# Patient Record
Sex: FEMALE | Race: WHITE | NOT HISPANIC OR LATINO | Employment: FULL TIME | URBAN - NONMETROPOLITAN AREA
[De-identification: names, ages, dates, MRNs, and addresses within clinical notes are randomized per-mention and may not be internally consistent; named-entity substitution may affect disease eponyms.]

---

## 2017-07-14 ENCOUNTER — RADIANT APPOINTMENT (OUTPATIENT)
Dept: GENERAL RADIOLOGY | Facility: CLINIC | Age: 52
End: 2017-07-14
Attending: FAMILY MEDICINE
Payer: COMMERCIAL

## 2017-07-14 DIAGNOSIS — M79.631 PAIN OF RIGHT FOREARM: Primary | ICD-10-CM

## 2017-07-14 DIAGNOSIS — M79.631 PAIN OF RIGHT FOREARM: ICD-10-CM

## 2017-07-14 PROCEDURE — 73090 X-RAY EXAM OF FOREARM: CPT | Mod: RT

## 2018-04-03 DIAGNOSIS — Z20.1 EXPOSURE TO TB: ICD-10-CM

## 2018-04-03 DIAGNOSIS — Z20.1 EXPOSURE TO TB: Primary | ICD-10-CM

## 2018-04-03 PROCEDURE — 36415 COLL VENOUS BLD VENIPUNCTURE: CPT | Performed by: FAMILY MEDICINE

## 2018-04-03 PROCEDURE — 86480 TB TEST CELL IMMUN MEASURE: CPT | Performed by: FAMILY MEDICINE

## 2018-04-05 LAB
M TB TUBERC IFN-G BLD QL: NEGATIVE
M TB TUBERC IFN-G/MITOGEN IGNF BLD: 0.02 IU/ML

## 2018-09-19 ENCOUNTER — RADIANT APPOINTMENT (OUTPATIENT)
Dept: MAMMOGRAPHY | Facility: CLINIC | Age: 53
End: 2018-09-19
Payer: COMMERCIAL

## 2018-09-19 DIAGNOSIS — Z12.31 VISIT FOR SCREENING MAMMOGRAM: ICD-10-CM

## 2018-09-19 DIAGNOSIS — Z12.11 SPECIAL SCREENING FOR MALIGNANT NEOPLASMS, COLON: Primary | ICD-10-CM

## 2018-09-19 PROCEDURE — 77067 SCR MAMMO BI INCL CAD: CPT | Mod: TC

## 2018-09-20 NOTE — PROGRESS NOTES
[facility] OB/GYN     CC: Annual Exam    HPI: Lucina Santamaria is a 52 year old  female here for annual exam. 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 Bleeding lasted for 2 days. Described as medium flow, dark brown. Denies any STI hx. Has had only 1x partner for 17 years (her ), but has not had intercourse in >1 yr. S/P tubal ligation. Denies any vaginal dryness, although reports intermittent issues with yeast infections and BV - which she treats with OTC medications. No issues currently. Hx of PE on OCPs.   2) Skin tag: had a skin tag near her anus that bothersome, itchy and gets caught on her underwear.    PAP smear history: >5 yrs ago, denies any abnormal PAP smears   Last mammogram: Mammogram was normal, 2018  Colon Cancer: Recent nl fit testing.     Review Of Systems  A 10 pt ROS was completed and found to be negative unless mentioned in the HPI.     Past Medical History:   Diagnosis Date     Gestational diabetes      Pulmonary emboli (H)      Patient Active Problem List   Diagnosis     CARDIOVASCULAR SCREENING; LDL GOAL LESS THAN 130     Past Surgical History:   Procedure Laterality Date     APPENDECTOMY       BREAST BIOPSY, RT/LT      right breast - benign      SECTION       GYN SURGERY      tubal ligation     ORTHOPEDIC SURGERY      radius, ulnar - open fracture     Family History   Problem Relation Age of Onset     Diabetes Mother      Cancer Father      Arthritis Maternal Grandmother      rheumatoid arthritis     Diabetes Paternal Grandmother      Depression Brother      bipolar     Cancer       breast   Denies any family history of endometrial, ovarian or cervical cancer.     Social History     Social History     Marital status: Single     Spouse name: N/A     Number of  "children: N/A     Years of education: N/A     Occupational History     Not on file.     Social History Main Topics     Smoking status: Never Smoker     Smokeless tobacco: Never Used     Alcohol use Yes      Comment: rare     Drug use: No     Sexual activity: Yes     Partners: Male     Birth control/ protection: Surgical     Other Topics Concern     Not on file     Social History Narrative   Partnered for 17 years. 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     No known allergies  Current Outpatient Prescriptions   Medication Sig Dispense Refill     ASPIRIN CAPS 81 MG OR one capsule by mouth daily 100 3     Calcium Carbonate-Vit D-Min (CALCIUM 1200 PO) Take  by mouth.       Cholecalciferol (VITAMIN D) 2000 UNIT tablet Take 1 tablet by mouth daily.       Multiple Vitamin (MULTIVITAMIN) per tablet Take 1 tablet by mouth daily.       Objective:    Exam:  /75 (BP Location: Left arm, Patient Position: Chair, Cuff Size: Adult Large)  Pulse 73  Temp 98.1  F (36.7  C) (Tympanic)  Resp 16  Ht 5' 6.5\" (1.689 m)  Wt 316 lb 3.2 oz (143.4 kg)  BMI 50.27 kg/m2  Constitutional: Healthy appearing morbidly obese female, no acute distress  Cardiovascular: warm and well-perfused  Respiratory: breathing comfortably on room air   Gastrointestinal: Abdomen soft, no masses, organomegaly  Pelvic Exam - : External genitalia normal well-estrogenized, healthy tissue.  No obvious excoriations, lesions, or rashes. Bartholins, urethra, skeins normal.  Normal pink vaginal mucosa. Cervix without lesions. Uterus sounded to 10cm in length. Bimanual exam limited by obesity, but uterus anteverted with no appreciable adnexal enlargement.   Skin: 5mm skin tag, darkly pigmented, inferior to anus at 5 o'clock   Psychiatric: mentation appears normal and affect normal/bright    UPT: negative    ASSESSMENT: 52 year old  female here for annual routine pap and checkup.     PLAN:   -Cervical cancer screening: history " of abnormal PAP smear so plan for co-testing q5 yrs. Collected now.  -Breast cancer screening: no increased risk of breast cancer, plan for annual mammogram - completed 9/19  -Colon cancer screening: recent FIT testing nl   -Annual exam labs drawn: TSH, HgbA1c, lipid panel - return when fasting.   -Hypertension: reviewed BP with patient and she plans to f/u with primary care NP at her own clinic   -Smoking: pt is a non-smoker  -Weight: Body mass index is 50.27 kg/(m^2)., pt has plans to complete bariatric surgery and has completed the training to do so, waiting until children are out of HS.   -Immunizations: plan for flu shot at her own clinic  -Postmenopausal bleeding: UPT neg. Given BMI Body mass index is 50.27 kg/(m^2). pt is a high risk for endometrial cancer. Endometrial biopsy obtained today. Consider pelvic US to assess for fibroid or polyp once GYN malignancy or hyperplasia is ruled out. S/p biopsy - see procedure note  -Skin tag: s/p removal - see procedure note    Yumi Kay MD  Obstetrics and Gynecology  [facility]

## 2018-09-21 DIAGNOSIS — Z12.11 SPECIAL SCREENING FOR MALIGNANT NEOPLASMS, COLON: ICD-10-CM

## 2018-09-21 PROCEDURE — 82274 ASSAY TEST FOR BLOOD FECAL: CPT | Performed by: FAMILY MEDICINE

## 2018-09-22 LAB — HEMOCCULT STL QL IA: NEGATIVE

## 2018-09-24 ENCOUNTER — OFFICE VISIT (OUTPATIENT)
Dept: OBGYN | Facility: CLINIC | Age: 53
End: 2018-09-24
Payer: COMMERCIAL

## 2018-09-24 VITALS
BODY MASS INDEX: 45.99 KG/M2 | TEMPERATURE: 98.1 F | HEART RATE: 73 BPM | DIASTOLIC BLOOD PRESSURE: 75 MMHG | RESPIRATION RATE: 16 BRPM | HEIGHT: 67 IN | SYSTOLIC BLOOD PRESSURE: 141 MMHG | WEIGHT: 293 LBS

## 2018-09-24 DIAGNOSIS — Z01.411 ENCOUNTER FOR GYNECOLOGICAL EXAMINATION WITH ABNORMAL FINDING: ICD-10-CM

## 2018-09-24 DIAGNOSIS — E66.01 MORBID OBESITY (H): ICD-10-CM

## 2018-09-24 DIAGNOSIS — N95.0 POST-MENOPAUSAL BLEEDING: Primary | ICD-10-CM

## 2018-09-24 DIAGNOSIS — K64.4 SKIN TAG OF ANUS: ICD-10-CM

## 2018-09-24 LAB — HCG UR QL: NEGATIVE

## 2018-09-24 PROCEDURE — G0145 SCR C/V CYTO,THINLAYER,RESCR: HCPCS | Performed by: OBSTETRICS & GYNECOLOGY

## 2018-09-24 PROCEDURE — 88305 TISSUE EXAM BY PATHOLOGIST: CPT | Performed by: OBSTETRICS & GYNECOLOGY

## 2018-09-24 PROCEDURE — 99386 PREV VISIT NEW AGE 40-64: CPT | Mod: 25 | Performed by: OBSTETRICS & GYNECOLOGY

## 2018-09-24 PROCEDURE — 11200 RMVL SKIN TAGS UP TO&INC 15: CPT | Performed by: OBSTETRICS & GYNECOLOGY

## 2018-09-24 PROCEDURE — 58100 BIOPSY OF UTERUS LINING: CPT | Performed by: OBSTETRICS & GYNECOLOGY

## 2018-09-24 PROCEDURE — 99214 OFFICE O/P EST MOD 30 MIN: CPT | Mod: 25 | Performed by: OBSTETRICS & GYNECOLOGY

## 2018-09-24 PROCEDURE — 88304 TISSUE EXAM BY PATHOLOGIST: CPT | Performed by: OBSTETRICS & GYNECOLOGY

## 2018-09-24 PROCEDURE — 81025 URINE PREGNANCY TEST: CPT | Performed by: OBSTETRICS & GYNECOLOGY

## 2018-09-24 PROCEDURE — 87624 HPV HI-RISK TYP POOLED RSLT: CPT | Performed by: OBSTETRICS & GYNECOLOGY

## 2018-09-24 NOTE — LETTER
October 2, 2018    Lucina Santamaria  [address]  [place] [address]    Dear Lucina,  We are happy to inform you that your PAP smear result from 9/24/18 is normal.  We are now able to do a follow up test on PAP smears. The DNA test is for HPV (Human Papilloma Virus). Cervical cancer is closely linked with certain types of HPV. Your results showed no evidence of high risk HPV.  Therefore we recommend you return in 3 years for your next pap smear.  You will still need to return to the clinic every year for an annual exam and other preventive tests.  If you have additional questions regarding this result, please call our registered nurse, Tanesha at [phone].  Sincerely,    Yumi Kay MD/leslie

## 2018-09-24 NOTE — PATIENT INSTRUCTIONS
Please go to the lab and have your blood drawn when you are fasting.   We will contact you with the results of your biopsies.   It was a pleasure to meet you!  Sincerely,   Dr. Kay       Preventive Health Recommendations  Female Ages 50 - 64    Yearly exam: See your health care provider every year in order to  o Review health changes.   o Discuss preventive care.    o Review your medicines if your doctor has prescribed any.      Get a Pap test every three years (unless you have an abnormal result and your provider advises testing more often).    If you get Pap tests with HPV test, you only need to test every 5 years, unless you have an abnormal result.     You do not need a Pap test if your uterus was removed (hysterectomy) and you have not had cancer.    You should be tested each year for STDs (sexually transmitted diseases) if you're at risk.     Have a mammogram every 1 to 2 years.    Have a colonoscopy at age 50, or have a yearly FIT test (stool test). These exams screen for colon cancer.      Have a cholesterol test every 5 years, or more often if advised.    Have a diabetes test (fasting glucose) every three years. If you are at risk for diabetes, you should have this test more often.     If you are at risk for osteoporosis (brittle bone disease), think about having a bone density scan (DEXA).    Shots: Get a flu shot each year. Get a tetanus shot every 10 years.    Nutrition:     Eat at least 5 servings of fruits and vegetables each day.    Eat whole-grain bread, whole-wheat pasta and brown rice instead of white grains and rice.    Get adequate Calcium and Vitamin D.     Lifestyle    Exercise at least 150 minutes a week (30 minutes a day, 5 days a week). This will help you control your weight and prevent disease.    Limit alcohol to one drink per day.    No smoking.     Wear sunscreen to prevent skin cancer.     See your dentist every six months for an exam and cleaning.    See your eye doctor every 1 to  2 years.

## 2018-09-24 NOTE — NURSING NOTE
"Initial /75 (BP Location: Left arm, Patient Position: Chair, Cuff Size: Adult Large)  Pulse 73  Temp 98.1  F (36.7  C) (Tympanic)  Resp 16  Ht 5' 6.5\" (1.689 m)  Wt 316 lb 3.2 oz (143.4 kg)  BMI 50.27 kg/m2 Estimated body mass index is 50.27 kg/(m^2) as calculated from the following:    Height as of this encounter: 5' 6.5\" (1.689 m).    Weight as of this encounter: 316 lb 3.2 oz (143.4 kg). .      "

## 2018-09-24 NOTE — MR AVS SNAPSHOT
After Visit Summary   9/24/2018    Lucina Santamaria    MRN: [id number]           Patient Information     Date Of Birth          1965        Visit Information        Provider Department      9/24/2018 10:30 AM Yumi Kay MD [facility]        Today's Diagnoses     Post-menopausal bleeding    -  1    Encounter for gynecological examination with abnormal finding        Morbid obesity (H)        Skin tag of anus          Care Instructions    Please go to the lab and have your blood drawn when you are fasting.   We will contact you with the results of your biopsies.   It was a pleasure to meet you!  Sincerely,   Dr. Kay       Preventive Health Recommendations  Female Ages 50 - 64    Yearly exam: See your health care provider every year in order to  o Review health changes.   o Discuss preventive care.    o Review your medicines if your doctor has prescribed any.      Get a Pap test every three years (unless you have an abnormal result and your provider advises testing more often).    If you get Pap tests with HPV test, you only need to test every 5 years, unless you have an abnormal result.     You do not need a Pap test if your uterus was removed (hysterectomy) and you have not had cancer.    You should be tested each year for STDs (sexually transmitted diseases) if you're at risk.     Have a mammogram every 1 to 2 years.    Have a colonoscopy at age 50, or have a yearly FIT test (stool test). These exams screen for colon cancer.      Have a cholesterol test every 5 years, or more often if advised.    Have a diabetes test (fasting glucose) every three years. If you are at risk for diabetes, you should have this test more often.     If you are at risk for osteoporosis (brittle bone disease), think about having a bone density scan (DEXA).    Shots: Get a flu shot each year. Get a tetanus shot every 10 years.    Nutrition:     Eat at least 5 servings of fruits and  "vegetables each day.    Eat whole-grain bread, whole-wheat pasta and brown rice instead of white grains and rice.    Get adequate Calcium and Vitamin D.     Lifestyle    Exercise at least 150 minutes a week (30 minutes a day, 5 days a week). This will help you control your weight and prevent disease.    Limit alcohol to one drink per day.    No smoking.     Wear sunscreen to prevent skin cancer.     See your dentist every six months for an exam and cleaning.    See your eye doctor every 1 to 2 years.            Follow-ups after your visit        Future tests that were ordered for you today     Open Future Orders        Priority Expected Expires Ordered    **TSH with free T4 reflex FUTURE 1yr Routine 8/25/2019 9/24/2019 9/24/2018    Lipid panel reflex to direct LDL Fasting Routine 8/25/2019 9/24/2019 9/24/2018    **A1C FUTURE 1yr Routine 8/25/2019 9/24/2019 9/24/2018            Who to contact     If you have questions or need follow up information about today's clinic visit or your schedule please contact [facility] directly at [phone].  Normal or non-critical lab and imaging results will be communicated to you by MyChart, letter or phone within 4 business days after the clinic has received the results. If you do not hear from us within 7 days, please contact the clinic through MyChart or phone. If you have a critical or abnormal lab result, we will notify you by phone as soon as possible.  Submit refill requests through [employer] or call your pharmacy and they will forward the refill request to us. Please allow 3 business days for your refill to be completed.          Additional Information About Your Visit        Care EveryWhere ID     This is your Care EveryWhere ID. This could be used by other organizations to access your [facility] medical records  [id number]        Your Vitals Were     Pulse Temperature Respirations Height BMI (Body Mass Index)       73 98.1  F (36.7  C) (Tympanic) 16 5' 6.5\" " (1.689 m) 50.27 kg/m2        Blood Pressure from Last 3 Encounters:   09/24/18 141/75   05/31/12 116/72   07/01/04 140/70    Weight from Last 3 Encounters:   09/24/18 316 lb 3.2 oz (143.4 kg)              We Performed the Following     BIOPSY SKIN/SUBQ/MUC MEM, SINGLE LESION     ENDOMETRIAL BIOPSY W/O CERVICAL DILATION     HCG Qual, Urine - [facility],  [place], [place]  ([id number])     HPV High Risk Types DNA Cervical     Pap imaged thin layer screen with HPV - recommended age 30 - 65 years (select HPV order below)     Surgical pathology exam          Today's Medication Changes          These changes are accurate as of 9/24/18 11:04 AM.  If you have any questions, ask your nurse or doctor.               Stop taking these medicines if you haven't already. Please contact your care team if you have questions.     HYDROcodone-acetaminophen 5-325 MG per tablet   Commonly known as:  NORCO   Stopped by:  Yumi Kay MD           predniSONE 20 MG tablet   Commonly known as:  DELTASONE   Stopped by:  Yumi Kay MD                    Primary Care Provider Office Phone # Fax #    Jessie LAWRENCE MD [place] [phone]       [address] [address]        Equal Access to Services     CATHY MAYORGA AH: Hadii aad ku hadasho Soomaali, waaxda luqadaha, qaybta kaalmada adeegyada, dayna cast. So [facility] [phone].    ATENCIÓN: Si habla español, tiene a israel disposición servicios gratuitos de asistencia lingüística. Llame al [phone].    We comply with applicable federal civil rights laws and [place] laws. We do not discriminate on the basis of race, color, national origin, age, disability, sex, sexual orientation, or gender identity.            Thank you!     Thank you for choosing [facility]  for your care. Our goal is always to provide you with excellent care. Hearing back from our patients is one way we can continue to improve our  services. Please take a few minutes to complete the written survey that you may receive in the mail after your visit with us. Thank you!             Your Updated Medication List - Protect others around you: Learn how to safely use, store and throw away your medicines at www.disposemymeds.org.          This list is accurate as of 9/24/18 11:04 AM.  Always use your most recent med list.                   Brand Name Dispense Instructions for use Diagnosis    ASPIRIN CAPS 81 MG OR     100    one capsule by mouth daily        CALCIUM 1200 PO      Take  by mouth.        multivitamin per tablet      Take 1 tablet by mouth daily.        vitamin D 2000 units tablet      Take 1 tablet by mouth daily.

## 2018-09-26 LAB
COPATH REPORT: NORMAL
PAP: NORMAL

## 2018-09-28 LAB
FINAL DIAGNOSIS: NORMAL
HPV HR 12 DNA CVX QL NAA+PROBE: NEGATIVE
HPV16 DNA SPEC QL NAA+PROBE: NEGATIVE
HPV18 DNA SPEC QL NAA+PROBE: NEGATIVE
SPECIMEN DESCRIPTION: NORMAL
SPECIMEN SOURCE CVX/VAG CYTO: NORMAL

## 2018-09-29 LAB — COPATH REPORT: NORMAL

## 2018-10-01 NOTE — PROGRESS NOTES
Please call Lucina and let her know that the biopsy of her skin tag was seborrheic keratosis, which is benign. Her endometrial biopsy was also benign and her PAP smear was normal, HPV negative. Please ask her to return to clinic if she has any repeat postmenopausal bleeding. Also please have her stop by the lab when she is fasting, in order to have the other blood work drawn that we talked about.   Thanks! Yumi

## 2018-10-01 NOTE — PROGRESS NOTES
Call to pt to notify of below.  Unable to reach.  Left message for pt to call back     Kacy Choudhary   Ob/Gyn Clinic  RN

## 2019-08-07 NOTE — PROGRESS NOTES
Endometrial Biopsy/Skin tag removal Procedure Note    Lucina Santamaria  1965  [id number]    The patient was counseled on the risks (including including risk of infection, bleeding, perforation), benefits, and alternatives of the procedure. Verbal and written consent were obtained. A UPT was negative prior to the procedure.    Technique: The patient was placed in the dorsal lithotomy position.  A speculum was placed in the vagina and the cervix visualized. The cervix was cleaned with betadine swabs x3 and the endometrial rocket was passed through the cervix. Adequate tissue returned and was sent for permanent surgical pathology. Speculum was removed.   Attention was then turned to the skin tag. 2cc of lidocaine plain were injected at the base. The skin tag was grasped with a pickups and excised with a scalpel. The skin tag was placed in formalin and sent for permanent pathology. Hemostasis was obtained with silver nitrate sticks. Pt tolerated both procedures well and EBL 1 ml.     A/P: s/p procedures detailed above. Bleeding and pelvic rest precautions detailed.     Will call to discuss results with patient.     Yumi Kay MD  OB/GYN   9/21/2018   Orthopedics

## 2019-12-31 ENCOUNTER — HOSPITAL ENCOUNTER (OUTPATIENT)
Facility: CLINIC | Age: 54
Discharge: HOME | End: 2019-12-31
Attending: EMERGENCY MEDICINE | Admitting: SURGERY
Payer: COMMERCIAL

## 2019-12-31 ENCOUNTER — APPOINTMENT (OUTPATIENT)
Dept: ULTRASOUND IMAGING | Facility: CLINIC | Age: 54
End: 2019-12-31
Attending: FAMILY MEDICINE
Payer: COMMERCIAL

## 2019-12-31 ENCOUNTER — OFFICE VISIT (OUTPATIENT)
Dept: SURGERY | Facility: CLINIC | Age: 54
End: 2019-12-31
Payer: COMMERCIAL

## 2019-12-31 ENCOUNTER — HOSPITAL ENCOUNTER (EMERGENCY)
Facility: CLINIC | Age: 54
Discharge: HOME | End: 2019-12-31
Attending: FAMILY MEDICINE | Admitting: FAMILY MEDICINE
Payer: COMMERCIAL

## 2019-12-31 ENCOUNTER — ANESTHESIA EVENT (OUTPATIENT)
Dept: SURGERY | Facility: CLINIC | Age: 54
End: 2019-12-31
Payer: COMMERCIAL

## 2019-12-31 ENCOUNTER — ANESTHESIA (OUTPATIENT)
Dept: SURGERY | Facility: CLINIC | Age: 54
End: 2019-12-31
Payer: COMMERCIAL

## 2019-12-31 VITALS
SYSTOLIC BLOOD PRESSURE: 137 MMHG | RESPIRATION RATE: 25 BRPM | TEMPERATURE: 97.4 F | HEART RATE: 72 BPM | DIASTOLIC BLOOD PRESSURE: 82 MMHG | OXYGEN SATURATION: 94 %

## 2019-12-31 VITALS
DIASTOLIC BLOOD PRESSURE: 78 MMHG | HEIGHT: 68 IN | TEMPERATURE: 97.8 F | SYSTOLIC BLOOD PRESSURE: 172 MMHG | OXYGEN SATURATION: 99 % | BODY MASS INDEX: 42.44 KG/M2 | WEIGHT: 280 LBS | RESPIRATION RATE: 18 BRPM

## 2019-12-31 VITALS
HEIGHT: 68 IN | TEMPERATURE: 97.5 F | DIASTOLIC BLOOD PRESSURE: 84 MMHG | SYSTOLIC BLOOD PRESSURE: 140 MMHG | WEIGHT: 293 LBS | BODY MASS INDEX: 44.41 KG/M2

## 2019-12-31 DIAGNOSIS — K80.50 BILIARY COLIC: Primary | ICD-10-CM

## 2019-12-31 DIAGNOSIS — Z90.49 S/P LAPAROSCOPIC CHOLECYSTECTOMY: Primary | ICD-10-CM

## 2019-12-31 DIAGNOSIS — K80.50 BILIARY COLIC: ICD-10-CM

## 2019-12-31 DIAGNOSIS — E66.01 MORBID OBESITY (H): ICD-10-CM

## 2019-12-31 DIAGNOSIS — K80.20 CALCULUS OF GALLBLADDER WITHOUT CHOLECYSTITIS WITHOUT OBSTRUCTION: ICD-10-CM

## 2019-12-31 DIAGNOSIS — K80.10 CHRONIC CHOLECYSTITIS WITH CALCULUS: ICD-10-CM

## 2019-12-31 LAB
ALBUMIN SERPL-MCNC: 3.6 G/DL (ref 3.4–5)
ALBUMIN UR-MCNC: NEGATIVE MG/DL
ALP SERPL-CCNC: 76 U/L (ref 40–150)
ALT SERPL W P-5'-P-CCNC: 29 U/L (ref 0–50)
ANION GAP SERPL CALCULATED.3IONS-SCNC: 4 MMOL/L (ref 3–14)
APPEARANCE UR: CLEAR
AST SERPL W P-5'-P-CCNC: 15 U/L (ref 0–45)
BACTERIA #/AREA URNS HPF: ABNORMAL /HPF
BASOPHILS # BLD AUTO: 0 10E9/L (ref 0–0.2)
BASOPHILS NFR BLD AUTO: 0.5 %
BILIRUB SERPL-MCNC: 0.2 MG/DL (ref 0.2–1.3)
BILIRUB UR QL STRIP: NEGATIVE
BUN SERPL-MCNC: 18 MG/DL (ref 7–30)
CALCIUM SERPL-MCNC: 8.6 MG/DL (ref 8.5–10.1)
CHLORIDE SERPL-SCNC: 109 MMOL/L (ref 94–109)
CO2 SERPL-SCNC: 27 MMOL/L (ref 20–32)
COLOR UR AUTO: YELLOW
CREAT SERPL-MCNC: 0.76 MG/DL (ref 0.52–1.04)
DIFFERENTIAL METHOD BLD: ABNORMAL
EOSINOPHIL NFR BLD AUTO: 5 %
ERYTHROCYTE [DISTWIDTH] IN BLOOD BY AUTOMATED COUNT: 11.9 % (ref 10–15)
GFR SERPL CREATININE-BSD FRML MDRD: 89 ML/MIN/{1.73_M2}
GLUCOSE SERPL-MCNC: 120 MG/DL (ref 70–99)
GLUCOSE UR STRIP-MCNC: NEGATIVE MG/DL
HCT VFR BLD AUTO: 39.6 % (ref 35–47)
HGB BLD-MCNC: 13.1 G/DL (ref 11.7–15.7)
HGB UR QL STRIP: ABNORMAL
IMM GRANULOCYTES # BLD: 0 10E9/L (ref 0–0.4)
IMM GRANULOCYTES NFR BLD: 0.2 %
KETONES UR STRIP-MCNC: NEGATIVE MG/DL
LEUKOCYTE ESTERASE UR QL STRIP: NEGATIVE
LIPASE SERPL-CCNC: 49 U/L (ref 73–393)
LYMPHOCYTES # BLD AUTO: 1.8 10E9/L (ref 0.8–5.3)
LYMPHOCYTES NFR BLD AUTO: 22.1 %
MCH RBC QN AUTO: 33.2 PG (ref 26.5–33)
MCHC RBC AUTO-ENTMCNC: 33.1 G/DL (ref 31.5–36.5)
MCV RBC AUTO: 101 FL (ref 78–100)
MONOCYTES # BLD AUTO: 0.4 10E9/L (ref 0–1.3)
MONOCYTES NFR BLD AUTO: 5.4 %
MUCOUS THREADS #/AREA URNS LPF: PRESENT /LPF
NEUTROPHILS # BLD AUTO: 5.4 10E9/L (ref 1.6–8.3)
NEUTROPHILS NFR BLD AUTO: 66.8 %
NITRATE UR QL: NEGATIVE
NRBC # BLD AUTO: 0 10*3/UL
NRBC BLD AUTO-RTO: 0 /100
PH UR STRIP: 6 PH (ref 5–7)
PLATELET # BLD AUTO: 179 10E9/L (ref 150–450)
POTASSIUM SERPL-SCNC: 4.3 MMOL/L (ref 3.4–5.3)
PROT SERPL-MCNC: 7.1 G/DL (ref 6.8–8.8)
RBC # BLD AUTO: 3.94 10E12/L (ref 3.8–5.2)
RBC #/AREA URNS AUTO: 1 /HPF (ref 0–2)
SODIUM SERPL-SCNC: 140 MMOL/L (ref 133–144)
SOURCE: ABNORMAL
SP GR UR STRIP: 1.01 (ref 1–1.03)
SQUAMOUS #/AREA URNS AUTO: 1 /HPF (ref 0–1)
UROBILINOGEN UR STRIP-MCNC: 0 MG/DL (ref 0–2)
WBC # BLD AUTO: 8.2 10E9/L (ref 4–11)
WBC #/AREA URNS AUTO: 1 /HPF (ref 0–5)

## 2019-12-31 PROCEDURE — 25800030 ZZH RX IP 258 OP 636: Performed by: FAMILY MEDICINE

## 2019-12-31 PROCEDURE — 93010 ELECTROCARDIOGRAM REPORT: CPT | Mod: Z6 | Performed by: FAMILY MEDICINE

## 2019-12-31 PROCEDURE — 80053 COMPREHEN METABOLIC PANEL: CPT | Performed by: FAMILY MEDICINE

## 2019-12-31 PROCEDURE — 96372 THER/PROPH/DIAG INJ SC/IM: CPT | Performed by: EMERGENCY MEDICINE

## 2019-12-31 PROCEDURE — 99285 EMERGENCY DEPT VISIT HI MDM: CPT | Mod: Z6 | Performed by: EMERGENCY MEDICINE

## 2019-12-31 PROCEDURE — 85025 COMPLETE CBC W/AUTO DIFF WBC: CPT | Performed by: FAMILY MEDICINE

## 2019-12-31 PROCEDURE — 25000128 H RX IP 250 OP 636: Performed by: NURSE ANESTHETIST, CERTIFIED REGISTERED

## 2019-12-31 PROCEDURE — 25000132 ZZH RX MED GY IP 250 OP 250 PS 637: Performed by: SURGERY

## 2019-12-31 PROCEDURE — 96375 TX/PRO/DX INJ NEW DRUG ADDON: CPT | Performed by: FAMILY MEDICINE

## 2019-12-31 PROCEDURE — 81001 URINALYSIS AUTO W/SCOPE: CPT | Performed by: FAMILY MEDICINE

## 2019-12-31 PROCEDURE — 71000014 ZZH RECOVERY PHASE 1 LEVEL 2 FIRST HR: Performed by: SURGERY

## 2019-12-31 PROCEDURE — 99285 EMERGENCY DEPT VISIT HI MDM: CPT | Mod: 25 | Performed by: FAMILY MEDICINE

## 2019-12-31 PROCEDURE — 83690 ASSAY OF LIPASE: CPT | Performed by: FAMILY MEDICINE

## 2019-12-31 PROCEDURE — 25000125 ZZHC RX 250: Performed by: SURGERY

## 2019-12-31 PROCEDURE — 25000128 H RX IP 250 OP 636: Performed by: SURGERY

## 2019-12-31 PROCEDURE — 37000008 ZZH ANESTHESIA TECHNICAL FEE, 1ST 30 MIN: Performed by: SURGERY

## 2019-12-31 PROCEDURE — 96361 HYDRATE IV INFUSION ADD-ON: CPT | Performed by: FAMILY MEDICINE

## 2019-12-31 PROCEDURE — 96374 THER/PROPH/DIAG INJ IV PUSH: CPT | Performed by: FAMILY MEDICINE

## 2019-12-31 PROCEDURE — 93005 ELECTROCARDIOGRAM TRACING: CPT | Performed by: FAMILY MEDICINE

## 2019-12-31 PROCEDURE — 37000009 ZZH ANESTHESIA TECHNICAL FEE, EACH ADDTL 15 MIN: Performed by: SURGERY

## 2019-12-31 PROCEDURE — 36000056 ZZH SURGERY LEVEL 3 1ST 30 MIN: Performed by: SURGERY

## 2019-12-31 PROCEDURE — 25800030 ZZH RX IP 258 OP 636: Performed by: NURSE ANESTHETIST, CERTIFIED REGISTERED

## 2019-12-31 PROCEDURE — 36000058 ZZH SURGERY LEVEL 3 EA 15 ADDTL MIN: Performed by: SURGERY

## 2019-12-31 PROCEDURE — 88304 TISSUE EXAM BY PATHOLOGIST: CPT | Performed by: SURGERY

## 2019-12-31 PROCEDURE — 27210794 ZZH OR GENERAL SUPPLY STERILE: Performed by: SURGERY

## 2019-12-31 PROCEDURE — 25800030 ZZH RX IP 258 OP 636: Performed by: SURGERY

## 2019-12-31 PROCEDURE — 25000564 ZZH DESFLURANE, EA 15 MIN: Performed by: SURGERY

## 2019-12-31 PROCEDURE — 25000128 H RX IP 250 OP 636: Performed by: FAMILY MEDICINE

## 2019-12-31 PROCEDURE — 76705 ECHO EXAM OF ABDOMEN: CPT

## 2019-12-31 PROCEDURE — 88304 TISSUE EXAM BY PATHOLOGIST: CPT | Mod: 26 | Performed by: SURGERY

## 2019-12-31 PROCEDURE — 99204 OFFICE O/P NEW MOD 45 MIN: CPT | Performed by: SURGERY

## 2019-12-31 PROCEDURE — 99285 EMERGENCY DEPT VISIT HI MDM: CPT | Mod: 25 | Performed by: EMERGENCY MEDICINE

## 2019-12-31 PROCEDURE — 25000125 ZZHC RX 250: Performed by: NURSE ANESTHETIST, CERTIFIED REGISTERED

## 2019-12-31 PROCEDURE — 71000027 ZZH RECOVERY PHASE 2 EACH 15 MINS: Performed by: SURGERY

## 2019-12-31 PROCEDURE — 96375 TX/PRO/DX INJ NEW DRUG ADDON: CPT | Mod: 59 | Performed by: EMERGENCY MEDICINE

## 2019-12-31 PROCEDURE — 47562 LAPAROSCOPIC CHOLECYSTECTOMY: CPT | Mod: 22 | Performed by: SURGERY

## 2019-12-31 PROCEDURE — 96374 THER/PROPH/DIAG INJ IV PUSH: CPT | Performed by: EMERGENCY MEDICINE

## 2019-12-31 PROCEDURE — 25000128 H RX IP 250 OP 636: Performed by: EMERGENCY MEDICINE

## 2019-12-31 RX ORDER — FENTANYL CITRATE 50 UG/ML
INJECTION, SOLUTION INTRAMUSCULAR; INTRAVENOUS PRN
Status: DISCONTINUED | OUTPATIENT
Start: 2019-12-31 | End: 2019-12-31

## 2019-12-31 RX ORDER — LIDOCAINE HYDROCHLORIDE 20 MG/ML
INJECTION, SOLUTION INFILTRATION; PERINEURAL PRN
Status: DISCONTINUED | OUTPATIENT
Start: 2019-12-31 | End: 2019-12-31

## 2019-12-31 RX ORDER — SODIUM CHLORIDE 9 MG/ML
1000 INJECTION, SOLUTION INTRAVENOUS CONTINUOUS
Status: DISCONTINUED | OUTPATIENT
Start: 2019-12-31 | End: 2019-12-31 | Stop reason: HOSPADM

## 2019-12-31 RX ORDER — DEXAMETHASONE SODIUM PHOSPHATE 10 MG/ML
INJECTION, SOLUTION INTRAMUSCULAR; INTRAVENOUS PRN
Status: DISCONTINUED | OUTPATIENT
Start: 2019-12-31 | End: 2019-12-31

## 2019-12-31 RX ORDER — ONDANSETRON 2 MG/ML
4 INJECTION INTRAMUSCULAR; INTRAVENOUS EVERY 30 MIN PRN
Status: DISCONTINUED | OUTPATIENT
Start: 2019-12-31 | End: 2019-12-31 | Stop reason: HOSPADM

## 2019-12-31 RX ORDER — FENTANYL CITRATE 50 UG/ML
25-50 INJECTION, SOLUTION INTRAMUSCULAR; INTRAVENOUS
Status: DISCONTINUED | OUTPATIENT
Start: 2019-12-31 | End: 2019-12-31 | Stop reason: HOSPADM

## 2019-12-31 RX ORDER — HYDRALAZINE HYDROCHLORIDE 20 MG/ML
2.5-5 INJECTION INTRAMUSCULAR; INTRAVENOUS EVERY 10 MIN PRN
Status: DISCONTINUED | OUTPATIENT
Start: 2019-12-31 | End: 2019-12-31 | Stop reason: HOSPADM

## 2019-12-31 RX ORDER — NALOXONE HYDROCHLORIDE 0.4 MG/ML
.1-.4 INJECTION, SOLUTION INTRAMUSCULAR; INTRAVENOUS; SUBCUTANEOUS
Status: DISCONTINUED | OUTPATIENT
Start: 2019-12-31 | End: 2019-12-31 | Stop reason: HOSPADM

## 2019-12-31 RX ORDER — HYDROMORPHONE HYDROCHLORIDE 1 MG/ML
.3-.5 INJECTION, SOLUTION INTRAMUSCULAR; INTRAVENOUS; SUBCUTANEOUS EVERY 10 MIN PRN
Status: DISCONTINUED | OUTPATIENT
Start: 2019-12-31 | End: 2019-12-31 | Stop reason: HOSPADM

## 2019-12-31 RX ORDER — FENTANYL CITRATE 50 UG/ML
50 INJECTION, SOLUTION INTRAMUSCULAR; INTRAVENOUS ONCE
Status: COMPLETED | OUTPATIENT
Start: 2019-12-31 | End: 2019-12-31

## 2019-12-31 RX ORDER — CEFAZOLIN SODIUM IN 0.9 % NACL 3 G/100 ML
3 INTRAVENOUS SOLUTION, PIGGYBACK (ML) INTRAVENOUS
Status: COMPLETED | OUTPATIENT
Start: 2019-12-31 | End: 2019-12-31

## 2019-12-31 RX ORDER — OXYCODONE AND ACETAMINOPHEN 5; 325 MG/1; MG/1
1 TABLET ORAL EVERY 6 HOURS PRN
Qty: 12 TABLET | Refills: 0 | Status: SHIPPED | OUTPATIENT
Start: 2019-12-31 | End: 2020-11-27

## 2019-12-31 RX ORDER — FENTANYL CITRATE 50 UG/ML
100 INJECTION, SOLUTION INTRAMUSCULAR; INTRAVENOUS ONCE
Status: COMPLETED | OUTPATIENT
Start: 2019-12-31 | End: 2019-12-31

## 2019-12-31 RX ORDER — SODIUM CHLORIDE, SODIUM LACTATE, POTASSIUM CHLORIDE, CALCIUM CHLORIDE 600; 310; 30; 20 MG/100ML; MG/100ML; MG/100ML; MG/100ML
INJECTION, SOLUTION INTRAVENOUS CONTINUOUS PRN
Status: DISCONTINUED | OUTPATIENT
Start: 2019-12-31 | End: 2019-12-31

## 2019-12-31 RX ORDER — DIMENHYDRINATE 50 MG/ML
25 INJECTION, SOLUTION INTRAMUSCULAR; INTRAVENOUS
Status: DISCONTINUED | OUTPATIENT
Start: 2019-12-31 | End: 2019-12-31 | Stop reason: HOSPADM

## 2019-12-31 RX ORDER — BUPIVACAINE HYDROCHLORIDE AND EPINEPHRINE 2.5; 5 MG/ML; UG/ML
INJECTION, SOLUTION INFILTRATION; PERINEURAL PRN
Status: DISCONTINUED | OUTPATIENT
Start: 2019-12-31 | End: 2019-12-31 | Stop reason: HOSPADM

## 2019-12-31 RX ORDER — MEPERIDINE HYDROCHLORIDE 25 MG/ML
12.5 INJECTION INTRAMUSCULAR; INTRAVENOUS; SUBCUTANEOUS
Status: DISCONTINUED | OUTPATIENT
Start: 2019-12-31 | End: 2019-12-31 | Stop reason: HOSPADM

## 2019-12-31 RX ORDER — HYDROMORPHONE HYDROCHLORIDE 1 MG/ML
INJECTION, SOLUTION INTRAMUSCULAR; INTRAVENOUS; SUBCUTANEOUS
Status: DISCONTINUED
Start: 2019-12-31 | End: 2019-12-31 | Stop reason: HOSPADM

## 2019-12-31 RX ORDER — KETOROLAC TROMETHAMINE 30 MG/ML
30 INJECTION, SOLUTION INTRAMUSCULAR; INTRAVENOUS ONCE
Status: COMPLETED | OUTPATIENT
Start: 2019-12-31 | End: 2019-12-31

## 2019-12-31 RX ORDER — SODIUM CHLORIDE, SODIUM LACTATE, POTASSIUM CHLORIDE, CALCIUM CHLORIDE 600; 310; 30; 20 MG/100ML; MG/100ML; MG/100ML; MG/100ML
INJECTION, SOLUTION INTRAVENOUS CONTINUOUS
Status: DISCONTINUED | OUTPATIENT
Start: 2019-12-31 | End: 2019-12-31 | Stop reason: HOSPADM

## 2019-12-31 RX ORDER — OXYCODONE HYDROCHLORIDE 5 MG/1
5-10 TABLET ORAL EVERY 4 HOURS PRN
Qty: 12 TABLET | Refills: 0 | Status: SHIPPED | OUTPATIENT
Start: 2019-12-31 | End: 2020-11-27

## 2019-12-31 RX ORDER — METOPROLOL TARTRATE 1 MG/ML
1-2 INJECTION, SOLUTION INTRAVENOUS EVERY 5 MIN PRN
Status: DISCONTINUED | OUTPATIENT
Start: 2019-12-31 | End: 2019-12-31 | Stop reason: HOSPADM

## 2019-12-31 RX ORDER — PROPOFOL 10 MG/ML
INJECTION, EMULSION INTRAVENOUS PRN
Status: DISCONTINUED | OUTPATIENT
Start: 2019-12-31 | End: 2019-12-31

## 2019-12-31 RX ORDER — HEPARIN SODIUM 5000 [USP'U]/.5ML
5000 INJECTION, SOLUTION INTRAVENOUS; SUBCUTANEOUS
Status: COMPLETED | OUTPATIENT
Start: 2019-12-31 | End: 2019-12-31

## 2019-12-31 RX ORDER — OXYCODONE HYDROCHLORIDE 5 MG/1
5 TABLET ORAL
Status: COMPLETED | OUTPATIENT
Start: 2019-12-31 | End: 2019-12-31

## 2019-12-31 RX ORDER — ONDANSETRON 4 MG/1
4 TABLET, ORALLY DISINTEGRATING ORAL EVERY 6 HOURS PRN
Qty: 12 TABLET | Refills: 0 | Status: SHIPPED | OUTPATIENT
Start: 2019-12-31 | End: 2020-01-05

## 2019-12-31 RX ORDER — ONDANSETRON 2 MG/ML
INJECTION INTRAMUSCULAR; INTRAVENOUS PRN
Status: DISCONTINUED | OUTPATIENT
Start: 2019-12-31 | End: 2019-12-31

## 2019-12-31 RX ORDER — CEFAZOLIN SODIUM 1 G/3ML
1 INJECTION, POWDER, FOR SOLUTION INTRAMUSCULAR; INTRAVENOUS SEE ADMIN INSTRUCTIONS
Status: DISCONTINUED | OUTPATIENT
Start: 2019-12-31 | End: 2019-12-31 | Stop reason: HOSPADM

## 2019-12-31 RX ORDER — KETOROLAC TROMETHAMINE 30 MG/ML
INJECTION, SOLUTION INTRAMUSCULAR; INTRAVENOUS PRN
Status: DISCONTINUED | OUTPATIENT
Start: 2019-12-31 | End: 2019-12-31

## 2019-12-31 RX ORDER — ONDANSETRON 4 MG/1
4 TABLET, ORALLY DISINTEGRATING ORAL EVERY 30 MIN PRN
Status: DISCONTINUED | OUTPATIENT
Start: 2019-12-31 | End: 2019-12-31 | Stop reason: HOSPADM

## 2019-12-31 RX ADMIN — HEPARIN SODIUM 5000 UNITS: 10000 INJECTION, SOLUTION INTRAVENOUS; SUBCUTANEOUS at 17:53

## 2019-12-31 RX ADMIN — FENTANYL CITRATE 50 MCG: 50 INJECTION, SOLUTION INTRAMUSCULAR; INTRAVENOUS at 18:10

## 2019-12-31 RX ADMIN — SODIUM CHLORIDE 1000 ML: 9 INJECTION, SOLUTION INTRAVENOUS at 05:22

## 2019-12-31 RX ADMIN — KETOROLAC TROMETHAMINE 30 MG: 30 INJECTION, SOLUTION INTRAMUSCULAR at 19:20

## 2019-12-31 RX ADMIN — FENTANYL CITRATE 50 MCG: 50 INJECTION, SOLUTION INTRAMUSCULAR; INTRAVENOUS at 18:07

## 2019-12-31 RX ADMIN — CEFAZOLIN SODIUM 3 G: 1 INJECTION, POWDER, FOR SOLUTION INTRAMUSCULAR; INTRAVENOUS at 17:50

## 2019-12-31 RX ADMIN — ROCURONIUM BROMIDE 50 MG: 10 INJECTION INTRAVENOUS at 18:10

## 2019-12-31 RX ADMIN — MIDAZOLAM 1 MG: 1 INJECTION INTRAMUSCULAR; INTRAVENOUS at 18:07

## 2019-12-31 RX ADMIN — DEXAMETHASONE SODIUM PHOSPHATE 10 MG: 10 INJECTION, SOLUTION INTRAMUSCULAR; INTRAVENOUS at 18:23

## 2019-12-31 RX ADMIN — ONDANSETRON 4 MG: 2 INJECTION INTRAMUSCULAR; INTRAVENOUS at 20:18

## 2019-12-31 RX ADMIN — ONDANSETRON 4 MG: 2 INJECTION INTRAMUSCULAR; INTRAVENOUS at 06:30

## 2019-12-31 RX ADMIN — HYDROMORPHONE HYDROCHLORIDE 0.5 MG: 1 INJECTION, SOLUTION INTRAMUSCULAR; INTRAVENOUS; SUBCUTANEOUS at 20:19

## 2019-12-31 RX ADMIN — ROCURONIUM BROMIDE 20 MG: 10 INJECTION INTRAVENOUS at 18:31

## 2019-12-31 RX ADMIN — LIDOCAINE HYDROCHLORIDE 80 MG: 20 INJECTION, SOLUTION INFILTRATION; PERINEURAL at 18:10

## 2019-12-31 RX ADMIN — SUGAMMADEX 200 MG: 100 INJECTION, SOLUTION INTRAVENOUS at 19:40

## 2019-12-31 RX ADMIN — SODIUM CHLORIDE, POTASSIUM CHLORIDE, SODIUM LACTATE AND CALCIUM CHLORIDE: 600; 310; 30; 20 INJECTION, SOLUTION INTRAVENOUS at 18:07

## 2019-12-31 RX ADMIN — HYDROMORPHONE HYDROCHLORIDE 0.5 MG: 1 INJECTION, SOLUTION INTRAMUSCULAR; INTRAVENOUS; SUBCUTANEOUS at 18:26

## 2019-12-31 RX ADMIN — PROPOFOL 200 MG: 10 INJECTION, EMULSION INTRAVENOUS at 18:10

## 2019-12-31 RX ADMIN — FENTANYL CITRATE 100 MCG: 50 INJECTION, SOLUTION INTRAMUSCULAR; INTRAVENOUS at 06:30

## 2019-12-31 RX ADMIN — KETOROLAC TROMETHAMINE 30 MG: 30 INJECTION, SOLUTION INTRAMUSCULAR at 05:23

## 2019-12-31 RX ADMIN — MIDAZOLAM 1 MG: 1 INJECTION INTRAMUSCULAR; INTRAVENOUS at 18:05

## 2019-12-31 RX ADMIN — ONDANSETRON 4 MG: 2 INJECTION INTRAMUSCULAR; INTRAVENOUS at 05:23

## 2019-12-31 RX ADMIN — FENTANYL CITRATE 50 MCG: 50 INJECTION, SOLUTION INTRAMUSCULAR; INTRAVENOUS at 17:47

## 2019-12-31 RX ADMIN — OXYCODONE HYDROCHLORIDE 5 MG: 5 TABLET ORAL at 20:46

## 2019-12-31 RX ADMIN — ONDANSETRON 4 MG: 2 INJECTION INTRAMUSCULAR; INTRAVENOUS at 18:19

## 2019-12-31 RX ADMIN — HYDROMORPHONE HYDROCHLORIDE 0.5 MG: 1 INJECTION, SOLUTION INTRAMUSCULAR; INTRAVENOUS; SUBCUTANEOUS at 20:02

## 2019-12-31 ASSESSMENT — MIFFLIN-ST. JEOR
SCORE: 1928.57
SCORE: 2118.63

## 2019-12-31 NOTE — ED TRIAGE NOTES
"Pt presents with abdominal pain that started about midnight tonight. Generalized throughout belly. Had pain like this \"when I had my babies\". Pt could not get comfortable at home. Had tylenol at around 12-1am. That did not help; tried to use the restroom and that didn't help either.   "

## 2019-12-31 NOTE — ED AVS SNAPSHOT
[facility] Emergency Department  [address] [place] [address]  Phone:  [phone]  Fax:  [phone]                                    Lucina Santamaria   MRN: [id number]    Department:  [facility] Emergency Department   Date of Visit:  12/31/2019           After Visit Summary Signature Page    I have received my discharge instructions, and my questions have been answered. I have discussed any challenges I see with this plan with the nurse or doctor.    ..........................................................................................................................................  Patient/Patient Representative Signature      ..........................................................................................................................................  Patient Representative Print Name and Relationship to Patient    ..................................................               ................................................  Date                                   Time    ..........................................................................................................................................  Reviewed by Signature/Title    ...................................................              ..............................................  Date                                               Time          22EPIC Rev 08/18

## 2019-12-31 NOTE — ANESTHESIA PREPROCEDURE EVALUATION
Anesthesia Pre-Procedure Evaluation    Patient: Lucina Santamaria   MRN: [id number] : 1965          Preoperative Diagnosis: Cholecystitis [K81.9]    Procedure(s):  CHOLECYSTECTOMY, LAPAROSCOPIC    Past Medical History:   Diagnosis Date     Gestational diabetes      Pulmonary emboli (H)      Past Surgical History:   Procedure Laterality Date     APPENDECTOMY       BREAST BIOPSY, RT/LT      right breast - benign      SECTION       GYN SURGERY      tubal ligation     ORTHOPEDIC SURGERY      radius, ulnar - open fracture       Anesthesia Evaluation     . Pt has had prior anesthetic. Type: General and Regional           ROS/MED HX    ENT/Pulmonary:  - neg pulmonary ROS     Neurologic:  - neg neurologic ROS     Cardiovascular:  - neg cardiovascular ROS   (+) ----. : . . . :. . Previous cardiac testing date:results:date: results:ECG reviewed date: results:SB date: results:          METS/Exercise Tolerance:     Hematologic:  - neg hematologic  ROS       Musculoskeletal:   (+) arthritis,  -       GI/Hepatic:  - neg GI/hepatic ROS       Renal/Genitourinary:     (+) Pt has no history of transplant,       Endo:     (+) Obesity, .      Psychiatric:  - neg psychiatric ROS       Infectious Disease:  - neg infectious disease ROS       Malignancy:      - no malignancy   Other:    (+) No chance of pregnancy C-spine cleared: N/A, no H/O Chronic Pain,                        Physical Exam  Normal systems: cardiovascular, pulmonary and dental    Airway   Mallampati: II  TM distance: >3 FB  Neck ROM: full    Dental     Cardiovascular   Rhythm and rate: regular and normal      Pulmonary    breath sounds clear to auscultation            Lab Results   Component Value Date    WBC 8.2 2019    HGB 13.1 2019    HCT 39.6 2019     2019    CRP 11.1 (H) 2016    SED 15 2016     2019    POTASSIUM 4.3 2019    CHLORIDE 109 2019    CO2  "27 12/31/2019    BUN 18 12/31/2019    CR 0.76 12/31/2019     (H) 12/31/2019    LAURA 8.6 12/31/2019    ALBUMIN 3.6 12/31/2019    PROTTOTAL 7.1 12/31/2019    ALT 29 12/31/2019    AST 15 12/31/2019    ALKPHOS 76 12/31/2019    BILITOTAL 0.2 12/31/2019    LIPASE 49 (L) 12/31/2019    TSH 3.10 06/09/2016    HCG Negative 09/24/2018       Preop Vitals  BP Readings from Last 3 Encounters:   12/31/19 (!) 184/88   12/31/19 (!) 140/84   12/31/19 (!) 172/78    Pulse Readings from Last 3 Encounters:   12/31/19 72   09/24/18 73   05/31/12 72      Resp Readings from Last 3 Encounters:   12/31/19 20   12/31/19 18   09/24/18 16    SpO2 Readings from Last 3 Encounters:   12/31/19 97%   12/31/19 99%      Temp Readings from Last 1 Encounters:   12/31/19 97.4  F (36.3  C) (Oral)    Ht Readings from Last 1 Encounters:   12/31/19 1.727 m (5' 8\")      Wt Readings from Last 1 Encounters:   12/31/19 146 kg (321 lb 14.4 oz)    Estimated body mass index is 48.94 kg/m  as calculated from the following:    Height as of an earlier encounter on 12/31/19: 1.727 m (5' 8\").    Weight as of an earlier encounter on 12/31/19: 146 kg (321 lb 14.4 oz).       Anesthesia Plan      History & Physical Review  History and physical reviewed and following examination; no interval change.    ASA Status:  3 emergent.    NPO Status:  > 8 hours    Plan for ETT and RSI with Intravenous and Propofol induction.   PONV prophylaxis:  Dexamethasone or Solumedrol and Ondansetron (or other 5HT-3)  Additional equipment: Videolaryngoscope      Postoperative Care  Postoperative pain management:  Oral pain medications and IV analgesics.      Consents  Anesthetic plan, risks, benefits and alternatives discussed with:  Patient..                 YARON Mclain CRNA  "

## 2019-12-31 NOTE — ED TRIAGE NOTES
Pt here with abdominal pain, was seen earlier and reports that Dr. Doshi told her to come back to the ER so that she can have her gallbladder removed, pt reports taking 2 oxycodone at home without relief of pain

## 2019-12-31 NOTE — PROGRESS NOTES
General Surgery Consultation    Lucina Santamaria MRN# [id number]   Age: 54 year old YOB: 1965     Reason for consult: Abdominal pain, right upper quadrant                        Assessment and Plan:    Lucina Santamaria is a 54 year old female who presented to the ED this morning (12/31) with epigastric pain and RUQ abdominal pain with symptoms, history, and physical exam that are consistent with biliary colic. Ultrasound done in the ED today showed cholelithiasis without evidence of cholecystitis. There was no intra or extrahepatic biliary dilatation. Hepatic duct measured at 0.6 cm.     After discussing options for delay of surgery with pain management vs surgery this evening, pt has elected for surgery today. Because of the holiday, the way to do this is to re-admit through the ED. I discussed this with the patient and she was still willing. Plan to admit and do a laparoscopic cholecystectomy.       ICD-10-CM    1. Biliary colic K80.50    2. Chronic cholecystitis with calculus K80.10    3. Morbid obesity (H) E66.01      I discussed the risks, benefits, and alternatives as below:     The patient was thoroughly counseled regarding their Biliary colic [K80.50].     The patient was informed that the proposed procedure or medical intervention involves removal of the gallbladder laparoscopically (with small incisions and camera) possibly open and does offer a very good likelihood of symptom relief.     The patient was made aware of the risks of the procedure, including but not limited to:    bleeding, conversion to open, bile leak, bile duct injury or other adjacent organ injury, retained gallstones, cardiac or pulmonary related complications and anesthesia complications. Also that difficulties may be encountered during recovery to include: wound or deep intraabdominal infection, wound dehiscence, incisional hernia, bile leak or retained stone requiring ERCP.     In the course of the evaluation we did  discuss other therapeutic options with the patient, including antibiotics and/or drainage. The risks and benefits of these options were also discussed which include but are not limited to: recurrent worsening episodes.     Also discussed were possible problems or difficulties the patient may encounter if treatment was not pursued at this time. These include: worsening episodes, cholangitis and/or pancreatitis.     The patient was informed that [facility], DO will be primarily responsible for the procedure. Assistance during the procedure and during hospitalization may also be provided by other physicians, nurses and technicians.     The patient was also informed that if exposure to the patient s blood or body fluids occurs during the procedure, HIV testing of the patient will occur unless they refuse at this time. Risk of blood transfusion is minimal.     The patient will be provided additional education resources by the support staff. If there are ever any questions regarding their diagnosis or the procedure, the patient is encouraged to ask.     All of the patient s or their legal representative s questions have been answered to their satisfaction and they have indicated a clear understanding of this discussion.   Lucina expressed understanding of risks, benefits and alternatives and wished to proceed.     All findings, test results, and diagnosis were discussed with the patient. Lucina  participated in the decision making process and agreed with the plan of care. Questions were sought and answered.             Chief Complaint:     RUQ pain radiating to back, nausea.            History of Present Illness:   This patient is a 54 year old  female  who presents with symptomatic cholelithiasis.     Yesterday Lucina had one episode of nonbloody, non-greasy loose stools after eating some ricardo pasta. She then woke up at midnight on 12/31 with some abdominal pain, starting in the umbilical region. The  pain then radiated up to right upper quadrant and now radiates through to scapula. Pain was at first intermittent but is now constant. Only had relief with fentanyl. She has a history of a reducible umbilical hernia. Is now nauseated, had chills. No vomiting. No symptoms of heart burn. Distant history of appendectomy (age 12) without complication. PSH also included - tubal ligation and C section.  No CVA tenderness. Had a hx of an uncomplicated UTI a few months ago that was cleared with antibiotics.     No fevers; no chills; not jaundice; no pruritis.      Has been urinating fine, had a small bowel movement today around midnight with no blood or mucous.            Past Medical History:    has a past medical history of Gestational diabetes () and Pulmonary emboli (H) ().          Past Surgical History:     Past Surgical History:   Procedure Laterality Date     APPENDECTOMY       BREAST BIOPSY, RT/LT      right breast - benign      SECTION       GYN SURGERY      tubal ligation     ORTHOPEDIC SURGERY      radius, ulnar - open fracture           Medications:   [COMPLETED] 0.9% sodium chloride BOLUS  [COMPLETED] fentaNYL (PF) (SUBLIMAZE) injection 100 mcg  [COMPLETED] ketorolac (TORADOL) injection 30 mg    Calcium Carbonate-Vit D-Min (CALCIUM 1200 PO), Take  by mouth.  Cholecalciferol (VITAMIN D) 2000 UNIT tablet, Take 1 tablet by mouth daily.  Multiple Vitamin (MULTIVITAMIN) per tablet, Take 1 tablet by mouth daily.  ondansetron (ZOFRAN ODT) 4 MG ODT tab, Take 1 tablet (4 mg) by mouth every 6 hours as needed for nausea  oxyCODONE (ROXICODONE) 5 MG tablet, Take 1-2 tablets (5-10 mg) by mouth every 4 hours as needed for pain  ASPIRIN CAPS 81 MG OR, one capsule by mouth daily (Patient not taking: Reported on 2019)          Allergies:      Allergies   Allergen Reactions     No Known Allergies             Social History:   Lucina CALERO Rosalio  reports that she has never smoked. She has  "never used smokeless tobacco. She reports current alcohol use. She reports that she does not use drugs.          Family History:   The patient has a personal history of  PE. She has no other family history of blood clots, bleeding, or problems with anaesthesia.            Review of Systems:     Constitutional: Denies fever. Had some chills yesterday but no longer.   Eyes: Denies change in visual acuity   HENT: Denies nasal congestion or sore throat   Respiratory: Denies cough or shortness of breath   Cardiovascular: Denies chest pain or edema   GI: Positive for abdominal pain and nausea. Denies vomiting, bloody stools or diarrhea   : Denies dysuria   Musculoskeletal: Denies back pain or joint pain   Integument: Denies rash   Neurologic: Denies headache, focal weakness or sensory changes   Endocrine: Denies polyuria or polydipsia   Lymphatic: Denies swollen glands          Physical Exam:     Vitals: BP (!) 140/84   Temp 97.5  F (36.4  C) (Temporal)   Ht 1.727 m (5' 8\")   Wt 146 kg (321 lb 14.4 oz)   BMI 48.94 kg/m    BMI= Body mass index is 48.94 kg/m .  Constitutional: Awake, alert, in moderate distress.  Eyes:  No scleral icterus.  Conjunctiva are without injection.  ENMT: Mucous membranes moist  Respiratory: No acute respiratory distress  Abdomen: Non-distended, tender with peritoneal signs. Positive murillo's sign. umbilical hernia   Musculoskeletal: No spinal or CVA tenderness. Full range of motion in the upper and lower extremities.    Skin: No skin rashes or lesions to inspection.  No petechia.  Bruise on right arm from IV site in ED earlier today.   Psychiatric: The patient is alert and oriented times 3.  The patient's affect is not blunted and mood is appropriate.          Data:   Vital Signs:  BP (!) 140/84   Temp 97.5  F (36.4  C) (Temporal)   Ht 1.727 m (5' 8\")   Wt 146 kg (321 lb 14.4 oz)   BMI 48.94 kg/m       WBC -   WBC   Date Value Ref Range Status   12/31/2019 8.2 4.0 - 11.0 10e9/L Final "   ], HgB -   Hemoglobin   Date Value Ref Range Status   12/31/2019 13.1 11.7 - 15.7 g/dL Final   ]   Liver Function Studies -   Recent Labs   Lab Test 12/31/19  0525   PROTTOTAL 7.1   ALBUMIN 3.6   BILITOTAL 0.2   ALKPHOS 76   AST 15   ALT 29     Recent Results (from the past 744 hour(s))   US Abdomen Limited (RUQ)    Narrative    US ABDOMEN LIMITED  12/31/2019 6:18 AM      HISTORY: RUQ abd pain, radiating to back, suspect GB disease     COMPARISON: None.    FINDINGS: The liver is normal in size and texture without focal mass.  There is no intra or extrahepatic biliary dilatation. The common  hepatic duct measures 0.6 cm. Several stones in the gallbladder. The  gallbladder otherwise appears normal.  The pancreas head and body  appear normal. The tail is obscured by bowel gas.  The right kidney  measures 11.5 cm and is normal in appearance. The proximal abdominal  aorta and IVC appear normal.       Impression    IMPRESSION:  Cholelithiasis. There is no biliary dilatation or evidence of  cholecystitis.    BOONE SANDOVAL MD               This document serves as a record of the services and decisions personally performed and made by Dr. Doshi.  It was created on her behalf by Julia Carey, a trained medical student and scribe.  The creation of this record is based on the provider's personal observations and the statements of the patient.     Julia Carey, MPH, MS3  December 31, 2019      [facility] Doshi, DO

## 2019-12-31 NOTE — DISCHARGE INSTRUCTIONS
See Dr. Doshi in clinic at 12:30 today.  She may be able to take your gallbladder out on Thursday.  Low-fat diet until then.  You may use the Zofran ODT as needed for nausea on the oxycodone as needed for pain.  Please return to the ED if you develop a fever over 100.4, persistent vomiting, pain not controlled by oral medications or any concerns.  It was a pleasure visiting with both of you this morning.  I am glad you are feeling better and hope you continue to do well.  Good luck with your surgery and recovery.  Happy New Year!    Thank you for choosing [facility]. We appreciate the opportunity to meet your urgent medical needs. Please let us know if we could have done anything to make your stay more satisfying.    After discharge, please closely monitor for any new or worsening symptoms. Return to the Emergency Department if you develop any acute worsening signs or symptoms.    If you received an opiate pain medication or sedative during your visit, please do not drive for at least 8 hours.     If you had lab work, cultures or imaging studies done during your stay, the final results may still be pending. We will call you if your plan of care needs to change. However, if you are not improving as expected, please follow up with your primary care provider or clinic.     Start any prescription medications that were prescribed to you and take them as directed.     Please see additional handouts that may be pertinent to your condition.

## 2019-12-31 NOTE — LETTER
12/31/2019         RE: Lucina Santamaria  [address]  [place] [address]        Dear Colleague,    Thank you for referring your patient, Lucina Santamaria, to the [facility]. Please see a copy of my visit note below.    Lucina to follow up with Primary Care provider regarding elevated blood pressure.    140/84    An RAMIREZ CMA      General Surgery Consultation    Lucina Santamaria MRN# [id number]   Age: 54 year old YOB: 1965     Reason for consult: Abdominal pain, right upper quadrant                        Assessment and Plan:    Lucina Santamaria is a 54 year old female who presented to the ED this morning (12/31) with epigastric pain and RUQ abdominal pain with symptoms, history, and physical exam that are consistent with biliary colic. Ultrasound done in the ED today showed cholelithiasis without evidence of cholecystitis. There was no intra or extrahepatic biliary dilatation. Hepatic duct measured at 0.6 cm.     After discussing options for delay of surgery with pain management vs surgery this evening, pt has elected for surgery today. Because of the holiday, the way to do this is to re-admit through the ED. I discussed this with the patient and she was still willing. Plan to admit and do a laparoscopic cholecystectomy.       ICD-10-CM    1. Biliary colic K80.50    2. Chronic cholecystitis with calculus K80.10    3. Morbid obesity (H) E66.01      I discussed the risks, benefits, and alternatives as below:     The patient was thoroughly counseled regarding their Biliary colic [K80.50].     The patient was informed that the proposed procedure or medical intervention involves removal of the gallbladder laparoscopically (with small incisions and camera) possibly open and does offer a very good likelihood of symptom relief.     The patient was made aware of the risks of the procedure, including but not limited to:    bleeding, conversion to open, bile leak, bile duct injury or  other adjacent organ injury, retained gallstones, cardiac or pulmonary related complications and anesthesia complications. Also that difficulties may be encountered during recovery to include: wound or deep intraabdominal infection, wound dehiscence, incisional hernia, bile leak or retained stone requiring ERCP.     In the course of the evaluation we did discuss other therapeutic options with the patient, including antibiotics and/or drainage. The risks and benefits of these options were also discussed which include but are not limited to: recurrent worsening episodes.     Also discussed were possible problems or difficulties the patient may encounter if treatment was not pursued at this time. These include: worsening episodes, cholangitis and/or pancreatitis.     The [facility] was [facility] for the procedure. Assistance during the procedure and during hospitalization may also be provided by other physicians, nurses and technicians.     The patient was also informed that if exposure to the patient s blood or body fluids occurs during the procedure, HIV testing of the patient will occur unless they refuse at this time. Risk of blood transfusion is minimal.     The patient will be provided additional education resources by the support staff. If there are ever any questions regarding their diagnosis or the procedure, the patient is encouraged to ask.     All of the patient s or their legal representative s questions have been answered to their satisfaction and they have indicated a clear understanding of this discussion.   Lucina expressed understanding of risks, benefits and alternatives and wished to proceed.     All findings, test results, and diagnosis were discussed with the patient. Lucina  participated in the decision making process and agreed with the plan of care. Questions were sought and answered.             Chief Complaint:     RUQ pain radiating to  back, nausea.            History of Present Illness:   This patient is a 54 year old  female  who presents with symptomatic cholelithiasis.     Yesterday Lucina had one episode of nonbloody, non-greasy loose stools after eating some ricardo pasta. She then woke up at midnight on  with some abdominal pain, starting in the umbilical region. The pain then radiated up to right upper quadrant and now radiates through to scapula. Pain was at first intermittent but is now constant. Only had relief with fentanyl. She has a history of a reducible umbilical hernia. Is now nauseated, had chills. No vomiting. No symptoms of heart burn. Distant history of appendectomy (age 12) without complication. PSH also included - tubal ligation and C section.  No CVA tenderness. Had a hx of an uncomplicated UTI a few months ago that was cleared with antibiotics.     No fevers; no chills; not jaundice; no pruritis.      Has been urinating fine, had a small bowel movement today around midnight with no blood or mucous.            Past Medical History:    has a past medical history of Gestational diabetes () and Pulmonary emboli (H) ().          Past Surgical History:     Past Surgical History:   Procedure Laterality Date     APPENDECTOMY       BREAST BIOPSY, RT/LT      right breast - benign      SECTION       GYN SURGERY      tubal ligation     ORTHOPEDIC SURGERY      radius, ulnar - open fracture           Medications:   [COMPLETED] 0.9% sodium chloride BOLUS  [COMPLETED] fentaNYL (PF) (SUBLIMAZE) injection 100 mcg  [COMPLETED] ketorolac (TORADOL) injection 30 mg    Calcium Carbonate-Vit D-Min (CALCIUM 1200 PO), Take  by mouth.  Cholecalciferol (VITAMIN D) 2000 UNIT tablet, Take 1 tablet by mouth daily.  Multiple Vitamin (MULTIVITAMIN) per tablet, Take 1 tablet by mouth daily.  ondansetron (ZOFRAN ODT) 4 MG ODT tab, Take 1 tablet (4 mg) by mouth every 6 hours as needed for nausea  oxyCODONE  "(ROXICODONE) 5 MG tablet, Take 1-2 tablets (5-10 mg) by mouth every 4 hours as needed for pain  ASPIRIN CAPS 81 MG OR, one capsule by mouth daily (Patient not taking: Reported on 12/31/2019)          Allergies:      Allergies   Allergen Reactions     No Known Allergies             Social History:   Lucina Santamaria  reports that she has never smoked. She has never used smokeless tobacco. She reports current alcohol use. She reports that she does not use drugs.          Family History:   The patient has a personal history of  PE. She has no other family history of blood clots, bleeding, or problems with anaesthesia.            Review of Systems:     Constitutional: Denies fever. Had some chills yesterday but no longer.   Eyes: Denies change in visual acuity   HENT: Denies nasal congestion or sore throat   Respiratory: Denies cough or shortness of breath   Cardiovascular: Denies chest pain or edema   GI: Positive for abdominal pain and nausea. Denies vomiting, bloody stools or diarrhea   : Denies dysuria   Musculoskeletal: Denies back pain or joint pain   Integument: Denies rash   Neurologic: Denies headache, focal weakness or sensory changes   Endocrine: Denies polyuria or polydipsia   Lymphatic: Denies swollen glands          Physical Exam:     Vitals: BP (!) 140/84   Temp 97.5  F (36.4  C) (Temporal)   Ht 1.727 m (5' 8\")   Wt 146 kg (321 lb 14.4 oz)   BMI 48.94 kg/m     BMI= Body mass index is 48.94 kg/m .  Constitutional: Awake, alert, in moderate distress.  Eyes:  No scleral icterus.  Conjunctiva are without injection.  ENMT: Mucous membranes moist  Respiratory: No acute respiratory distress  Abdomen: Non-distended, tender with peritoneal signs. Positive murillo's sign. umbilical hernia   Musculoskeletal: No spinal or CVA tenderness. Full range of motion in the upper and lower extremities.    Skin: No skin rashes or lesions to inspection.  No petechia.  Bruise on right arm from IV site in ED earlier today. " "  Psychiatric: The patient is alert and oriented times 3.  The patient's affect is not blunted and mood is appropriate.          Data:   Vital Signs:  BP (!) 140/84   Temp 97.5  F (36.4  C) (Temporal)   Ht 1.727 m (5' 8\")   Wt 146 kg (321 lb 14.4 oz)   BMI 48.94 kg/m        WBC -   WBC   Date Value Ref Range Status   12/31/2019 8.2 4.0 - 11.0 10e9/L Final   ], HgB -   Hemoglobin   Date Value Ref Range Status   12/31/2019 13.1 11.7 - 15.7 g/dL Final   ]   Liver Function Studies -   Recent Labs   Lab Test 12/31/19  0525   PROTTOTAL 7.1   ALBUMIN 3.6   BILITOTAL 0.2   ALKPHOS 76   AST 15   ALT 29     Recent Results (from the past 744 hour(s))   US Abdomen Limited (RUQ)    Narrative    US ABDOMEN LIMITED  12/31/2019 6:18 AM      HISTORY: RUQ abd pain, radiating to back, suspect GB disease     COMPARISON: None.    FINDINGS: The liver is normal in size and texture without focal mass.  There is no intra or extrahepatic biliary dilatation. The common  hepatic duct measures 0.6 cm. Several stones in the gallbladder. The  gallbladder otherwise appears normal.  The pancreas head and body  appear normal. The tail is obscured by bowel gas.  The right kidney  measures 11.5 cm and is normal in appearance. The proximal abdominal  aorta and IVC appear normal.       Impression    IMPRESSION:  Cholelithiasis. There is no biliary dilatation or evidence of  cholecystitis.    BOONE SANDOVAL MD               This document serves as a record of the services and decisions personally performed and made by Dr. Doshi.  It was created on her behalf by Julia Carey, a trained medical student and scribe.  The creation of this record is based on the provider's personal observations and the statements of the patient.     Julia Carey, MPH, MS3  December 31, 2019      [facility] Glenda DO            Again, thank you for allowing me to participate in the care of your patient.        Sincerely,        BarronSuraj Doshi MD    "

## 2019-12-31 NOTE — ED PROVIDER NOTES
History     Chief Complaint   Patient presents with     Abdominal Pain     HPI  Lucina Santamaria is a 54 year old female who presents to the ED this morning with epigastric and right upper quadrant abdominal pain that started around 12 midnight.  It radiates through to her back.  She is slightly nauseous because of the pain.  Had chicken Sudhir about 8 PM and then some pretzels and a Snickers bar around 12 midnight shortly before the onset.  Still has her gallbladder.  Status post appendectomy, tubal ligation and .  No fevers chills or sweats.  No dysuria.  No shortness of breath or chest pressure.    Works at the [employer].    Allergies:  Allergies   Allergen Reactions     No Known Allergies        Problem List:    Patient Active Problem List    Diagnosis Date Noted     Morbid obesity (H) 2018     Priority: Medium     CARDIOVASCULAR SCREENING; LDL GOAL LESS THAN 130 2012     Priority: Medium        Past Medical History:    Past Medical History:   Diagnosis Date     Gestational diabetes      Pulmonary emboli (H)        Past Surgical History:    Past Surgical History:   Procedure Laterality Date     APPENDECTOMY       BREAST BIOPSY, RT/LT      right breast - benign      SECTION       GYN SURGERY      tubal ligation     ORTHOPEDIC SURGERY      radius, ulnar - open fracture       Family History:    Family History   Problem Relation Age of Onset     Cancer Father      Depression Brother         bipolar     Diabetes Mother      Arthritis Maternal Grandmother         rheumatoid arthritis     Diabetes Paternal Grandmother      Cancer Other         breast       Social History:  Marital Status:  Single [1]  Social History     Tobacco Use     Smoking status: Never Smoker     Smokeless tobacco: Never Used   Substance Use Topics     Alcohol use: Yes     Comment: rare     Drug use: No        Medications:    ondansetron (ZOFRAN ODT) 4 MG ODT  "tab  oxyCODONE (ROXICODONE) 5 MG tablet  ASPIRIN CAPS 81 MG OR  Calcium Carbonate-Vit D-Min (CALCIUM 1200 PO)  Cholecalciferol (VITAMIN D) 2000 UNIT tablet  Multiple Vitamin (MULTIVITAMIN) per tablet          Review of Systems   All other systems reviewed and are negative.      Physical Exam   BP: (!) 196/108  Heart Rate: 65  Temp: 97.8  F (36.6  C)  Resp: 18  Height: 172.7 cm (5' 8\")  Weight: 127 kg (280 lb)  SpO2: 99 %      Physical Exam  Constitutional:       General: She is in acute distress ( Moderate, cannot get comfortable.).      Appearance: She is well-developed. She is obese.   HENT:      Mouth/Throat:      Mouth: Mucous membranes are moist.      Pharynx: Oropharynx is clear.   Cardiovascular:      Rate and Rhythm: Normal rate and regular rhythm.   Pulmonary:      Effort: Pulmonary effort is normal.      Breath sounds: Normal breath sounds.   Abdominal:      Tenderness: There is abdominal tenderness in the right upper quadrant and epigastric area. There is no right CVA tenderness, guarding or rebound. Positive signs include Rich's sign.      Hernia: A hernia is present. Hernia is present in the umbilical area ( Slightly tender but reducible.  States it is always a little tender).   Musculoskeletal: Normal range of motion.   Skin:     General: Skin is warm and dry.   Neurological:      General: No focal deficit present.      Mental Status: She is alert.   Psychiatric:         Mood and Affect: Mood normal.         ED Course  (with Medical Decision Making)    54-year-old female with right upper quadrant epigastric pain starting around 12 midnight tonight.  It does radiate to her back and is suspicious for gallbladder etiology.  IV placed.  Labs drawn.  Toradol for pain.  Zofran for nausea.  1 L normal saline.  Right upper quadrant ultrasound ordered.    She did not realize any pain relief with the Toradol.  She was given fentanyl 100 mcg IV and had excellent relief.    WBC normal at 8.2.  LFTs and lipase " are normal.  UA was unremarkable.  Right upper quadrant ultrasound shows cholelithiasis without biliary ductal dilatation or gallbladder wall thickening.    0647:  I spoke with Dr. Zeng from general surgery.  He could see her in clinic later this week but since she lives a fair distance away, we could try to get her into see 1 of his partners later today.  I spoke with Dr. Doshi who would be happy to see her in the clinic at 1230 today.  Might be able to take her gallbladder out on Thursday, January 2, 2020.  Low-fat diet until then.  Oxycodone for pain.  Zofran for nausea.  Will do an EKG today in anticipation of surgery later this week.    EKG shows a sinus bradycardia at 53 bpm.  No acute ischemic changes.    She is cleared for surgery and anesthesia of choice.          Procedures           EKG:  Interpreted by Andrea Mcdaniel MD   Sinus bradycardia  Rate:  53 bpm  Normal axis.  No acute ST/T changes.  Normal EKG.     Critical Care time:  none               Results for orders placed or performed during the hospital encounter of 12/31/19 (from the past 24 hour(s))   CBC with platelets differential   Result Value Ref Range    WBC 8.2 4.0 - 11.0 10e9/L    RBC Count 3.94 3.8 - 5.2 10e12/L    Hemoglobin 13.1 11.7 - 15.7 g/dL    Hematocrit 39.6 35.0 - 47.0 %     (H) 78 - 100 fl    MCH 33.2 (H) 26.5 - 33.0 pg    MCHC 33.1 31.5 - 36.5 g/dL    RDW 11.9 10.0 - 15.0 %    Platelet Count 179 150 - 450 10e9/L    Diff Method Automated Method     % Neutrophils 66.8 %    % Lymphocytes 22.1 %    % Monocytes 5.4 %    % Eosinophils 5.0 %    % Basophils 0.5 %    % Immature Granulocytes 0.2 %    Nucleated RBCs 0 0 /100    Absolute Neutrophil 5.4 1.6 - 8.3 10e9/L    Absolute Lymphocytes 1.8 0.8 - 5.3 10e9/L    Absolute Monocytes 0.4 0.0 - 1.3 10e9/L    Absolute Basophils 0.0 0.0 - 0.2 10e9/L    Abs Immature Granulocytes 0.0 0 - 0.4 10e9/L    Absolute Nucleated RBC 0.0    Comprehensive metabolic panel   Result Value  Ref Range    Sodium 140 133 - 144 mmol/L    Potassium 4.3 3.4 - 5.3 mmol/L    Chloride 109 94 - 109 mmol/L    Carbon Dioxide 27 20 - 32 mmol/L    Anion Gap 4 3 - 14 mmol/L    Glucose 120 (H) 70 - 99 mg/dL    Urea Nitrogen 18 7 - 30 mg/dL    Creatinine 0.76 0.52 - 1.04 mg/dL    GFR Estimate 89 >60 mL/min/[1.73_m2]    GFR Estimate If Black >90 >60 mL/min/[1.73_m2]    Calcium 8.6 8.5 - 10.1 mg/dL    Bilirubin Total 0.2 0.2 - 1.3 mg/dL    Albumin 3.6 3.4 - 5.0 g/dL    Protein Total 7.1 6.8 - 8.8 g/dL    Alkaline Phosphatase 76 40 - 150 U/L    ALT 29 0 - 50 U/L    AST 15 0 - 45 U/L   Lipase   Result Value Ref Range    Lipase 49 (L) 73 - 393 U/L   UA with Microscopic   Result Value Ref Range    Color Urine Yellow     Appearance Urine Clear     Glucose Urine Negative NEG^Negative mg/dL    Bilirubin Urine Negative NEG^Negative    Ketones Urine Negative NEG^Negative mg/dL    Specific Gravity Urine 1.014 1.003 - 1.035    Blood Urine Small (A) NEG^Negative    pH Urine 6.0 5.0 - 7.0 pH    Protein Albumin Urine Negative NEG^Negative mg/dL    Urobilinogen mg/dL 0.0 0.0 - 2.0 mg/dL    Nitrite Urine Negative NEG^Negative    Leukocyte Esterase Urine Negative NEG^Negative    Source Midstream Urine     WBC Urine 1 0 - 5 /HPF    RBC Urine 1 0 - 2 /HPF    Bacteria Urine Few (A) NEG^Negative /HPF    Squamous Epithelial /HPF Urine 1 0 - 1 /HPF    Mucous Urine Present (A) NEG^Negative /LPF   US Abdomen Limited (RUQ)    Narrative    US ABDOMEN LIMITED  12/31/2019 6:18 AM      HISTORY: RUQ abd pain, radiating to back, suspect GB disease     COMPARISON: None.    FINDINGS: The liver is normal in size and texture without focal mass.  There is no intra or extrahepatic biliary dilatation. The common  hepatic duct measures 0.6 cm. Several stones in the gallbladder. The  gallbladder otherwise appears normal.  The pancreas head and body  appear normal. The tail is obscured by bowel gas.  The right kidney  measures 11.5 cm and is normal in  appearance. The proximal abdominal  aorta and IVC appear normal.       Impression    IMPRESSION:  Cholelithiasis. There is no biliary dilatation or evidence of  cholecystitis.    BOONE SANDOVAL MD       Medications   0.9% sodium chloride BOLUS (0 mLs Intravenous Stopped 12/31/19 0640)     Followed by   sodium chloride 0.9% infusion (has no administration in time range)   ondansetron (ZOFRAN) injection 4 mg (4 mg Intravenous Given 12/31/19 0630)   ketorolac (TORADOL) injection 30 mg (30 mg Intravenous Given 12/31/19 0523)   fentaNYL (PF) (SUBLIMAZE) injection 100 mcg (100 mcg Intravenous Given 12/31/19 0630)       Assessments & Plan     I have reviewed the nursing notes.    I have reviewed the findings, diagnosis, plan and need for follow up with the patient.       New Prescriptions    ONDANSETRON (ZOFRAN ODT) 4 MG ODT TAB    Take 1 tablet (4 mg) by mouth every 6 hours as needed for nausea    OXYCODONE (ROXICODONE) 5 MG TABLET    Take 1-2 tablets (5-10 mg) by mouth every 4 hours as needed for pain       Final diagnoses:   Biliary colic   Calculus of gallbladder without cholecystitis without obstruction       12/31/2019   [facility] EMERGENCY DEPARTMENT     Andrea Mcdaniel MD  12/31/19 0422

## 2019-12-31 NOTE — ED PROVIDER NOTES
History     Chief Complaint   Patient presents with     Abdominal Pain     HPI  Lucina Santamaria is a 54 year old female who presents to the emergency department at the request of Dr. Doshi for brief evaluation in preparation for a cholecystectomy.  She was seen earlier today for right upper quadrant pain felt to be related to her gallbladder.  She was nauseated due to the pain.  Last time she ate anything was a candy bar around 12 PM but she did have water to drink at 3:30 PM today.  She received Toradol, Zofran and normal saline on her initial presentation.  She ended up needing fentanyl, 100 mcg IV to get relief.  Her white blood cell count was normal.  LFTs and lipase were also normal.  Right upper quadrant ultrasound showed cholelithiasis without biliary ductal dilatation or gallbladder wall thickening.  He had cleared her for surgery.  She saw  in the office today and would not be able to do surgery until Thursday.  She spoke with the patient again who stated she can wait and therefore she was sent back to the emergency department to prepare for surgery.  The patient continues to have right upper quadrant pain.    Since midnight been issues  Episode 14 years ago after having second set of twins, has not had problems since  Sent  home with pain pills and nausea meds, took 2 oxycodone, helped some  Cannot get comfrotable  Has umbilical hernia  Goes up behind right breast into right shoulder  No vomiting   Was not nauseated before this  No fevers, chilled this afternoon  No previous issues with anesthesia  Nothing to eat since midnight last night       Allergies:  Allergies   Allergen Reactions     No Known Allergies        Problem List:    Patient Active Problem List    Diagnosis Date Noted     Morbid obesity (H) 09/24/2018     Priority: Medium     CARDIOVASCULAR SCREENING; LDL GOAL LESS THAN 130 05/31/2012     Priority: Medium        Past Medical History:    Past Medical History:   Diagnosis Date      Gestational diabetes 2003     Pulmonary emboli (H)        Past Surgical History:    Past Surgical History:   Procedure Laterality Date     APPENDECTOMY  1971     BREAST BIOPSY, RT/LT  2000    right breast - benign      SECTION  2005     GYN SURGERY  2005    tubal ligation     ORTHOPEDIC SURGERY  1991    radius, ulnar - open fracture       Family History:    Family History   Problem Relation Age of Onset     Cancer Father      Depression Brother         bipolar     Diabetes Mother      Arthritis Maternal Grandmother         rheumatoid arthritis     Diabetes Paternal Grandmother      Cancer Other         breast       Social History:  Marital Status:  Single [1]  Social History     Tobacco Use     Smoking status: Never Smoker     Smokeless tobacco: Never Used   Substance Use Topics     Alcohol use: Yes     Comment: rare     Drug use: No        Medications:    No current outpatient medications on file.        Review of Systems   All other systems reviewed and are negative.      Physical Exam   BP: (!) 184/88  Pulse: 72  Temp: 97.4  F (36.3  C)  Resp: 20  SpO2: 97 %      Physical Exam  Constitutional:       General: She is not in acute distress.     Appearance: She is well-developed. She is not diaphoretic.   HENT:      Head: Normocephalic and atraumatic.      Mouth/Throat:      Mouth: Mucous membranes are moist.      Pharynx: Oropharynx is clear.   Eyes:      General: No scleral icterus.  Neck:      Musculoskeletal: Normal range of motion and neck supple.   Cardiovascular:      Rate and Rhythm: Normal rate and regular rhythm.   Abdominal:      Tenderness: There is abdominal tenderness in the right upper quadrant.      Comments: Obese   Skin:     General: Skin is warm and dry.      Coloration: Skin is not pale.      Findings: No erythema or rash.   Neurological:      Mental Status: She is alert and oriented to person, place, and time.   Psychiatric:         Mood and Affect: Mood normal.         ED Course         Procedures                 Results for orders placed or performed during the hospital encounter of 12/31/19 (from the past 24 hour(s))   CBC with platelets differential   Result Value Ref Range    WBC 8.2 4.0 - 11.0 10e9/L    RBC Count 3.94 3.8 - 5.2 10e12/L    Hemoglobin 13.1 11.7 - 15.7 g/dL    Hematocrit 39.6 35.0 - 47.0 %     (H) 78 - 100 fl    MCH 33.2 (H) 26.5 - 33.0 pg    MCHC 33.1 31.5 - 36.5 g/dL    RDW 11.9 10.0 - 15.0 %    Platelet Count 179 150 - 450 10e9/L    Diff Method Automated Method     % Neutrophils 66.8 %    % Lymphocytes 22.1 %    % Monocytes 5.4 %    % Eosinophils 5.0 %    % Basophils 0.5 %    % Immature Granulocytes 0.2 %    Nucleated RBCs 0 0 /100    Absolute Neutrophil 5.4 1.6 - 8.3 10e9/L    Absolute Lymphocytes 1.8 0.8 - 5.3 10e9/L    Absolute Monocytes 0.4 0.0 - 1.3 10e9/L    Absolute Basophils 0.0 0.0 - 0.2 10e9/L    Abs Immature Granulocytes 0.0 0 - 0.4 10e9/L    Absolute Nucleated RBC 0.0    Comprehensive metabolic panel   Result Value Ref Range    Sodium 140 133 - 144 mmol/L    Potassium 4.3 3.4 - 5.3 mmol/L    Chloride 109 94 - 109 mmol/L    Carbon Dioxide 27 20 - 32 mmol/L    Anion Gap 4 3 - 14 mmol/L    Glucose 120 (H) 70 - 99 mg/dL    Urea Nitrogen 18 7 - 30 mg/dL    Creatinine 0.76 0.52 - 1.04 mg/dL    GFR Estimate 89 >60 mL/min/[1.73_m2]    GFR Estimate If Black >90 >60 mL/min/[1.73_m2]    Calcium 8.6 8.5 - 10.1 mg/dL    Bilirubin Total 0.2 0.2 - 1.3 mg/dL    Albumin 3.6 3.4 - 5.0 g/dL    Protein Total 7.1 6.8 - 8.8 g/dL    Alkaline Phosphatase 76 40 - 150 U/L    ALT 29 0 - 50 U/L    AST 15 0 - 45 U/L   Lipase   Result Value Ref Range    Lipase 49 (L) 73 - 393 U/L   UA with Microscopic   Result Value Ref Range    Color Urine Yellow     Appearance Urine Clear     Glucose Urine Negative NEG^Negative mg/dL    Bilirubin Urine Negative NEG^Negative    Ketones Urine Negative NEG^Negative mg/dL    Specific Gravity Urine 1.014 1.003 - 1.035    Blood Urine Small (A) NEG^Negative     pH Urine 6.0 5.0 - 7.0 pH    Protein Albumin Urine Negative NEG^Negative mg/dL    Urobilinogen mg/dL 0.0 0.0 - 2.0 mg/dL    Nitrite Urine Negative NEG^Negative    Leukocyte Esterase Urine Negative NEG^Negative    Source Midstream Urine     WBC Urine 1 0 - 5 /HPF    RBC Urine 1 0 - 2 /HPF    Bacteria Urine Few (A) NEG^Negative /HPF    Squamous Epithelial /HPF Urine 1 0 - 1 /HPF    Mucous Urine Present (A) NEG^Negative /LPF   US Abdomen Limited (RUQ)    Narrative    US ABDOMEN LIMITED  12/31/2019 6:18 AM      HISTORY: RUQ abd pain, radiating to back, suspect GB disease     COMPARISON: None.    FINDINGS: The liver is normal in size and texture without focal mass.  There is no intra or extrahepatic biliary dilatation. The common  hepatic duct measures 0.6 cm. Several stones in the gallbladder. The  gallbladder otherwise appears normal.  The pancreas head and body  appear normal. The tail is obscured by bowel gas.  The right kidney  measures 11.5 cm and is normal in appearance. The proximal abdominal  aorta and IVC appear normal.       Impression    IMPRESSION:  Cholelithiasis. There is no biliary dilatation or evidence of  cholecystitis.    BOONE SANDOVAL MD       Medications   ceFAZolin (ANCEF) 1 g vial to attach to  ml bag for ADULT or 50 ml bag for PEDS (has no administration in time range)   ceFAZolin (ANCEF) intermittent infusion 3 g (pre-mix) (3 g Intravenous Given 12/31/19 1750)   heparin ANTICOAGULANT injection 5,000 Units (5,000 Units Subcutaneous Given 12/31/19 1753)   fentaNYL (PF) (SUBLIMAZE) injection 50 mcg (50 mcg Intravenous Given 12/31/19 1747)       Assessments & Plan (with Medical Decision Making)  Right upper quadrant pain concerning for biliary colic.  Patient was already evaluated by surgery go to the operating room for laparoscopic cholecystectomy.  Labs and ultrasound were reviewed from earlier in the day.  The patient was given a dose of IV fentanyl.  She is stable for surgery and is  cleared for surgery.     I have reviewed the nursing notes.    I have reviewed the findings, diagnosis, plan and need for follow up with the patient.      Current Discharge Medication List          Final diagnoses:   Biliary colic       12/31/2019   [facility] EMERGENCY DEPARTMENT     Tristan Banegas MD  12/31/19 4643

## 2019-12-31 NOTE — H&P (VIEW-ONLY)
General Surgery Consultation    Lucina Santamaria MRN# [id number]   Age: 54 year old YOB: 1965     Reason for consult: Abdominal pain, right upper quadrant                        Assessment and Plan:    Lucina Santamaria is a 54 year old female who presented to the ED this morning (12/31) with epigastric pain and RUQ abdominal pain with symptoms, history, and physical exam that are consistent with biliary colic. Ultrasound done in the ED today showed cholelithiasis without evidence of cholecystitis. There was no intra or extrahepatic biliary dilatation. Hepatic duct measured at 0.6 cm.     After discussing options for delay of surgery with pain management vs surgery this evening, pt has elected for surgery today. Because of the holiday, the way to do this is to re-admit through the ED. I discussed this with the patient and she was still willing. Plan to admit and do a laparoscopic cholecystectomy.       ICD-10-CM    1. Biliary colic K80.50    2. Chronic cholecystitis with calculus K80.10    3. Morbid obesity (H) E66.01      I discussed the risks, benefits, and alternatives as below:     The patient was thoroughly counseled regarding their Biliary colic [K80.50].     The patient was informed that the proposed procedure or medical intervention involves removal of the gallbladder laparoscopically (with small incisions and camera) possibly open and does offer a very good likelihood of symptom relief.     The patient was made aware of the risks of the procedure, including but not limited to:    bleeding, conversion to open, bile leak, bile duct injury or other adjacent organ injury, retained gallstones, cardiac or pulmonary related complications and anesthesia complications. Also that difficulties may be encountered during recovery to include: wound or deep intraabdominal infection, wound dehiscence, incisional hernia, bile leak or retained stone requiring ERCP.     In the course of the evaluation we did  discuss other therapeutic options with the patient, including antibiotics and/or drainage. The risks and benefits of these options were also discussed which include but are not limited to: recurrent worsening episodes.     Also discussed were possible problems or difficulties the patient may encounter if treatment was not pursued at this time. These include: worsening episodes, cholangitis and/or pancreatitis.     The patient was informed that [facility], DO will be primarily responsible for the procedure. Assistance during the procedure and during hospitalization may also be provided by other physicians, nurses and technicians.     The patient was also informed that if exposure to the patient s blood or body fluids occurs during the procedure, HIV testing of the patient will occur unless they refuse at this time. Risk of blood transfusion is minimal.     The patient will be provided additional education resources by the support staff. If there are ever any questions regarding their diagnosis or the procedure, the patient is encouraged to ask.     All of the patient s or their legal representative s questions have been answered to their satisfaction and they have indicated a clear understanding of this discussion.   Lucina expressed understanding of risks, benefits and alternatives and wished to proceed.     All findings, test results, and diagnosis were discussed with the patient. Lucina  participated in the decision making process and agreed with the plan of care. Questions were sought and answered.             Chief Complaint:     RUQ pain radiating to back, nausea.            History of Present Illness:   This patient is a 54 year old  female  who presents with symptomatic cholelithiasis.     Yesterday Lucina had one episode of nonbloody, non-greasy loose stools after eating some ricardo pasta. She then woke up at midnight on 12/31 with some abdominal pain, starting in the umbilical region. The  pain then radiated up to right upper quadrant and now radiates through to scapula. Pain was at first intermittent but is now constant. Only had relief with fentanyl. She has a history of a reducible umbilical hernia. Is now nauseated, had chills. No vomiting. No symptoms of heart burn. Distant history of appendectomy (age 12) without complication. PSH also included - tubal ligation and C section.  No CVA tenderness. Had a hx of an uncomplicated UTI a few months ago that was cleared with antibiotics.     No fevers; no chills; not jaundice; no pruritis.      Has been urinating fine, had a small bowel movement today around midnight with no blood or mucous.            Past Medical History:    has a past medical history of Gestational diabetes () and Pulmonary emboli (H) ().          Past Surgical History:     Past Surgical History:   Procedure Laterality Date     APPENDECTOMY       BREAST BIOPSY, RT/LT      right breast - benign      SECTION       GYN SURGERY      tubal ligation     ORTHOPEDIC SURGERY      radius, ulnar - open fracture           Medications:   [COMPLETED] 0.9% sodium chloride BOLUS  [COMPLETED] fentaNYL (PF) (SUBLIMAZE) injection 100 mcg  [COMPLETED] ketorolac (TORADOL) injection 30 mg    Calcium Carbonate-Vit D-Min (CALCIUM 1200 PO), Take  by mouth.  Cholecalciferol (VITAMIN D) 2000 UNIT tablet, Take 1 tablet by mouth daily.  Multiple Vitamin (MULTIVITAMIN) per tablet, Take 1 tablet by mouth daily.  ondansetron (ZOFRAN ODT) 4 MG ODT tab, Take 1 tablet (4 mg) by mouth every 6 hours as needed for nausea  oxyCODONE (ROXICODONE) 5 MG tablet, Take 1-2 tablets (5-10 mg) by mouth every 4 hours as needed for pain  ASPIRIN CAPS 81 MG OR, one capsule by mouth daily (Patient not taking: Reported on 2019)          Allergies:      Allergies   Allergen Reactions     No Known Allergies             Social History:   Lucina CALERO Rosalio  reports that she has never smoked. She has  "never used smokeless tobacco. She reports current alcohol use. She reports that she does not use drugs.          Family History:   The patient has a personal history of  PE. She has no other family history of blood clots, bleeding, or problems with anaesthesia.            Review of Systems:     Constitutional: Denies fever. Had some chills yesterday but no longer.   Eyes: Denies change in visual acuity   HENT: Denies nasal congestion or sore throat   Respiratory: Denies cough or shortness of breath   Cardiovascular: Denies chest pain or edema   GI: Positive for abdominal pain and nausea. Denies vomiting, bloody stools or diarrhea   : Denies dysuria   Musculoskeletal: Denies back pain or joint pain   Integument: Denies rash   Neurologic: Denies headache, focal weakness or sensory changes   Endocrine: Denies polyuria or polydipsia   Lymphatic: Denies swollen glands          Physical Exam:     Vitals: BP (!) 140/84   Temp 97.5  F (36.4  C) (Temporal)   Ht 1.727 m (5' 8\")   Wt 146 kg (321 lb 14.4 oz)   BMI 48.94 kg/m    BMI= Body mass index is 48.94 kg/m .  Constitutional: Awake, alert, in moderate distress.  Eyes:  No scleral icterus.  Conjunctiva are without injection.  ENMT: Mucous membranes moist  Respiratory: No acute respiratory distress  Abdomen: Non-distended, tender with peritoneal signs. Positive murillo's sign. umbilical hernia   Musculoskeletal: No spinal or CVA tenderness. Full range of motion in the upper and lower extremities.    Skin: No skin rashes or lesions to inspection.  No petechia.  Bruise on right arm from IV site in ED earlier today.   Psychiatric: The patient is alert and oriented times 3.  The patient's affect is not blunted and mood is appropriate.          Data:   Vital Signs:  BP (!) 140/84   Temp 97.5  F (36.4  C) (Temporal)   Ht 1.727 m (5' 8\")   Wt 146 kg (321 lb 14.4 oz)   BMI 48.94 kg/m       WBC -   WBC   Date Value Ref Range Status   12/31/2019 8.2 4.0 - 11.0 10e9/L Final "   ], HgB -   Hemoglobin   Date Value Ref Range Status   12/31/2019 13.1 11.7 - 15.7 g/dL Final   ]   Liver Function Studies -   Recent Labs   Lab Test 12/31/19  0525   PROTTOTAL 7.1   ALBUMIN 3.6   BILITOTAL 0.2   ALKPHOS 76   AST 15   ALT 29     Recent Results (from the past 744 hour(s))   US Abdomen Limited (RUQ)    Narrative    US ABDOMEN LIMITED  12/31/2019 6:18 AM      HISTORY: RUQ abd pain, radiating to back, suspect GB disease     COMPARISON: None.    FINDINGS: The liver is normal in size and texture without focal mass.  There is no intra or extrahepatic biliary dilatation. The common  hepatic duct measures 0.6 cm. Several stones in the gallbladder. The  gallbladder otherwise appears normal.  The pancreas head and body  appear normal. The tail is obscured by bowel gas.  The right kidney  measures 11.5 cm and is normal in appearance. The proximal abdominal  aorta and IVC appear normal.       Impression    IMPRESSION:  Cholelithiasis. There is no biliary dilatation or evidence of  cholecystitis.    BOONE SANDOVAL MD               This document serves as a record of the services and decisions personally performed and made by Dr. Doshi.  It was created on her behalf by Julia Carey, a trained medical student and scribe.  The creation of this record is based on the provider's personal observations and the statements of the patient.     Julia Carey, MPH, MS3  December 31, 2019      [facility] Doshi, DO

## 2019-12-31 NOTE — INTERVAL H&P NOTE
The History and Physical has been reviewed, the patient has been examined and no changes have occurred in the patient's condition since the H & P was completed.       [facility]o, DO

## 2019-12-31 NOTE — PROGRESS NOTES
Lucina to follow up with Primary Care provider regarding elevated blood pressure.    140/84    An RAMIREZ CMA

## 2020-01-01 NOTE — ANESTHESIA POSTPROCEDURE EVALUATION
Patient: Lucina Santamaria    Procedure(s):  CHOLECYSTECTOMY, LAPAROSCOPIC    Diagnosis:Cholecystitis [K81.9]  Diagnosis Additional Information: No value filed.    Anesthesia Type:  General    Note:  Anesthesia Post Evaluation    Patient location during evaluation: Phase 2  Patient participation: Able to fully participate in evaluation  Level of consciousness: awake and alert  Pain management: adequate  Airway patency: patent  Cardiovascular status: blood pressure returned to baseline  Respiratory status: spontaneous ventilation and room air  Hydration status: acceptable  PONV: none     Anesthetic complications: None    Comments: Patient is resting without complaint at this time.  She was pleased with her anesthetic today. No anesthesia concerns.         Last vitals:  Vitals:    12/31/19 1619 12/31/19 1757 12/31/19 1758   BP: (!) 184/88 (!) 183/83    Pulse: 72 77    Resp: 20     Temp: 97.4  F (36.3  C)     SpO2: 97%  99%         Electronically Signed By: YARON Mclain CRNA  December 31, 2019  8:14 PM

## 2020-01-01 NOTE — ED NOTES
Anesthetist aware of elevated BP and Pt's clotting issues and when she had her last Fentanyl. To OR accompanied by OR surgery crew.

## 2020-01-01 NOTE — OP NOTE
OPERATIVE NOTE  [facility] SURGERY    DATE:  12/31/2019    SURGEON:  Lynn Doshi DO    ASSISTANT:  Demario Ricci CFA    PREOPERATIVE DIAGNOSIS:  Cholecystitis [K81.9]  Chronic calculus cholecystitis  Morbid obesity  Incarcerated umbilical hernia      POSTOPERATIVE DIAGNOSIS:  Acute on chronic calculus cholecystitis  Morbid obesity  Incarcerated umbilical hernia with omentum within    OPERATION:  Laparoscopic cholecystectomy - modifier 22 .    ANESTHESIA:  General endotracheal.    INDICATIONS FOR PROCEDURE:  The patient is a 54 year old year old female with refractory biliary colic with secondary chronic calculus cholecystitis; pt has incarcerated umbilical hernia; I do the lap rocael for now and will do a definite repair of the hernia at another date  And when pt undergo went loss .  Based on this, laparoscopic cholecystectomy was recommended.    FINDINGS:  Enlarge and distended gallbladder with pericholecystitic edema.  Pt also has a large tongue of omentum incarcerated within her umbilical hernia ~2cm was what I can see.  Case did take longer due to pt's body's habitus and enlarge and edematous gallbladder    PROCEDURE IN DETAIL: The patient was preoperatively identified. Consent was signed and placed on the chart. She/he was brought back to the operative suite where he was laid supine on the operating table. General endotracheal anesthesia was induced per anesthesia protocol. She/he was then prepped and draped in sterile fashion. We started by infiltrating 5 cc of local anesthetic in the right upper quadrant area and made small skin incision and accessing the abdominal cavity by using Optiview trocar and 5-mm trocar site visualizing all layers of the abdominal wall until we reached the peritoneal cavity. We then insufflated  with CO2 gas to create pneumoperitoneum. A second right subcostal port was inserted, also 5 mm, as well as subxiphoid at supraumbilical site. All were 5 mm ports except the subxiphoid.  All were inserted under direct visualization and all sites were preanesthetized with local anesthetic prior to the insertion of the trocar. I then used a 60cc syringe to aspirate ~60 ml of serosangenous fluid from the distended gallbladder inorder to grasp onto it.  We then grabbed the fundus of the gallbladder, retracted it anteriorly and cephalad. There were large number of adhesions to the greater omentum and the gallbladder - including omentum and portion of transverse colon These were bluntly taken down and with the bovie.  There was also edematous fat rind around the gallbladder that I dissected off bluntly and sharply.   After we had successfully taken down these adhesions, we then grabbed the neck of the gallbladder, retracted it laterally, and undertook dissection of the Calot's triangle.   This was difficult due to the large and distended gallbladder. We identified the duct and artery, skeletonized the structures, clipped them proximally and distally, and ligated them with EndoShears. At this point, we took the gallbladder off of the liver bed using Bovie electrosurgery. This was difficult due to the edema around th liver fossa making it difficult to identify the plan.  After the gallbladder had been completely liberated from the liver bed, we removed it from the abdomen using an Endopouch.  At this point, we then irrigated copiously in this area and then suctioned out all irrigant. Hemostasis was noted to be excellent at the conclusion of the case. We closed the 12mm trocar transfascially with a 0-vicryl on a UR6. All irrigant that could be visualized was removed from the abdomen. We then desufflated the abdomen, reduced pneumoperitoneum, and removed the trocars under direct visualization. We then undertook skin closure with interrupted Monocryl sutures in subcuticular fashion. The patient tolerated the procedure well and was brought to PACU in stable condition.    COMPLICATIONS: None.    ESTIMATED BLOOD  LOSS: 10cc    SPECIMENS: Gallbladder.    DISPOSITION: Stable to PACU with anticipated discharge home later today.    [facility], DO

## 2020-01-01 NOTE — ANESTHESIA CARE TRANSFER NOTE
Patient: Lucina Santamaria    Procedure(s):  CHOLECYSTECTOMY, LAPAROSCOPIC    Diagnosis: Cholecystitis [K81.9]  Diagnosis Additional Information: No value filed.    Anesthesia Type:   General     Note:  Airway :Face Mask  Patient transferred to:PACU  Handoff Report: Identifed the Patient, Identified the Reponsible Provider, Reviewed the pertinent medical history, Discussed the surgical course, Reviewed Intra-OP anesthesia mangement and issues during anesthesia, Set expectations for post-procedure period and Allowed opportunity for questions and acknowledgement of understanding      Vitals: (Last set prior to Anesthesia Care Transfer)    CRNA VITALS  12/31/2019 1915 - 12/31/2019 1955      12/31/2019             EKG:  Sinus rhythm                Electronically Signed By: YARON Mclain CRNA  December 31, 2019  7:55 PM

## 2020-01-01 NOTE — DISCHARGE INSTRUCTIONS
[facility]    Home Care Following Gallbladder Surgery    [facility]    Pain meds:     600mg ibuprofen every 6hrs prn     650mg of acetaminophen every 6hrs prn    You can alternate these meds so that you take one every 3 hrs.      Make sure you do not go over 4000mg of acetaminophen every 24hrs, especially if you are taking Norco or percocet 5/325mg.    Care of the Incision:    Remove gauze dressing (if present) after 48 hours.    Leave small strips in place (if present).  They will gradually come off.    If surgical glue was used on your incision, keep it dry for 24 hours.  Then you may shower but don t submerge under water for at least 2 weeks.  Gently pat your incision dry with a freshly laundered towel.    Do not touch your incision with bare hands or pick at scabs.    Leave your incision open to air.  Cover it only if draining, clothing rubs or irritates it.    Activity:    Gradually increase your activity.  Walk short distances several times each day and increase the distance as your strength allows.    To promote circulation, do not cross your legs while sitting.    No strenuous lifting or straining for 2-3 weeks.  Do not lift anything over 10-20 pounds until your doctor approves an increase.    Return to work will be determined by the type of work you do and should be discussed with your physician.    Do not drive or operate equipment while taking prescription pain medicines.  You may drive 1 week after surgery if you have stopped taking prescription pain medicines and can react quickly enough to make an emergency stop if necessary.    Diet:    Return to the diet you were on before surgery.    Drink plenty of water.    Avoid foods that cause constipation.      REMEMBER--most prescription pain pills cause constipation.  Walking, extra fluids, and increased fiber (fresh fruits and vegetables, etc.) are natural remedies for constipation.  You can also take mineral oil, 1-2 Tablespoons per  day.  If still constipated you may try a stool softener such as Colace or Miralax.    Call Your Physician if You Have:    Redness, increased swelling or cloudy drainage from your incision.    A temperature of more than 101 degrees F.    Worsening pain in your incision not relieved by your prescription pain pills and/or a short rest.    Jaundice (yellowing of skin or eyes)    Abdominal distention (stomach getting very large)    Swelling in your legs    Productive cough    Burning with urination    Any questions or concerns about your recovery, please call     Business hours [phone]    After hours [phone] Nurse Advice Line (24 hours a day)    Follow-up Care:    Make an appointment 2-3 week after your surgery.  Call [phone].    [facility] Same-Day Surgery   Adult Discharge Orders & Instructions     For 24 hours after surgery    1. Get plenty of rest.  A responsible adult must stay with you for at least 24 hours after you leave the hospital.   2. Do not drive or use heavy equipment.  If you have weakness or tingling, don't drive or use heavy equipment until this feeling goes away.  3. Do not drink alcohol.  4. Avoid strenuous or risky activities.  Ask for help when climbing stairs.   5. You may feel lightheaded.  If so, sit for a few minutes before standing.  Have someone help you get up.   6. You may have a slight fever. Call the doctor if your fever is over 100 F (37.7 C) (taken under the tongue) or lasts longer than 24 hours.  7. You may have a dry mouth, a sore throat, muscle aches or trouble sleeping.  These should go away after 24 hours.  8. Do not make important or legal decisions.  We don t expect you to have any problems from the surgery or treatment you had today. Just in case, here s what to do if you have pain, upset stomach (nausea), bleeding or infection:  Pain:  Take medicines your doctor has prescribed or over-the-counter medicine they have suggested. Resting and using ice packs  can help, too. For surgery on an arm or leg, raise it on a pillow to ease swelling. Call your doctor if these methods don t work.  Copyright Joni Cosme, Licensed under [employer]  Upset stomach (nausea):  Take anti-nausea medicine approved by your doctor. Drink clear liquids like apple juice, ginger ale, broth or 7-Up. Be sure to drink enough fluids. Rest can help, too. Move to normal foods when you re ready.   Bleeding:  In the first 24 hours, you may see a little blood on your dressing, about the size of a quarter. You don t need to worry about this much blood, but if the blood spot keeps getting bigger:    Put pressure on the wound if you can, AND    Call your doctor.  Copyright [employer], Licensed under [employer]  Fever/Infection: Please call your doctor if you have any of these signs:    Redness    Swelling    Wound feels warm    Pain gets worse    Bad-smelling fluid leaks from wound    Fever or chills  Call your doctor for any of the followin.  It has been over 8 to 10 hours since surgery and you are still not able to urinate (pass water).    Nurse advice line: [phone] (24 hour)

## 2020-01-04 LAB — COPATH REPORT: NORMAL

## 2020-01-13 ENCOUNTER — OFFICE VISIT (OUTPATIENT)
Dept: SURGERY | Facility: OTHER | Age: 55
End: 2020-01-13
Payer: COMMERCIAL

## 2020-01-13 VITALS
HEIGHT: 68 IN | SYSTOLIC BLOOD PRESSURE: 136 MMHG | WEIGHT: 293 LBS | DIASTOLIC BLOOD PRESSURE: 84 MMHG | TEMPERATURE: 97 F | BODY MASS INDEX: 44.41 KG/M2

## 2020-01-13 DIAGNOSIS — Z90.49 S/P LAPAROSCOPIC CHOLECYSTECTOMY: Primary | ICD-10-CM

## 2020-01-13 DIAGNOSIS — E66.01 MORBID OBESITY (H): ICD-10-CM

## 2020-01-13 PROCEDURE — 99024 POSTOP FOLLOW-UP VISIT: CPT | Performed by: SURGERY

## 2020-01-13 RX ORDER — IBUPROFEN 200 MG
200 TABLET ORAL EVERY 4 HOURS PRN
COMMUNITY
End: 2020-11-27

## 2020-01-13 ASSESSMENT — MIFFLIN-ST. JEOR: SCORE: 2086.2

## 2020-01-13 NOTE — NURSING NOTE
Lucina brought in paper work today from  [employer]. I will complete paperwork tomorrow in clinic, fax to [facility] management @ [phone]. I will also mail a copy to patients home address.      An RAMIREZ CMA

## 2020-01-13 NOTE — LETTER
"    1/13/2020         RE: Lucina Santamaria  [address]  [place] [address]        Dear Colleague,    Thank you for referring your patient, Lucina Santamaria, to the [facility]. Please see a copy of my visit note below.    [facility] FOLLOW-UP NOTE  GENERAL SURGERY    PCP: Nani Pierson         Assessment and Plan:   Assessment:   Lucina Santamaria is a 54 year old female who presented post operatively from laparoscopic cholecystectomy 12/31/2019 and is doing very well.       ICD-10-CM    1. S/P laparoscopic cholecystectomy Z90.49    2. Morbid obesity (H) E66.01        I reviewed the pathology report today with the patient and answered all questions.  FINAL DIAGNOSIS:   Gallbladder and contents:   - Cholelithiasis with acute and chronic cholecystitis.     Electronically signed out by:     DUSTIN Moreira M.D.     Plan:  -Pain controlled; Incisions CDI; will get work note and FMLA papers done soon.  Pt understands she will be on a lifting restriction of less than 15-20lbs for 6 weeks from DOS           Subjective:     Lucina Santamaria is a 54 year old female who presents post operatively from laparoscopic cholecystectomy 12/31/2019. Pain has been controled. Currently is not using pain medications. Eating a Regular and having regular bladder/bowel function. Overall doing very well.           Objective:       /84   Temp 97  F (36.1  C) (Temporal)   Ht 1.727 m (5' 8\")   Wt 142.8 kg (314 lb 12 oz)   BMI 47.86 kg/m      Constitutional: Awake, alert, in no acute distress.  Respiratory: Non-labored.   Cardiovascular: Regular rate and rhythm.   Abdomen: soft. Incision is Healing well.    BarronKristopherh Glenda, DO  [facility]                Again, thank you for allowing me to participate in the care of your patient.        Sincerely,        BarronSuraj Doshi MD    "

## 2020-01-13 NOTE — PROGRESS NOTES
"[facility] FOLLOW-UP NOTE  GENERAL SURGERY    PCP: Nani Pierson         Assessment and Plan:   Assessment:   Lucina Santamaria is a 54 year old female who presented post operatively from laparoscopic cholecystectomy 12/31/2019 and is doing very well.       ICD-10-CM    1. S/P laparoscopic cholecystectomy Z90.49    2. Morbid obesity (H) E66.01        I reviewed the pathology report today with the patient and answered all questions.  FINAL DIAGNOSIS:   Gallbladder and contents:   - Cholelithiasis with acute and chronic cholecystitis.     Electronically signed out by:     DUSTIN Moreira M.D.     Plan:  -Pain controlled; Incisions CDI; will get work note and FMLA papers done soon.  Pt understands she will be on a lifting restriction of less than 15-20lbs for 6 weeks from DOS           Subjective:     Lucina Santamaria is a 54 year old female who presents post operatively from laparoscopic cholecystectomy 12/31/2019. Pain has been controled. Currently is not using pain medications. Eating a Regular and having regular bladder/bowel function. Overall doing very well.           Objective:       /84   Temp 97  F (36.1  C) (Temporal)   Ht 1.727 m (5' 8\")   Wt 142.8 kg (314 lb 12 oz)   BMI 47.86 kg/m     Constitutional: Awake, alert, in no acute distress.  Respiratory: Non-labored.   Cardiovascular: Regular rate and rhythm.   Abdomen: soft. Incision is Healing well.    [facility], DO  [facility]              "

## 2020-01-14 ENCOUNTER — DOCUMENTATION ONLY (OUTPATIENT)
Dept: SURGERY | Facility: CLINIC | Age: 55
End: 2020-01-14

## 2020-01-14 NOTE — PROGRESS NOTES
Completed paperwork and sent to [facility] management [phone]. Fax'd work note Attn: Allie . Mailed copies to [facility] home address. I will place fax'd forms in white binder , copies sent to [facility].        Received forms from [facility] for disability benefits , will complete and fax to [facility] @ [phone].      An RAMIREZ CMA

## 2020-01-16 DIAGNOSIS — J20.9 ACUTE BRONCHITIS WITH SYMPTOMS > 10 DAYS: Primary | ICD-10-CM

## 2020-01-16 RX ORDER — AZITHROMYCIN 250 MG/1
TABLET, FILM COATED ORAL
Qty: 6 TABLET | Refills: 0 | Status: SHIPPED | OUTPATIENT
Start: 2020-01-16 | End: 2020-01-21

## 2020-02-16 ENCOUNTER — HEALTH MAINTENANCE LETTER (OUTPATIENT)
Age: 55
End: 2020-02-16

## 2020-04-23 ENCOUNTER — TELEPHONE (OUTPATIENT)
Dept: FAMILY MEDICINE | Facility: CLINIC | Age: 55
End: 2020-04-23

## 2020-04-23 DIAGNOSIS — Z13.6 CARDIOVASCULAR SCREENING; LDL GOAL LESS THAN 130: ICD-10-CM

## 2020-04-23 DIAGNOSIS — R73.9 ELEVATED BLOOD SUGAR: ICD-10-CM

## 2020-04-23 DIAGNOSIS — R00.2 PALPITATIONS: Primary | ICD-10-CM

## 2020-04-23 NOTE — TELEPHONE ENCOUNTER
Lucina thinks she may have had an episode of afib with RVR, lasted 45 mn. High irregular pulse.   Will get some labs to start, consider Zio patch EKG.

## 2020-11-22 ENCOUNTER — HEALTH MAINTENANCE LETTER (OUTPATIENT)
Age: 55
End: 2020-11-22

## 2020-11-25 DIAGNOSIS — J00 ACUTE RHINITIS: Primary | ICD-10-CM

## 2020-11-25 DIAGNOSIS — Z20.822 EXPOSURE TO 2019 NOVEL CORONAVIRUS: ICD-10-CM

## 2020-11-27 ENCOUNTER — RESULTS ONLY (OUTPATIENT)
Dept: LAB | Age: 55
End: 2020-11-27

## 2020-11-27 ENCOUNTER — TELEPHONE (OUTPATIENT)
Dept: FAMILY MEDICINE | Facility: CLINIC | Age: 55
End: 2020-11-27

## 2020-11-27 DIAGNOSIS — J00 ACUTE RHINITIS: ICD-10-CM

## 2020-11-27 DIAGNOSIS — Z20.822 EXPOSURE TO 2019 NOVEL CORONAVIRUS: ICD-10-CM

## 2020-11-27 DIAGNOSIS — J20.9 ACUTE BRONCHITIS, UNSPECIFIED ORGANISM: Primary | ICD-10-CM

## 2020-11-27 RX ORDER — PREDNISONE 20 MG/1
20 TABLET ORAL 2 TIMES DAILY
Qty: 10 TABLET | Refills: 0 | Status: SHIPPED | OUTPATIENT
Start: 2020-11-27 | End: 2020-12-02

## 2020-11-27 RX ORDER — AZITHROMYCIN 250 MG/1
TABLET, FILM COATED ORAL
Qty: 6 TABLET | Refills: 0 | Status: SHIPPED | OUTPATIENT
Start: 2020-11-27 | End: 2020-12-02

## 2020-11-27 NOTE — TELEPHONE ENCOUNTER
Has cough with wheezing. Has COVID test today, would like to have zpack and prednisone on hand for emergency use.   zpack for secondary pneumonia, will hold on to it

## 2020-11-29 LAB
SARS-COV-2 RNA SPEC QL NAA+PROBE: ABNORMAL
SPECIMEN SOURCE: ABNORMAL

## 2020-12-17 ENCOUNTER — TELEPHONE (OUTPATIENT)
Dept: FAMILY MEDICINE | Facility: CLINIC | Age: 55
End: 2020-12-17

## 2020-12-17 DIAGNOSIS — R30.0 DYSURIA: Primary | ICD-10-CM

## 2020-12-17 DIAGNOSIS — R30.0 DYSURIA: ICD-10-CM

## 2020-12-17 DIAGNOSIS — N30.00 ACUTE CYSTITIS WITHOUT HEMATURIA: ICD-10-CM

## 2020-12-17 LAB
ALBUMIN UR-MCNC: 100 MG/DL
APPEARANCE UR: ABNORMAL
BACTERIA #/AREA URNS HPF: ABNORMAL /HPF
BILIRUB UR QL STRIP: NEGATIVE
COLOR UR AUTO: YELLOW
GLUCOSE UR STRIP-MCNC: NEGATIVE MG/DL
HGB UR QL STRIP: ABNORMAL
KETONES UR STRIP-MCNC: ABNORMAL MG/DL
LEUKOCYTE ESTERASE UR QL STRIP: ABNORMAL
NITRATE UR QL: POSITIVE
NON-SQ EPI CELLS #/AREA URNS LPF: ABNORMAL /LPF
PH UR STRIP: 5.5 PH (ref 5–7)
RBC #/AREA URNS AUTO: ABNORMAL /HPF
SOURCE: ABNORMAL
SP GR UR STRIP: >1.03 (ref 1–1.03)
UROBILINOGEN UR STRIP-ACNC: 0.2 EU/DL (ref 0.2–1)
WBC #/AREA URNS AUTO: ABNORMAL /HPF

## 2020-12-17 PROCEDURE — 81001 URINALYSIS AUTO W/SCOPE: CPT | Performed by: FAMILY MEDICINE

## 2020-12-17 PROCEDURE — 87088 URINE BACTERIA CULTURE: CPT | Performed by: FAMILY MEDICINE

## 2020-12-17 PROCEDURE — 87086 URINE CULTURE/COLONY COUNT: CPT | Performed by: FAMILY MEDICINE

## 2020-12-17 PROCEDURE — 87186 SC STD MICRODIL/AGAR DIL: CPT | Performed by: FAMILY MEDICINE

## 2020-12-17 RX ORDER — SULFAMETHOXAZOLE/TRIMETHOPRIM 800-160 MG
1 TABLET ORAL 2 TIMES DAILY
Qty: 14 TABLET | Refills: 0 | Status: SHIPPED | OUTPATIENT
Start: 2020-12-17 | End: 2020-12-24

## 2020-12-17 RX ORDER — PHENAZOPYRIDINE HYDROCHLORIDE 200 MG/1
200 TABLET, FILM COATED ORAL 3 TIMES DAILY PRN
Qty: 6 TABLET | Refills: 0 | Status: SHIPPED | OUTPATIENT
Start: 2020-12-17 | End: 2020-12-19

## 2020-12-19 LAB
BACTERIA SPEC CULT: ABNORMAL
Lab: ABNORMAL
SPECIMEN SOURCE: ABNORMAL

## 2020-12-30 ENCOUNTER — HOSPITAL ENCOUNTER (OUTPATIENT)
Dept: MAMMOGRAPHY | Facility: CLINIC | Age: 55
Discharge: HOME | End: 2020-12-30
Attending: FAMILY MEDICINE | Admitting: FAMILY MEDICINE
Payer: COMMERCIAL

## 2020-12-30 DIAGNOSIS — Z12.31 VISIT FOR SCREENING MAMMOGRAM: ICD-10-CM

## 2020-12-30 PROCEDURE — 77067 SCR MAMMO BI INCL CAD: CPT

## 2021-04-04 ENCOUNTER — HEALTH MAINTENANCE LETTER (OUTPATIENT)
Age: 56
End: 2021-04-04

## 2021-07-20 ENCOUNTER — TELEPHONE (OUTPATIENT)
Dept: FAMILY MEDICINE | Facility: CLINIC | Age: 56
End: 2021-07-20

## 2021-07-20 DIAGNOSIS — R06.2 WHEEZING: Primary | ICD-10-CM

## 2021-07-20 RX ORDER — PREDNISONE 20 MG/1
TABLET ORAL
Qty: 20 TABLET | Refills: 0 | Status: SHIPPED | OUTPATIENT
Start: 2021-07-20 | End: 2021-09-13

## 2021-07-21 NOTE — TELEPHONE ENCOUNTER
2 days ago Lucina was burning boxes from attic with mouse droppings and mold and within 15 min felt wheezy and shortness of breath.   Has had more trouble with shortness of breath since COVID last year.   Has no fever and does not feel ill.   Continues to have wheezing   Lungs listened to and have expiratory wheezes all fields  Prednisone ordered.

## 2021-09-02 NOTE — PATIENT INSTRUCTIONS
1. Welcome to the Bariatric Program. Read the manual as you've been doing.  2. Aiming for 10-15lb reduction in weight preop if measurements show weight above 330 lbs. Goal of 315 lbs or less. To help, start trial of Half a tablet of phentermine after breakfast. Don't skip meals. Don't use if ill or using cold medications or other stimulants. Stop using if racing heart beats/chest pain/headaches/insomnia, increased anxiety or mood swings. Anticipate stopping phentermine with preop liquid diet before surgery.  3. Anticipate using lovenox for 2-4 weeks post op to reduce chance of another provoked PE.  4. Anticipate longer term H2 or PPI therapy to reduce stomach ulcer risks while on baby ASA.   5. Come in for measurements/exam this next 6 weeks and to assess how phentermine is going.  6. Attend support group (see link below).  7. Intake labs can be done anytime, I'll message about a week after they are drawn, once all are back.  8. No need for Actigall given your previous cholecystectomy.  9. Recommend starting 2-3 days of some strength work to minimize muscle loss during the rapid weight loss phase. Continue excellent walking habits.            Before being submitted for insurance approval, you will need the following:    -Clearance by the Psychologist  -Clearance by the dietitian  -Attend Support Group ([address] [place] [address] at [facility]) 2nd Tuesday of the month 6:30-8pm. Make sure to sign in.  -Routine Health Care Maintenance must be up to date (mammograms yearly after age 40, paps as recommended by your primary provider,  colonoscopy after age 50, earlier if high risk.  -If you are on estrogen, estrogen will be discontinued one month prior to surgery. It may be resumed one month after surgery unless otherwise advised to limit blood clotting risks.  -Pre Operative Lab work-ordered today.    -Structured weight loss visits IF mandated by your insurance carrier or bariatrician.  -Surgeon consult as we get nearer to  potential surgery or sooner if you have special considerations that may make your surgery more complex.  -If you have sleep apnea you will need to be using CPAP for at least one month before surgery to reduce your risk of breathing problems after surgery. Make sure to bring your CPAP or BiPAP to the hospital at the time of surgery.    -You will need to be tobacco free for 2 months before surgery and remain a non-smoker thereafter. If you are currently smoking or have recently quit, your urine will be evaluated for tobacco metabolites pre-operatively.  Smoking is a major risk factor for developing ulceration of your surgical sites and potential severe complications.    -If you are on insulin, you might be referred to an endocrinologist who will manage your insulin during the liquid diet and around the time of surgery. This endocrinologist does not replace your primary provider or your endocrinologist.   -You will need an exercise plan which includes MOVE, ie., walking and MUSCLE, ie.,calisthenics, bands, weight, machines, etc...Maintenance of long term weight loss and quality of life is much improved with regular exercise as the weight comes off.  ______________________________________________________________________    Remember that after your bariatric surgery, vitamin supplementation is a lifelong need.    You will take:    B-12 300-500mcg or higher sublingual (under the tongue) daily or by 1000mcg injection 1-2X/month  D3:  3000- 5000 IUs daily   Multivitamin containing 18mg of iron twice a day  Calcium citrate 1 or 2 daily    To keep your weight off and your vitamin levels up, follow-up is important.    Your labs will be monitored every 6 months for the first two years (every 3 months if you had a duodenal switch) and yearly thereafter.    To avoid ulcers in your stomach avoid tobacco forever, alcohol in excess, caffeine in excess and anti-inflammatories (NSAIDS)  (Aspirin, Ibuprofen, Naproxen and similar  medications). Tylenol is fine at usual doses on the box.    If you are told by your physician take Aspirin to protect your heart or for another reason, make sure to take omeprazole or similar medication (protonix, nexium, prevacid) to protect your stomach.    Remember that alcohol affects you differently after bariatric surgery. If you have even ONE drink DO NOT DRIVE due to rapid absorption and fast spiking of blood alcohol levels within minutes of consumption.     Slowly Increasing your exercise capacity such that 3-6 months after your surgery you're tolerating 150-250 minutes of exercise weekly will help optimize your metabolism and improve the chance of good weight loss maintenance.              LEAN PROTEIN SOURCES  Getting 20-30 grams of protein, 3 meals daily, is appropriate for most people, some need more but more than about 40 grams per meal is not useful.  General rule is drinking one ounce of water per gram of protein eaten over the course of the day:  70 grams of protein each day, drink 70 oz of water.  Protein Source Portion Calories Grams of Protein                           Nonfat, plain Greek yogurt    (10 grams sugar or less) 3/4 cup (6 oz)  12-17   Light Yogurt (10 grams sugar or less) 3/4 cup (6 oz)  6-8   Protein Shake 1 shake 110-180 15-30   Skim/1% Milk or lactose-free milk 1 cup ( 8 oz)  8   Plain or light, flavored soymilk 1 cup  7-8   Plain or light, hemp milk 1 cup 110 6   Fat Free or 1% Cottage Cheese 1/2 cup 90 15   Part skim ricotta cheese 1/2 cup 100 14   Part skim or reduced fat cheese slices 1 ounce 65-80 8     Mozzarella String Cheese 1 80 8   Canned tuna, chicken, crab or salmon  (canned in water)  1/2 cup 100 15-20   White fish (broiled, grilled, baked) 3 ounces 100 21   [place]/Tuna (broiled, grilled, baked) 3 ounces 150-180 21   Shrimp, Scallops, Lobster, Crab 3 ounces 100 21   Pork loin, Pork Tenderloin 3 ounces 150 21   Boneless, skinless chicken /turkey  breast                          (broiled, grilled, baked) 3 ounces 120 21   [place], [place], [place], and Venison 3 ounces 120 21   Lean cuts of red meat and pork (sirloin,   round, tenderloin, flank, ground 93%-96%) 3 ounces 170 21   Lean or Extra Lean Ground Turkey 1/2 cup 150 20   90-95% Lean [place] Burger 1 stephania 140-180 21   Low-fat casserole with lean meat 3/4 cup 200 17   Luncheon Meats                                                        (turkey, lean ham, roast beef, chicken) 3 ounces 100 21   Egg (boiled, poached, scrambled) 1 Egg 60 7   Egg Substitute 1/2 cup 70 10   Nuts (limit to 1 serving per day)  3 Tbsp. 150 7   Nut [place] (peanut, almond)  Limit to 1 serving or less daily 1 Tbsp. 90 4   Soy Burger (varies) 1  15   Garbanzo, Black, [place] Beans 1/2 cup 110 7   Refried Beans 1/2 cup 100 7   Kidney and Lima beans 1/2 cup 110 7   Tempeh 3 oz 175 18   Vegan crumbles 1/2 cup 100 14   Tofu 1/2 cup 110 14   Chili (beans and extra lean beef or turkey) 1 cup 200 23   Lentil Stew/Soup 1 cup 150 12   Black Bean Soup 1 cup 175 12         Example Meal Plan for a 8537-7319 Calorie Diet:    In order to fuel your weight loss properly and avoid hunger-induced overeating later in the day, for your height and weight, you will enjoy the most success by following the diet below or similar with adjustments based on your particular tastes and preferences.  Exercise may influence speed, amount of weight loss further.     I recommend getting into a meal routine and keeping it similar day to day in the beginning so you don t have to think too hard about what you re going to make/eat.  Keep snacks healthy, ideally containing protein and some vegetables.  Non-processed food is preferable to packaged items.  Eat at least a few crunchy green vegetables if having a snack, which should be 2-3 hours after your mealtimes(prepare these ahead of time for ease of use).  Drink 64 oz -80 oz of water daily for most, some of you will  need more and we'll discuss it at your visit if that is the case.  When changing our diet and reducing our intake,  we can often mistake thirst for hunger or just have some grazing habits we have to break ('bored/mindless eating') and a glass of water and reconsideration of our hunger is often all that is needed.  Having the urge is not the problem, but watching it pass by without acting on it is the goal.    If you re having hunger problems, add a protein drink/snack to your morning hours or afternoon snack with at least 20grams of protein and not too much sugar (under 10g).  A carton of higher protein/low sugar yogurt can work as well.  If the urge to snack is overwhelming and not satiated, try going for a 10 minute walk/exercise, come home and drink a glass of water and if still hungry, have a  calorie snack (handful of raw/sprouted nuts, veggies and string cheese, protein bar, etc).  Savor it.    It is better to have a large breakfast, a moderate lunch and a smaller dinner to fuel your day.  People lose 10-15% more weight during their weight loss season with this strategy. Optimizing your protein intake at each meal will further keep you more satisfied while eating less food overall.  Getting exercise in early has also been shown to offer the best results (before breakfast ideally but anytime is the right time to exercise if that is not an option for you).    To make sure you re getting adequate vitamins and minerals during weight loss, I recommend one complete multivitamin a day of your choice.  Consider a probiotic and taking some vitamin D 2000 IU daily.    Let supper be your last meal of the day and ideally try to have at least 12 hours between supper and breakfast the next day to tap into some beneficial overnight fasting dynamics.  Water in the evening is fine, unsweetened, non caffeinated herbal tea is helpful as well.  Consolidating your meals within a 8-12 hour period of your day will help tap  into these additional metabolic benefits and tends to keep your appetite up for breakfast, further helping to stay on track.  For most of my patients I don't recommend an intermittent fasting style diet (many find it hard to fit in their lifestyle) but an overnight fast is very doable for most patients and helps regulate our hunger drives a little better.  This makes it very important to nail good intake at all three meals to feel satisfied/energized and still lose weight.  If evening snacking desires are high, consider a glass of fiber supplement for some additional fullness (metamucil or similar). Most of us don't get the 25-30 grams per day of fiber that promotes good gut health/satiety.  Benefiber, metamucil, citrucel are reasonable/affordable options for most people.  Inulin, chicory, psyllium husk are reasonable options but start slow and low in the dose to avoid gas/bloating until your gut gets acclimated (ramping up to 5-10 grams per day of supplemental fiber after 3-4 weeks if needed).      Example Meal Plan:  Breakfast: 450-475 Calories  1 egg cooked on low in olive oil:   calories.  5oz Greek Yogurt (Fage plain classic: ~150 hawa)  Handful of Berries of your choice (about a calorie per berry or 20-40cal per handful)    cup(cooked) of  old fashioned oatmeal or 1/2 cup(cooked) steel cut oats. (150 hawa)  Sprinkle amount of brown sugar and a pat of butter. (40 hawa)  Glass of  Water  Black coffee or unsweetened Tea (0calories).      2-3 hours Later Snack: (195 calories).  Glass of water  One string Cheese (80 calories) or 4 oz creamed cottage cheese (115 calories) with  Crunchy Celery sticks (less than 10 calories per large stalk) 2 stalks. (20 calories)    of a  Large Banana or   of a Large Apple (60 calories):  eat second half at lunch or afternoon snack.     Lunch:300 -350 calories   Chicken Breast  (baked/broiled/roasted/grilled)  4-6 oz.  (125-180 hawa), BBQ sauce/hot sauce/mustard/seasoning is free.  Just use a reasonable amount. Or a can of tuna with 1 tablespoon mayonnaise.  Salad: lettuce, any other veggies (cucumbers, green peppers/celery you like and a small drizzle of dressing to just flavor.  Go as big on the veggies as you like,  as they are practically calorie free.   A whole, 8 inch cucumber is 45 calories, a whole green pepper is 23 calories, a stalk of celery is 9 calories.  [place] Dressing is 60 calories per tablespoon..so moderate your desired dressing or do a drizzle of olive oil and splash of balsamic vinegar on top,  Total calories unlikely to be over 150 even with dressing.  Glass of Water.    Option for lunch is meal replacement protein drink/smoothie.  Need at least 20 grams of protein and eat the rest of your apple/banana from the morning snack.      Afternoon Snack: 150-200 calories   Cheese Stick or cottage cheese again  and a fresh fruit OR  Granola Bar (protein Bar acceptable if under 200 calories OR  Homemade smoothies:  8oz skim milk,  a handful of berries (fresh or frozen and a serving of protein powder such as BiPro or Sheila sWhey for example.  If you don't like dairy, make with 8oz water, one small banana, handful of berries and the protein powder, add any veggies you want as well:  roughly 200 calories.   Glass of Water    Dinner: 325 calories  4oz of fresh, [place] salmon.  Broiled (salt/pepper/dill) for about 8-8.5 minutes (200calories) or  4oz filet mignon steak or sirloin steak  Salad or vegetable sautéed lightly in olive oil or   Broccoli 1.5  cups chopped and steamed  or micro-waved in a little water (75 calories)  Glass of Water,    Cup of herbal tea (unsweetened, caffeine free)      Herbs and seasonings are encouraged to flavor your foods/vegetables.  Make your food delicious.      Tips for Success:  1.  Prepare proteins ahead of time (broil chicken breasts in bulk so you can grab and go), steel cut oats/lentils can be stored in casserole dish/bowl in the fridge  "for quick scoop in the morning and rewarm in microwave, make use of crock pot recipes (watch salt content).  Making meals that cover 3-4 future meals is an easy way to stay on track.  2.  Drink a 8-12 oz glass of water every 2-3 hours when awake.  We often mistake hunger for thirst, especially when losing weight.  3. Remember your Reward and Motivation when things get hard.  4.  Weigh yourself every morning and record, you'll stay on track better and learn how our biorhythms, diet and elimination patterns show up on the scale. Don't worry about 1 or 2 day patterns, but when on track you'll notice good trend downward of weight over 3-4 day segments.  Plateaus tend to resolve after 4-8 days in most cases if you stay consistent with your plan.  These are natural and part of weight loss, even if you're perfect with your plan execution.  5. Call if problems/concerns.  [employer] is a great tool to stay in touch and provide weekly outside accountability. Check in with questions or if you want to brag.  6.  Find a handful of meals/foods that keep you on track and feeling good and get into a routine that is sustainable for you.  It's OK to have a routine that works for you.  7.  Consider taking a complete multivitamin just to make sure all micronutrients are adequate during weight loss.  8. If losing hair/brittle nails it usually means you are not taking enough protein.  Minimum goal is 60 grams daily of protein for smaller women, 80 grams a day for men. Consider taking Biotin as supplement or a \"Hair and Nail\" multivitamin.    Information about the Weight Loss Medication Phentermine    When combined with mindful eating and behavior changes, weight loss medications can be a nice additional tool to maximize your weight loss season.  There are no magic pills and without diet and behavior changes, weight loss will be minimal.  Think of this medication as a tool to make your diet and behavior changes easier and you'll enjoy a " "higher probability of success.  Remember not to skip meals, but use this medication to tolerate your reduced calories more easily.  If you are very hungry in the evenings, you are likely not eating enough in the first 10 hours of your day and need to focus on getting your protein requirements in at each of your 3 daily meals.      Phentermine is a stimulant medication related to the amphetamine class of medication but with a lower risk of dependence and addiction.  It is used for weight loss by suppressing the appetite region of the brain.  It also may speed up the metabolic rate and give a person more energy.  Like any medication there are potential side effects and the most common are:  Dry mouth occurs in almost everyone (hydrate well), fewer people experience Palpatations, fast heart rate, elevation of blood pressure, restlessness, insomnia, dizziness, change in mood, tremor, headache, changes in bowel movements,itchiness, changes in sex drive.  If you are or may have become pregnant, do not use phentermine as it increases the risk for birth defects/miscarriage.  Do not use if breastfeeding.    Some people can develop serious side effects which include:  Heart strain (\"ischemia\").  Tachycardia (fast heart rate or irregular heart rate).  Hypertension  Pulmonary Hypertension  Psychosis  Dependency and abuse has occurred in some.  If you've been on high dose (37.5mg) for long periods, phentermine should be tapered down over a few weeks before abruptly stopping as seizures have been reported rarely.    We do not recommend taking it in combination with the following medications due to potential drug interactions which can increase the risk of side effects and/or potential for seizures:    Absolutely contraindicated are:  Amphetamines or other stimulants like ADHD medication: (dextroamphetamine, amphetamine, diethylpropion, isocarboxazid, methamphetamine, lisdexamfetamine, benzphetamine,dexmethylphenidate, " methylphenidate, selegiline patch, sibutramine, tranylcypromine.    Avoid use with:   Dopamine, dobutamine, ephedra, ephedrine, epinephrine, isoproterenol, linezolid, norepinephrine, phenylephrine injection, venlafaxine (Effexor).    Monitor or modify dose with:  Acebutolol, atenolol, betaxolol, bisoprolol, carvedilol, droxidopa, esmolol, labetalol, magnesium citrate, metoprolol, nadolol, nebivolol, penbutolol, pindolol, propranolol, sotalol, timolol.    Caution with: armodafinil,betaxolol eye drops, brexpiprazole, bupropion, busulfan, caffeine, carteolol drops, enzalutaminde, ginseng, green tea, guarana, levobunolol drops, lindane cream, modafinil, afrin nasal spray (oxymetazoline), pamabrom, phenylephrine oral and nasal spray, pseudoephedrine (sudafed), rasgiline, sleegiline, bowel prep, tiagabine, timolol drops,  TRAMADOL due to increased risk of seizures.    The current cheapest place to fill your prescription is at [facility] [address] [facility] [place] [facility] or [facility] and is around $22for 90 tablets.  Occasionally, [employer] and [facility] have price matched, so call around and get the best price for you.  Other pharmacies may charge closer to$70- $100 for the same prescription. You don't have to be a member to use the pharmacy at [facility] currently.  An alternative some patients have tried is using a voucher system through [employer].  $25 paid on their website gets you a  voucher that can allows you to pick your meds up at [facility] without paying anything more.  [employer] may also offer discounted coupons and give prices around you.    Dosing:  We start with half a tablet for the first 2-3 weeks and if tolerating it without problems, you can take up to one full tablet daily in the morning after breakfast.  For those with evening hunger problems, sometimes half a tablet in the morning and half a tablet around 1 pm can be effective, however, risks of nighttime insomnia/restless increase with  "afternoon dosing so call me at the clinic if considering this regimen or having any issues.  You only have to use the amount effective for you, not to exceed one full tablet.  It can also be used situationaly and does not have to be taken every day. For more sensitive individuals,: get a pill cutter and cut the half tab into quarters and use a quarter of a tablet, about 9mg, for a couple weeks before increasing to half a tablet (18.75mg) if needed.  As always, if any questions give us a call at the [facility] telephone:  [phone].     Don't use Phentermine if sick/ill or using other stimulants/cold medications and it's OK to skip days that you don't feel the need for appetite suppressant assistance.  This medication works the day you take it and doesn't require \"building up\" in the system.  MEDICATIONS FOR WEIGHT LOSS  There are several medications available to assist us in weight loss.  By themselves, without compliance to a change in diet and increase in movement/activity these medications are disappointing in their results. However, combined with a closely monitored program of diet change and exercise they can be very effective in controlling appetite and boosting initial weight loss.  All weight loss medications need continual re-evaluation for efficacy as their side effects and health benefits fail to be worthwhile if a person is not continuing to lose weight or in maintaining their healthy weight.  Some weight loss medications are scheduled drugs, meaning there is at least a theoretical possibility for developing addiction to them but in practice this is rare.  We do anticipate coming off meds in the future after stabilization of weight loss is assurred.  Finally, a tolerance can develop and people s perceived efficacy of medication can diminish.  In communication with your physician, it may be appropriate to intermittently take a break from these medications and then restart again " (few weeks off then restart again) if a plateau is reached that cannot be broken through.  Each person can respond to a medication differently and to be a good option for you, it will need to be affordable, effective and well tolerated with minimal side effects.    In most cases, weight loss progress after one month and three months will be obtained and if a patient is not reaching the satisfactory progress towards weight loss, the medications may be discontinued.  The thought is that if a person is taking a weight loss medication and not receiving the potential health benefit of that drug, the side effects are not worthwhile and use should be discontinued.  On the flip side, there are many people on some weight loss medications for years because it continues to be an effective tool in their weight management and they are tolerating the medication without any long-term side effects.  Each person's response and purpose will be evaluated.      PHENTERMINE (Adipex): approved in 1959 for appetite suppression.  It has stimulant effects and cannot be used with Ritalin, Concerta, or other stimulants.  Although it is not highly addictive, it's chemically related to amphetamines which are addictive.  Occasional dependence can develop, but rarely. The most common side effects are dry mouth, increased energy and concentration, increased pulse, and constipation.  You should not take phentermine if you have glaucoma, hyperthyroidism, or uncontrolled/untreated hypertension or overly anxious. You should stop if dramatic mood swings, severe insomnia, palpations, chest pains, visual changes or if your Blood Pressure is consistently elevated or any time it's over 160/90.   It's ok to go off the med for a few weeks and restart if efficacy is wearing off.  $24-$30 for 90 tablets at [facility]. Females are required to have reliable birth control to reduce the risk birth defects/miscarriage.      TOPIRAMATE (Topamax): Anti-seizure  medication, also used to prevent migraines and sometimes for mood stabilization.  Side effects include paresthesia, glaucoma, altered concentration, attention difficulties, memory and speech problems, metabolic acidosis, depression, increase in body temperature and decrease sweating, risk of kidney stones.  Do not take Topamax while taking Depakote as this can cause high ammonia levels.  You must have reliable birth control as Topamax can cause birth defects.  If prolonged use has occurred it should be tapered off slowly to avoid withdrawal issues.  Insurance usually covers Topiramate.  At higher doses, there may be some confusion/forgetfulness associated with this so we try to limit dose to under 75mg twice daily to reduce this risk. Often covered by insurance as it's used for many reasons.  Topamax will cause carbonated beverages to taste bad. A recheck of your kidney/electrolytes may occur within a few months of starting.    QSYMIA (Phentermine + Topamax):  See above information about phentermine and Topamax.  Most common side effects are paresthesia, dizziness, distortion of taste, insomnia, constipation, and dry mouth.  See above descriptions for the two individual agents.Females are required to have reliable birth control to reduce the risk birth defects/miscarriage.  $150-$220 per month      Liraglutide (Victoza/Saxenda):   Part of the family of Glucagon Like Peptide Agonists, liraglutide directly suppresses appetite and is often used by diabetic patients due to decrease liver production of glucose, thereby improving glucose levels.  It also slows how quickly the stomach empties. May be hard to get covered for non diabetics and is an injectable medication delivered via a injector pen. It can be very costly without insurance coverage (over $500/month).  Small risk for pancreatitis and dose should be held if increased mid abdominal pain/burning. It is not to be used if previous Multiple Endocrine Neoplasia. In  "rodents, may increase risk of thyroid tumors and not indicated for anyone with hx of medullary thyroid cancer as a result.  If changes in voice/swallowing should be discontinued. Reliable birth control required in women.    Contrave (Bupropion/Naltrexone).    Synergistic combination of a mild appetite suppressing anti-depressant (Bupropion) whose effects are increased due to interaction with Naltrexone.  Naltrexone may have some effects on craving and is often used in addiction medicine to help previous opiate addicts be less prone to relapse as it blocks the action of opiates. Should be stopped if any need for opiate pain medication, surgery or planned procedures where you'll be given sedation/anesthesia. If prolonged use recommend stepping down bupropion over 2-3 weeks to limit any risk of withdrawal issues. Side effects may include dry mouth, increased heart rate, mild elevation in Blood pressure;  dizziness, ringing in the ears, anxiety (typically due to bupropion), nausea, constipation, and some get fatigued with naltrexone.  About $210 on [facility] for 120 tabs of \"Contrave\", the brand name without insurance coverage. Generic Bupropion 75mg: $25 for 120 tabs, Naltrexone: $55 for 90 tabs without insurance coverage on [facility]. Cannot be used if pregnant/trying to conceive or breast feeding.            Support and accountability is an important part of your weight loss journey and maintenance once you reach your health goals.  Because of this, we require that you attend at least one of our support groups before you meet with the surgeon so you get a feel for what they are like and hopefully establish a connection to others who are going through a similar process or have had surgery before so you can ask your questions.    We currently have two options for support group but they are in the virtual format only at this time.  Both groups are using Microsoft Teams for their platform and you can access it through the " web or an giorgio that can be downloaded to a smart phone if you have one.      The Pre- and Post-op Connections Group is on the second Tuesday of the month from 6:30-8pm and is hosted by Jason Dodd, PhD.  If you are interested in this group, you will need to email him at [email or web address] each month and then he will in turn send you the invitation to join.      We also have a Post-op focused Connections Group the 4th Wednesday of the month from 11am-12pm that is mostly geared toward post-operative patients who are past three months post-op.  This group is hosted by Paula Drew, KJ, CBN, CIC and you can email her to join the group at [email or web address] each month and she will send you an invite similar to Jason's group.  If you decide you would like to be a regular attender at this group, we can add you to an automatic invitation list of people.    You can see more information about available support groups that the [facility] offers to our patients as well by following the link:    [email or web address]      Please let us know if you have any questions.     Thank you,   [facility] Bariatric Team      Bariatric Task List    Fax:  Please fax all paperwork to: [phone] -     Status:  Is patient a candidate for bariatric surgery?:  patient is a candidate for bariatric surgery -     Cleared to schedule surgeon consult?:  not cleared to schedule surgeon consult -     Status:  surgery evaluation in process -     Surgeon: Any -        Insurance: Insurance:  [employer] -      Contact insurance to discuss coverage: Needed -       Other:  Please call Rabia Dunn to rubiuss insurance needs at [phone] -        Patient Info: Initial Weight:  333lb -     Date of Initial Weight/Height:  9/13/2021 -     Goal Weight (lbs):  320 -     Required Weight Loss:  13 -     Surgery Type:  gastric  "bypass -        Dietician Visits: Structured weight loss required by insurance?:  no structured weight loss required -     Clearance from dietician to see surgeon?:  Needed -     Dietician Notes:    -        Psychological Evaluation: Psych eval:  Needed -     Other:    -        Lab Work: Complete Blood Count:  Completed -     Comprehensive Metabolic Panel:  Completed -     Vitamin D:  Completed -     PTH:  Completed -     Hgb A1c:  Completed - 9/23/2021  5.9    Lipids: Completed -      TSH ([facility], [facility], [place] MA): Completed -       Ferritin: Completed -       Folate: Completed -     Thiamine: Completed -     Vitamin A: Completed -     Vitamin B12: Completed -     Zinc: Completed -        PCP: Establish care with PCP:  Completed -        Patient Education:  Information Session:  Completed -     Given \"Making your decision\" handout?:  Yes -     Given \"A Roadmap to you Weight Loss Surgery\" handout?: Yes -     Given support group information?:    -  Yes   Attended support group?:  Needed -     Support plan in place?:  Completed -        Additional Surgery Requirements: Review Coag plan:  Needed - high risk, hx of PE provoked as child on OCP/dental extraction. 2-3 weeks lovenox recommended then baby asa again.   Birth control plan:    - tubal ligation   Gallstone prevention plan (Actigall for 6 months postop): Completed - hx of rocael.   Other: Colonoscopy scheduled for 11/10/2021 -     Other:   -        Final Tasks:  Before surgery online class:  Needed - 3 weeks prior to surgery with RD and RN   After surgery online class:    - 1 week after with RD   Nurse visit per clinic:  Needed - for measurements, photos, height and weight.   History and Physical per clinic:   -  Needed -  Within 1 month of surgery, nurses will arrange closer to surgery   Final labs per clinic: Needed -  Within 1 month of surgery, nurses will arrange closer to surgery   Chest xray per clinic:   -  Needed -  Within 1 month of surgery, nurses will arrange " closer to surgery   Electrocardiogram (ECG) per clinic:   -  Needed -  Within 1 month of surgery, nurses will arrange closer to surgery   Other:   -        Notes:   -

## 2021-09-13 ENCOUNTER — VIRTUAL VISIT (OUTPATIENT)
Dept: SURGERY | Facility: CLINIC | Age: 56
End: 2021-09-13
Payer: COMMERCIAL

## 2021-09-13 VITALS — HEIGHT: 68 IN | BODY MASS INDEX: 44.41 KG/M2 | WEIGHT: 293 LBS

## 2021-09-13 DIAGNOSIS — R63.5 WEIGHT GAIN: ICD-10-CM

## 2021-09-13 DIAGNOSIS — E66.01 OBESITY, MORBID, BMI 50 OR HIGHER (H): Primary | ICD-10-CM

## 2021-09-13 DIAGNOSIS — Z86.711 HISTORY OF PULMONARY EMBOLISM: ICD-10-CM

## 2021-09-13 PROCEDURE — 99205 OFFICE O/P NEW HI 60 MIN: CPT | Mod: 95 | Performed by: EMERGENCY MEDICINE

## 2021-09-13 RX ORDER — MULTIVIT-MIN/IRON/FOLIC ACID/K 18-600-40
1000 CAPSULE ORAL 2 TIMES DAILY
Status: ON HOLD | COMMUNITY
End: 2022-07-26

## 2021-09-13 RX ORDER — PHENTERMINE HYDROCHLORIDE 37.5 MG/1
TABLET ORAL
Qty: 30 TABLET | Refills: 0 | Status: SHIPPED | OUTPATIENT
Start: 2021-09-13 | End: 2021-10-01

## 2021-09-13 ASSESSMENT — MIFFLIN-ST. JEOR: SCORE: 2163.98

## 2021-09-13 NOTE — PROGRESS NOTES
"Video visit      New Bariatric Surgery Consultation Note    2021    RE: Lucina Santamaria  MR#: [id number]  : 1965      Referring provider:   No flowsheet data found.    Chief Complaint/Reason for visit: evaluation for possible weight loss surgery    Dear Nani Pierson MD (General),    I had the pleasure of seeing your patient, Lucina Santamaria, to evaluate her obesity and consider her for possible weight loss surgery. As you know, Lucina Santamaria is 55 year old.  She has a height of 5' 8\", a weight of 333 lbs 0 oz, and calculated Body mass index is 50.63 kg/m ..  Today we discussed our Bariatric Surgery program to address their weight related health. She has viewed our online seminar prior to this visit and on discussion today, has decided to pursue the surgical program.  Given higher BMI, weight reported of 333 lbs she was interested in preoperative weight loss and after reviewing options, will start a couple months of phentermine to assist her dietary changes given poor progress this year with just self guided, higher protein dietary therapy.        Factors that could affect the success/risk of their bariatric surgery include:  Hx of PE: will recommend 2-4 weeks of lovenox post op.  She's an avid walker but joint pains have slowed her in the past.  Long commute to and from work can affect personal time/activity and exercise and reliance on convenience eating.  Baby ASA use will need some PPI or H2 blocker therapy long term to reduce ulcer risks.  Previous rocael/appy/tubal ligation and c/s could affect scar tissues/length of surgery.  Nauseated w/ morphine in the past so avoiding post op is recommended (didn't require pain meds after cholecystectomy).  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    is also getting bariatric surgery this Fall. .    Assessment & Plan   Problem List Items Addressed This Visit     None      Visit Diagnoses     " Obesity, morbid, BMI 50 or higher (H)    -  Primary    Relevant Medications    phentermine (ADIPEX-P) 37.5 MG tablet         1. Welcome to the Bariatric Program. Read the manual as you've been doing.  2. Aiming for 10-15lb reduction in weight preop if measurements show weight above 330 lbs. Goal of 315 lbs or less. To help, start trial of Half a tablet of phentermine after breakfast. Don't skip meals. Don't use if ill or using cold medications or other stimulants. Stop using if racing heart beats/chest pain/headaches/insomnia, increased anxiety or mood swings. Anticipate stopping phentermine with preop liquid diet before surgery.  3. Anticipate using lovenox for 2-4 weeks post op to reduce chance of another provoked PE.  4. Anticipate longer term H2 or PPI therapy to reduce stomach ulcer risks while on baby ASA.   5. Come in for measurements/exam this next 6 weeks and to assess how phentermine is going.  6. Attend support group (see link below).  7. Intake labs can be done anytime, I'll message about a week after they are drawn, once all are back.  8. No need for Actigall given your previous cholecystectomy.  9. Recommend starting 2-3 days of some strength work to minimize muscle loss during the rapid weight loss phase. Continue excellent walking habits.        HISTORY OF PRESENT ILLNESS:  Weight Loss History Reviewed with Patient 9/13/2021   How long have you been overweight? Since puberty   What is the most that you have ever weighed? 333   What is the most weight you have lost? 40   I have tried the following methods to lose weight Watching portions or calories, Exercise, Weight Watchers, Atkins type diet (low carb/high protein), OTC Medications, Prescription Medications, Physician directed program   I have tried the following weight loss medications? (Check all that apply) Xenical/Orlistat/Chevy, Phentermine/Adipex-p/Suprenza   Have you ever had weight loss surgery? No     Kids are getting older, two sets of  twins and feels now is the time to take care of her restrictions from her weight. Has tried multiple nonsurgical plans.  Worked w/ Dr. Ortiz in the past prior to twins.   COVID in November last year and gained about 50 lbs last year.  Can struggle w/ later meals some of the time.   Working up endurance  Former 5k regular user.  CO-MORBIDITIES OF OBESITY INCLUDE:     2021   I have the following health issues associated with obesity: Stress Incontinence, Lymphedema, Osteoarthritis (joint disease)     Lower extremity edema, wears compression socks from her venostasis.   Family hx of diabetes and wants to avoid it.    Note: long ago had PE while on OCPs and getting wisdom teeth out, takes baby asa. No factor V hx. MTHFR gene mutation hx.     Needs colonoscopy (hx of constipation).   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  PAST MEDICAL HISTORY:  Past Medical History:   Diagnosis Date     Arthritis     sacro iliac and hip, right ankle.     Chronic constipation     since menopause. fiber/hydration helps.     Complication of anesthesia     nauseated w/ morphine previously after arm surgery.     COVID-19     .     Gallbladder problem     s/p cholecystectomy.     Gestational diabetes      Head injury     childhood concussions     Pneumonia     covid     Pulmonary emboli (H)      Pulmonary embolism (H)     wisdome teeth out while on OCPs and MTHFR gene mutation history.     Pulmonary embolism (H)     age 23, OCPs/dental extraction provoked. reports some MTHFR gene issues, unsure if homo or heterozygous.       PAST SURGICAL HISTORY:  Past Surgical History:   Procedure Laterality Date     APPENDECTOMY       BREAST BIOPSY, RT/LT      right breast - benign      SECTION  2005     GYN SURGERY      tubal ligation     LAPAROSCOPIC CHOLECYSTECTOMY N/A 2019    Procedure: CHOLECYSTECTOMY, LAPAROSCOPIC;  Surgeon: Glenda, MD Lynn;  Location:  OR      ORTHOPEDIC SURGERY  1991    radius, ulnar - open fracture     TUBAL LIGATION       wisdom teeth         FAMILY HISTORY:   Family History   Problem Relation Age of Onset     Cancer Father      Depression Brother         bipolar     Diabetes Mother      Arthritis Maternal Grandmother         rheumatoid arthritis     Diabetes Paternal Grandmother      Cancer Other         breast       SOCIAL HISTORY:   Social History Questions Reviewed With Patient 9/13/2021   Which best describes your employment status (select all that apply) I work full-time   If you work, 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 Yes   Who do you have in your support network that can be available to help you for the first 2 weeks after surgery? , children   Who can you count on for support throughout your weight loss surgery journey? , children   Can you afford 3 meals a day?  Yes   Can you afford 50-60 dollars a month for vitamins? Yes       HABITS:     9/13/2021   How often do you drink alcohol? Monthly or less   If you do drink alcohol, how many drinks might you have in a day? (one drink = 5 oz. wine, 1 can/bottle of beer, 1 shot liquor) 1 or 2   Have you ever used any of the following nicotine products? No   Have you or are you currently using street drugs or prescription strength medication for which you do not have a prescription for? No   Do you have a history of chemical dependency (alcohol or drug abuse)? No     Currently taking narcotic/opioids No    PSYCHOLOGICAL HISTORY:   Psychological History Reviewed With Patient 9/13/2021   Have you ever attempted suicide? Never.   Have you had thoughts of suicide in the past year? No   Have you ever been hospitalized for mental illness or a suicide attempt? Never.   Do you have a history of chronic pain? No   Have you ever been diagnosed with fibromyalgia? No   Are you currently seeing a therapist or counselor?  No   Are  "you currently seeing a psychiatrist? No       ROS:     9/13/2021   Skin:  Skin fold rashes (groin or other folds), Leg swelling, Varicose veins, compression socks help.   HEENT: None of these   Musculoskeletal: Joint Pain, Back pain, Swelling of legs   Cardiovascular: None of the above   Pulmonary: Shortness of breath with activity   Gastrointestinal: Constipation   Genitourinary: Stress incontinence (losing urine when coughing, sneezing, etc.)   Hematological: Pulmonary embolism (PE)   Neurological: None of the above   Female only: Post-menopausal       EATING BEHAVIORS:     9/13/2021   Have you or anyone else thought that you had an eating disorder? No   Do you currently binge eat (eat a large amount of food in a short time)? No   Are you an emotional eater? Yes   Do you get up to eat after falling asleep? No       EXERCISE:     9/13/2021   How often do you exercise? 3 to 4 times per week   What is the duration of your exercise (in minutes)? 45 Minutes   What types of exercise do you do? walking, swimming   What keeps you from being more active?  Pain, I have recently been sick, Lack of Time       MEDICATIONS:  Current Outpatient Medications   Medication Sig Dispense Refill     Acetaminophen 325 MG CAPS Take 325-650 mg by mouth every 4 hours as needed       Ascorbic Acid (VITAMIN C) 500 MG CAPS Take 1,000 mg by mouth 2 times daily       ASPIRIN CAPS 81 MG OR one capsule by mouth daily 100 3     Cholecalciferol (VITAMIN D) 2000 UNIT tablet Take 1 tablet by mouth daily.       Multiple Vitamin (MULTIVITAMIN) per tablet Take 1 tablet by mouth daily.       phentermine (ADIPEX-P) 37.5 MG tablet Start half tablet daily after breakfast. 30 tablet 0       ALLERGIES:  Allergies   Allergen Reactions     No Known Allergies    wt hx 300 lbs prior to rocael, 260lbs         PHYSICAL EXAM:  Objective    Ht 1.727 m (5' 8\")   Wt (!) 151 kg (333 lb)   BMI 50.63 kg/m    Vitals - Patient Reported  Weight (Patient Reported): 151 kg " "(333 lb)  Height (Patient Reported): 172.7 cm (5' 8\")  BMI (Based on Pt Reported Ht/Wt): 50.63        Physical Exam   GENERAL: Healthy, alert and no distress  RESP: No audible wheeze, cough, or visible cyanosis.  No visible retractions or increased work of breathing.    SKIN: Visible skin clear. No significant rash, abnormal pigmentation or lesions.  NEURO: Cranial nerves grossly intact.  Mentation and speech appropriate for age.  PSYCH: Mentation appears normal, affect normal/bright, judgement and insight intact, normal speech and appearance well-groomed.      In summary, Lucina Santamaria has Class III obesity with a body mass index of Body mass index is 50.63 kg/m . kg/m2 and the comorbidities stated above. She completed an informational seminar and is a possible candidate for the laparoscopic gastric bypass.  She will have to complete the following pre-requisites:    Received weight loss goal of TBD once measurements done,  prior to surgery.  Achieve clearance from dietitian to see surgeon.  Have preoperative laboratory tests drawn.  Psychological Evaluation with MMPI and clearance for weight loss surgery.  Given provoked PE in the past, anticipate some lovenox use post op for 2-4 weeks.    Today in the office we discussed gastric bypass surgery.  Preoperative, perioperative, and postoperative processes, management, and follow up were addressed.  Risks and benefits were outlined including the risk of death, staple line leak (1-2%), PE, DVT, ulcer, N/V, stricture, hernia, wound infection, weight regain, and vitamin deficiencies. I emphasized exercise and activity along with appropriate food choice as the main foundation for weight loss with surgery providing surgical reinforcement of this. All questions were answered.  A goal sheet and support group handout were given to the patient.          Once the patient has completed the requirements in their task list and there are no further recommendations, the pt will " be allowed to see the surgeon of her choice for consultation on the laparoscopic gastric bypass surgery. Patient verbalizes understanding of the process to surgery and expectations for the postoperative period including the need for lifelong lifestyle changes, vitamin supplementation, and laboratory monitoring.    Sincerely,     Arnulfo Stanley MD

## 2021-09-13 NOTE — LETTER
"    2021         RE: Lucina Santamaria  [address]  [place] [address]        Dear Colleague,    Thank you for referring your patient, Lucina Santamaria, to the [facility]. Please see a copy of my visit note below.    Video visit      New Bariatric Surgery Consultation Note    2021    RE: Lucina Santamaria  MR#: [id number]  : 1965      Referring provider:   No flowsheet data found.    Chief Complaint/Reason for visit: evaluation for possible weight loss surgery    Dear Nani Pierson MD (General),    I had the pleasure of seeing your patient, Lucina Santamaria, to evaluate her obesity and consider her for possible weight loss surgery. As you know, Lucina Santamaria is 55 year old.  She has a height of 5' 8\", a weight of 333 lbs 0 oz, and calculated Body mass index is 50.63 kg/m ..  Today we discussed our Bariatric Surgery program to address their weight related health. She has viewed our online seminar prior to this visit and on discussion today, has decided to pursue the surgical program.  Given higher BMI, weight reported of 333 lbs she was interested in preoperative weight loss and after reviewing options, will start a couple months of phentermine to assist her dietary changes given poor progress this year with just self guided, higher protein dietary therapy.        Factors that could affect the success/risk of their bariatric surgery include:  Hx of PE: will recommend 2-4 weeks of lovenox post op.  She's an avid walker but joint pains have slowed her in the past.  Long commute to and from work can affect personal time/activity and exercise and reliance on convenience eating.  Baby ASA use will need some PPI or H2 blocker therapy long term to reduce ulcer risks.  Previous rocael/appy/tubal ligation and c/s could affect scar tissues/length of surgery.  Nauseated w/ morphine in the past so avoiding post op is recommended (didn't " require pain meds after cholecystectomy).  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    is also getting bariatric surgery this Fall. .    Assessment & Plan   Problem List Items Addressed This Visit     None      Visit Diagnoses     Obesity, morbid, BMI 50 or higher (H)    -  Primary    Relevant Medications    phentermine (ADIPEX-P) 37.5 MG tablet         1. Welcome to the Bariatric Program. Read the manual as you've been doing.  2. Aiming for 10-15lb reduction in weight preop if measurements show weight above 330 lbs. Goal of 315 lbs or less. To help, start trial of Half a tablet of phentermine after breakfast. Don't skip meals. Don't use if ill or using cold medications or other stimulants. Stop using if racing heart beats/chest pain/headaches/insomnia, increased anxiety or mood swings. Anticipate stopping phentermine with preop liquid diet before surgery.  3. Anticipate using lovenox for 2-4 weeks post op to reduce chance of another provoked PE.  4. Anticipate longer term H2 or PPI therapy to reduce stomach ulcer risks while on baby ASA.   5. Come in for measurements/exam this next 6 weeks and to assess how phentermine is going.  6. Attend support group (see link below).  7. Intake labs can be done anytime, I'll message about a week after they are drawn, once all are back.  8. No need for Actigall given your previous cholecystectomy.  9. Recommend starting 2-3 days of some strength work to minimize muscle loss during the rapid weight loss phase. Continue excellent walking habits.        HISTORY OF PRESENT ILLNESS:  Weight Loss History Reviewed with Patient 9/13/2021   How long have you been overweight? Since puberty   What is the most that you have ever weighed? 333   What is the most weight you have lost? 40   I have tried the following methods to lose weight Watching portions or calories, Exercise, Weight Watchers, Atkins type diet (low carb/high protein), OTC  Medications, Prescription Medications, Physician directed program   I have tried the following weight loss medications? (Check all that apply) Xenical/Orlistat/Chevy, Phentermine/Adipex-p/Suprenza   Have you ever had weight loss surgery? No     Kids are getting older, two sets of twins and feels now is the time to take care of her restrictions from her weight. Has tried multiple nonsurgical plans.  Worked w/ Dr. Ortiz in the past prior to twins.   COVID in November last year and gained about 50 lbs last year.  Can struggle w/ later meals some of the time.   Working up endurance  Former 5k regular user.  CO-MORBIDITIES OF OBESITY INCLUDE:     9/13/2021   I have the following health issues associated with obesity: Stress Incontinence, Lymphedema, Osteoarthritis (joint disease)     Lower extremity edema, wears compression socks from her venostasis.   Family hx of diabetes and wants to avoid it.    Note: long ago had PE while on OCPs and getting wisdom teeth out, takes baby asa. No factor V hx. MTHFR gene mutation hx.     Needs colonoscopy (hx of constipation).   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  PAST MEDICAL HISTORY:  Past Medical History:   Diagnosis Date     Arthritis     sacro iliac and hip, right ankle.     Chronic constipation     since menopause. fiber/hydration helps.     Complication of anesthesia     nauseated w/ morphine previously after arm surgery.     COVID-19     2020, November.     Gallbladder problem     s/p cholecystectomy.     Gestational diabetes 2003     Head injury     childhood concussions     Pneumonia     covid     Pulmonary emboli (H) 1989     Pulmonary embolism (H)     wisdome teeth out while on OCPs and MTHFR gene mutation history.     Pulmonary embolism (H)     age 23, OCPs/dental extraction provoked. reports some MTHFR gene issues, unsure if homo or heterozygous.       PAST SURGICAL HISTORY:  Past Surgical History:   Procedure Laterality Date      APPENDECTOMY  1971     BREAST BIOPSY, RT/LT      right breast - benign      SECTION  2005     GYN SURGERY  2005    tubal ligation     LAPAROSCOPIC CHOLECYSTECTOMY N/A 2019    Procedure: CHOLECYSTECTOMY, LAPAROSCOPIC;  Surgeon: Lynn Doshi MD;  Location: [facility] OR     ORTHOPEDIC SURGERY      radius, ulnar - open fracture     TUBAL LIGATION       wisdom teeth         FAMILY HISTORY:   Family History   Problem Relation Age of Onset     Cancer Father      Depression Brother         bipolar     Diabetes Mother      Arthritis Maternal Grandmother         rheumatoid arthritis     Diabetes Paternal Grandmother      Cancer Other         breast       SOCIAL HISTORY:   Social History Questions Reviewed With Patient 2021   Which best describes your employment status (select all that apply) I work full-time   If you work, 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 Yes   Who do you have in your support network that can be available to help you for the first 2 weeks after surgery? , children   Who can you count on for support throughout your weight loss surgery journey? , children   Can you afford 3 meals a day?  Yes   Can you afford 50-60 dollars a month for vitamins? Yes       HABITS:     2021   How often do you drink alcohol? Monthly or less   If you do drink alcohol, how many drinks might you have in a day? (one drink = 5 oz. wine, 1 can/bottle of beer, 1 shot liquor) 1 or 2   Have you ever used any of the following nicotine products? No   Have you or are you currently using street drugs or prescription strength medication for which you do not have a prescription for? No   Do you have a history of chemical dependency (alcohol or drug abuse)? No     Currently taking narcotic/opioids No    PSYCHOLOGICAL HISTORY:   Psychological History Reviewed With Patient 2021   Have you ever attempted suicide? Never.   Have you had  thoughts of suicide in the past year? No   Have you ever been hospitalized for mental illness or a suicide attempt? Never.   Do you have a history of chronic pain? No   Have you ever been diagnosed with fibromyalgia? No   Are you currently seeing a therapist or counselor?  No   Are you currently seeing a psychiatrist? No       ROS:     9/13/2021   Skin:  Skin fold rashes (groin or other folds), Leg swelling, Varicose veins, compression socks help.   HEENT: None of these   Musculoskeletal: Joint Pain, Back pain, Swelling of legs   Cardiovascular: None of the above   Pulmonary: Shortness of breath with activity   Gastrointestinal: Constipation   Genitourinary: Stress incontinence (losing urine when coughing, sneezing, etc.)   Hematological: Pulmonary embolism (PE)   Neurological: None of the above   Female only: Post-menopausal       EATING BEHAVIORS:     9/13/2021   Have you or anyone else thought that you had an eating disorder? No   Do you currently binge eat (eat a large amount of food in a short time)? No   Are you an emotional eater? Yes   Do you get up to eat after falling asleep? No       EXERCISE:     9/13/2021   How often do you exercise? 3 to 4 times per week   What is the duration of your exercise (in minutes)? 45 Minutes   What types of exercise do you do? walking, swimming   What keeps you from being more active?  Pain, I have recently been sick, Lack of Time       MEDICATIONS:  Current Outpatient Medications   Medication Sig Dispense Refill     Acetaminophen 325 MG CAPS Take 325-650 mg by mouth every 4 hours as needed       Ascorbic Acid (VITAMIN C) 500 MG CAPS Take 1,000 mg by mouth 2 times daily       ASPIRIN CAPS 81 MG OR one capsule by mouth daily 100 3     Cholecalciferol (VITAMIN D) 2000 UNIT tablet Take 1 tablet by mouth daily.       Multiple Vitamin (MULTIVITAMIN) per tablet Take 1 tablet by mouth daily.       phentermine (ADIPEX-P) 37.5 MG tablet Start half tablet daily after breakfast. 30  "tablet 0       ALLERGIES:  Allergies   Allergen Reactions     No Known Allergies    wt hx 300 lbs prior to rocael, 260lbs         PHYSICAL EXAM:  Objective    Ht 1.727 m (5' 8\")   Wt (!) 151 kg (333 lb)   BMI 50.63 kg/m    Vitals - Patient Reported  Weight (Patient Reported): 151 kg (333 lb)  Height (Patient Reported): 172.7 cm (5' 8\")  BMI (Based on Pt Reported Ht/Wt): 50.63        Physical Exam   GENERAL: Healthy, alert and no distress  RESP: No audible wheeze, cough, or visible cyanosis.  No visible retractions or increased work of breathing.    SKIN: Visible skin clear. No significant rash, abnormal pigmentation or lesions.  NEURO: Cranial nerves grossly intact.  Mentation and speech appropriate for age.  PSYCH: Mentation appears normal, affect normal/bright, judgement and insight intact, normal speech and appearance well-groomed.      In summary, Lucina Santamaria has Class III obesity with a body mass index of Body mass index is 50.63 kg/m . kg/m2 and the comorbidities stated above. She completed an informational seminar and is a possible candidate for the laparoscopic gastric bypass.  She will have to complete the following pre-requisites:    Received weight loss goal of TBD once measurements done,  prior to surgery.  Achieve clearance from dietitian to see surgeon.  Have preoperative laboratory tests drawn.  Psychological Evaluation with MMPI and clearance for weight loss surgery.  Given provoked PE in the past, anticipate some lovenox use post op for 2-4 weeks.    Today in the office we discussed gastric bypass surgery.  Preoperative, perioperative, and postoperative processes, management, and follow up were addressed.  Risks and benefits were outlined including the risk of death, staple line leak (1-2%), PE, DVT, ulcer, N/V, stricture, hernia, wound infection, weight regain, and vitamin deficiencies. I emphasized exercise and activity along with appropriate food choice as the main foundation for weight " loss with surgery providing surgical reinforcement of this. All questions were answered.  A goal sheet and support group handout were given to the patient.          Once the patient has completed the requirements in their task list and there are no further recommendations, the pt will be allowed to see the surgeon of her choice for consultation on the laparoscopic gastric bypass surgery. Patient verbalizes understanding of the process to surgery and expectations for the postoperative period including the need for lifelong lifestyle changes, vitamin supplementation, and laboratory monitoring.    Sincerely,     Arnulfo Stanley MD            Again, thank you for allowing me to participate in the care of your patient.        Sincerely,        Arnulfo Stanley MD

## 2021-09-13 NOTE — Clinical Note
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 Started some phentermine to help preop weight loss and needs to come in for exam/measurements within 6 weeks.

## 2021-09-19 ENCOUNTER — HEALTH MAINTENANCE LETTER (OUTPATIENT)
Age: 56
End: 2021-09-19

## 2021-09-23 ENCOUNTER — ANCILLARY PROCEDURE (OUTPATIENT)
Dept: GENERAL RADIOLOGY | Facility: CLINIC | Age: 56
End: 2021-09-23
Attending: FAMILY MEDICINE
Payer: COMMERCIAL

## 2021-09-23 ENCOUNTER — OFFICE VISIT (OUTPATIENT)
Dept: FAMILY MEDICINE | Facility: CLINIC | Age: 56
End: 2021-09-23
Payer: COMMERCIAL

## 2021-09-23 VITALS
SYSTOLIC BLOOD PRESSURE: 138 MMHG | HEART RATE: 102 BPM | RESPIRATION RATE: 20 BRPM | BODY MASS INDEX: 50.48 KG/M2 | TEMPERATURE: 97.9 F | WEIGHT: 293 LBS | DIASTOLIC BLOOD PRESSURE: 88 MMHG | OXYGEN SATURATION: 100 %

## 2021-09-23 DIAGNOSIS — R60.9 EDEMA, UNSPECIFIED TYPE: ICD-10-CM

## 2021-09-23 DIAGNOSIS — R06.02 SOB (SHORTNESS OF BREATH): ICD-10-CM

## 2021-09-23 DIAGNOSIS — R63.5 WEIGHT GAIN: ICD-10-CM

## 2021-09-23 DIAGNOSIS — Z12.4 SCREENING FOR CERVICAL CANCER: ICD-10-CM

## 2021-09-23 DIAGNOSIS — Z00.00 ROUTINE GENERAL MEDICAL EXAMINATION AT A HEALTH CARE FACILITY: Primary | ICD-10-CM

## 2021-09-23 DIAGNOSIS — Z13.6 CARDIOVASCULAR SCREENING; LDL GOAL LESS THAN 130: ICD-10-CM

## 2021-09-23 DIAGNOSIS — E66.01 OBESITY, MORBID, BMI 50 OR HIGHER (H): ICD-10-CM

## 2021-09-23 DIAGNOSIS — L98.9 SKIN LESION: ICD-10-CM

## 2021-09-23 DIAGNOSIS — Z12.11 SPECIAL SCREENING FOR MALIGNANT NEOPLASMS, COLON: ICD-10-CM

## 2021-09-23 DIAGNOSIS — R03.0 ELEVATED BLOOD PRESSURE READING WITHOUT DIAGNOSIS OF HYPERTENSION: ICD-10-CM

## 2021-09-23 LAB
ALBUMIN SERPL-MCNC: 3.4 G/DL (ref 3.4–5)
ALP SERPL-CCNC: 78 U/L (ref 40–150)
ALT SERPL W P-5'-P-CCNC: 32 U/L (ref 0–50)
ANION GAP SERPL CALCULATED.3IONS-SCNC: 5 MMOL/L (ref 3–14)
AST SERPL W P-5'-P-CCNC: 16 U/L (ref 0–45)
BILIRUB SERPL-MCNC: 0.3 MG/DL (ref 0.2–1.3)
BUN SERPL-MCNC: 16 MG/DL (ref 7–30)
CALCIUM SERPL-MCNC: 8.7 MG/DL (ref 8.5–10.1)
CHLORIDE BLD-SCNC: 108 MMOL/L (ref 94–109)
CHOLEST SERPL-MCNC: 182 MG/DL
CO2 SERPL-SCNC: 24 MMOL/L (ref 20–32)
CREAT SERPL-MCNC: 0.85 MG/DL (ref 0.52–1.04)
ERYTHROCYTE [DISTWIDTH] IN BLOOD BY AUTOMATED COUNT: 12.1 % (ref 10–15)
FASTING STATUS PATIENT QL REPORTED: YES
FERRITIN SERPL-MCNC: 119 NG/ML (ref 8–252)
FOLATE SERPL-MCNC: 14.2 NG/ML
GFR SERPL CREATININE-BSD FRML MDRD: 78 ML/MIN/1.73M2
GLUCOSE BLD-MCNC: 113 MG/DL (ref 70–99)
HBA1C MFR BLD: 5.9 % (ref 0–5.6)
HCT VFR BLD AUTO: 41.1 % (ref 35–47)
HDLC SERPL-MCNC: 53 MG/DL
HGB BLD-MCNC: 13.8 G/DL (ref 11.7–15.7)
IRON SATN MFR SERPL: 13 % (ref 15–46)
IRON SERPL-MCNC: 45 UG/DL (ref 35–180)
LDLC SERPL CALC-MCNC: 110 MG/DL
MCH RBC QN AUTO: 33.2 PG (ref 26.5–33)
MCHC RBC AUTO-ENTMCNC: 33.6 G/DL (ref 31.5–36.5)
MCV RBC AUTO: 99 FL (ref 78–100)
NONHDLC SERPL-MCNC: 129 MG/DL
PLATELET # BLD AUTO: 172 10E3/UL (ref 150–450)
POTASSIUM BLD-SCNC: 4.3 MMOL/L (ref 3.4–5.3)
PROLACTIN SERPL-MCNC: 9 UG/L (ref 3–27)
PROT SERPL-MCNC: 7.2 G/DL (ref 6.8–8.8)
PTH-INTACT SERPL-MCNC: 35 PG/ML (ref 18–80)
RBC # BLD AUTO: 4.16 10E6/UL (ref 3.8–5.2)
SODIUM SERPL-SCNC: 137 MMOL/L (ref 133–144)
T3FREE SERPL-MCNC: 3.8 PG/ML (ref 2.3–4.2)
T4 FREE SERPL-MCNC: 1.18 NG/DL (ref 0.76–1.46)
TIBC SERPL-MCNC: 356 UG/DL (ref 240–430)
TRIGL SERPL-MCNC: 93 MG/DL
TSH SERPL DL<=0.005 MIU/L-ACNC: 5.94 MU/L (ref 0.4–4)
VIT B12 SERPL-MCNC: 301 PG/ML (ref 193–986)
WBC # BLD AUTO: 7.2 10E3/UL (ref 4–11)

## 2021-09-23 PROCEDURE — 82306 VITAMIN D 25 HYDROXY: CPT | Performed by: FAMILY MEDICINE

## 2021-09-23 PROCEDURE — 82728 ASSAY OF FERRITIN: CPT | Performed by: FAMILY MEDICINE

## 2021-09-23 PROCEDURE — 83550 IRON BINDING TEST: CPT | Performed by: FAMILY MEDICINE

## 2021-09-23 PROCEDURE — 36415 COLL VENOUS BLD VENIPUNCTURE: CPT | Performed by: FAMILY MEDICINE

## 2021-09-23 PROCEDURE — 71046 X-RAY EXAM CHEST 2 VIEWS: CPT | Performed by: RADIOLOGY

## 2021-09-23 PROCEDURE — 84425 ASSAY OF VITAMIN B-1: CPT | Mod: 90 | Performed by: FAMILY MEDICINE

## 2021-09-23 PROCEDURE — 99396 PREV VISIT EST AGE 40-64: CPT | Performed by: FAMILY MEDICINE

## 2021-09-23 PROCEDURE — 87624 HPV HI-RISK TYP POOLED RSLT: CPT | Performed by: FAMILY MEDICINE

## 2021-09-23 PROCEDURE — 80053 COMPREHEN METABOLIC PANEL: CPT | Performed by: FAMILY MEDICINE

## 2021-09-23 PROCEDURE — 84590 ASSAY OF VITAMIN A: CPT | Mod: 90 | Performed by: FAMILY MEDICINE

## 2021-09-23 PROCEDURE — 84146 ASSAY OF PROLACTIN: CPT | Performed by: FAMILY MEDICINE

## 2021-09-23 PROCEDURE — 82607 VITAMIN B-12: CPT | Performed by: FAMILY MEDICINE

## 2021-09-23 PROCEDURE — 83970 ASSAY OF PARATHORMONE: CPT | Performed by: FAMILY MEDICINE

## 2021-09-23 PROCEDURE — 82746 ASSAY OF FOLIC ACID SERUM: CPT | Performed by: FAMILY MEDICINE

## 2021-09-23 PROCEDURE — 99000 SPECIMEN HANDLING OFFICE-LAB: CPT | Performed by: FAMILY MEDICINE

## 2021-09-23 PROCEDURE — 84481 FREE ASSAY (FT-3): CPT | Performed by: FAMILY MEDICINE

## 2021-09-23 PROCEDURE — 99214 OFFICE O/P EST MOD 30 MIN: CPT | Mod: 25 | Performed by: FAMILY MEDICINE

## 2021-09-23 PROCEDURE — 80061 LIPID PANEL: CPT | Performed by: FAMILY MEDICINE

## 2021-09-23 PROCEDURE — G0145 SCR C/V CYTO,THINLAYER,RESCR: HCPCS | Performed by: FAMILY MEDICINE

## 2021-09-23 PROCEDURE — 85027 COMPLETE CBC AUTOMATED: CPT | Performed by: FAMILY MEDICINE

## 2021-09-23 PROCEDURE — 84443 ASSAY THYROID STIM HORMONE: CPT | Performed by: FAMILY MEDICINE

## 2021-09-23 PROCEDURE — 84439 ASSAY OF FREE THYROXINE: CPT | Performed by: FAMILY MEDICINE

## 2021-09-23 PROCEDURE — 84630 ASSAY OF ZINC: CPT | Mod: 90 | Performed by: FAMILY MEDICINE

## 2021-09-23 PROCEDURE — 83036 HEMOGLOBIN GLYCOSYLATED A1C: CPT | Performed by: FAMILY MEDICINE

## 2021-09-23 RX ORDER — SPIRONOLACTONE 25 MG/1
25 TABLET ORAL DAILY
Qty: 90 TABLET | Refills: 3 | Status: SHIPPED | OUTPATIENT
Start: 2021-09-23 | End: 2022-07-14

## 2021-09-23 NOTE — NURSING NOTE
"Chief Complaint   Patient presents with     Physical       Initial /88 (BP Location: Right arm)   Pulse 102   Temp 97.9  F (36.6  C) (Tympanic)   Resp 20   Wt (!) 150.6 kg (332 lb)   SpO2 100%   BMI 50.48 kg/m   Estimated body mass index is 50.48 kg/m  as calculated from the following:    Height as of 9/13/21: 1.727 m (5' 8\").    Weight as of this encounter: 150.6 kg (332 lb).    Patient presents to the clinic using No DME    Health Maintenance that is potentially due pending provider review:  Colonoscopy/FIT    n/a    Is there anyone who you would like to be able to receive your results? No  If yes have patient fill out TERESA    "

## 2021-09-23 NOTE — PATIENT INSTRUCTIONS
Start spironolactone one daily  Labs today  chest x-ray today  Get your colonoscopy done  We'll biopsy the lesion      Preventive Health Recommendations  Female Ages 50 - 64    Yearly exam: See your health care provider every year in order to  o Review health changes.   o Discuss preventive care.    o Review your medicines if your doctor has prescribed any.      Get a Pap test every three years (unless you have an abnormal result and your provider advises testing more often).    If you get Pap tests with HPV test, you only need to test every 5 years, unless you have an abnormal result.     You do not need a Pap test if your uterus was removed (hysterectomy) and you have not had cancer.    You should be tested each year for STDs (sexually transmitted diseases) if you're at risk.     Have a mammogram every 1 to 2 years.    Have a colonoscopy at age 50, or have a yearly FIT test (stool test). These exams screen for colon cancer.      Have a cholesterol test every 5 years, or more often if advised.    Have a diabetes test (fasting glucose) every three years. If you are at risk for diabetes, you should have this test more often.     If you are at risk for osteoporosis (brittle bone disease), think about having a bone density scan (DEXA).    Shots: Get a flu shot each year. Get a tetanus shot every 10 years.    Nutrition:     Eat at least 5 servings of fruits and vegetables each day.    Eat whole-grain bread, whole-wheat pasta and brown rice instead of white grains and rice.    Get adequate Calcium and Vitamin D.     Lifestyle    Exercise at least 150 minutes a week (30 minutes a day, 5 days a week). This will help you control your weight and prevent disease.    Limit alcohol to one drink per day.    No smoking.     Wear sunscreen to prevent skin cancer.     See your dentist every six months for an exam and cleaning.    See your eye doctor every 1 to 2 years.

## 2021-09-23 NOTE — PROGRESS NOTES
SUBJECTIVE:   CC: Lucina Santamaria is an 55 year old woman who presents for preventive health visit.       Patient has been advised of split billing requirements and indicates understanding: Yes  HPI    Post COVID  Still has shortness of breath, fatigue and increased edema in her legs since having COVID last year.   No chest pain, but shortness of breath with activities.   Wears compression stockings at all times. Is a chronic problem since birth of her first set of twins. Is better when on vacation and not sitting so much for work.     Saw the weight loss folks as is interested in bariatric surgery. They have future labs ordered that she'd like to get today.     Today's PHQ-2 Score:   PHQ-2 ( 1999 Pfizer) 9/23/2021   Q1: Little interest or pleasure in doing things 0   Q2: Feeling down, depressed or hopeless 0   PHQ-2 Score 0       Abuse: Current or Past (Physical, Sexual or Emotional) - No  Do you feel safe in your environment? Yes    Have you ever done Advance Care Planning? (For example, a Health Directive, POLST, or a discussion with a medical provider or your loved ones about your wishes): No, advance care planning information given to patient to review.  Advanced care planning was discussed at today's visit.    Social History     Tobacco Use     Smoking status: Never Smoker     Smokeless tobacco: Never Used   Substance Use Topics     Alcohol use: Yes     Comment: rare, once monthly.         No flowsheet data found.    Reviewed orders with patient.  Reviewed health maintenance and updated orders accordingly - Yes    Blood pressures high normal    BP Readings from Last 3 Encounters:   09/23/21 138/88   01/13/20 136/84   12/31/19 137/82    Wt Readings from Last 3 Encounters:   09/23/21 (!) 150.6 kg (332 lb)   09/13/21 (!) 151 kg (333 lb)   01/13/20 142.8 kg (314 lb 12 oz)            Wt Readings from Last 5 Encounters:   09/23/21 (!) 150.6 kg (332 lb)   09/13/21 (!) 151 kg (333 lb)   01/13/20 142.8 kg (314  lb 12 oz)   19 146 kg (321 lb 14.4 oz)   19 127 kg (280 lb)            Breast Cancer Screening:  Any new diagnosis of family breast, ovarian, or bowel cancer? No    FHS-7: No flowsheet data found.    Mammogram Screening: Recommended annual mammography  Pertinent mammograms are reviewed under the imaging tab.    History of abnormal Pap smear: NO - age 30-65 PAP every 5 years with negative HPV co-testing recommended  PAP / HPV Latest Ref Rng & Units 2018   PAP (Historical) - NIL   HPV16 NEG:Negative Negative   HPV18 NEG:Negative Negative   HRHPV NEG:Negative Negative     Reviewed and updated as needed this visit by clinical staff  Tobacco  Allergies  Meds   Med Hx  Surg Hx  Fam Hx  Soc Hx        Reviewed and updated as needed this visit by Provider                Past Medical History:   Diagnosis Date     Arthritis     sacro iliac and hip, right ankle.     Chronic constipation     since menopause. fiber/hydration helps.     Complication of anesthesia     nauseated w/ morphine previously after arm surgery.     COVID-19     .     Gallbladder problem     s/p cholecystectomy.     Gestational diabetes      Head injury     childhood concussions     Pneumonia     covid     Pulmonary emboli (H)      Pulmonary embolism (H)     wisdome teeth out while on OCPs and MTHFR gene mutation history.     Pulmonary embolism (H)     age 23, OCPs/dental extraction provoked. reports some MTHFR gene issues, unsure if homo or heterozygous.      Past Surgical History:   Procedure Laterality Date     APPENDECTOMY       BREAST BIOPSY, RT/LT      right breast - benign      SECTION       GYN SURGERY      tubal ligation     LAPAROSCOPIC CHOLECYSTECTOMY N/A 2019    Procedure: CHOLECYSTECTOMY, LAPAROSCOPIC;  Surgeon: Lynn Doshi MD;  Location:  OR     ORTHOPEDIC SURGERY      radius, ulnar - open fracture     TUBAL LIGATION       wisdom teeth       OB History     Para Term  AB Living   5 5 0 0 0 0   SAB TAB Ectopic Multiple Live Births   0 0 0 2 0      # Outcome Date GA Lbr Mesfin/2nd Weight Sex Delivery Anes PTL Lv   5 Para            4 Para            3 Para            2 Para            1 Para                Review of Systems  CONSTITUTIONAL: NEGATIVE for fever, chills, change in weight  INTEGUMENTARY/SKIN: NEGATIVE for worrisome rashes, moles or lesions  EYES: NEGATIVE for vision changes or irritation  ENT: NEGATIVE for ear, mouth and throat problems  BREAST: NEGATIVE for masses, tenderness or discharge  GI: NEGATIVE for nausea, abdominal pain, heartburn, or change in bowel habits  : NEGATIVE for unusual urinary or vaginal symptoms. No vaginal bleeding.  NEURO: NEGATIVE for weakness, dizziness or paresthesias  HEME/ALLERGY/IMMUNE: NEGATIVE for bleeding problems  PSYCHIATRIC: NEGATIVE for changes in mood or affect   Positive for joint pain, shortness of breath and edema as above.     OBJECTIVE:   /88 (BP Location: Right arm)   Pulse 102   Temp 97.9  F (36.6  C) (Tympanic)   Resp 20   Wt (!) 150.6 kg (332 lb)   SpO2 100%   BMI 50.48 kg/m    Physical Exam  GENERAL APPEARANCE: healthy, alert and no distress  EYES: Eyes grossly normal to inspection, PERRL and conjunctivae and sclerae normal  HENT: mask in place  NECK: no adenopathy, no asymmetry, masses, or scars and thyroid normal to palpation  RESP: lungs clear to auscultation - no rales, rhonchi or wheezes  BREAST:   CV: regular rate and rhythm, normal S1 S2, no S3 or S4, no murmur, click or rub, no peripheral edema and peripheral pulses strong  ABDOMEN: soft, obese, nontender, no hepatosplenomegaly but exam limited by obesity, no distention  MS: no musculoskoeletal defects are noted and gait is age appropriate without ataxia  : normal female external genitalia, macule noted as below, normal glans, normal vaginal walls and cervix, clear discharge present, pap taken, bimanual exam reveals normal sized  uterus, adnexa without mass or tenderness   SKIN: approximately 8 mm darkly pigmented slightly irregular macule with some cloudiness lateral to left labia   NEURO: Normal strength and tone, sensory exam grossly normal, mentation intact and speech normal  PSYCH: mentation appears normal and affect normal/bright        ASSESSMENT/PLAN:   Lucina was seen today for physical.    Diagnoses and all orders for this visit:    Routine general medical examination at a health care facility  -     Pap screen with HPV - recommended age 30 - 65 years  -     HPV Hold (Lab Only)    CARDIOVASCULAR SCREENING; LDL GOAL LESS THAN 130  -     Lipid panel reflex to direct LDL Fasting; Future  -     Lipid panel reflex to direct LDL Fasting    Obesity, morbid, BMI 50 or higher (H)  -     Zinc  -     Vitamin B12  -     Vitamin B1 whole blood  -     Vitamin A  -     Prolactin  -     T3 Free  -     TSH  -     T4 free - FUTURE S+90  -     Parathyroid Hormone Intact  -     Hemoglobin A1c  -     Iron & Iron Binding Capacity  -     Cancel: HC 25 HYDROXYVITAMIN D TOTAL  -     Folate  -     Comprehensive metabolic panel  -     CBC with platelets  -     Ferritin  -     Vitamin D Deficiency; Future  -     Vitamin D Deficiency    Weight gain  -     Prolactin  -     T3 Free  -     TSH  -     T4 free - FUTURE S+90    Edema, unspecified type  Will try spironolactone for edema and will help with blood pressure--     spironolactone (ALDACTONE) 25 MG tablet; Take 1 tablet (25 mg) by mouth daily  -     Echocardiogram Complete; Future    Elevated blood pressure reading without diagnosis of hypertension  Will try spironolactone for edema and will help with blood pressure-     spironolactone (ALDACTONE) 25 MG tablet; Take 1 tablet (25 mg) by mouth daily    SOB (shortness of breath)  Post COVID, uncertain etiology  -     XR Chest 2 Views; Future  -     Echocardiogram Complete; Future    Screening for cervical cancer  -     Cancel: Pap screen with HPV -  "recommended age 30 - 65 years    Special screening for malignant neoplasms, colon  -     Adult Gastro Ref - Procedure Only; Future    Skin lesion  Suspicious  Recommend biopsy either by myself, derm or ob/gyn      Patient has been advised of split billing requirements and indicates understanding: Yes  COUNSELING:  Reviewed preventive health counseling, as reflected in patient instructions       Regular exercise       Aspirin prophylaxis       Colon cancer screening    Estimated body mass index is 50.48 kg/m  as calculated from the following:    Height as of 9/13/21: 1.727 m (5' 8\").    Weight as of this encounter: 150.6 kg (332 lb).    Weight management plan: pursuing bariatric surgery    She reports that she has never smoked. She has never used smokeless tobacco.      Counseling Resources:  ATP IV Guidelines  Pooled Cohorts Equation Calculator  Breast Cancer Risk Calculator  BRCA-Related Cancer Risk Assessment: FHS-7 Tool  FRAX Risk Assessment  ICSI Preventive Guidelines  Dietary Guidelines for Americans, 2010  USDA's MyPlate  ASA Prophylaxis  Lung CA Screening    Nani Eli MD  [facility]  "

## 2021-09-24 LAB — DEPRECATED CALCIDIOL+CALCIFEROL SERPL-MC: 52 UG/L (ref 20–75)

## 2021-09-26 ENCOUNTER — TELEPHONE (OUTPATIENT)
Dept: FAMILY MEDICINE | Facility: CLINIC | Age: 56
End: 2021-09-26

## 2021-09-26 DIAGNOSIS — E03.8 SUBCLINICAL HYPOTHYROIDISM: Primary | ICD-10-CM

## 2021-09-26 LAB
ANNOTATION COMMENT IMP: NORMAL
RETINYL PALMITATE SERPL-MCNC: 0.03 MG/L
VIT A SERPL-MCNC: 0.38 MG/L
ZINC SERPL-MCNC: 89.1 UG/DL

## 2021-09-26 RX ORDER — LEVOTHYROXINE SODIUM 50 UG/1
50 TABLET ORAL DAILY
Qty: 90 TABLET | Refills: 1 | Status: SHIPPED | OUTPATIENT
Start: 2021-09-26 | End: 2022-01-20

## 2021-09-26 NOTE — TELEPHONE ENCOUNTER
Discussed subclinical hypothyroidism with Lucina, as she has symptoms, strong FH hypothyroidism,  she would like to try replacement

## 2021-09-27 ENCOUNTER — TELEPHONE (OUTPATIENT)
Dept: GASTROENTEROLOGY | Facility: CLINIC | Age: 56
End: 2021-09-27

## 2021-09-27 LAB
BKR LAB AP GYN ADEQUACY: NORMAL
BKR LAB AP GYN INTERPRETATION: NORMAL
BKR LAB AP HPV REFLEX: NORMAL
BKR LAB AP PREVIOUS ABNORMAL: NORMAL
PATH REPORT.COMMENTS IMP SPEC: NORMAL
PATH REPORT.RELEVANT HX SPEC: NORMAL
VIT B1 PYROPHOSHATE BLD-SCNC: 102 NMOL/L

## 2021-09-27 NOTE — TELEPHONE ENCOUNTER
Screening Questions  1. Are you active on mychart? YES    2. What insurance is in the chart? [employer]    2.  Ordering/Referring Provider: Nani Pierson MD in [facility]    3. BMI 48.7    4. Are you on daily home oxygen? NO    5. Do you have a history of difficult airway? NO    6. Have you had a heart, lung, or liver transplant? NO    7. Are you currently on dialysis or have chronic kidney disease? NO    8. Have you had a stroke or Transient ischemic atttack (TIA) within 6 months? NO    9. In the past 6 months, have you had any heart related issues including cardiomyopathy or heart attack?         If yes, did it require cardiac stenting or other implantable device?NO    10. Do you have any implantable devices in your body (pacemaker, defib, LVAD)? NO    11. Do you take nitroglycerin? If yes, how often? NO    12. Are you currently taking any blood thinners?ASPIRIN    13. Are you a diabetic? NO    14. (Females) Are you currently pregnant? NO  If yes, how many weeks?    15. Have you had a procedure in the past that was difficult to tolerate with conscious sedation? Any allergies to Fentanyl or Versed NO    16. Are you taking any scheduled prescription narcotics more than once daily? NO    17. Do you have any chemical dependencies such as alcohol, street drugs, or methadone? NO    18. Do you have any history of post-traumatic stress syndrome or mental health issues? NO    19. Do you transfer independently? YES    20.  Do you have any issues with constipation? SOME     21. Preferred Pharmacy for Pre Prescription [facility]    Scheduling Details    Which Colonoscopy Prep was Sent?: EXTENDED PREP   Procedure Scheduled: COLONOSCOPY   Provider/Surgeon: Dr. Doshi  Date of Procedure: 11/10/2021  Location:   Caller (Please ask for phone number if not scheduled by patient): [facility]       Sedation Type: MAC  Conscious Sedation- Needs  for 6 hours after the procedure  MAC/General-Needs   for 24 hours after procedure    Pre-op Required at [facility], [place], [place] and OR for MAC sedation:   (if yes advise patient they will need a pre-op prior to procedure)      Is patient on blood thinners? -NO (If yes- inform patient to follow up with PCP or provider for follow up instructions)     Informed patient they will need an adult  YES  Cannot take any type of public or medical transportation alone    [facility]: YES    Confirmed Nurse will call to complete assessment YES    Additional comments: NO

## 2021-09-29 LAB
HUMAN PAPILLOMA VIRUS 16 DNA: NEGATIVE
HUMAN PAPILLOMA VIRUS 18 DNA: NEGATIVE
HUMAN PAPILLOMA VIRUS FINAL DIAGNOSIS: NORMAL
HUMAN PAPILLOMA VIRUS OTHER HR: NEGATIVE

## 2021-09-30 ENCOUNTER — TELEPHONE (OUTPATIENT)
Dept: SURGERY | Facility: CLINIC | Age: 56
End: 2021-09-30

## 2021-09-30 NOTE — TELEPHONE ENCOUNTER
Called the patient to get her in for a 6 week check in for the phentermine and she was unable to get in until the end of November due to a combination of her work schedule and the providers schedule.  She is wondering if she can have a refill prior to that appointment since she will be out.  She started right at the 1 full tablet dosing and is doing well on that.  She also wanted to note that she was started on 50 mcg of synthroid recently as well.      She is requesting a 90 days supply if possible since it is cheaper for her that way.  Paula Drew RN

## 2021-10-01 DIAGNOSIS — E66.01 OBESITY, MORBID, BMI 50 OR HIGHER (H): ICD-10-CM

## 2021-10-01 RX ORDER — PHENTERMINE HYDROCHLORIDE 37.5 MG/1
TABLET ORAL
Qty: 60 TABLET | Refills: 0 | Status: SHIPPED | OUTPATIENT
Start: 2021-10-01 | End: 2021-11-22 | Stop reason: SINTOL

## 2021-10-06 DIAGNOSIS — Z11.59 ENCOUNTER FOR SCREENING FOR OTHER VIRAL DISEASES: ICD-10-CM

## 2021-10-11 ENCOUNTER — VIRTUAL VISIT (OUTPATIENT)
Dept: SURGERY | Facility: CLINIC | Age: 56
End: 2021-10-11
Payer: COMMERCIAL

## 2021-10-11 DIAGNOSIS — E66.01 CLASS 3 SEVERE OBESITY DUE TO EXCESS CALORIES WITHOUT SERIOUS COMORBIDITY WITH BODY MASS INDEX (BMI) OF 45.0 TO 49.9 IN ADULT (H): Primary | ICD-10-CM

## 2021-10-11 DIAGNOSIS — Z71.3 NUTRITIONAL COUNSELING: ICD-10-CM

## 2021-10-11 DIAGNOSIS — E66.813 CLASS 3 SEVERE OBESITY DUE TO EXCESS CALORIES WITHOUT SERIOUS COMORBIDITY WITH BODY MASS INDEX (BMI) OF 45.0 TO 49.9 IN ADULT (H): Primary | ICD-10-CM

## 2021-10-11 DIAGNOSIS — E66.01 OBESITY, MORBID, BMI 50 OR HIGHER (H): ICD-10-CM

## 2021-10-11 PROCEDURE — 97802 MEDICAL NUTRITION INDIV IN: CPT | Mod: GT | Performed by: DIETITIAN, REGISTERED

## 2021-10-11 NOTE — LETTER
10/11/2021         RE: Lucina Santamaria  [address]  [place] [address]        Dear Colleague,    Thank you for referring your patient, Lucina Santamaria, to the [facility]ERY [facility]ATRICS CARE [place]. Please see a copy of my visit note below.    Lucina Santamaria is a 56 year old who is being evaluated via a billable video visit.      How would you like to obtain your AVS? MyChart  If the video visit is dropped, the invitation should be resent by: Send to e-mail at: [email or web address]  Will anyone else be joining your video visit? No      Video Start Time: 9:53 AM      Initial Structured Weight Loss Supervised Diet Evaluation     Assessment:  Lucina is being seen today for initial RD nutritional evaluation. Patient has been unsuccessful with non-surgical weight loss methods and is interested in bariatric surgery. Today we reviewed current eating habits and level of physical activity, and instructed on the changes that are required for successful bariatric outcomes.  Patient is also taking Phentermine to help assist with weight loss.     Surgery of interest per pt: RNY.    Workflow review:  Support Group: Not completed.  Psychology:In progress.  Lab work:Completed.  SWL:Yes 2nd Visit     Weight goal: At or below initial.    Anthropometrics:  Initial Weight: 332 lbs  Current Weight: 324 lbs   BMI: There is no height or weight on file to calculate BMI.   Ideal body weight: 63.9 kg (140 lb 14 oz)  Adjusted ideal body weight: 98.6 kg (217 lb 5.2 oz)    Estimated RMR (Ogden-St Jeor equation):  2123 kcals x 1.3 (light active) = 2760 kcals (for weight maintenance)    Medical History:  Patient Active Problem List   Diagnosis     CARDIOVASCULAR SCREENING; LDL GOAL LESS THAN 130     Obesity, morbid, BMI 50 or higher (H)     Elevated blood pressure reading without diagnosis of hypertension     Edema, unspecified type      Diabetes: No   HbA1c:  No results found for: HGBA1C    Nutrition  History  Food allergies/intolerances/cultural or religous food customs: No   Does get an upset stomach with milk, ice cream    Vitamins/Mineral currently taking: Vitamin B1, Vitamin C, Vitamin D, MVI    Socioeconomic Status  Who does the grocery shopping for your household? Self, sometimes shared     Who prepares your meals at home? Shared     Diet Recall/Time  Breakfast: Greek Yogurt with granola or Oatmeal with PB Powder and blueberries or Protein Shake   Lunch: leftovers from the night before   Dinner: Fit Crunch Bars (during her later days at work) or Steamed Vegetables or Chicken Stir Chicas, occasional wild/brown rice or Lo Mein Noodles    Typical Snacks: veggies, crackers (Triscuits or Wheat Thins) or pretzels or almonds      Beverages  Occasional V8 Juice, Water with Bryson (5 bottles/day), Coffee (Decaf more regularly) with Premier Protein      Exercise  Walking 3 days/week for 3 miles    Nutrition Diagnosis (PES statement)  Overweight/Obesity (NC 3.3) related to overeating and poor lifestyle habits as evidenced by patient's subjective statements (excessive energy intake) and BMI 49.4 kg/m2.     Intervention    Nutrition Education:   1. Provided general overview of diet and lifestyle modifications needed to be a deemed a safe candidate for bariatric surgery.   2. Educated patient on how to read a food label: choosing foods with than 10 grams fat and 10 grams sugar per serving to avoid dumping syndrome.  3. Dumping Syndrome: Described the mechanisms of syndrome, symptoms, and prevention tools from a dietary perspective.   4. Vitamins: Educated on post-op vitamin regimen including MVI+ 18 mg Fe two times a day, calcium citrate 400-600 mg two times a day, 7123-8730 mcg sublingual B12 daily, 5000 IU vitamin D3 daily.     Food/Nutrient Delivery:  5. Educated patient on eating three meals, with cutting out snacking.  6. Bariatric Plate: Patient and I discussed the importance of including a lean protein source (20-30  grams/meal), vegetables (included at lunch and dinner), one serving (15g) of carbohydrate, and limited added fat (1 tb/day) at each meal.   7. Educated patient on how to complete a food journal and benefits of meal planning.   8. Educated patient on using a protein powder drink as a meal replacement and/or supplement after bariatric surgery.   9. Discussed importance of adequate hydration after surgery, with goal of at least 64 oz of fluids/day.  10. Addressed avoiding all carbonated, caffeinated and sweetened drinks to prepare for bariatric surgery.     Nutrition Counselin. Mindful eating techniques: Encouraged slow meal pace, chewing foods to applesauce consistency for 20-30 minutes/meal.   12. Discussed  fluids 30 minutes before, during, and after meal to prevent dumping syndrome and discomfort post bariatric surgery.     Instructions/Goals:     1. Start implementing bariatric surgery lifestyle modifications.    Handouts Provided:   [facility] Weight Management Patient Handbook    Monitor/Evaluation:  Pt. s target weight: no gain from initial visit, patient verbalized understanding.     Plan for next visit:     (Final Supervised Diet visit with RD) pre/post-op  diet progression, give review of surgery process.        Video-Visit Details    Type of service:  Video Visit    Video End Time:10:36 AM    Originating Location (pt. Location): Home    Distant Location (provider location):  [facility] CL[facility] AND [facility]ATRICS CARE [place]     Platform used for Video Visit: Carmenza Vanegas RD          Again, thank you for allowing me to participate in the care of your patient.        Sincerely,        Cindy Vanegas RD
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
## 2021-10-11 NOTE — PROGRESS NOTES
Lucina Santamaria is a 56 year old who is being evaluated via a billable video visit.      How would you like to obtain your AVS? MyChart  If the video visit is dropped, the invitation should be resent by: Send to e-mail at: [email or web address]  Will anyone else be joining your video visit? No      Video Start Time: 9:53 AM      Initial Structured Weight Loss Supervised Diet Evaluation     Assessment:  Lucina is being seen today for initial RD nutritional evaluation. Patient has been unsuccessful with non-surgical weight loss methods and is interested in bariatric surgery. Today we reviewed current eating habits and level of physical activity, and instructed on the changes that are required for successful bariatric outcomes.  Patient is also taking Phentermine to help assist with weight loss.     Surgery of interest per pt: JIGNA.    Workflow review:  Support Group: Not completed.  Psychology:In progress.  Lab work:Completed.  SWL:Yes 2nd Visit     Weight goal: At or below initial.    Anthropometrics:  Initial Weight: 332 lbs  Current Weight: 324 lbs   BMI: There is no height or weight on file to calculate BMI.   Ideal body weight: 63.9 kg (140 lb 14 oz)  Adjusted ideal body weight: 98.6 kg (217 lb 5.2 oz)    Estimated RMR (Galax-St Jeor equation):  2123 kcals x 1.3 (light active) = 2760 kcals (for weight maintenance)    Medical History:  Patient Active Problem List   Diagnosis     CARDIOVASCULAR SCREENING; LDL GOAL LESS THAN 130     Obesity, morbid, BMI 50 or higher (H)     Elevated blood pressure reading without diagnosis of hypertension     Edema, unspecified type      Diabetes: No   HbA1c:  No results found for: HGBA1C    Nutrition History  Food allergies/intolerances/cultural or religous food customs: No   Does get an upset stomach with milk, ice cream    Vitamins/Mineral currently taking: Vitamin B1, Vitamin C, Vitamin D, MVI    Socioeconomic Status  Who does the grocery shopping for your household? Self,  sometimes shared     Who prepares your meals at home? Shared     Diet Recall/Time  Breakfast: Greek Yogurt with granola or Oatmeal with PB Powder and blueberries or Protein Shake   Lunch: leftovers from the night before   Dinner: Fit Crunch Bars (during her later days at work) or Steamed Vegetables or Chicken Stir Chicas, occasional wild/brown rice or Lo Mein Noodles    Typical Snacks: veggies, crackers (Triscuits or Wheat Thins) or pretzels or almonds      Beverages  Occasional V8 Juice, Water with [place] (5 bottles/day), Coffee (Decaf more regularly) with Premier Protein      Exercise  Walking 3 days/week for 3 miles    Nutrition Diagnosis (PES statement)  Overweight/Obesity (NC 3.3) related to overeating and poor lifestyle habits as evidenced by patient's subjective statements (excessive energy intake) and BMI 49.4 kg/m2.     Intervention    Nutrition Education:   1. Provided general overview of diet and lifestyle modifications needed to be a deemed a safe candidate for bariatric surgery.   2. Educated patient on how to read a food label: choosing foods with than 10 grams fat and 10 grams sugar per serving to avoid dumping syndrome.  3. Dumping Syndrome: Described the mechanisms of syndrome, symptoms, and prevention tools from a dietary perspective.   4. Vitamins: Educated on post-op vitamin regimen including MVI+ 18 mg Fe two times a day, calcium citrate 400-600 mg two times a day, 4429-0232 mcg sublingual B12 daily, 5000 IU vitamin D3 daily.     Food/Nutrient Delivery:  5. Educated patient on eating three meals, with cutting out snacking.  6. Bariatric Plate: Patient and I discussed the importance of including a lean protein source (20-30 grams/meal), vegetables (included at lunch and dinner), one serving (15g) of carbohydrate, and limited added fat (1 tb/day) at each meal.   7. Educated patient on how to complete a food journal and benefits of meal planning.   8. Educated patient on using a protein powder drink as a  meal replacement and/or supplement after bariatric surgery.   9. Discussed importance of adequate hydration after surgery, with goal of at least 64 oz of fluids/day.  10. Addressed avoiding all carbonated, caffeinated and sweetened drinks to prepare for bariatric surgery.     Nutrition Counselin. Mindful eating techniques: Encouraged slow meal pace, chewing foods to applesauce consistency for 20-30 minutes/meal.   12. Discussed  fluids 30 minutes before, during, and after meal to prevent dumping syndrome and discomfort post bariatric surgery.     Instructions/Goals:     1. Start implementing bariatric surgery lifestyle modifications.    Handouts Provided:   [facility] Weight Management Patient Handbook    Monitor/Evaluation:  Pt. s target weight: no gain from initial visit, patient verbalized understanding.     Plan for next visit:     (Final Supervised Diet visit with RD) pre/post-op  diet progression, give review of surgery process.        Video-Visit Details    Type of service:  Video Visit    Video End Time:10:36 AM    Originating Location (pt. Location): Home    Distant Location (provider location):  [facility] [place]     Platform used for Video Visit: Carmenza Vanegas RD

## 2021-10-25 ENCOUNTER — TRANSFERRED RECORDS (OUTPATIENT)
Dept: HEALTH INFORMATION MANAGEMENT | Facility: CLINIC | Age: 56
End: 2021-10-25

## 2021-10-25 ENCOUNTER — VIRTUAL VISIT (OUTPATIENT)
Dept: SURGERY | Facility: CLINIC | Age: 56
End: 2021-10-25
Payer: COMMERCIAL

## 2021-10-25 DIAGNOSIS — E66.01 MORBID OBESITY (H): Primary | ICD-10-CM

## 2021-10-25 NOTE — PROGRESS NOTES
Video-Visit Details    Type of service:  Video Visit    Video Start Time (time video started): 10:20 AM    Video End Time (time video stopped): 10:58 AM    Originating Location (pt. Location): Home    Distant Location (provider location):  Home office     Mode of Communication:  Video Conference via [email or web address]    Physician has received verbal consent for a Video Visit from the patient? Yes    Lucina has made quality changes in eating and lifestyle and appears to be more mindful about her eating. She is now ready to proceed with surgery and a report was sent to Rabia Dunn. Dx: F50.9; E66.01    Jason Dodd, PhD

## 2021-10-25 NOTE — LETTER
10/25/2021         RE: Lucina Santamaria  [address]  [place] [address]        Dear Colleague,    Thank you for referring your patient, Lucina Santamaria, to the [facility]. Please see a copy of my visit note below.    Video-Visit Details    Type of service:  Video Visit    Video Start Time (time video started): 10:20 AM    Video End Time (time video stopped): 10:58 AM    Originating Location (pt. Location): Home    Distant Location (provider location):  Home office     Mode of Communication:  Video Conference via [email or web address]    Physician has received verbal consent for a Video Visit from the patient? Yes    Lucina has made quality changes in eating and lifestyle and appears to be more mindful about her eating. She is now ready to proceed with surgery and a report was sent to Rabia Dunn. Dx: F50.9; E66.01    Jason Dodd, PhD            Again, thank you for allowing me to participate in the care of your patient.        Sincerely,        Jasno Dodd, PhD

## 2021-11-06 ENCOUNTER — LAB (OUTPATIENT)
Dept: LAB | Facility: CLINIC | Age: 56
End: 2021-11-06
Payer: COMMERCIAL

## 2021-11-06 DIAGNOSIS — Z11.59 ENCOUNTER FOR SCREENING FOR OTHER VIRAL DISEASES: ICD-10-CM

## 2021-11-06 PROCEDURE — U0005 INFEC AGEN DETEC AMPLI PROBE: HCPCS

## 2021-11-06 PROCEDURE — U0003 INFECTIOUS AGENT DETECTION BY NUCLEIC ACID (DNA OR RNA); SEVERE ACUTE RESPIRATORY SYNDROME CORONAVIRUS 2 (SARS-COV-2) (CORONAVIRUS DISEASE [COVID-19]), AMPLIFIED PROBE TECHNIQUE, MAKING USE OF HIGH THROUGHPUT TECHNOLOGIES AS DESCRIBED BY CMS-2020-01-R: HCPCS

## 2021-11-07 LAB — SARS-COV-2 RNA RESP QL NAA+PROBE: NEGATIVE

## 2021-11-09 ENCOUNTER — ANESTHESIA EVENT (OUTPATIENT)
Dept: GASTROENTEROLOGY | Facility: CLINIC | Age: 56
End: 2021-11-09
Payer: COMMERCIAL

## 2021-11-10 ENCOUNTER — ANESTHESIA (OUTPATIENT)
Dept: GASTROENTEROLOGY | Facility: CLINIC | Age: 56
End: 2021-11-10
Payer: COMMERCIAL

## 2021-11-10 ENCOUNTER — HOSPITAL ENCOUNTER (OUTPATIENT)
Facility: CLINIC | Age: 56
Discharge: HOME | End: 2021-11-10
Attending: SURGERY | Admitting: SURGERY
Payer: COMMERCIAL

## 2021-11-10 VITALS
OXYGEN SATURATION: 99 % | TEMPERATURE: 98 F | DIASTOLIC BLOOD PRESSURE: 80 MMHG | SYSTOLIC BLOOD PRESSURE: 145 MMHG | RESPIRATION RATE: 16 BRPM | HEART RATE: 75 BPM

## 2021-11-10 LAB — COLONOSCOPY: NORMAL

## 2021-11-10 PROCEDURE — 258N000003 HC RX IP 258 OP 636: Performed by: SURGERY

## 2021-11-10 PROCEDURE — 250N000009 HC RX 250: Performed by: NURSE ANESTHETIST, CERTIFIED REGISTERED

## 2021-11-10 PROCEDURE — 250N000011 HC RX IP 250 OP 636: Performed by: NURSE ANESTHETIST, CERTIFIED REGISTERED

## 2021-11-10 PROCEDURE — 45380 COLONOSCOPY AND BIOPSY: CPT | Mod: PT | Performed by: SURGERY

## 2021-11-10 PROCEDURE — 370N000017 HC ANESTHESIA TECHNICAL FEE, PER MIN: Performed by: SURGERY

## 2021-11-10 PROCEDURE — 250N000009 HC RX 250: Performed by: SURGERY

## 2021-11-10 PROCEDURE — 45380 COLONOSCOPY AND BIOPSY: CPT | Performed by: SURGERY

## 2021-11-10 PROCEDURE — 88305 TISSUE EXAM BY PATHOLOGIST: CPT | Mod: TC | Performed by: SURGERY

## 2021-11-10 RX ORDER — LIDOCAINE 40 MG/G
CREAM TOPICAL
Status: DISCONTINUED | OUTPATIENT
Start: 2021-11-10 | End: 2021-11-10 | Stop reason: HOSPADM

## 2021-11-10 RX ORDER — LIDOCAINE HYDROCHLORIDE 20 MG/ML
INJECTION, SOLUTION INFILTRATION; PERINEURAL PRN
Status: DISCONTINUED | OUTPATIENT
Start: 2021-11-10 | End: 2021-11-10

## 2021-11-10 RX ORDER — PROPOFOL 10 MG/ML
INJECTION, EMULSION INTRAVENOUS CONTINUOUS PRN
Status: DISCONTINUED | OUTPATIENT
Start: 2021-11-10 | End: 2021-11-10

## 2021-11-10 RX ORDER — SODIUM CHLORIDE, SODIUM LACTATE, POTASSIUM CHLORIDE, CALCIUM CHLORIDE 600; 310; 30; 20 MG/100ML; MG/100ML; MG/100ML; MG/100ML
INJECTION, SOLUTION INTRAVENOUS CONTINUOUS
Status: DISCONTINUED | OUTPATIENT
Start: 2021-11-10 | End: 2021-11-10 | Stop reason: HOSPADM

## 2021-11-10 RX ORDER — PROPOFOL 10 MG/ML
INJECTION, EMULSION INTRAVENOUS PRN
Status: DISCONTINUED | OUTPATIENT
Start: 2021-11-10 | End: 2021-11-10

## 2021-11-10 RX ADMIN — LIDOCAINE HYDROCHLORIDE 1 ML: 10 INJECTION, SOLUTION EPIDURAL; INFILTRATION; INTRACAUDAL; PERINEURAL at 07:27

## 2021-11-10 RX ADMIN — PROPOFOL 150 MCG/KG/MIN: 10 INJECTION, EMULSION INTRAVENOUS at 08:08

## 2021-11-10 RX ADMIN — LIDOCAINE HYDROCHLORIDE 60 MG: 20 INJECTION, SOLUTION INFILTRATION; PERINEURAL at 08:08

## 2021-11-10 RX ADMIN — SODIUM CHLORIDE, POTASSIUM CHLORIDE, SODIUM LACTATE AND CALCIUM CHLORIDE: 600; 310; 30; 20 INJECTION, SOLUTION INTRAVENOUS at 07:27

## 2021-11-10 RX ADMIN — PROPOFOL 100 MG: 10 INJECTION, EMULSION INTRAVENOUS at 08:08

## 2021-11-10 NOTE — ANESTHESIA CARE TRANSFER NOTE
Patient: Lucina Santamaria    Procedure: Procedure(s):  COLONOSCOPY, WITH POLYPECTOMY       Diagnosis: Special screening for malignant neoplasms, colon [Z12.11]  Diagnosis Additional Information: No value filed.    Anesthesia Type:   MAC     Note:    Oropharynx: spontaneously breathing  Level of Consciousness: awake  Oxygen Supplementation: room air    Independent Airway: airway patency satisfactory and stable  Dentition: dentition unchanged  Vital Signs Stable: post-procedure vital signs reviewed and stable  Report to RN Given: handoff report given  Patient transferred to: Phase II    Handoff Report: Identifed the Patient, Identified the Reponsible Provider, Reviewed the pertinent medical history, Discussed the surgical course, Reviewed Intra-OP anesthesia mangement and issues during anesthesia, Set expectations for post-procedure period and Allowed opportunity for questions and acknowledgement of understanding      Vitals:  Vitals Value Taken Time   /93 11/10/21 0845   Temp     Pulse 74 11/10/21 0845   Resp     SpO2     Vitals shown include unvalidated device data.    Electronically Signed By: YARON Sy CRNA  November 10, 2021  8:49 AM

## 2021-11-10 NOTE — LETTER
Lucina Santamaria  [address]  [place] [address]      November 12, 2021    Dear Lucina,  This letter is written to inform you of the results of your recent colonoscopy.  Your examination showed polyp(s) in your transverse colon and rectum. All polyps were removed in their entirety and sent for review by a pathologist. As you will see on the pathology report below, the tissue(s) were hyperplastic polyps. Your examination was otherwise without abnormality.    Final Diagnosis   A: Colon, splenic flexure, polypectomy  -Hyperplastic polyp  -No neoplastic polyp or malignancy     B: Colon, rectum, polypectomy  -Hyperplastic polyp  -No neoplastic polyp or malignancy         Hyperplastic polyps are entirely benign (non-cancerous) and rarely associated with the development of additional polyps or colorectal cancer.    Given these findings,  I recommend that you undergo a repeat colonoscopy in ten years for screening. We will enter you into a recall system so you receive a reminder closer to the time that you are due for repeat examination.     Please remember that this recommendation is made with the understanding that you are not experiencing persistent changes in bowel function, bleeding per rectum, and/or significant abdominal pain. If you experience these symptoms, please contact your primary care provider for a further evaluation.     If you have any questions or concerns about the results of your colonoscopy or the appropriate follow-up, please contact my assistant at [phone]    Sincerely,        [facility]  [facility]  ___

## 2021-11-10 NOTE — ANESTHESIA POSTPROCEDURE EVALUATION
Patient: Lucina Santamaria    Procedure: Procedure(s):  COLONOSCOPY, WITH POLYPECTOMY       Diagnosis:Special screening for malignant neoplasms, colon [Z12.11]  Diagnosis Additional Information: No value filed.    Anesthesia Type:  MAC    Note:  Disposition: Outpatient   Postop Pain Control: Uneventful            Sign Out: Well controlled pain   PONV: No   Neuro/Psych: Uneventful            Sign Out: Acceptable/Baseline neuro status   Airway/Respiratory: Uneventful            Sign Out: Acceptable/Baseline resp. status   CV/Hemodynamics: Uneventful            Sign Out: Acceptable CV status   Other NRE: NONE   DID A NON-ROUTINE EVENT OCCUR? No    Event details/Postop Comments:  Pt was happy with anesthesia care.  No complications.  I will follow up with the pt if needed.           Last vitals:  Vitals Value Taken Time   /93 11/10/21 0845   Temp     Pulse 74 11/10/21 0845   Resp     SpO2     Vitals shown include unvalidated device data.    Electronically Signed By: YARON Sy CRNA  November 10, 2021  8:50 AM

## 2021-11-10 NOTE — DISCHARGE INSTRUCTIONS
[facility]    Home Care Following Endoscopy          Activity:    You have just undergone an endoscopic procedure usually performed with conscious sedation.  Do not work or operate machinery (including a car) for at least 12 hours.      I encourage you to walk and attempt to pass this air as soon as possible.    Diet:    Return to the diet you were on before your procedure but eat lightly for the first 12-24 hours.    Drink plenty of water.    Resume any regular medications unless otherwise advised by your physician.  Please begin any new medication prescribed as a result of your procedure as directed by your physician.     If you had any biopsy or polyp removed please refrain from aspirin or aspirin products for 2 days.  If on Coumadin please restart as instructed by your physician.   Pain:    You may take Tylenol as needed for pain.  Expected Recovery:    You can expect some mild abdominal fullness and/or discomfort due to the air used to inflate your intestinal tract. It is also normal to have a mild sore throat after upper endoscopy.    Call Your Physician if You Have:    After Colonoscopy:  o Worsening persisting abdominal pain which is worse with activity.  o Fevers (>101 degrees F), chills or shakes.  o Passage of continued blood with bowel movements.   Any questions or concerns about your recovery, please call [phone] or after hours [phone] Nurse Advice Line.    Follow-up Care:    You should receive a call or letter with your results within 1 week. Please call if you have not received a notification of your results.  If asked to return to clinic please make an appointment 1 week after your procedure.  Call [phone].

## 2021-11-10 NOTE — H&P
[facility]    Pre-Endoscopy History and Physical     Lucina Santamaria MRN# [id number]   YOB: 1965 Age: 56 year old     Date of Procedure: 11/10/2021  Primary care provider: Nani Pierson  Type of Endoscopy: Colonoscopy with possible biopsy, possible polypectomy  Reason for Procedure: screening  Type of Anesthesia Anticipated: MAC    HPI:    Lucina is a 56 year old female who will be undergoing the above procedure.  1st colonoscopy; no famhx of colon cancer; no anticolagulation    A history and physical has been performed. The patient's medications and allergies have been reviewed. The risks and benefits of the procedure and the sedation options and risks were discussed with the patient.  All questions were answered and informed consent was obtained.      She denies a personal or family history of anesthesia complications or bleeding disorders.     Patient Active Problem List   Diagnosis     CARDIOVASCULAR SCREENING; LDL GOAL LESS THAN 130     Obesity, morbid, BMI 50 or higher (H)     Elevated blood pressure reading without diagnosis of hypertension     Edema, unspecified type        Past Medical History:   Diagnosis Date     Arthritis     sacro iliac and hip, right ankle.     Chronic constipation     since menopause. fiber/hydration helps.     Complication of anesthesia     nauseated w/ morphine previously after arm surgery.     COVID-19     2020, November.     Gallbladder problem     s/p cholecystectomy.     Gestational diabetes 2003     Head injury     childhood concussions     Pneumonia     covid     Pulmonary emboli (H) 1989     Pulmonary embolism (H)     wisdome teeth out while on OCPs and MTHFR gene mutation history.     Pulmonary embolism (H)     age 23, OCPs/dental extraction provoked. reports some MTHFR gene issues, unsure if homo or heterozygous.        Past Surgical History:   Procedure Laterality Date     APPENDECTOMY  1971     BREAST BIOPSY, RT/LT  2000     right breast - benign      SECTION       GYN SURGERY      tubal ligation     LAPAROSCOPIC CHOLECYSTECTOMY N/A 2019    Procedure: CHOLECYSTECTOMY, LAPAROSCOPIC;  Surgeon: Lynn Doshi MD;  Location:  OR     ORTHOPEDIC SURGERY      radius, ulnar - open fracture     TUBAL LIGATION       wisdom teeth         Social History     Tobacco Use     Smoking status: Never Smoker     Smokeless tobacco: Never Used   Substance Use Topics     Alcohol use: Yes     Comment: rare, once monthly.       Family History   Problem Relation Age of Onset     Cancer Father      Depression Brother         bipolar     Diabetes Mother      Arthritis Maternal Grandmother         rheumatoid arthritis     Diabetes Paternal Grandmother      Cancer Other         breast       Prior to Admission medications    Medication Sig Start Date End Date Taking? Authorizing Provider   Acetaminophen 325 MG CAPS Take 325-650 mg by mouth every 4 hours as needed   Yes Reported, Patient   Ascorbic Acid (VITAMIN C) 500 MG CAPS Take 1,000 mg by mouth 2 times daily   Yes Reported, Patient   ASPIRIN CAPS 81 MG OR one capsule by mouth daily 04  Yes    Cholecalciferol (VITAMIN D) 2000 UNIT tablet Take 1 tablet by mouth daily.   Yes Reported, Patient   levothyroxine (SYNTHROID/LEVOTHROID) 50 MCG tablet Take 1 tablet (50 mcg) by mouth daily 21  Yes Nani Pierson MD   Multiple Vitamin (MULTIVITAMIN) per tablet Take 1 tablet by mouth daily.   Yes Reported, Patient   phentermine (ADIPEX-P) 37.5 MG tablet Half tablet to one full tablet daily if tolerated. 10/1/21  Yes Arnulfo Stanley MD   spironolactone (ALDACTONE) 25 MG tablet Take 1 tablet (25 mg) by mouth daily 21  Yes Nani Pierson MD       Allergies   Allergen Reactions     No Known Allergies         REVIEW OF SYSTEMS:   5 point ROS negative except as noted above in HPI, including Gen., Resp., CV, GI &  system review.    PHYSICAL EXAM:   BP (!) 149/85   Temp 98  F  "(36.7  C) (Oral)   Resp 16   SpO2 96%  Estimated body mass index is 50.48 kg/m  as calculated from the following:    Height as of 9/13/21: 1.727 m (5' 8\").    Weight as of 9/23/21: 150.6 kg (332 lb).   Constitutional: Awake, alert, no acute distress.  Eyes: No scleral icterus.  Conjunctiva are without injection.  ENMT: Mucous membranes moist, dentition and gums are intact.   Neck: Soft, supple, trachea midline.    Endocrine: n/a   Lymphatic: There is no cervical, submandibularadenopathy.  Respiratory: normal effortgs   Cardiovascular: S1, S2  Abdomen: Non-distended, non-tender,  No masses,  Musculoskeletal: No spinal or CVA tenderness. Full range of motion in the upper and lower extremities.    Skin: No skin rashes or lesions to inspection.  No petechia.    Neurologic: alerted and oriented 3x  Psychiatric: The patient's affect is not blunted and mood is appropriate.  DIAGNOSTICS:    Not indicated    IMPRESSION   ASA Class 3 - Severe systemic disease, but not incapacitating    PLAN:   Plan for Colonoscopy with possible biopsy, possible polypectomy. We discussed the risks, benefits and alternatives and the patient wished to proceed.  Patient is cleared for the above procedure.    The above has been forwarded to the consulting provider.    [facility], [facility]        "

## 2021-11-10 NOTE — ANESTHESIA PREPROCEDURE EVALUATION
Anesthesia Pre-Procedure Evaluation    Patient: Lucina Santamaria   MRN: [id number] : 1965        Preoperative Diagnosis: Special screening for malignant neoplasms, colon [Z12.11]    Procedure : Procedure(s):  COLONOSCOPY          Past Medical History:   Diagnosis Date     Arthritis     sacro iliac and hip, right ankle.     Chronic constipation     since menopause. fiber/hydration helps.     Complication of anesthesia     nauseated w/ morphine previously after arm surgery.     COVID-19     .     Gallbladder problem     s/p cholecystectomy.     Gestational diabetes      Head injury     childhood concussions     Pneumonia     covid     Pulmonary emboli (H)      Pulmonary embolism (H)     wisdome teeth out while on OCPs and MTHFR gene mutation history.     Pulmonary embolism (H)     age 23, OCPs/dental extraction provoked. reports some MTHFR gene issues, unsure if homo or heterozygous.      Past Surgical History:   Procedure Laterality Date     APPENDECTOMY       BREAST BIOPSY, RT/LT      right breast - benign      SECTION       GYN SURGERY      tubal ligation     LAPAROSCOPIC CHOLECYSTECTOMY N/A 2019    Procedure: CHOLECYSTECTOMY, LAPAROSCOPIC;  Surgeon: Lynn Doshi MD;  Location: [facility] OR     ORTHOPEDIC SURGERY      radius, ulnar - open fracture     TUBAL LIGATION       wisdom teeth        Allergies   Allergen Reactions     No Known Allergies       Social History     Tobacco Use     Smoking status: Never Smoker     Smokeless tobacco: Never Used   Substance Use Topics     Alcohol use: Yes     Comment: rare, once monthly.      Wt Readings from Last 1 Encounters:   23 (!) 150.6 kg (332 lb)        Anesthesia Evaluation            ROS/MED HX  ENT/Pulmonary:     (+) recent URI, resolved,     Neurologic: Comment: Hx closed head injury      Cardiovascular:  - neg cardiovascular ROS     METS/Exercise Tolerance:     Hematologic: Comments: Hx Pulmonary  embolism    (+) History of blood clots,     Musculoskeletal:   (+) arthritis,     GI/Hepatic:  - neg GI/hepatic ROS     Renal/Genitourinary:  - neg Renal ROS     Endo: Comment: Hx Gestational diabetes    (+) Obesity,     Psychiatric/Substance Use:  - neg psychiatric ROS     Infectious Disease: Comment: Hx COVID 19 - 11/29/20      Malignancy:  - neg malignancy ROS     Other:  - neg other ROS          Physical Exam    Airway  airway exam normal      Mallampati: II   TM distance: > 3 FB   Neck ROM: full   Mouth opening: > 3 cm    Respiratory Devices and Support         Dental  no notable dental history         Cardiovascular   cardiovascular exam normal       Rhythm and rate: regular and normal     Pulmonary   pulmonary exam normal        breath sounds clear to auscultation           OUTSIDE LABS:  CBC:   Lab Results   Component Value Date    WBC 7.2 09/23/2021    WBC 8.2 12/31/2019    HGB 13.8 09/23/2021    HGB 13.1 12/31/2019    HCT 41.1 09/23/2021    HCT 39.6 12/31/2019     09/23/2021     12/31/2019     BMP:   Lab Results   Component Value Date     09/23/2021     12/31/2019    POTASSIUM 4.3 09/23/2021    POTASSIUM 4.3 12/31/2019    CHLORIDE 108 09/23/2021    CHLORIDE 109 12/31/2019    CO2 24 09/23/2021    CO2 27 12/31/2019    BUN 16 09/23/2021    BUN 18 12/31/2019    CR 0.85 09/23/2021    CR 0.76 12/31/2019     (H) 09/23/2021     (H) 12/31/2019     COAGS: No results found for: PTT, INR, FIBR  POC:   Lab Results   Component Value Date    HCG Negative 09/24/2018     HEPATIC:   Lab Results   Component Value Date    ALBUMIN 3.4 09/23/2021    PROTTOTAL 7.2 09/23/2021    ALT 32 09/23/2021    AST 16 09/23/2021    ALKPHOS 78 09/23/2021    BILITOTAL 0.3 09/23/2021     OTHER:   Lab Results   Component Value Date    A1C 5.9 (H) 09/23/2021    LAURA 8.7 09/23/2021    LIPASE 49 (L) 12/31/2019    TSH 5.94 (H) 09/23/2021    T4 1.18 09/23/2021    CRP 11.1 (H) 06/09/2016    SED 15 06/09/2016        Anesthesia Plan    ASA Status:  2   NPO Status:  NPO Appropriate    Anesthesia Type: MAC.     - Reason for MAC: straight local not clinically adequate   Induction: Intravenous, Propofol.   Maintenance: TIVA.        Consents    Anesthesia Plan(s) and associated risks, benefits, and realistic alternatives discussed. Questions answered and patient/representative(s) expressed understanding.     - Discussed with:  Patient      - Extended Intubation/Ventilatory Support Discussed: No.      - Patient is DNR/DNI Status: No    Use of blood products discussed: No .     Postoperative Care    Pain management: IV analgesics.        Comments:    The risks and benefits of anesthesia, and the alternatives where applicable, have been discussed with the patient, and they wish to proceed.            YARON Sy CRNA

## 2021-11-12 LAB
PATH REPORT.COMMENTS IMP SPEC: NORMAL
PATH REPORT.COMMENTS IMP SPEC: NORMAL
PATH REPORT.FINAL DX SPEC: NORMAL
PATH REPORT.GROSS SPEC: NORMAL
PATH REPORT.MICROSCOPIC SPEC OTHER STN: NORMAL
PATH REPORT.RELEVANT HX SPEC: NORMAL
PHOTO IMAGE: NORMAL

## 2021-11-12 PROCEDURE — 88305 TISSUE EXAM BY PATHOLOGIST: CPT | Mod: 26 | Performed by: PATHOLOGY

## 2021-11-22 ENCOUNTER — OFFICE VISIT (OUTPATIENT)
Dept: SURGERY | Facility: CLINIC | Age: 56
End: 2021-11-22
Payer: COMMERCIAL

## 2021-11-22 VITALS
HEART RATE: 86 BPM | BODY MASS INDEX: 44.41 KG/M2 | DIASTOLIC BLOOD PRESSURE: 88 MMHG | HEIGHT: 68 IN | SYSTOLIC BLOOD PRESSURE: 130 MMHG | WEIGHT: 293 LBS

## 2021-11-22 DIAGNOSIS — E66.01 OBESITY, CLASS III, BMI 40-49.9 (MORBID OBESITY) (H): Primary | ICD-10-CM

## 2021-11-22 DIAGNOSIS — Z86.711 HISTORY OF PULMONARY EMBOLISM: ICD-10-CM

## 2021-11-22 DIAGNOSIS — Z87.898 HISTORY OF PREDIABETES: ICD-10-CM

## 2021-11-22 PROCEDURE — 99214 OFFICE O/P EST MOD 30 MIN: CPT | Performed by: EMERGENCY MEDICINE

## 2021-11-22 RX ORDER — LIRAGLUTIDE 6 MG/ML
INJECTION, SOLUTION SUBCUTANEOUS
Qty: 3 ML | Refills: 5 | Status: SHIPPED | OUTPATIENT
Start: 2021-11-22 | End: 2022-04-22

## 2021-11-22 ASSESSMENT — MIFFLIN-ST. JEOR: SCORE: 2100.01

## 2021-11-22 NOTE — Clinical Note
Updated needs list. Doing well, cleared psych, preop weight loss going well, switching from phentermine to Sanxenda (insomnia).  Arnulfo Stanley MD

## 2021-11-22 NOTE — LETTER
11/22/2021         RE: Lucina Santamaria  [address]  [place] [address]        Dear Colleague,    Thank you for referring your patient, Lucina Santamaria, to the [facility] [place]. Please see a copy of my visit note below.    See above.    Outpatient Bariatric Medicine Progress Note-Structured Weight Loss   Indication: Medical bariatric therapy to precede bariatric surgery    Surgery:  Kris en Y Gastric Bypass    Surgeon: Dr. Schneider    Impression     56 year old female with Body mass index is 48.66 kg/m . in the setting of morbid obesity which satisfies the NIH criteria for bariatric surgery. She is  Making excellent progress in the program and is nearing completion of her needs list. She was found to be intolerant to even low dose phentermine with insomnia side effects so, given some delay in our ability to schedule surgery due to COVID back log at the hospital, we'll look to optimize her preoperative weight loss goal of getting under 315 lbs with a trial of Saxenda through the winter if tolerated/affordable.  Her colonoscopy was done, she attended support group and has been cleared by Psych since our first visit. Labs were done and weekly B12 recommnded.     Plan:  1. Given the COVID delay situation, and intolerance to even lower dose phentermine (insomnia), Stop phentermine and we can ramp up a trial of Saxenda if tolerated:  0.6mg daily for 1-2 weeks and increase dose by 0.6mg every 1-2 weeks if tolerated up to the max tolerated dose or 3mg/day if you reach the top dose. Anticipate stopping medication with preop liquid diet.    2. Continue dietary education/clearance process.    3. Continue work towards preop weight goal of 315 lbs.    4. Colonoscopy looked good.     5. Anticipate lovenox for 2-3 weeks post op.    Comorbidities:  Patient Active Problem List   Diagnosis     CARDIOVASCULAR SCREENING; LDL GOAL LESS THAN 130     Obesity, morbid, BMI 50 or  higher (H)     Elevated blood pressure reading without diagnosis of hypertension     Edema, unspecified type         Past Surgical History        Past Surgical History:   Procedure Laterality Date     APPENDECTOMY  1971     BREAST BIOPSY, RT/LT      right breast - benign      SECTION       COLONOSCOPY N/A 11/10/2021    Procedure: COLONOSCOPY, WITH POLYPECTOMY;  Surgeon: Lynn Doshi MD;  Location:  GI     GYN SURGERY      tubal ligation     LAPAROSCOPIC CHOLECYSTECTOMY N/A 2019    Procedure: CHOLECYSTECTOMY, LAPAROSCOPIC;  Surgeon: Lynn Doshi MD;  Location:  OR     ORTHOPEDIC SURGERY      radius, ulnar - open fracture     TUBAL LIGATION       wisdom teeth         Family History, Social History   Reviewed as documented. See time and date confirmation.    Interim History/Pre-op needs list    Initial Labs Complete and Reviewed: donen  Dietitian Visits Initiated/cleared: started  Weight Change since initial weight: down 13 lbs  Psychologist visits initiated/cleared: cleared  HCM UTD:    Pap: itd   Mammogram: utd   Colonscopy:  utd   Other: NA  Sugars Well Controlled: yes  Cardiac: no issues  Pulmonary: no issues  Hormone use: no  Birth control n/a.  Other: she's OK w lovenox therapy following surgery..  Medications and Allergies      Current Outpatient Medications   Medication Sig Dispense Refill     Acetaminophen 325 MG CAPS Take 325-650 mg by mouth every 4 hours as needed       Ascorbic Acid (VITAMIN C) 500 MG CAPS Take 1,000 mg by mouth 2 times daily       ASPIRIN CAPS 81 MG OR one capsule by mouth daily 100 3     Cholecalciferol (VITAMIN D) 2000 UNIT tablet Take 1 tablet by mouth daily.       Cyanocobalamin (B-12) 1000 MCG TBCR        levothyroxine (SYNTHROID/LEVOTHROID) 50 MCG tablet Take 1 tablet (50 mcg) by mouth daily 90 tablet 1     Multiple Vitamin (MULTIVITAMIN) per tablet Take 1 tablet by mouth daily.       phentermine (ADIPEX-P) 37.5 MG tablet Half tablet to one full  "tablet daily if tolerated. 60 tablet 0     spironolactone (ALDACTONE) 25 MG tablet Take 1 tablet (25 mg) by mouth daily 90 tablet 3     Allergies: No known allergies    Habits     Dietary History   Reviewed proper bariatric method again today.  The patient has been working with our bariatric dieticians and has been making good progress, is down about 13 lbs since her intro visit..    As far as distracted eating/grazing, getting 3 meals daily, avoiding empty calories and optimizing their protein intake, continuing to make changes..    Review of Systems       Physical Exam   Vitals: /88   Pulse 68   Ht 1.727 m (5' 8\")   Wt 145.2 kg (320 lb)   BMI 48.66 kg/m    Body mass index is 48.66 kg/m .  GENERAL: appears her stated age. Ambulation is indepedent.  NECK:thin.  HEART: Rhythm regular, rate regular, no murmur   LUNGS: Clear   ABDOMEN: soft, non-tender, obese,no rashes, surgical scars periumbilically w/ small hernia  MUSCULOSKELETAL: no obvious deformities  VASCULAR: palpable peripheral pulses, no  lower extremity edema, no color changes of venous stasis, no ulcerations  SKIN: Rashes: none seen on exposed skin (limited).    Counseling     We discussed the National Weight Control Registry and Healthy Weight Maintenance Strategies.   We discussed ways to optimize her metabolism.  We discussed specific exercise goals and dietary habits conducive to a healthy weight.  We discussed the importance of lifelong vitamin supplementation and the potential medical complications of vitamin non-compliance.  We discussed the importance of restorative sleep and stress management in maintaining a healthy weight.  I reminded her to avoid alcohol for 1 year post-operatively, to avoid NSAIDS for life unless specifically indicated and used with a concomitant PPI, to avoid caffeine in excess, and explained the importance of lifelong tobacco abstinence.  Medical Decision Makin minutes spent on the date of the encounter " doing chart review, history and exam, documentation and further activities per the note      Arnulfo Stanley MD  [facility]  11/22/2021  12:48 PM      Again, thank you for allowing me to participate in the care of your patient.        Sincerely,        Arnulfo Stanley MD

## 2021-11-22 NOTE — PROGRESS NOTES
Outpatient Bariatric Medicine Progress Note-Structured Weight Loss   Indication: Medical bariatric therapy to precede bariatric surgery    Surgery:  Kris en Y Gastric Bypass    Surgeon: Dr. Schneider    Impression     56 year old female with Body mass index is 48.66 kg/m . in the setting of morbid obesity which satisfies the NIH criteria for bariatric surgery. She is  Making excellent progress in the program and is nearing completion of her needs list. She was found to be intolerant to even low dose phentermine with insomnia side effects so, given some delay in our ability to schedule surgery due to COVID back log at the hospital, we'll look to optimize her preoperative weight loss goal of getting under 315 lbs with a trial of Saxenda through the winter if tolerated/affordable.  Her colonoscopy was done, she attended support group and has been cleared by Psych since our first visit. Labs were done and weekly B12 recommnded.     Plan:  1. Given the COVID delay situation, and intolerance to even lower dose phentermine (insomnia), Stop phentermine and we can ramp up a trial of Saxenda if tolerated:  0.6mg daily for 1-2 weeks and increase dose by 0.6mg every 1-2 weeks if tolerated up to the max tolerated dose or 3mg/day if you reach the top dose. Anticipate stopping medication with preop liquid diet.    2. Continue dietary education/clearance process.    3. Continue work towards preop weight goal of 315 lbs.    4. Colonoscopy looked good.     5. Anticipate lovenox for 2-3 weeks post op.    Comorbidities:  Patient Active Problem List   Diagnosis     CARDIOVASCULAR SCREENING; LDL GOAL LESS THAN 130     Obesity, morbid, BMI 50 or higher (H)     Elevated blood pressure reading without diagnosis of hypertension     Edema, unspecified type         Past Surgical History        Past Surgical History:   Procedure Laterality Date     APPENDECTOMY       BREAST BIOPSY, RT/LT      right breast - benign      SECTION   2005     COLONOSCOPY N/A 11/10/2021    Procedure: COLONOSCOPY, WITH POLYPECTOMY;  Surgeon: Lynn Doshi MD;  Location:  GI     GYN SURGERY  2005    tubal ligation     LAPAROSCOPIC CHOLECYSTECTOMY N/A 12/31/2019    Procedure: CHOLECYSTECTOMY, LAPAROSCOPIC;  Surgeon: Lynn Doshi MD;  Location:  OR     ORTHOPEDIC SURGERY  1991    radius, ulnar - open fracture     TUBAL LIGATION       wisdom teeth         Family History, Social History   Reviewed as documented. See time and date confirmation.    Interim History/Pre-op needs list    Initial Labs Complete and Reviewed: donen  Dietitian Visits Initiated/cleared: started  Weight Change since initial weight: down 13 lbs  Psychologist visits initiated/cleared: cleared  HCM UTD:    Pap: itd   Mammogram: utd   Colonscopy:  utd   Other: NA  Sugars Well Controlled: yes  Cardiac: no issues  Pulmonary: no issues  Hormone use: no  Birth control n/a.  Other: she's OK w lovenox therapy following surgery..  Medications and Allergies      Current Outpatient Medications   Medication Sig Dispense Refill     Acetaminophen 325 MG CAPS Take 325-650 mg by mouth every 4 hours as needed       Ascorbic Acid (VITAMIN C) 500 MG CAPS Take 1,000 mg by mouth 2 times daily       ASPIRIN CAPS 81 MG OR one capsule by mouth daily 100 3     Cholecalciferol (VITAMIN D) 2000 UNIT tablet Take 1 tablet by mouth daily.       Cyanocobalamin (B-12) 1000 MCG TBCR        levothyroxine (SYNTHROID/LEVOTHROID) 50 MCG tablet Take 1 tablet (50 mcg) by mouth daily 90 tablet 1     Multiple Vitamin (MULTIVITAMIN) per tablet Take 1 tablet by mouth daily.       phentermine (ADIPEX-P) 37.5 MG tablet Half tablet to one full tablet daily if tolerated. 60 tablet 0     spironolactone (ALDACTONE) 25 MG tablet Take 1 tablet (25 mg) by mouth daily 90 tablet 3     Allergies: No known allergies    Habits     Dietary History   Reviewed proper bariatric method again today.  The patient has been working with our bariatric  "dieticians and has been making good progress, is down about 13 lbs since her intro visit..    As far as distracted eating/grazing, getting 3 meals daily, avoiding empty calories and optimizing their protein intake, continuing to make changes..    Review of Systems       Physical Exam   Vitals: /88   Pulse 68   Ht 1.727 m (5' 8\")   Wt 145.2 kg (320 lb)   BMI 48.66 kg/m    Body mass index is 48.66 kg/m .  GENERAL: appears her stated age. Ambulation is indepedent.  NECK:thin.  HEART: Rhythm regular, rate regular, no murmur   LUNGS: Clear   ABDOMEN: soft, non-tender, obese,no rashes, surgical scars periumbilically w/ small hernia  MUSCULOSKELETAL: no obvious deformities  VASCULAR: palpable peripheral pulses, no  lower extremity edema, no color changes of venous stasis, no ulcerations  SKIN: Rashes: none seen on exposed skin (limited).    Counseling     We discussed the National Weight Control Registry and Healthy Weight Maintenance Strategies.   We discussed ways to optimize her metabolism.  We discussed specific exercise goals and dietary habits conducive to a healthy weight.  We discussed the importance of lifelong vitamin supplementation and the potential medical complications of vitamin non-compliance.  We discussed the importance of restorative sleep and stress management in maintaining a healthy weight.  I reminded her to avoid alcohol for 1 year post-operatively, to avoid NSAIDS for life unless specifically indicated and used with a concomitant PPI, to avoid caffeine in excess, and explained the importance of lifelong tobacco abstinence.  Medical Decision Makin minutes spent on the date of the encounter doing chart review, history and exam, documentation and further activities per the note      Arnulfo Stanley MD  [facility]  2021  12:48 PM  "

## 2021-11-22 NOTE — PATIENT INSTRUCTIONS
Plan:  1. Given the COVID delay situation, and intolerance to even lower dose phentermine (insomnia), Stop phentermine and we can ramp up a trial of Saxenda if tolerated:  0.6mg daily for 1-2 weeks and increase dose by 0.6mg every 1-2 weeks if tolerated up to the max tolerated dose or 3mg/day if you reach the top dose. Anticipate stopping medication with preop liquid diet.    2. Continue dietary education/clearance process.    3. Continue work towards preop weight goal of 315 lbs.    4. Colonoscopy looked good.     5. Anticipate lovenox for 2-3 weeks post op.        LEAN PROTEIN SOURCES  Getting 20-30 grams of protein, 3 meals daily, is appropriate for most people, some need more but more than about 40 grams per meal is not useful.  General rule is drinking one ounce of water per gram of protein eaten over the course of the day:  70 grams of protein each day, drink 70 oz of water.  Protein Source Portion Calories Grams of Protein                           Nonfat, plain Greek yogurt    (10 grams sugar or less) 3/4 cup (6 oz)  12-17   Light Yogurt (10 grams sugar or less) 3/4 cup (6 oz)  6-8   Protein Shake 1 shake 110-180 15-30   Skim/1% Milk or lactose-free milk 1 cup ( 8 oz)  8   Plain or light, flavored soymilk 1 cup  7-8   Plain or light, hemp milk 1 cup 110 6   Fat Free or 1% Cottage Cheese 1/2 cup 90 15   Part skim ricotta cheese 1/2 cup 100 14   Part skim or reduced fat cheese slices 1 ounce 65-80 8     Mozzarella String Cheese 1 80 8   Canned tuna, chicken, crab or salmon  (canned in water)  1/2 cup 100 15-20   White fish (broiled, grilled, baked) 3 ounces 100 21   [place]/Tuna (broiled, grilled, baked) 3 ounces 150-180 21   Shrimp, Scallops, Lobster, Crab 3 ounces 100 21   Pork loin, Pork Tenderloin 3 ounces 150 21   Boneless, skinless chicken /turkey breast                          (broiled, grilled, baked) 3 ounces 120 21   [place], [place], [place], and Venison 3 ounces 120 21   Lean cuts  of red meat and pork (sirloin,   round, tenderloin, flank, ground 93%-96%) 3 ounces 170 21   Lean or Extra Lean Ground Turkey 1/2 cup 150 20   90-95% Lean Reese Burger 1 stephania 140-180 21   Low-fat casserole with lean meat 3/4 cup 200 17   Luncheon Meats                                                        (turkey, lean ham, roast beef, chicken) 3 ounces 100 21   Egg (boiled, poached, scrambled) 1 Egg 60 7   Egg Substitute 1/2 cup 70 10   Nuts (limit to 1 serving per day)  3 Tbsp. 150 7   Nut [place] (peanut, almond)  Limit to 1 serving or less daily 1 Tbsp. 90 4   Soy Burger (varies) 1  15   Garbanzo, Black, [place] Beans 1/2 cup 110 7   Refried Beans 1/2 cup 100 7   Kidney and Lima beans 1/2 cup 110 7   Tempeh 3 oz 175 18   Vegan crumbles 1/2 cup 100 14   Tofu 1/2 cup 110 14   Chili (beans and extra lean beef or turkey) 1 cup 200 23   Lentil Stew/Soup 1 cup 150 12   Black Bean Soup 1 cup 175 12       Example Meal Plan for a 3296-3650 Calorie Diet:    In order to fuel your weight loss properly and avoid hunger-induced overeating later in the day, for your height and weight, you will enjoy the most success by following the diet below or similar with adjustments based on your particular tastes and preferences.  Exercise may influence speed, amount of weight loss further.     I recommend getting into a meal routine and keeping it similar day to day in the beginning so you don t have to think too hard about what you re going to make/eat.  Keep snacks healthy, ideally containing protein and some vegetables.  Non-processed food is preferable to packaged items.  Eat at least a few crunchy green vegetables if having a snack, which should be 2-3 hours after your mealtimes(prepare these ahead of time for ease of use).  Drink 64 oz -80 oz of water daily for most, some of you will need more and we'll discuss it at your visit if that is the case.  When changing our diet and reducing our intake,  we can often mistake  thirst for hunger or just have some grazing habits we have to break ('bored/mindless eating') and a glass of water and reconsideration of our hunger is often all that is needed.  Having the urge is not the problem, but watching it pass by without acting on it is the goal.    If you re having hunger problems, add a protein drink/snack to your morning hours or afternoon snack with at least 20grams of protein and not too much sugar (under 10g).  A carton of higher protein/low sugar yogurt can work as well.  If the urge to snack is overwhelming and not satiated, try going for a 10 minute walk/exercise, come home and drink a glass of water and if still hungry, have a  calorie snack (handful of raw/sprouted nuts, veggies and string cheese, protein bar, etc).  Savor it.    It is better to have a large breakfast, a moderate lunch and a smaller dinner to fuel your day.  People lose 10-15% more weight during their weight loss season with this strategy. Optimizing your protein intake at each meal will further keep you more satisfied while eating less food overall.  Getting exercise in early has also been shown to offer the best results (before breakfast ideally but anytime is the right time to exercise if that is not an option for you).    To make sure you re getting adequate vitamins and minerals during weight loss, I recommend one complete multivitamin a day of your choice.  Consider a probiotic and taking some vitamin D 2000 IU daily.    Let supper be your last meal of the day and ideally try to have at least 12 hours between supper and breakfast the next day to tap into some beneficial overnight fasting dynamics.  Water in the evening is fine, unsweetened, non caffeinated herbal tea is helpful as well.  Consolidating your meals within a 8-12 hour period of your day will help tap into these additional metabolic benefits and tends to keep your appetite up for breakfast, further helping to stay on track.  For most of my  patients I don't recommend an intermittent fasting style diet (many find it hard to fit in their lifestyle) but an overnight fast is very doable for most patients and helps regulate our hunger drives a little better.  This makes it very important to nail good intake at all three meals to feel satisfied/energized and still lose weight.  If evening snacking desires are high, consider a glass of fiber supplement for some additional fullness (metamucil or similar). Most of us don't get the 25-30 grams per day of fiber that promotes good gut health/satiety.  Benefiber, metamucil, citrucel are reasonable/affordable options for most people.  Inulin, chicory, psyllium husk are reasonable options but start slow and low in the dose to avoid gas/bloating until your gut gets acclimated (ramping up to 5-10 grams per day of supplemental fiber after 3-4 weeks if needed).      Example Meal Plan:  Breakfast: 450-475 Calories  1 egg cooked on low in olive oil:   calories.  5oz Greek Yogurt (Fage plain classic: ~150 hawa)  Handful of Berries of your choice (about a calorie per berry or 20-40cal per handful)    cup(cooked) of  old fashioned oatmeal or 1/2 cup(cooked) steel cut oats. (150 hawa)  Sprinkle amount of brown sugar and a pat of butter. (40 hawa)  Glass of  Water  Black coffee or unsweetened Tea (0calories).      2-3 hours Later Snack: (195 calories).  Glass of water  One string Cheese (80 calories) or 4 oz creamed cottage cheese (115 calories) with  Crunchy Celery sticks (less than 10 calories per large stalk) 2 stalks. (20 calories)    of a  Large Banana or   of a Large Apple (60 calories):  eat second half at lunch or afternoon snack.     Lunch:300 -350 calories   Chicken Breast  (baked/broiled/roasted/grilled)  4-6 oz.  (125-180 hawa), BBQ sauce/hot sauce/mustard/seasoning is free. Just use a reasonable amount. Or a can of tuna with 1 tablespoon mayonnaise.  Salad: lettuce, any other veggies (cucumbers, green  peppers/celery you like and a small drizzle of dressing to just flavor.  Go as big on the veggies as you like,  as they are practically calorie free.   A whole, 8 inch cucumber is 45 calories, a whole green pepper is 23 calories, a stalk of celery is 9 calories.  [place] Dressing is 60 calories per tablespoon..so moderate your desired dressing or do a drizzle of olive oil and splash of balsamic vinegar on top,  Total calories unlikely to be over 150 even with dressing.  Glass of Water.    Option for lunch is meal replacement protein drink/smoothie.  Need at least 20 grams of protein and eat the rest of your apple/banana from the morning snack.      Afternoon Snack: 150-200 calories   Cheese Stick or cottage cheese again  and a fresh fruit OR  Granola Bar (protein Bar acceptable if under 200 calories OR  Homemade smoothies:  8oz skim milk,  a handful of berries (fresh or frozen and a serving of protein powder such as BiPro or Sheila sWhey for example.  If you don't like dairy, make with 8oz water, one small banana, handful of berries and the protein powder, add any veggies you want as well:  roughly 200 calories.   Glass of Water    Dinner: 325 calories  4oz of fresh, Atlantic salmon.  Broiled (salt/pepper/dill) for about 8-8.5 minutes (200calories) or  4oz filet mignon steak or sirloin steak  Salad or vegetable sautéed lightly in olive oil or   Broccoli 1.5  cups chopped and steamed  or micro-waved in a little water (75 calories)  Glass of Water,    Cup of herbal tea (unsweetened, caffeine free)      Herbs and seasonings are encouraged to flavor your foods/vegetables.  Make your food delicious.      Tips for Success:  1.  Prepare proteins ahead of time (broil chicken breasts in bulk so you can grab and go), steel cut oats/lentils can be stored in casserole dish/bowl in the fridge for quick scoop in the morning and rewarm in microwave, make use of crock pot recipes (watch salt content).  Making meals that cover  "3-4 future meals is an easy way to stay on track.  2.  Drink a 8-12 oz glass of water every 2-3 hours when awake.  We often mistake hunger for thirst, especially when losing weight.  3. Remember your Reward and Motivation when things get hard.  4.  Weigh yourself every morning and record, you'll stay on track better and learn how our biorhythms, diet and elimination patterns show up on the scale. Don't worry about 1 or 2 day patterns, but when on track you'll notice good trend downward of weight over 3-4 day segments.  Plateaus tend to resolve after 4-8 days in most cases if you stay consistent with your plan.  These are natural and part of weight loss, even if you're perfect with your plan execution.  5. Call if problems/concerns.  [employer] is a great tool to stay in touch and provide weekly outside accountability. Check in with questions or if you want to brag.  6.  Find a handful of meals/foods that keep you on track and feeling good and get into a routine that is sustainable for you.  It's OK to have a routine that works for you.  7.  Consider taking a complete multivitamin just to make sure all micronutrients are adequate during weight loss.  8. If losing hair/brittle nails it usually means you are not taking enough protein.  Minimum goal is 60 grams daily of protein for smaller women, 80 grams a day for men. Consider taking Biotin as supplement or a \"Hair and Nail\" multivitamin.        "

## 2021-11-30 ENCOUNTER — DOCUMENTATION ONLY (OUTPATIENT)
Dept: SURGERY | Facility: CLINIC | Age: 56
End: 2021-11-30
Payer: COMMERCIAL

## 2021-11-30 NOTE — PROGRESS NOTES
Patient getting closer to being ready to see the surgeon for consult.  She is nearing her weight goal of 315 lb with being 320 lbs at her last visit with Dr. Stanley.  She will follow-up with Cindy next month and will hopefully be cleared at that time. All other tasks are complete and she can proceed to consult if this happens.  Tasklist updated.  Paula Drew RN

## 2021-12-03 ENCOUNTER — TELEPHONE (OUTPATIENT)
Dept: URGENT CARE | Facility: URGENT CARE | Age: 56
End: 2021-12-03
Payer: COMMERCIAL

## 2021-12-03 DIAGNOSIS — N30.00 ACUTE CYSTITIS WITHOUT HEMATURIA: Primary | ICD-10-CM

## 2021-12-03 RX ORDER — CIPROFLOXACIN 500 MG/1
500 TABLET, FILM COATED ORAL 2 TIMES DAILY
Qty: 14 TABLET | Refills: 0 | Status: SHIPPED | OUTPATIENT
Start: 2021-12-03 | End: 2021-12-10

## 2021-12-03 NOTE — TELEPHONE ENCOUNTER
Patient reporting UTI symptoms. Will treat with cipro two times daily x 7 days sent to [facility] in [place]

## 2021-12-06 ENCOUNTER — VIRTUAL VISIT (OUTPATIENT)
Dept: SURGERY | Facility: CLINIC | Age: 56
End: 2021-12-06
Payer: COMMERCIAL

## 2021-12-06 DIAGNOSIS — E66.01 CLASS 3 SEVERE OBESITY DUE TO EXCESS CALORIES WITHOUT SERIOUS COMORBIDITY WITH BODY MASS INDEX (BMI) OF 45.0 TO 49.9 IN ADULT (H): Primary | ICD-10-CM

## 2021-12-06 DIAGNOSIS — Z71.3 NUTRITIONAL COUNSELING: ICD-10-CM

## 2021-12-06 DIAGNOSIS — E66.813 CLASS 3 SEVERE OBESITY DUE TO EXCESS CALORIES WITHOUT SERIOUS COMORBIDITY WITH BODY MASS INDEX (BMI) OF 45.0 TO 49.9 IN ADULT (H): Primary | ICD-10-CM

## 2021-12-06 PROCEDURE — 97803 MED NUTRITION INDIV SUBSEQ: CPT | Mod: GT | Performed by: DIETITIAN, REGISTERED

## 2021-12-06 NOTE — PROGRESS NOTES
Lucina Santamaria is a 56 year old who is being evaluated via a billable video visit.      How would you like to obtain your AVS? MyChart  If the video visit is dropped, the invitation should be resent by: Send to e-mail at: [email or web address]  Will anyone else be joining your video visit? No      Video Start Time: 10:06 AM      Follow Up Surgical Weight Loss Supervised Diet Evaluation    Assessment:  Pt. is being seen today for a follow up RD nutritional evaluation. Pt. has been unsuccessful with non-surgical weight loss methods and is interested in bariatric surgery. Today we reviewed current eating habits and level of physical activity, and instructed on the changes that are required for successful bariatric outcomes.  Started Saxenda with Dr Stanley, will officially start on Saturday 12/11.      Surgery of interest per pt: JIGNA.    Workflow review:  Support Group: Completed.  Psychology:Completed.  Lab work:Completed.  SWL:Yes 3rd Visit    Weight goal: At or below initial.    Anthropometrics:  Pt's weight is 315.8 lbs  Initial weight: 332 lbs   Weight change: 16.2 lbs   BMI: There is no height or weight on file to calculate BMI.   Ideal body weight: 63.9 kg (140 lb 14 oz)  Adjusted ideal body weight: 96.4 kg (212 lb 8.4 oz)    Estimated RMR ([place]-St Jeor equation):  2085 kcals x 1.3 (light active) = 2711 kcals (for weight maintenance)    Medical History:  Patient Active Problem List   Diagnosis     CARDIOVASCULAR SCREENING; LDL GOAL LESS THAN 130     Obesity, morbid, BMI 50 or higher (H)     Elevated blood pressure reading without diagnosis of hypertension     Edema, unspecified type      Diabetes: No   HbA1c:  No results found for: HGBA1C    Progress over past month: has been using a smaller plate along with smaller utensils to help with portion control. Trying to focus on protein first and figuring out different recipes for meals, noting that she is aiming for at least 20 grams of protein at each meal.   Patient reports that she has been working on less carbohydrates in general.        Meal duration: going well, no concerns and continues to work on     fluids by 30 minutes before, during meal, and waiting 30 minutes after meal before drinking fluids: Yes    Beverages  Decaf Coffee with Protein Shake, Water (at least 5 bottles/day)    Exercise  Walking 3-4 times/week for 3 miles    PES statement:   Overweight/Obesity (NC 3.3) related to overeating and poor lifestyle habits as evidenced by patient's subjective statements (h/o excessive energy intake) and BMI of 48.1kg/m2.   Intervention    Nutrition Education:   1. Provided general overview of diet and lifestyle modifications needed to be a deemed a safe candidate for bariatric surgery.     Food/Nutrient Delivery:  2. Educated patient on eating three meals, with cutting out snacking.  3. Bariatric Plate: Patient and I discussed the importance of including a lean protein source (20-30 grams/meal), vegetables (included at lunch and dinner), one serving (15g) of carbohydrate, and limited added fat (1 tb/day) at each meal.   4. Educated patient on using a protein powder drink as a meal replacement and/or supplement after bariatric surgery.   5. Discussed importance of adequate hydration after surgery, with goal of at least 64 oz of fluids/day.  6. Addressed avoiding all carbonated, caffeinated and sweetened drinks to prepare for bariatric surgery.     Nutrition Counselin. Mindful eating techniques: Encouraged slow meal pace, chewing foods to applesauce consistency for 20-30 minutes/meal.   8. Discussed  fluids 30 minutes before, during, and after meal to prevent dumping syndrome and discomfort post bariatric surgery.     Instructions/Goals:     1. Continue implementing bariatric surgery lifestyle modifications.    Handouts Provided:   [facility] Weight Management Patient Handbook      Monitor/Evaluation:  Pt. s target weight:  no gain from  initial visit, pt. verbalized understanding.     Pt has completed all nutrition requirements and is well-informed of the dietary and physical activity requirements that are necessary for successful bariatric outcomes. This pt is an appropriate candidate for surgery from a nutrition standpoint at this time. The patient understands that surgery is a tool, not a cure, and post operative follow up is essential. Patient will check in with this writer again in Feb 2022 for accountability and progress.     Plan for next visit:   Check In     Video-Visit Details    Type of service:  Video Visit    Video End Time:10:21 AM    Originating Location (pt. Location): Home    Distant Location (provider location):  [facility]ERY CL[facility] [place]     Platform used for Video Visit: Carmenza Vanegas RD

## 2021-12-06 NOTE — LETTER
12/6/2021         RE: Lucina Santamaria  [address]  [place] [address]        Dear Colleague,    Thank you for referring your patient, Lucina Santamaria, to the [facility] [place]. Please see a copy of my visit note below.    Lucina Santamaria is a 56 year old who is being evaluated via a billable video visit.      How would you like to obtain your AVS? MyChart  If the video visit is dropped, the invitation should be resent by: Send to e-mail at: [email or web address]  Will anyone else be joining your video visit? No      Video Start Time: 10:06 AM      Follow Up Surgical Weight Loss Supervised Diet Evaluation    Assessment:  Pt. is being seen today for a follow up RD nutritional evaluation. Pt. has been unsuccessful with non-surgical weight loss methods and is interested in bariatric surgery. Today we reviewed current eating habits and level of physical activity, and instructed on the changes that are required for successful bariatric outcomes.  Started Saxenda with Dr Stanley, will officially start on Saturday 12/11.      Surgery of interest per pt: RNY.    Workflow review:  Support Group: Completed.  Psychology:Completed.  Lab work:Completed.  SWL:Yes 3rd Visit    Weight goal: At or below initial.    Anthropometrics:  Pt's weight is 315.8 lbs  Initial weight: 332 lbs   Weight change: 16.2 lbs   BMI: There is no height or weight on file to calculate BMI.   Ideal body weight: 63.9 kg (140 lb 14 oz)  Adjusted ideal body weight: 96.4 kg (212 lb 8.4 oz)    Estimated RMR ([place]-St Jeor equation):  2085 kcals x 1.3 (light active) = 2711 kcals (for weight maintenance)    Medical History:  Patient Active Problem List   Diagnosis     CARDIOVASCULAR SCREENING; LDL GOAL LESS THAN 130     Obesity, morbid, BMI 50 or higher (H)     Elevated blood pressure reading without diagnosis of hypertension     Edema, unspecified type      Diabetes: No   HbA1c:  No results found for:  HGBA1C    Progress over past month: has been using a smaller plate along with smaller utensils to help with portion control. Trying to focus on protein first and figuring out different recipes for meals, noting that she is aiming for at least 20 grams of protein at each meal.  Patient reports that she has been working on less carbohydrates in general.        Meal duration: going well, no concerns and continues to work on     fluids by 30 minutes before, during meal, and waiting 30 minutes after meal before drinking fluids: Yes    Beverages  Decaf Coffee with Protein Shake, Water (at least 5 bottles/day)    Exercise  Walking 3-4 times/week for 3 miles    PES statement:   Overweight/Obesity (NC 3.3) related to overeating and poor lifestyle habits as evidenced by patient's subjective statements (h/o excessive energy intake) and BMI of 48.1kg/m2.   Intervention    Nutrition Education:   1. Provided general overview of diet and lifestyle modifications needed to be a deemed a safe candidate for bariatric surgery.     Food/Nutrient Delivery:  2. Educated patient on eating three meals, with cutting out snacking.  3. Bariatric Plate: Patient and I discussed the importance of including a lean protein source (20-30 grams/meal), vegetables (included at lunch and dinner), one serving (15g) of carbohydrate, and limited added fat (1 tb/day) at each meal.   4. Educated patient on using a protein powder drink as a meal replacement and/or supplement after bariatric surgery.   5. Discussed importance of adequate hydration after surgery, with goal of at least 64 oz of fluids/day.  6. Addressed avoiding all carbonated, caffeinated and sweetened drinks to prepare for bariatric surgery.     Nutrition Counselin. Mindful eating techniques: Encouraged slow meal pace, chewing foods to applesauce consistency for 20-30 minutes/meal.   8. Discussed  fluids 30 minutes before, during, and after meal to prevent dumping  syndrome and discomfort post bariatric surgery.     Instructions/Goals:     1. Continue implementing bariatric surgery lifestyle modifications.    Handouts Provided:   [facility] Weight Management Patient Handbook      Monitor/Evaluation:  Pt. s target weight:  no gain from initial visit, pt. verbalized understanding.     Pt has completed all nutrition requirements and is well-informed of the dietary and physical activity requirements that are necessary for successful bariatric outcomes. This pt is an appropriate candidate for surgery from a nutrition standpoint at this time. The patient understands that surgery is a tool, not a cure, and post operative follow up is essential. Patient will check in with this writer again in Feb 2022 for accountability and progress.     Plan for next visit:   Check In     Video-Visit Details    Type of service:  Video Visit    Video End Time:10:21 AM    Originating Location (pt. Location): Home    Distant Location (provider location):  [facility] SURGERY CLINIC AND BARIATRICS CARE [place]     Platform used for Video Visit: Carmenza Vanegas RD          Again, thank you for allowing me to participate in the care of your patient.        Sincerely,        Cindy Vanegas RD

## 2021-12-29 ENCOUNTER — OFFICE VISIT (OUTPATIENT)
Dept: SURGERY | Facility: CLINIC | Age: 56
End: 2021-12-29
Payer: COMMERCIAL

## 2021-12-29 VITALS
DIASTOLIC BLOOD PRESSURE: 90 MMHG | HEIGHT: 68 IN | SYSTOLIC BLOOD PRESSURE: 130 MMHG | BODY MASS INDEX: 44.41 KG/M2 | WEIGHT: 293 LBS

## 2021-12-29 DIAGNOSIS — E66.01 OBESITY, CLASS III, BMI 40-49.9 (MORBID OBESITY) (H): Primary | ICD-10-CM

## 2021-12-29 PROCEDURE — 99214 OFFICE O/P EST MOD 30 MIN: CPT | Performed by: SURGERY

## 2021-12-29 RX ORDER — ONDANSETRON 2 MG/ML
4 INJECTION INTRAMUSCULAR; INTRAVENOUS
Status: CANCELLED | OUTPATIENT
Start: 2021-12-29

## 2021-12-29 RX ORDER — GABAPENTIN 100 MG/1
600 CAPSULE ORAL
Status: CANCELLED | OUTPATIENT
Start: 2021-12-29

## 2021-12-29 RX ORDER — HEPARIN SODIUM 5000 [USP'U]/.5ML
5000 INJECTION, SOLUTION INTRAVENOUS; SUBCUTANEOUS ONCE
Status: CANCELLED | OUTPATIENT
Start: 2021-12-29 | End: 2021-12-29

## 2021-12-29 RX ORDER — ACETAMINOPHEN 325 MG/1
975 TABLET ORAL ONCE
Status: CANCELLED | OUTPATIENT
Start: 2021-12-29 | End: 2021-12-29

## 2021-12-29 ASSESSMENT — MIFFLIN-ST. JEOR: SCORE: 2076.88

## 2021-12-29 NOTE — PROGRESS NOTES
I was consulted by Nani Pierson to evaluate this pt for Bariatric Surgery.    HPI: Lucina Santamaria is a 56 year old female here today for consideration of metabolic and bariatric surgery. she has had weight problems since high school. she Has tried multiple weight loss programs in the past with moderate success.    she carries most of his/her obesity in through their abdomen, and hips.   This pt does not have Hypertention.   This pt does not have GERD.   The pt does not have Sleep Apnea.   This pt does not have diabetes.    Allergies:No known allergies    Past Medical History:   Diagnosis Date     Arthritis     sacro iliac and hip, right ankle.     Chronic constipation     since menopause. fiber/hydration helps.     Complication of anesthesia     nauseated w/ morphine previously after arm surgery.     COVID-19     .     Gallbladder problem     s/p cholecystectomy.     Gestational diabetes      Head injury     childhood concussions     Pneumonia     covid     Pulmonary emboli (H)      Pulmonary embolism (H)     wisdome teeth out while on OCPs and MTHFR gene mutation history.     Pulmonary embolism (H)     age 23, OCPs/dental extraction provoked. reports some MTHFR gene issues, unsure if homo or heterozygous.       Past Surgical History:   Procedure Laterality Date     APPENDECTOMY       BREAST BIOPSY, RT/LT      right breast - benign      SECTION       COLONOSCOPY N/A 11/10/2021    Procedure: COLONOSCOPY, WITH POLYPECTOMY;  Surgeon: Lynn Doshi MD;  Location:  GI     GYN SURGERY      tubal ligation     LAPAROSCOPIC CHOLECYSTECTOMY N/A 2019    Procedure: CHOLECYSTECTOMY, LAPAROSCOPIC;  Surgeon: Lynn Doshi MD;  Location:  OR     ORTHOPEDIC SURGERY      radius, ulnar - open fracture     TUBAL LIGATION       wisdom teeth         CURRENT MEDS:      Family History   Problem Relation Age of Onset     Cancer Father      Depression Brother          "bipolar     Diabetes Mother      Arthritis Maternal Grandmother         rheumatoid arthritis     Diabetes Paternal Grandmother      Cancer Other         breast        reports that she has never smoked. She has never used smokeless tobacco. She reports current alcohol use. She reports that she does not use drugs.    Review of Systems - 12 point Review of Systems is negative except for the issues mentioned above.    PSYCHIATRIC: she has undergone a lifestyle assessment and has been deemed a good candidate for bariatric surgery by the psychologist.    BP (!) 130/90   Ht 1.727 m (5' 8\")   Wt 142.8 kg (314 lb 14.4 oz)   BMI 47.88 kg/m    Body mass index is 47.88 kg/m .    EXAM:  GENERAL: This is a well-developed 56 year old female who appears her stated age  HEAD & NECK: Grossly normal.  No palpable thyroid lesions  CARDIAC: RRR without murmur  CHEST/LUNG:  Clear to auscultation  ABDOMEN: Obese.  Nontender.  No hernias or masses appreciated.  LYMPHATIC:  No significant adenopathy appreciated.    EXTREMITIES: Grossly normal.  No evidence of chronic venous stasis.    NEUROLOGIC: Focally intact  INTEGUMENT: No open lesions or ulcers  PSYCHIATRIC: Normal affect. she has a good grasp on the nature of her obesity and the treatment options.    LABS:  Lab Results   Component Value Date    WBC 7.2 09/23/2021    WBC 8.2 12/31/2019    HGB 13.8 09/23/2021    HGB 13.1 12/31/2019    HCT 41.1 09/23/2021    HCT 39.6 12/31/2019    MCV 99 09/23/2021     12/31/2019     09/23/2021     12/31/2019     INR/Prothrombin Time  @LABRCNTIP(NA,K,CL,co2,bun,creatinine,labglom,glucose,calcium)@  No results found for: HGBA1C  Lab Results   Component Value Date    ALT 32 09/23/2021    AST 16 09/23/2021    ALKPHOS 78 09/23/2021       Assessment/Plan: 56 year old female who is an excellent candidate for bariatric and metabolic surgery.  After a careful conversation with the patient it was decided that a  Laparoscopic Kris-en-Y " Gastric Bypass. would be her preferred option. She does have a history of DVT and Pulmonary Embolus.  She takes a baby asprin.  She will need to stop the baby asprin after having this surgery.      I went over the surgery in detail with her.  I went over the nature of the operation and some of the potential consequences of the surgery.  I went over the expected hospital course and discussed laparoscopic versus open surgery, understanding that we will plan on doing this laparoscopically with the possibility of having to convert to an open operation.  I went over some of the risks and complications of the operation including, but not limited to, DVT, pulmonary emboli, pneumonia, postoperative bleeding, wound infection, staple line leak, intra-abdominal sepsis, and possible death.  I also went over some of the potential nutritional concerns such as vitamin B-12, iron, vitamin D, vitamin A, calcium and protein deficiencies.  I will also went over the need for lifelong nutritional surveillance.  The patient understands and wants to proceed with surgery.  We will submit for prior authorization.      North Schneider MD ,MD  [facility] Department of Surgery  [phone]

## 2021-12-29 NOTE — LETTER
2021         RE: Lucina Santamaria  [address]  [place] [address]        Dear Colleague,    Thank you for referring your patient, Lucina Santamaria, to the [facility] SURGERY CLINIC [facility] BARIATRICS CARE [place]. Please see a copy of my visit note below.    I was consulted by Nani Pierson to evaluate this pt for Bariatric Surgery.    HPI: Lucina Santamaria is a 56 year old female here today for consideration of metabolic and bariatric surgery. she has had weight problems since high school. she Has tried multiple weight loss programs in the past with moderate success.    she carries most of his/her obesity in through their abdomen, and hips.   This pt does not have Hypertention.   This pt does not have GERD.   The pt does not have Sleep Apnea.   This pt does not have diabetes.    Allergies:No known allergies    Past Medical History:   Diagnosis Date     Arthritis     sacro iliac and hip, right ankle.     Chronic constipation     since menopause. fiber/hydration helps.     Complication of anesthesia     nauseated w/ morphine previously after arm surgery.     COVID-19     .     Gallbladder problem     s/p cholecystectomy.     Gestational diabetes      Head injury     childhood concussions     Pneumonia     covid     Pulmonary emboli (H)      Pulmonary embolism (H)     wisdome teeth out while on OCPs and MTHFR gene mutation history.     Pulmonary embolism (H)     age 23, OCPs/dental extraction provoked. reports some MTHFR gene issues, unsure if homo or heterozygous.       Past Surgical History:   Procedure Laterality Date     APPENDECTOMY       BREAST BIOPSY, RT/LT      right breast - benign      SECTION       COLONOSCOPY N/A 11/10/2021    Procedure: COLONOSCOPY, WITH POLYPECTOMY;  Surgeon: Lynn Doshi MD;  Location:  GI     GYN SURGERY      tubal ligation     LAPAROSCOPIC CHOLECYSTECTOMY N/A 2019    Procedure: CHOLECYSTECTOMY,  "LAPAROSCOPIC;  Surgeon: Lynn Doshi MD;  Location: [facility] OR     ORTHOPEDIC SURGERY  1991    radius, ulnar - open fracture     TUBAL LIGATION       wisdom teeth         CURRENT MEDS:      Family History   Problem Relation Age of Onset     Cancer Father      Depression Brother         bipolar     Diabetes Mother      Arthritis Maternal Grandmother         rheumatoid arthritis     Diabetes Paternal Grandmother      Cancer Other         breast        reports that she has never smoked. She has never used smokeless tobacco. She reports current alcohol use. She reports that she does not use drugs.    Review of Systems - 12 point Review of Systems is negative except for the issues mentioned above.    PSYCHIATRIC: she has undergone a lifestyle assessment and has been deemed a good candidate for bariatric surgery by the psychologist.    BP (!) 130/90   Ht 1.727 m (5' 8\")   Wt 142.8 kg (314 lb 14.4 oz)   BMI 47.88 kg/m    Body mass index is 47.88 kg/m .    EXAM:  GENERAL: This is a well-developed 56 year old female who appears her stated age  HEAD & NECK: Grossly normal.  No palpable thyroid lesions  CARDIAC: RRR without murmur  CHEST/LUNG:  Clear to auscultation  ABDOMEN: Obese.  Nontender.  No hernias or masses appreciated.  LYMPHATIC:  No significant adenopathy appreciated.    EXTREMITIES: Grossly normal.  No evidence of chronic venous stasis.    NEUROLOGIC: Focally intact  INTEGUMENT: No open lesions or ulcers  PSYCHIATRIC: Normal affect. she has a good grasp on the nature of her obesity and the treatment options.    LABS:  Lab Results   Component Value Date    WBC 7.2 09/23/2021    WBC 8.2 12/31/2019    HGB 13.8 09/23/2021    HGB 13.1 12/31/2019    HCT 41.1 09/23/2021    HCT 39.6 12/31/2019    MCV 99 09/23/2021     12/31/2019     09/23/2021     12/31/2019     INR/Prothrombin Time  @LABRCNTIP(NA,K,CL,co2,bun,creatinine,labglom,glucose,calcium)@  No results found for: HGBA1C  Lab Results   Component " Value Date    ALT 32 09/23/2021    AST 16 09/23/2021    ALKPHOS 78 09/23/2021       Assessment/Plan: 56 year old female who is an excellent candidate for bariatric and metabolic surgery.  After a careful conversation with the patient it was decided that a  Laparoscopic Kris-en-Y Gastric Bypass. would be her preferred option. She does have a history of DVT and Pulmonary Embolus.  She takes a baby asprin.  She will need to stop the baby asprin after having this surgery.      I went over the surgery in detail with her.  I went over the nature of the operation and some of the potential consequences of the surgery.  I went over the expected hospital course and discussed laparoscopic versus open surgery, understanding that we will plan on doing this laparoscopically with the possibility of having to convert to an open operation.  I went over some of the risks and complications of the operation including, but not limited to, DVT, pulmonary emboli, pneumonia, postoperative bleeding, wound infection, staple line leak, intra-abdominal sepsis, and possible death.  I also went over some of the potential nutritional concerns such as vitamin B-12, iron, vitamin D, vitamin A, calcium and protein deficiencies.  I will also went over the need for lifelong nutritional surveillance.  The patient understands and wants to proceed with surgery.  We will submit for prior authorization.      oNrth Schneider MD ,MD  [facility] Department of Surgery  [phone]      Again, thank you for allowing me to participate in the care of your patient.        Sincerely,        North Schneider MD

## 2021-12-30 ENCOUNTER — DOCUMENTATION ONLY (OUTPATIENT)
Dept: SURGERY | Facility: CLINIC | Age: 56
End: 2021-12-30
Payer: COMMERCIAL

## 2021-12-30 NOTE — PROGRESS NOTES
I have submitted a PA to [employer] for this patient to be approved for a LRNY with Dr. North Schneider

## 2022-01-06 ENCOUNTER — TELEPHONE (OUTPATIENT)
Dept: FAMILY MEDICINE | Facility: CLINIC | Age: 57
End: 2022-01-06
Payer: COMMERCIAL

## 2022-01-06 DIAGNOSIS — E03.8 SUBCLINICAL HYPOTHYROIDISM: ICD-10-CM

## 2022-01-06 DIAGNOSIS — E03.9 ACQUIRED HYPOTHYROIDISM: ICD-10-CM

## 2022-01-06 DIAGNOSIS — M25.50 MULTIPLE JOINT PAIN: Primary | ICD-10-CM

## 2022-01-19 ENCOUNTER — LAB (OUTPATIENT)
Dept: LAB | Facility: CLINIC | Age: 57
End: 2022-01-19
Payer: COMMERCIAL

## 2022-01-19 DIAGNOSIS — E03.8 SUBCLINICAL HYPOTHYROIDISM: ICD-10-CM

## 2022-01-19 DIAGNOSIS — E03.9 ACQUIRED HYPOTHYROIDISM: ICD-10-CM

## 2022-01-19 DIAGNOSIS — M25.50 MULTIPLE JOINT PAIN: ICD-10-CM

## 2022-01-19 LAB
CRP SERPL-MCNC: 21.3 MG/L (ref 0–8)
ERYTHROCYTE [SEDIMENTATION RATE] IN BLOOD BY WESTERGREN METHOD: 14 MM/HR (ref 0–30)
TSH SERPL DL<=0.005 MIU/L-ACNC: 3 MU/L (ref 0.4–4)
URATE SERPL-MCNC: 5.1 MG/DL (ref 2.6–6)

## 2022-01-19 PROCEDURE — 84550 ASSAY OF BLOOD/URIC ACID: CPT

## 2022-01-19 PROCEDURE — 86140 C-REACTIVE PROTEIN: CPT

## 2022-01-19 PROCEDURE — 86618 LYME DISEASE ANTIBODY: CPT

## 2022-01-19 PROCEDURE — 86431 RHEUMATOID FACTOR QUANT: CPT

## 2022-01-19 PROCEDURE — 85652 RBC SED RATE AUTOMATED: CPT

## 2022-01-19 PROCEDURE — 86200 CCP ANTIBODY: CPT

## 2022-01-19 PROCEDURE — 36415 COLL VENOUS BLD VENIPUNCTURE: CPT

## 2022-01-19 PROCEDURE — 84443 ASSAY THYROID STIM HORMONE: CPT

## 2022-01-19 PROCEDURE — 86038 ANTINUCLEAR ANTIBODIES: CPT

## 2022-01-20 LAB
ANA SER QL IF: NEGATIVE
B BURGDOR IGG+IGM SER QL: 0.49
CCP AB SER IA-ACNC: 1.7 U/ML
RHEUMATOID FACT SER NEPH-ACNC: 8 IU/ML

## 2022-01-20 RX ORDER — LEVOTHYROXINE SODIUM 75 UG/1
75 TABLET ORAL DAILY
Qty: 90 TABLET | Refills: 1 | Status: SHIPPED | OUTPATIENT
Start: 2022-01-20 | End: 2022-07-14

## 2022-02-14 ENCOUNTER — VIRTUAL VISIT (OUTPATIENT)
Dept: SURGERY | Facility: CLINIC | Age: 57
End: 2022-02-14
Payer: COMMERCIAL

## 2022-02-14 DIAGNOSIS — E66.813 CLASS 3 SEVERE OBESITY DUE TO EXCESS CALORIES WITHOUT SERIOUS COMORBIDITY WITH BODY MASS INDEX (BMI) OF 45.0 TO 49.9 IN ADULT (H): Primary | ICD-10-CM

## 2022-02-14 DIAGNOSIS — E66.01 CLASS 3 SEVERE OBESITY DUE TO EXCESS CALORIES WITHOUT SERIOUS COMORBIDITY WITH BODY MASS INDEX (BMI) OF 45.0 TO 49.9 IN ADULT (H): Primary | ICD-10-CM

## 2022-02-14 DIAGNOSIS — Z71.3 NUTRITIONAL COUNSELING: ICD-10-CM

## 2022-02-14 PROCEDURE — 97803 MED NUTRITION INDIV SUBSEQ: CPT | Mod: GT | Performed by: DIETITIAN, REGISTERED

## 2022-02-14 NOTE — PROGRESS NOTES
Lucina Santamaria is a 56 year old who is being evaluated via a billable video visit.      How would you like to obtain your AVS? MyChart  If the video visit is dropped, the invitation should be resent by: Send to e-mail at: [email or web address]  Will anyone else be joining your video visit? No      Video Start Time: 9:57 AM      Follow Up Surgical Weight Loss Supervised Diet Evaluation    Assessment:  Pt. is being seen today for a follow up RD nutritional evaluation. Pt. has been unsuccessful with non-surgical weight loss methods and is interested in bariatric surgery. Today we reviewed current eating habits and level of physical activity, and instructed on the changes that are required for successful bariatric outcomes.      Surgery of interest per pt: RNY.    Workflow review:  Support Group: Completed.  Psychology:Completed.  Lab work:Completed.  SWL:Yes 4th Visit       Weight goal: At or below initial.    Anthropometrics:  Pt's weight is 312.5 lbs   Initial weight: 332 lbs   Weight change: 20 lbs (down)   BMI: There is no height or weight on file to calculate BMI.   Ideal body weight: 63.9 kg (140 lb 14 oz)  Adjusted ideal body weight: 95.5 kg (210 lb 7.8 oz)    Estimated RMR ([place]-St Jeor equation):  2070 kcals x 1.3 (light active) = 2691 kcals (for weight maintenance)    Medical History:  Patient Active Problem List   Diagnosis     CARDIOVASCULAR SCREENING; LDL GOAL LESS THAN 130     Obesity, morbid, BMI 50 or higher (H)     Elevated blood pressure reading without diagnosis of hypertension     Edema, unspecified type      Diabetes: No   HbA1c:  No results found for: HGBA1C    Progress over past month: Continues to focus on protein first.  Patient reports that she is trying to avoid eating after 7 pm.  Has been using low calorie/low carb bread for sandwiches as an option.      Diet Recall/Time  Breakfast: oatmeal with yogurt with fruit or cottage cheese  Lunch: leftovers or sandwich on low calorie  bread  Dinner: chicken stir rea or tuna or fish with veggies, occasional baked potato    Typical Snacks: pretzels or triscuits with cheese    Meal duration: 20-30 minutes. (continues to be mindful of)     fluids by 30 minutes before, during meal, and waiting 30 minutes after meal before drinking fluids: Yes    Beverages  Water-6-7 bottles/day (more challenging at work), 1 cup of Decaf Coffee with Protein Powder in the AM    Exercise  3-4 times/week of walking for 3 miles    PES statement:   Overweight/Obesity (NC 3.3) related to overeating and poor lifestyle habits as evidenced by patient's subjective statements (h/o excessive energy intake) and BMI of 47.6 kg/m2.   Intervention    Nutrition Education:   1. Provided general overview of diet and lifestyle modifications needed to be a deemed a safe candidate for bariatric surgery.   2. Educated patient on how to read a food label: choosing foods with than 10 grams fat and 10 grams sugar per serving to avoid dumping syndrome.  3. Dumping Syndrome: Described the mechanisms of syndrome, symptoms, and prevention tools from a dietary perspective.   4. Vitamins: Educated on post-op vitamin regimen including MVI+ 18 mg Fe two times a day, calcium citrate 400-600 mg two times a day, 8037-2375 mcg sublingual B12 daily, 5000 IU vitamin D3 daily.     Food/Nutrient Delivery:  5. Educated patient on eating three meals, with cutting out snacking.  6. Bariatric Plate: Patient and I discussed the importance of including a lean protein source (20-30 grams/meal), vegetables (included at lunch and dinner), one serving (15g) of carbohydrate, and limited added fat (1 tb/day) at each meal.   7. Educated patient on how to complete a food journal and benefits of meal planning.   8. Educated patient on using a protein powder drink as a meal replacement and/or supplement after bariatric surgery.   9. Discussed importance of adequate hydration after surgery, with goal of at least 64  oz of fluids/day.  10. Addressed avoiding all carbonated, caffeinated and sweetened drinks to prepare for bariatric surgery.     Nutrition Counselin. Mindful eating techniques: Encouraged slow meal pace, chewing foods to applesauce consistency for 20-30 minutes/meal.   12. Discussed  fluids 30 minutes before, during, and after meal to prevent dumping syndrome and discomfort post bariatric surgery.   13. Discussed pre/post operative diet progression, post op vitamin regimen, gave review of surgery process.     Instructions/Goals:     1. Continue implementing bariatric surgery lifestyle modifications.  2. Fill out food journal and return at follow up.    Handouts Provided:   [facility] Bariatric Surgery Nutrition Info  Food journal  Food label  Protein supplement list    Monitor/Evaluation:  Pt. s target weight:  no gain from initial visit, pt. verbalized understanding.     Pt has completed all nutrition requirements and is well-informed of the dietary and physical activity requirements that are necessary for successful bariatric outcomes. This pt is an appropriate candidate for surgery from a nutrition standpoint at this time. The patient understands that surgery is a tool, not a cure, and post operative follow up is essential.     Plan for next visit:   Check In     Video-Visit Details    Type of service:  Video Visit    Video End Time:10:14 AM    Originating Location (pt. Location): Home    Distant Location (provider location):  [facility] SURGERY CL[facility]ATRICS C[facility] [place]     Platform used for Video Visit: Carmenza Vanegas RD

## 2022-02-14 NOTE — LETTER
2/14/2022         RE: Lucina Santamaria  [address] [address]        Dear Colleague,    Thank you for referring your patient, Lucina Santamaria, to the [facility]ERY [facility] C[facility] [place]. Please see a copy of my visit note below.    Lucina Santamaria is a 56 year old who is being evaluated via a billable video visit.      How would you like to obtain your AVS? MyChart  If the video visit is dropped, the invitation should be resent by: Send to e-mail at: [email or web address]  Will anyone else be joining your video visit? No      Video Start Time: 9:57 AM      Follow Up Surgical Weight Loss Supervised Diet Evaluation    Assessment:  Pt. is being seen today for a follow up RD nutritional evaluation. Pt. has been unsuccessful with non-surgical weight loss methods and is interested in bariatric surgery. Today we reviewed current eating habits and level of physical activity, and instructed on the changes that are required for successful bariatric outcomes.      Surgery of interest per pt: RNY.    Workflow review:  Support Group: Completed.  Psychology:Completed.  Lab work:Completed.  SWL:Yes 4th Visit       Weight goal: At or below initial.    Anthropometrics:  Pt's weight is 312.5 lbs   Initial weight: 332 lbs   Weight change: 20 lbs (down)   BMI: There is no height or weight on file to calculate BMI.   Ideal body weight: 63.9 kg (140 lb 14 oz)  Adjusted ideal body weight: 95.5 kg (210 lb 7.8 oz)    Estimated RMR ([place]-St Jeor equation):  2070 kcals x 1.3 (light active) = 2691 kcals (for weight maintenance)    Medical History:  Patient Active Problem List   Diagnosis     CARDIOVASCULAR SCREENING; LDL GOAL LESS THAN 130     Obesity, morbid, BMI 50 or higher (H)     Elevated blood pressure reading without diagnosis of hypertension     Edema, unspecified type      Diabetes: No   HbA1c:  No results found for: HGBA1C    Progress over past month: Continues to focus on protein  first.  Patient reports that she is trying to avoid eating after 7 pm.  Has been using low calorie/low carb bread for sandwiches as an option.      Diet Recall/Time  Breakfast: oatmeal with yogurt with fruit or cottage cheese  Lunch: leftovers or sandwich on low calorie bread  Dinner: chicken stir rea or tuna or fish with veggies, occasional baked potato    Typical Snacks: pretzels or triscuits with cheese    Meal duration: 20-30 minutes. (continues to be mindful of)     fluids by 30 minutes before, during meal, and waiting 30 minutes after meal before drinking fluids: Yes    Beverages  Water-6-7 bottles/day (more challenging at work), 1 cup of Decaf Coffee with Protein Powder in the AM    Exercise  3-4 times/week of walking for 3 miles    PES statement:   Overweight/Obesity (NC 3.3) related to overeating and poor lifestyle habits as evidenced by patient's subjective statements (h/o excessive energy intake) and BMI of 47.6 kg/m2.   Intervention    Nutrition Education:   1. Provided general overview of diet and lifestyle modifications needed to be a deemed a safe candidate for bariatric surgery.   2. Educated patient on how to read a food label: choosing foods with than 10 grams fat and 10 grams sugar per serving to avoid dumping syndrome.  3. Dumping Syndrome: Described the mechanisms of syndrome, symptoms, and prevention tools from a dietary perspective.   4. Vitamins: Educated on post-op vitamin regimen including MVI+ 18 mg Fe two times a day, calcium citrate 400-600 mg two times a day, 8243-7751 mcg sublingual B12 daily, 5000 IU vitamin D3 daily.     Food/Nutrient Delivery:  5. Educated patient on eating three meals, with cutting out snacking.  6. Bariatric Plate: Patient and I discussed the importance of including a lean protein source (20-30 grams/meal), vegetables (included at lunch and dinner), one serving (15g) of carbohydrate, and limited added fat (1 tb/day) at each meal.   7. Educated patient on  how to complete a food journal and benefits of meal planning.   8. Educated patient on using a protein powder drink as a meal replacement and/or supplement after bariatric surgery.   9. Discussed importance of adequate hydration after surgery, with goal of at least 64 oz of fluids/day.  10. Addressed avoiding all carbonated, caffeinated and sweetened drinks to prepare for bariatric surgery.     Nutrition Counselin. Mindful eating techniques: Encouraged slow meal pace, chewing foods to applesauce consistency for 20-30 minutes/meal.   12. Discussed  fluids 30 minutes before, during, and after meal to prevent dumping syndrome and discomfort post bariatric surgery.   13. Discussed pre/post operative diet progression, post op vitamin regimen, gave review of surgery process.     Instructions/Goals:     1. Continue implementing bariatric surgery lifestyle modifications.  2. Fill out food journal and return at follow up.    Handouts Provided:   [facility] Bariatric Surgery Nutrition Info  Food journal  Food label  Protein supplement list    Monitor/Evaluation:  Pt. s target weight:  no gain from initial visit, pt. verbalized understanding.     Pt has completed all nutrition requirements and is well-informed of the dietary and physical activity requirements that are necessary for successful bariatric outcomes. This pt is an appropriate candidate for surgery from a nutrition standpoint at this time. The patient understands that surgery is a tool, not a cure, and post operative follow up is essential.     Plan for next visit:   Check In     Video-Visit Details    Type of service:  Video Visit    Video End Time:10:14 AM    Originating Location (pt. Location): Home    Distant Location (provider location):  [facility] SURGERY CLINIC AND BARIATRICS CARE [place]     Platform used for Video Visit: Carmenza Vanegas, DORA          Again, thank you for allowing me to participate in the care of  your patient.        Sincerely,        Cindy Vanegas, RD

## 2022-02-19 ENCOUNTER — HOSPITAL ENCOUNTER (EMERGENCY)
Facility: CLINIC | Age: 57
Discharge: LEFT BEFORE DISCHARGE | End: 2022-02-19
Payer: COMMERCIAL

## 2022-03-06 ENCOUNTER — HEALTH MAINTENANCE LETTER (OUTPATIENT)
Age: 57
End: 2022-03-06

## 2022-03-07 ENCOUNTER — TELEPHONE (OUTPATIENT)
Dept: SURGERY | Facility: CLINIC | Age: 57
End: 2022-03-07
Payer: COMMERCIAL

## 2022-03-11 ENCOUNTER — PREP FOR PROCEDURE (OUTPATIENT)
Dept: SURGERY | Facility: CLINIC | Age: 57
End: 2022-03-11
Payer: COMMERCIAL

## 2022-03-11 ENCOUNTER — DOCUMENTATION ONLY (OUTPATIENT)
Dept: SURGERY | Facility: CLINIC | Age: 57
End: 2022-03-11
Payer: COMMERCIAL

## 2022-03-11 DIAGNOSIS — E66.01 MORBID OBESITY (H): Primary | ICD-10-CM

## 2022-03-11 NOTE — PROGRESS NOTES
Pre-op Class Checklist:    Hemoglobin A1C   Date Value Ref Range Status   09/23/2021 5.9 (H) 0.0 - 5.6 % Final     Comment:     Normal <5.7%   Prediabetes 5.7-6.4%    Diabetes 6.5% or higher     Note: Adopted from ADA consensus guidelines.      CPAP: No   Smoking Cessation Date:  Urine Nicotine Screen: N/A   Consent: Complete  Support Group: Yes   Anticoag Risk: High Risk   Omeprazole: Need   Actigall: Marycarmen silva   Bariatrician: Arnulfo Stanley MD   Email: [email or web address]  Initial Wt: 333 lb   AB Visit:  [facility] [place] [place]  Wt Readings from Last 1 Encounters:   12/29/21 142.8 kg (314 lb 14.4 oz)     Tasklist updated.    Kady Ceja RN, CBN  [facility]  P [phone]  F [phone]

## 2022-03-11 NOTE — PROGRESS NOTES
I have Lucina scheduled for a LRNY with Dr. North Schneider on Monday, May 16, 2022 @ 7:30 am.  Scheduled for pre op class on April 27, 2022.  She will have her pre op and testing done at [facility].  She has also stated that she will have her Covid test at [facility] on 05/12/2022.  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      [employer] # [id number]  01/07/22-01/06/2023

## 2022-04-06 DIAGNOSIS — Z11.59 ENCOUNTER FOR SCREENING FOR OTHER VIRAL DISEASES: Primary | ICD-10-CM

## 2022-04-11 ENCOUNTER — VIRTUAL VISIT (OUTPATIENT)
Dept: SURGERY | Facility: CLINIC | Age: 57
End: 2022-04-11
Payer: COMMERCIAL

## 2022-04-11 DIAGNOSIS — E66.813 CLASS 3 SEVERE OBESITY DUE TO EXCESS CALORIES WITHOUT SERIOUS COMORBIDITY WITH BODY MASS INDEX (BMI) OF 45.0 TO 49.9 IN ADULT (H): Primary | ICD-10-CM

## 2022-04-11 DIAGNOSIS — E66.01 CLASS 3 SEVERE OBESITY DUE TO EXCESS CALORIES WITHOUT SERIOUS COMORBIDITY WITH BODY MASS INDEX (BMI) OF 45.0 TO 49.9 IN ADULT (H): Primary | ICD-10-CM

## 2022-04-11 DIAGNOSIS — Z71.3 NUTRITIONAL COUNSELING: ICD-10-CM

## 2022-04-11 PROCEDURE — 97803 MED NUTRITION INDIV SUBSEQ: CPT | Mod: GT | Performed by: DIETITIAN, REGISTERED

## 2022-04-11 NOTE — LETTER
4/11/2022         RE: Lucina Santamaria  [address] [address]        Dear Colleague,    Thank you for referring your patient, Lucina Santamaria, to the [facility] SURGERY CLINIC AND BARIATRICS CARE [place]. Please see a copy of my visit note below.    Lucina Santamaria is a 56 year old who is being evaluated via a billable video visit.      How would you like to obtain your AVS? MyChart  If the video visit is dropped, the invitation should be resent by: Send to e-mail at: [email or web address]  Will anyone else be joining your video visit? No      Video Start Time: 11:01 AM      Follow Up Surgical Weight Loss Supervised Diet Evaluation    Assessment:  Pt. is being seen today for a follow up RD nutritional evaluation. Pt. has been unsuccessful with non-surgical weight loss methods and is interested in bariatric surgery. Today we reviewed current eating habits and level of physical activity, and instructed on the changes that are required for successful bariatric outcomes.    Surgery of interest per pt: RNY.    Workflow review:  Support Group: Completed.  Psychology:Completed.  Lab work:Completed.  SWL:Yes 5th Visit       Weight goal: At or below initial.    Anthropometrics:  Pt's weight is 310 lbs   Initial weight: 332 lbs   Weight change: 22 lbs (down)   BMI: There is no height or weight on file to calculate BMI.   Patient weight not recorded    Estimated RMR ([place]-St Jeor equation):  2059 kcals x 1.3 (light active) = 2677 kcals (for weight maintenance)    Medical History:  Patient Active Problem List   Diagnosis     CARDIOVASCULAR SCREENING; LDL GOAL LESS THAN 130     Obesity, morbid, BMI 50 or higher (H)     Elevated blood pressure reading without diagnosis of hypertension     Edema, unspecified type      Diabetes: No  HbA1c:  No results found for: HGBA1C    Progress over past month: Continues to focus on protein first, making sure she is always incorporating it into each meal.  Has been  focusing on water as her primary beverage, noting that she is assuring adequate hydration.  Patient reports that she has been taking a MVI on a regular basis.  Patient reports that she has been making healthy snack choices if physically hungry.        Meal duration: 20-30 minutes.      fluids by 30 minutes before, during meal, and waiting 30 minutes after meal before drinking fluids: Yes    Beverages  Water-72 oz/day     Exercise  Walking as able/tolerated, Some Weight Lifting     PES statement:   Overweight/Obesity (NC 3.3) related to overeating and poor lifestyle habits as evidenced by patient's subjective statements (h/o excessive energy intake) and BMI of 47.2 kg/m2.     Intervention    Nutrition Education:   1. Provided general overview of diet and lifestyle modifications needed to be a deemed a safe candidate for bariatric surgery.     Food/Nutrient Delivery:  2. Educated patient on eating three meals, with cutting out snacking.  3. Bariatric Plate: Patient and I discussed the importance of including a lean protein source (20-30 grams/meal), vegetables (included at lunch and dinner), one serving (15g) of carbohydrate, and limited added fat (1 tb/day) at each meal.   4. Discussed importance of adequate hydration after surgery, with goal of at least 64 oz of fluids/day.  5. Addressed avoiding all carbonated, caffeinated and sweetened drinks to prepare for bariatric surgery.     Nutrition Counselin. Mindful eating techniques: Encouraged slow meal pace, chewing foods to applesauce consistency for 20-30 minutes/meal.   7. Discussed  fluids 30 minutes before, during, and after meal to prevent dumping syndrome and discomfort post bariatric surgery.     Instructions/Goals:     1. Continue implementing bariatric surgery lifestyle modifications.      Handouts Provided:   [facility] Bariatric Surgery Nutrition Info  Food journal  Food label  Protein supplement  list    Monitor/Evaluation:  Pt. s target weight:  no gain from initial visit, pt. verbalized understanding.     Pt has completed all nutrition requirements and is well-informed of the dietary and physical activity requirements that are necessary for successful bariatric outcomes. This pt is an appropriate candidate for surgery from a nutrition standpoint at this time. The patient understands that surgery is a tool, not a cure, and post operative follow up is essential.     Plan for next visit:   Post Op Nutrition       Video-Visit Details    Type of service:  Video Visit    Video End Time:11:14 AM    Originating Location (pt. Location): Home    Distant Location (provider location):  [facility]     Platform used for Video Visit: Carmenza Vanegas RD          Again, thank you for allowing me to participate in the care of your patient.        Sincerely,        Cindy Vanegas RD

## 2022-04-11 NOTE — PROGRESS NOTES
Lucina Santamaria is a 56 year old who is being evaluated via a billable video visit.      How would you like to obtain your AVS? MyChart  If the video visit is dropped, the invitation should be resent by: Send to e-mail at: [email or web address]  Will anyone else be joining your video visit? No      Video Start Time: 11:01 AM      Follow Up Surgical Weight Loss Supervised Diet Evaluation    Assessment:  Pt. is being seen today for a follow up RD nutritional evaluation. Pt. has been unsuccessful with non-surgical weight loss methods and is interested in bariatric surgery. Today we reviewed current eating habits and level of physical activity, and instructed on the changes that are required for successful bariatric outcomes.    Surgery of interest per pt: RNY.    Workflow review:  Support Group: Completed.  Psychology:Completed.  Lab work:Completed.  SWL:Yes 5th Visit       Weight goal: At or below initial.    Anthropometrics:  Pt's weight is 310 lbs   Initial weight: 332 lbs   Weight change: 22 lbs (down)   BMI: There is no height or weight on file to calculate BMI.   Patient weight not recorded    Estimated RMR ([place]-St Jeor equation):  2059 kcals x 1.3 (light active) = 2677 kcals (for weight maintenance)    Medical History:  Patient Active Problem List   Diagnosis     CARDIOVASCULAR SCREENING; LDL GOAL LESS THAN 130     Obesity, morbid, BMI 50 or higher (H)     Elevated blood pressure reading without diagnosis of hypertension     Edema, unspecified type      Diabetes: No  HbA1c:  No results found for: HGBA1C    Progress over past month: Continues to focus on protein first, making sure she is always incorporating it into each meal.  Has been focusing on water as her primary beverage, noting that she is assuring adequate hydration.  Patient reports that she has been taking a MVI on a regular basis.  Patient reports that she has been making healthy snack choices if physically hungry.        Meal duration: 20-30  minutes.      fluids by 30 minutes before, during meal, and waiting 30 minutes after meal before drinking fluids: Yes    Beverages  Water-72 oz/day     Exercise  Walking as able/tolerated, Some Weight Lifting     PES statement:   Overweight/Obesity (NC 3.3) related to overeating and poor lifestyle habits as evidenced by patient's subjective statements (h/o excessive energy intake) and BMI of 47.2 kg/m2.     Intervention    Nutrition Education:   1. Provided general overview of diet and lifestyle modifications needed to be a deemed a safe candidate for bariatric surgery.     Food/Nutrient Delivery:  2. Educated patient on eating three meals, with cutting out snacking.  3. Bariatric Plate: Patient and I discussed the importance of including a lean protein source (20-30 grams/meal), vegetables (included at lunch and dinner), one serving (15g) of carbohydrate, and limited added fat (1 tb/day) at each meal.   4. Discussed importance of adequate hydration after surgery, with goal of at least 64 oz of fluids/day.  5. Addressed avoiding all carbonated, caffeinated and sweetened drinks to prepare for bariatric surgery.     Nutrition Counselin. Mindful eating techniques: Encouraged slow meal pace, chewing foods to applesauce consistency for 20-30 minutes/meal.   7. Discussed  fluids 30 minutes before, during, and after meal to prevent dumping syndrome and discomfort post bariatric surgery.     Instructions/Goals:     1. Continue implementing bariatric surgery lifestyle modifications.      Handouts Provided:   [facility] Bariatric Surgery Nutrition Info  Food journal  Food label  Protein supplement list    Monitor/Evaluation:  Pt. s target weight:  no gain from initial visit, pt. verbalized understanding.     Pt has completed all nutrition requirements and is well-informed of the dietary and physical activity requirements that are necessary for successful bariatric outcomes. This pt is an  appropriate candidate for surgery from a nutrition standpoint at this time. The patient understands that surgery is a tool, not a cure, and post operative follow up is essential.     Plan for next visit:   Post Op Nutrition       Video-Visit Details    Type of service:  Video Visit    Video End Time:11:14 AM    Originating Location (pt. Location): Home    Distant Location (provider location):  [facility] SURGERY CLINIC AND BARIATRICS CARE [place]     Platform used for Video Visit: Carmenza Vanegas, DORA

## 2022-04-22 DIAGNOSIS — E66.01 MORBID OBESITY (H): Primary | ICD-10-CM

## 2022-04-22 DIAGNOSIS — Z86.711 HISTORY OF PULMONARY EMBOLISM: ICD-10-CM

## 2022-04-22 DIAGNOSIS — Z98.84 S/P BARIATRIC SURGERY: ICD-10-CM

## 2022-04-22 DIAGNOSIS — K91.2 POSTOPERATIVE INTESTINAL MALABSORPTION: ICD-10-CM

## 2022-04-22 DIAGNOSIS — K21.9 ACID REFLUX: ICD-10-CM

## 2022-04-22 RX ORDER — MULTIVIT-MIN/FOLIC/VIT K/LYCOP 400-300MCG
1 TABLET ORAL 2 TIMES DAILY
Qty: 180 TABLET | Refills: 3 | Status: SHIPPED | OUTPATIENT
Start: 2022-04-22

## 2022-04-22 NOTE — PROGRESS NOTES
Prescriptions for post op meds and supplements sent to pt's pharmacy per .    Kady Ceja RN, CBN  [facility]  P [phone]  F [phone]

## 2022-04-25 ENCOUNTER — HOSPITAL ENCOUNTER (OUTPATIENT)
Dept: MAMMOGRAPHY | Facility: CLINIC | Age: 57
Discharge: HOME | End: 2022-04-25
Attending: FAMILY MEDICINE | Admitting: FAMILY MEDICINE
Payer: COMMERCIAL

## 2022-04-25 DIAGNOSIS — Z12.31 VISIT FOR SCREENING MAMMOGRAM: ICD-10-CM

## 2022-04-25 PROCEDURE — 77067 SCR MAMMO BI INCL CAD: CPT

## 2022-04-25 RX ORDER — ENOXAPARIN SODIUM 100 MG/ML
40 INJECTION SUBCUTANEOUS EVERY 12 HOURS
Qty: 16.8 ML | Refills: 0 | Status: SHIPPED | OUTPATIENT
Start: 2022-05-17 | End: 2022-06-07

## 2022-04-26 NOTE — PROGRESS NOTES
Pt attended the pre-surgery class for bariatric surgery class and was educated on the pre and post surgery liquid diets. Patient received information via [employer] for the pre-op liquid diet and the post-op liquid diet. Discussed appropriate liquids and discussed portions. Reviewed appropriate calories, protein, and fluid goals for each stage before and after surgery. Educated on correct vitamins/minerals, dosage, and frequency to take after surgery. Provided grocery list and sample menu plan for each diet stage.      Pt will begin pre-op liquid diet 5/2/22 and will do clear liquids the day before surgery. Pt is scheduled for LRNY on 5/16/22 and will then follow 1 week post-op liquid diet. Pt will follow up with RD 1-week post-op for education on the pureed and soft/regular diet stages.    Sharda Mejia, DORA, LD

## 2022-04-27 ENCOUNTER — MEDICAL CORRESPONDENCE (OUTPATIENT)
Dept: SURGERY | Facility: CLINIC | Age: 57
End: 2022-04-27

## 2022-04-27 ENCOUNTER — ALLIED HEALTH/NURSE VISIT (OUTPATIENT)
Dept: SURGERY | Facility: CLINIC | Age: 57
End: 2022-04-27
Payer: COMMERCIAL

## 2022-04-27 DIAGNOSIS — E66.01 MORBID OBESITY (H): ICD-10-CM

## 2022-04-27 DIAGNOSIS — Z01.818 PRE-OP TESTING: ICD-10-CM

## 2022-04-27 DIAGNOSIS — Z71.89 ENCOUNTER FOR PRE-BARIATRIC SURGERY COUNSELING AND EDUCATION: Primary | ICD-10-CM

## 2022-04-27 PROBLEM — E03.9 HYPOTHYROIDISM: Status: ACTIVE | Noted: 2021-09-27

## 2022-04-27 PROBLEM — Z15.89 HISTORY OF MTHFR MUTATION: Status: ACTIVE | Noted: 2022-04-27

## 2022-04-27 PROCEDURE — 99207 PR PREOP VISIT IN GLOBAL PKG: CPT

## 2022-04-27 NOTE — PROGRESS NOTES
Patient attended virtual group class to review pre-op instructions for upcoming surgery.    Discussed admission process, COVID restrictions and hospital course.  Pharmacy information packet given and explained. Patient was given exercises to work on post-op for maintaining muscle mass and strengthening.  Bariatric quiz reviewed in class. Discharge instructions and follow up appointments given and reviewed with pt at this time and patient verbalized understanding. She will have her H&P at [facility] on 5/5/2022 with  and do her pre-op testing at that visit.  Prescriptions for Lovenox, Omeprazole and vitamins sent to the patient's pharmacy to be started after surgery.      Kady Ceja RN, CBN  [facility]  P [phone]  F [phone]

## 2022-04-27 NOTE — PATIENT INSTRUCTIONS
HOME MEDICATION RECOMMENDATIONS:  Below you will see your specific medication instructions.  Please share this information with your primary care so they can make adjustments to your medications if necessary.     Acetaminophen (Brand Name: Tylenol or MPAP)?????   You will likely be sent home on Tylenol to go with your other pain medications after surgery.  It is ok to cut/crush regular or extra strength tablets.  Make sure tablet is not long acting or extended release.  Do not take more than 3000mg in 24 hours.  If you decide to use liquid Tylenol at home, we recommend you use Adult strength because you will have to take less liquid to get the same effect, but it can be harder to find in the stores and might have an easier time ordering it online prior to your surgery.  Dilute the medication 1:1 with water before taking the liquid version to prevent dumping or stomach upset.      Aspirin    Stop 7 days before date of surgery.  Max of 81mg enteric coated aspirin per day after surgery is ok. Usually may restart 48 hours after surgery.  Swallow enteric coated aspirin whole.     Levothyroxine (Brand Name: Synthroid)   Small enough to swallow whole.  May cut/crush if needed.  *Take a.m. of surgery with a sip of water*      Multivitamin with Iron   Continue your MVI with at least 18mg of iron and 10-15 mg of zinc twice a day and resume the day after surgery for life. Do not take the morning of surgery.     Spironolactone (Brand Name: Aldactone)    Stop taking 2 weeks prior to surgery.  You will likely not go home on this medication after surgery for risk of dehydration.  Please be in touch with your primary doctor if you notice side effects of being off this medication.      Vitamin B12   Continue your vitamin B12 up until surgery. Do not take the morning of surgery.  After surgery it will need to be 1000mcg daily sublingual (under the tongue) daily, or injections monthly.     Vitamin C                      Stop taking 2  weeks before surgery.  Usually, may restart 48 hours after surgery if ok with bariatric dietitian.      Vitamin D3   Ok to cut or crush tablet; if a softgel will likely need to swallow whole if small enough.  If capsule is too large, may need to take tablet form until able to swallow larger pills again.  Do not take the morning of surgery and don t restart again until instructed by the dietitian.  This must be 5000 units (125 mcg) daily after surgery.        MORNING OF SURGERY:     Take necessary medications with a small sip of water as advised by this team (see home medication recommendations above) and your primary care provider.        AFTER SURGERY:     Here is a simple graph that might help makes things more clear for when to start certain medications after surgery.  (Zofran, Tylenol, Gabapentin and Hyoscyamine will be sent home with you from the hospital as needed)     Medication Dose When to Start   Vitamin B12  1000 mcg Once a day under the tongue (sublingual), weekly nasal spray, or monthly injections Day after surgery   Chewable Multivitamin with   18 mg Iron  (Must also contain Vitamin A and Zinc)  NO GUMMIES 1 tablet twice daily Day after surgery   Omeprazole  20 mg 1 capsule daily - open and sprinkle on food or swallow with fluid Day after surgery. Take even if no acid reflux symptoms.   Zofran 4 mg  (if ordered) 4mg tablet every 8 hours as needed for nausea As needed after surgery   Tylenol 1000 mg every 6 hours for three days after surgery.  After first three days after surgery, switch to as needed and do not take more than 3000mg daily Once you get home   (you will be sent home from the hospital with this)   Hyoscyamine  (if ordered) 0.125 mg tablet every 4 hours as needed for stomach spasm pain As needed after surgery   Liquid Gabapentin  Must be kept in fridge    250 mg liquid 3 times a day for at least 3 days after surgery Once you get home   (you will be sent home from the hospital with this)    Actigall (Ursodiol)- 300 mg for gallbladder if you still have Twice daily - swallow whole with warm water Two weeks after surgery   Chewable Calcium Citrate 2 tablets twice daily Three weeks after surgery   Vitamin D - (125 mcg)  5000 units 1 daily Three weeks after surgery         VITAMINS:     1st 3 Months after Surgery  Choose chewable, liquid or crushable forms   of Multivitamin and Calcium Citrate     1.)   Sublingual Vitamin B12: Take 4048-8015 mcg 1 time daily                    Also available in injectable form monthly.  Discuss with provider if interested.  2.)   Multivitamin with Iron: Take 1 multivitamin 2 times daily  Needs 18 mg of IRON per dose along with Vitamin A and Zinc in them  Generic Children's Chewable multivitamin with iron (Equate, Up & Up brand, etc.)  Centrum   Chewable  Centrum, Women's One-A-Day or Generic (after 3 months)  NO GUMMY Vitamins (these do not contain iron)  3.)   Calcium Citrate with Vitamin D: Take 400-600 mg 2 times daily (Start 3 weeks after surgery)  Bariatric Advantage: Citrate Lozenges (500mg)--2 Daily, or Chewy Bites (250 mg)--4 Daily  www.bariatricBI-[employer].Eggs Overnight  or 1-986.935.4454  Celebrate Calcium: Calcium Plus 500 (500mg)--2 Daily, or Calcet Creamy Bites (500 mg)--2 Daily, or Calcium Citrate Chews (250mg) - 4 Daily   www.PaeDaeebrateBiomondesProfitSee  or 1-634.235.1746  UNJURY Opurity: Calcium Citrate Plus (300mg)--3 Daily  www.Vitrinepix  or 1-214.746.5093  Up-Frank D: Nutrition Direct: Flavorless Calcium Powder (500 mg with 250 IU Vitamin D)  www.amazon.com  or 1-981.274.4854--3 Scoops Daily  Wellesse Liquid Calcium Citrate--1 Tbsp.  (500 mg)--2 Times Daily  Psykosoft, britebill's Youxigu  After 3 months post op:  Citracal Petites (or Generic version) (200mg) - 4 daily  Any grocery store or Pharmacy  4.)   Vitamin D3 : Take 5000 IU Daily (Start 3 weeks after surgery)  This can remain a small gel cap           General Medication Concerns Before  and After Bariatric Surgery      BEFORE Surgery  Certain medications may need to be stopped before major surgery. Some medications may increase your risk of bleeding, others may change the way your blood clots, and other medications can affect your lab work. The bariatric clinic will give you specific instructions about when to stop these medications.     This timeline shows when certain medications should be stopped before surgery: This is a general timeline, if your bariatric nurse or surgeon gives you other instructions, follow those specific instructions.     30 Days (One Month) Before Surgery  Birth control pills & patches that contain estrogen  You must use alternative forms of contraception  Hormone replacement therapy   Premarin  Testosterone  14 Days (Two Weeks) Before Surgery  Appetite control medications   Phentermine  Other: __________  Diuretics ( water pills )   Talk to your primary doctor.  You may need an alternative medication,  Hydrochlorothiazide (HCTZ)  Furosemide (Lasix )  Bumetanide (Bumex )  Spironolactone (Aldactone )  Chlorthalidone  Any blood pressure medication that is combined with a diuretic  Herbal supplements  Fish oil or Omega 3        Homeopathic remedies  7 Days (One Week) Before Surgery  Anti-platelet medications and medications that help to prevent blood clots:  Clopidogrel (Plavix ), Prasugrel (Effient ), Ticagrelor (Brilinta )  Aspirin/Dipyridamole (Aggrenox ), Dipyridamole (Persantine )  Cilostazol (Pletal )  All Non-Steroidal Anti-Inflammatory Drugs (NSAIDs):   Non-prescription: Ibuprofen (Advil , Motrin , Nuprin ), Naproxen (Naprosyn , Aleve , Anaprox ),   Prescription: Piroxicam (Feldene ), Sulindac (Clinoril ), Tolmentin (Tolectin ), Celecoxib (Celebrex ), Diclofenac (Voltaren ), Indomethacin (Indocin ), Nambumetone (Relafen ), Flurbiprofen (Ansaid ), Ketoprofen (Orudis ), Etodolac (Lodine ), Meloxicam (Mobic ), Oxaprozin (Daypro )  Aspirin (including low-dose (81mg) and chewable  baby  aspirin )  Warfarin (Coumadin , Jantoven )  When warfarin is restarted (usually 24-48 hrs after surgery), dosing and INR monitoring will be managed by the Bariatric Clinic for 4-6 weeks after your surgery date.   The Day Before Surgery  Diabetes medications: Follow the instructions on the  Diabetes Management for the Bariatric Surgery Patient  form.  The Day of Surgery  Diabetes medications: Follow the instructions on the  Diabetes Management for the Bariatric Surgery Patient  form.            AFTER Surgery     The 3 main ideas:  Cut or crush all meds for 6 weeks after surgery  Long acting medications are not the best option anymore  Avoid NSAID medications for the rest of your life     1.  Cutting or crushing meds:  Before surgery, tablet size does not matter.  After surgery there will be swelling around your new stomach pouch or sleeve.  Therefore, it is important to limit the size of medications for the first six weeks after surgery.       What this means for you:  For the first six weeks after surgery, you will need to cut or crush tablets that are larger than   inch (about the size of an eraser on a standard pencil)  Some capsules may be opened and the contents sprinkled onto soft food.  Once sprinkled on food, eat immediately being careful not to chew.    You will be given a pill cutter at the hospital  Refer to your medication list. This list will tell you which medications can be cut or crushed, and which capsules can be opened.      * Important Reminder: Discuss ALL of your medications with your primary care provider.    Your pre-op history and physical appointment is a good time to review your current medications.      2.  Long acting medications are not the best option anymore  Many types of bariatric surgery involve removing a portion of the small intestine. Long acting or extended release medications release slowly throughout the entire small intestine. Because your surgery has changed your stomach  and/or intestine, you may not get the full effect of the long acting medication.  Short acting medications may work better for you after surgery. Talk with your doctor at your pre-op history and physical appointment if you are taking long acting medications. Your doctor can change prescriptions for long acting medications to short acting.   3.  Avoid NSAIDs for the rest of your life  NSAIDs are Non-Steroidal Anti-Inflammatory Drugs  The NSAID class of medications is used to relieve minor aches, pains or to treat fever. They are commonly used to treat pain caused by a cold, flu, sore throat, headache, and arthritis.  Some NSAIDs are available over-the-counter while others need a prescription from your doctor.      NSAIDs should be avoided after bariatric surgery because they can cause stomach ulcers, scarring or bleeding.  You will not be able to take any NSAID mediation for the rest of your life after bariatric surgery.  Below is a list of common NSAID medications:     OVER-THE-COUNTER NSAIDs    Ibuprofen  - Brand names Advil   , Motrin  , Nuprin     Naproxen - Brand names Aleve  , Naprosyn  , Anaprox      PRESCRIPTION NSAIDs   Celebrex   (Celecoxib)                                       Orudis   (Ketoprofen)  Mobic   (Meloxicam)                                           Lodine   (Etodolac)  Voltaren  (Diclofenac)                                        Ansaid   (Flurbiprofen)  Relafen   Nabumetone                                       Clinoril   (Sulindac)  Feldene   (Piroxicam)                                         Tolectin   Tolmentin  Indocin   (Indomethacin)                                    Daypro  (Oxaprozin)         After surgery, the only safe non-prescription pain reliever is acetaminophen (Tylenol ).  Acetaminophen is available in 325mg and 500mg tablets.  If acetaminophen does not control your pain, call your primary care provider to discuss other options.     4. Other medications you may have to  avoid  Aspirin  Do not use aspirin for headaches, body aches, or muscle aches after bariatric surgery.  This is because aspirin can also cause stomach ulcers.  However, your primary care provider may tell you to take aspirin for certain conditions.  A maximum of 81mg of enteric coated aspirin (ex: Ecotrin ) once daily is usually okay to take if directed to take aspirin by your primary care doctor.   Be sure to take with food or milk.     Alendronate (Fosamax ), risedronate (Actonel ) (risedronate), ibandronate (Boniva ):  These are common osteoporosis medications that can cause erosions or ulcers in the esophagus and stomach.   Remind your primary care provider that you have had bariatric surgery and discuss other medication options.     Diuretics ( water pills )  These medications are used to treat high blood pressure.  They can also reduce swelling and water retention caused by various medical conditions.  Diuretics should not be used for patients who are having weight loss surgery.  Your diuretic will not be restarted immediately after surgery.  This is because it is often difficult to drink enough fluids after surgery to keep up with the fluid loss caused by the diuretic/ water pill .  Call your primary care provider to discuss alternative medications to treat high blood pressure.       5. Medications for chronic conditions  It is likely that the need for some of your medications will change after weight loss surgery.     High Blood Pressure Medications   As you lose weight, your blood pressure may change. Talk with your primary care provider about how to manage your high blood pressure after surgery. You may need the dose of your blood pressure medication(s) adjusted.  Keep track of your blood pressure, and call your primary doctor if you have low blood pressure symptoms, such as: dizziness, faintness, weakness, light-headedness (especially when going from sitting to standing)     Diabetes Medications  As you  lose weight, your blood sugars may change. Talk with your primary care provider about how to monitor and manage your diabetes after surgery. You may need the dose of your diabetes medication(s) adjusted.  Call your primary doctor if you have any of these symptoms.  Keep track of your blood sugars, and call your primary doctor if you have low blood sugar symptoms, such as: sweating, shaking, nervousness, headache, light-headedness, dizziness, weakness, or fainting  6. Pain Medications  Treating pain and discomfort is important to your recovery.  Your surgeon will order pain medication for you in the hospital.  You will also get a take home prescription for pain medication after surgery.       Ketorolac (Toradol )  This is an anti-inflammatory medication used to treat swelling and moderate pain  It is used only for 24 hours after surgery to decrease inflammation and pain  Given through your IV  Takes about 30 minutes to take effect  Common side effects: nausea, upset stomach  If your pain is not well controlled with this, you may be given a narcotic pain medication in addition to ketorolac     Hydromorphone (Dilaudid )  This is a narcotic medication used to treat moderate to severe pain.  Use after surgery as needed  Tablets are small so you should not need to cut or crush  It can take about 30-60 minutes for this medication to take effect  If your pain does not go away with the narcotic pain medication, notify your surgeon  Common side effects: sleepiness, dizziness, upset stomach, constipation   Constipation: you may use a Dulcolax   rectal suppository (available over-the-counter).  Narcotic medications can alter your judgment, coordination and reaction time.  Do NOT drive or do anything that requires clear thinking and coordination until you have been completely off narcotic medications for at least 24 hours.   Use only to treat moderate to severe pain.     Ultram (Tramadol)  This is a non-narcotic prescription  pain medication used for moderate to severe pain.  Immediate release tablets can be cut or crushed.  Tramadol can cause or worsen seizures. Your risk of seizures is higher if you're taking other certain medications. These drugs include other opioid pain drugs or certain medications for depression, other mood disorders, or psychosis.  Tramadol oral tablet may cause drowsiness. You should not drive, use heavy machinery, or perform any dangerous activities until you know how this drug affects you.     Acetaminophen (Tylenol )   Use for mild pain or discomfort  Available without a prescription  Maximum daily dosage: 3,000mg per 24 hours     * Important Reminder: Do NOT take NSAID or aspirin medications that are available without a prescription (examples: Ibuprofen, Motrin , Advil , Aleve , Naproxen)     7. Other information     Medication Absorption  Some medications may not be absorbed as well after bariatric surgery.  Watch for changes in symptoms.  It is important to discuss your medications with your doctor if you think your medications are not working as well as they were before.  You may need to have medication doses adjusted.     Ursodiol (Actigall )  With rapid weight loss, your risk of gallstones increases.  If your still have your gallbladder after surgery, you will be given a prescription for Actigall  that you should begin taking 2 weeks after surgery.  Actigall  will help prevent gallstones from forming.  If you are prescribed this medication, you will usually take it for six months. We encourage you to take this tablet or capsule whole with warm or hot liquid to help it dissolve before it reaches your stomach pouch.  This is medication is bigger than the   inch size restriction, but we will not have you cut/crush it due to it's unpleasant metallic taste. A pharmacist will speak with you more about this medication if it is ordered after surgery.    Common Side Effects: constipation, diarrhea, upset stomach,  nausea, dizziness     Non-prescription medications:  You may need medication for seasonal allergies, colds, headaches, or minor aches and pains.  It is important to read the package label carefully.  Below are some helpful hints for choosing the right medication:   Look at the active ingredient(s) list to be sure the medication does not contain caffeine, NSAIDs or aspirin (maximum aspirin dose: 81 mg daily).  Avoid long-acting medication options.  Immediate release medications may work better because they are absorbed better.  It is okay to use liquid medications with the following cautions:  Watch the sugar concentration.  High sugar content in medications could cause dumping syndrome.  Diluting the liquid medication with an equal amount of water will help prevent dumping syndrome.  Do not use products that contain high fructose corn syrup, corn syrup, sugar, or sorbitol.  Look for sugar-free products as an alternative.  Chewable tablets or tablets that dissolve on your tongue ( oral-dissolving tablet ) are generally safe to use.        Supplements  Refer to the Vitamins & Supplements Handout provided to you by the Bariatric Dietitian for recommended doses and products.

## 2022-04-29 VITALS — HEIGHT: 68 IN | BODY MASS INDEX: 44.41 KG/M2 | WEIGHT: 293 LBS

## 2022-05-03 ENCOUNTER — DOCUMENTATION ONLY (OUTPATIENT)
Dept: SURGERY | Facility: CLINIC | Age: 57
End: 2022-05-03
Payer: COMMERCIAL

## 2022-05-03 NOTE — PROGRESS NOTES
Federal Family and Medical Leave Act forms received, completed and signed on providers behalf.    Leave start date: 05/16/2022     Leave end date: 06/13/2022    RTW on 06/14/2022 with no restrictions.     Forms faxed to: [phone], attn: [facility] .    Forms labeled and sent to HIM for scanning.    Graciela Blancas  [facility]  Surgery Clinic [facility]  [facility] Clinic [address] [address]  Office: [phone]  Fax: [phone]

## 2022-05-05 ENCOUNTER — OFFICE VISIT (OUTPATIENT)
Dept: FAMILY MEDICINE | Facility: CLINIC | Age: 57
End: 2022-05-05
Payer: COMMERCIAL

## 2022-05-05 VITALS
HEART RATE: 84 BPM | TEMPERATURE: 97.7 F | BODY MASS INDEX: 47.59 KG/M2 | DIASTOLIC BLOOD PRESSURE: 82 MMHG | SYSTOLIC BLOOD PRESSURE: 139 MMHG | WEIGHT: 293 LBS | OXYGEN SATURATION: 97 %

## 2022-05-05 DIAGNOSIS — E66.01 MORBID OBESITY (H): ICD-10-CM

## 2022-05-05 DIAGNOSIS — R73.03 PREDIABETES: ICD-10-CM

## 2022-05-05 DIAGNOSIS — Z15.89 HISTORY OF MTHFR MUTATION: ICD-10-CM

## 2022-05-05 DIAGNOSIS — Z01.818 PRE-OP TESTING: Primary | ICD-10-CM

## 2022-05-05 DIAGNOSIS — E03.9 ACQUIRED HYPOTHYROIDISM: ICD-10-CM

## 2022-05-05 DIAGNOSIS — Z01.818 PREOP GENERAL PHYSICAL EXAM: Primary | ICD-10-CM

## 2022-05-05 DIAGNOSIS — E66.01 OBESITY, MORBID, BMI 50 OR HIGHER (H): ICD-10-CM

## 2022-05-05 PROBLEM — J18.9 PNEUMONIA: Status: ACTIVE | Noted: 2022-05-05

## 2022-05-05 LAB
ALBUMIN SERPL-MCNC: 3.6 G/DL (ref 3.4–5)
ALBUMIN UR-MCNC: NEGATIVE MG/DL
ALP SERPL-CCNC: 79 U/L (ref 40–150)
ALT SERPL W P-5'-P-CCNC: 26 U/L (ref 0–50)
ANION GAP SERPL CALCULATED.3IONS-SCNC: 5 MMOL/L (ref 3–14)
APPEARANCE UR: CLEAR
APTT PPP: 26 SECONDS (ref 22–38)
AST SERPL W P-5'-P-CCNC: 11 U/L (ref 0–45)
BASOPHILS # BLD AUTO: 0 10E3/UL (ref 0–0.2)
BASOPHILS NFR BLD AUTO: 0 %
BILIRUB SERPL-MCNC: 0.4 MG/DL (ref 0.2–1.3)
BILIRUB UR QL STRIP: NEGATIVE
BUN SERPL-MCNC: 16 MG/DL (ref 7–30)
CALCIUM SERPL-MCNC: 9.3 MG/DL (ref 8.5–10.1)
CHLORIDE BLD-SCNC: 107 MMOL/L (ref 94–109)
CO2 SERPL-SCNC: 27 MMOL/L (ref 20–32)
COLOR UR AUTO: YELLOW
CREAT SERPL-MCNC: 0.82 MG/DL (ref 0.52–1.04)
EOSINOPHIL # BLD AUTO: 0.4 10E3/UL (ref 0–0.7)
EOSINOPHIL NFR BLD AUTO: 5 %
ERYTHROCYTE [DISTWIDTH] IN BLOOD BY AUTOMATED COUNT: 11.8 % (ref 10–15)
GFR SERPL CREATININE-BSD FRML MDRD: 85 ML/MIN/1.73M2
GLUCOSE BLD-MCNC: 95 MG/DL (ref 70–99)
GLUCOSE UR STRIP-MCNC: NEGATIVE MG/DL
HBA1C MFR BLD: 5.6 % (ref 0–5.6)
HCT VFR BLD AUTO: 41.8 % (ref 35–47)
HGB BLD-MCNC: 13.9 G/DL (ref 11.7–15.7)
HGB UR QL STRIP: ABNORMAL
IMM GRANULOCYTES # BLD: 0 10E3/UL
IMM GRANULOCYTES NFR BLD: 0 %
INR PPP: 0.9 (ref 0.85–1.15)
KETONES UR STRIP-MCNC: NEGATIVE MG/DL
LEUKOCYTE ESTERASE UR QL STRIP: NEGATIVE
LYMPHOCYTES # BLD AUTO: 2.1 10E3/UL (ref 0.8–5.3)
LYMPHOCYTES NFR BLD AUTO: 30 %
MCH RBC QN AUTO: 32.6 PG (ref 26.5–33)
MCHC RBC AUTO-ENTMCNC: 33.3 G/DL (ref 31.5–36.5)
MCV RBC AUTO: 98 FL (ref 78–100)
MONOCYTES # BLD AUTO: 0.4 10E3/UL (ref 0–1.3)
MONOCYTES NFR BLD AUTO: 6 %
NEUTROPHILS # BLD AUTO: 4.2 10E3/UL (ref 1.6–8.3)
NEUTROPHILS NFR BLD AUTO: 59 %
NITRATE UR QL: NEGATIVE
PH UR STRIP: 5.5 [PH] (ref 5–7)
PLATELET # BLD AUTO: 182 10E3/UL (ref 150–450)
POTASSIUM BLD-SCNC: 4.4 MMOL/L (ref 3.4–5.3)
PROT SERPL-MCNC: 7.3 G/DL (ref 6.8–8.8)
RBC # BLD AUTO: 4.26 10E6/UL (ref 3.8–5.2)
RBC #/AREA URNS AUTO: NORMAL /HPF
SODIUM SERPL-SCNC: 139 MMOL/L (ref 133–144)
SP GR UR STRIP: 1.01 (ref 1–1.03)
TSH SERPL DL<=0.005 MIU/L-ACNC: 2 MU/L (ref 0.4–4)
UROBILINOGEN UR STRIP-ACNC: 0.2 E.U./DL
WBC # BLD AUTO: 7.2 10E3/UL (ref 4–11)
WBC #/AREA URNS AUTO: NORMAL /HPF

## 2022-05-05 PROCEDURE — 85610 PROTHROMBIN TIME: CPT | Performed by: FAMILY MEDICINE

## 2022-05-05 PROCEDURE — 83036 HEMOGLOBIN GLYCOSYLATED A1C: CPT | Performed by: FAMILY MEDICINE

## 2022-05-05 PROCEDURE — 36415 COLL VENOUS BLD VENIPUNCTURE: CPT | Performed by: FAMILY MEDICINE

## 2022-05-05 PROCEDURE — 99214 OFFICE O/P EST MOD 30 MIN: CPT | Performed by: FAMILY MEDICINE

## 2022-05-05 PROCEDURE — 85730 THROMBOPLASTIN TIME PARTIAL: CPT | Performed by: FAMILY MEDICINE

## 2022-05-05 PROCEDURE — 81001 URINALYSIS AUTO W/SCOPE: CPT | Performed by: FAMILY MEDICINE

## 2022-05-05 PROCEDURE — 93000 ELECTROCARDIOGRAM COMPLETE: CPT

## 2022-05-05 PROCEDURE — 80050 GENERAL HEALTH PANEL: CPT | Performed by: FAMILY MEDICINE

## 2022-05-05 NOTE — PROGRESS NOTES
[facility]  [address] [place] [id number]  Phone: [phone]  Fax: [phone]  Primary Provider: Roberto Carlos Pierson  Pre-op Performing Provider: ROBERTO CARLOS PIERSON      PREOPERATIVE EVALUATION:  Today's date: 5/5/2022    Lucina Santamaria is a 56 year old female who presents for a preoperative evaluation.    Surgical Information:  Surgery/Procedure: LAPAROSCOPIC HAFSA-EN-Y, GASTRIC BYPASS  Surgery Location: [facility]  Surgeon: Dr. Schneider  Surgery Date: 5/16/2022  Time of Surgery:   Where patient plans to recover: At home with family  Fax number for surgical facility: Note does not need to be faxed, will be available electronically in Epic.    Type of Anesthesia Anticipated: General    Assessment & Plan     The proposed surgical procedure is considered INTERMEDIATE risk.    Pre-op testing  Medically optimized for surgery  - CBC with Platelets & Differential  - Comprehensive metabolic panel  - INR  - Partial thromboplastin time  - UA with Microscopic reflex to Culture  - Urine Microscopic    Morbid obesity (H)  Bariatric surgery planned  - CBC with Platelets & Differential  - Comprehensive metabolic panel  - INR  - Partial thromboplastin time  - UA with Microscopic reflex to Culture  - Urine Microscopic    Acquired hypothyroidism  On replacement  - TSH; Future  - TSH    Prediabetes  Uncertain control  - Hemoglobin A1c; Future  - Hemoglobin A1c    Obesity, morbid, BMI 50 or higher (H)  Bariatric surgery planned    History of MTHFR mutation  Plan is to use enoxaparin bid for 21 days after surgery           Risks and Recommendations:  The patient has the following additional risks and recommendations for perioperative complications:   - Morbid obesity (BMI >40)    Medication Instructions:  Patient is to take all scheduled medications on the day of surgery EXCEPT for modifications listed below:  hold spironolactone    RECOMMENDATION:  APPROVAL GIVEN to proceed with  proposed procedure, without further diagnostic evaluation.                      Subjective     HPI related to upcoming procedure: long history of obesity since high school. Has tried multiple weight loss programs.     Preop Questions 5/3/2022   1. Have you ever had a heart attack or stroke? No   2. Have you ever had surgery on your heart or blood vessels, such as a stent placement, a coronary artery bypass, or surgery on an artery in your head, neck, heart, or legs? No   3. Do you have chest pain with activity? No   4. Do you have a history of  heart failure? No   5. Do you currently have a cold, bronchitis or symptoms of other infection? No   6. Do you have a cough, shortness of breath, or wheezing? YES - long term COVID   7. Do you or anyone in your family have previous history of blood clots? YES - history of PE, unprovoked, MTHFR heterozygous   8. Do you or does anyone in your family have a serious bleeding problem such as prolonged bleeding following surgeries or cuts? No   9. Have you ever had problems with anemia or been told to take iron pills? No   10. Have you had any abnormal blood loss such as black, tarry or bloody stools, or abnormal vaginal bleeding? No   11. Have you ever had a blood transfusion? No   12. Are you willing to have a blood transfusion if it is medically needed before, during, or after your surgery? Yes   13. Have you or any of your relatives ever had problems with anesthesia? No   14. Do you have sleep apnea, excessive snoring or daytime drowsiness? No   15. Do you have any artifical heart valves or other implanted medical devices like a pacemaker, defibrillator, or continuous glucose monitor? No   16. Do you have artificial joints? No   17. Are you allergic to latex? YES:    18. Is there any chance that you may be pregnant? No       Health Care Directive:  Patient does not have a Health Care Directive or Living Will: Patient states has Advance Directive and will bring in a copy to  clinic.    Preoperative Review of :   reviewed - no record of controlled substances prescribed.      History of PE, was found to have MTHR mutation. Has been on a baby aspirin. Has already stopped it.     Hypothyroidism  On levothyroxine   TSH   Date Value Ref Range Status   05/05/2022 2.00 0.40 - 4.00 mU/L Final   06/09/2016 3.10 0.40 - 4.00 mU/L Final        Prediabetes  Strong FH  Lab Results   Component Value Date    A1C 5.6 05/05/2022    A1C 5.9 09/23/2021          Review of Systems  CONSTITUTIONAL: NEGATIVE for fever, chills, change in weight  INTEGUMENTARY/SKIN: NEGATIVE for worrisome rashes, moles or lesions  ENT/MOUTH: NEGATIVE for ear, mouth and throat problems  RESP: NEGATIVE for significant cough or SOB  CV: NEGATIVE for chest pain, palpitations or peripheral edema  GI: NEGATIVE for nausea, abdominal pain, heartburn, or change in bowel habits  : NEGATIVE for frequency, dysuria, or hematuria  MUSCULOSKELETAL: NEGATIVE for significant arthralgias or myalgia  NEURO: NEGATIVE for weakness, dizziness or paresthesias  ENDOCRINE: NEGATIVE for temperature intolerance, skin/hair changes  PSYCHIATRIC: NEGATIVE for changes in mood or affect    Patient Active Problem List    Diagnosis Date Noted     History of MTHFR mutation 04/27/2022     Priority: Medium     Hypothyroidism 09/27/2021     Priority: Medium     Elevated blood pressure reading without diagnosis of hypertension 09/23/2021     Priority: Medium     Edema, unspecified type 09/23/2021     Priority: Medium     Obesity, morbid, BMI 50 or higher (H) 09/24/2018     Priority: Medium     CARDIOVASCULAR SCREENING; LDL GOAL LESS THAN 130 05/31/2012     Priority: Medium      Past Medical History:   Diagnosis Date     Arthritis     sacro iliac and hip, right ankle.     Chronic constipation     since menopause. fiber/hydration helps.     Complication of anesthesia     nauseated w/ morphine previously after arm surgery.     COVID-19     2020, November.      Gallbladder problem     s/p cholecystectomy.     Gestational diabetes      Head injury     childhood concussions     Pneumonia     covid     Pulmonary emboli (H)      Pulmonary embolism (H)     wisdome teeth out while on OCPs and MTHFR gene mutation history.     Pulmonary embolism (H)     age 23, OCPs/dental extraction provoked. reports some MTHFR gene issues, unsure if homo or heterozygous.     Past Surgical History:   Procedure Laterality Date     APPENDECTOMY       BREAST BIOPSY, RT/LT      right breast - benign      SECTION       COLONOSCOPY N/A 11/10/2021    Procedure: COLONOSCOPY, WITH POLYPECTOMY;  Surgeon: Lynn Doshi MD;  Location:  GI     GYN SURGERY      tubal ligation     LAPAROSCOPIC CHOLECYSTECTOMY N/A 2019    Procedure: CHOLECYSTECTOMY, LAPAROSCOPIC;  Surgeon: Lynn Doshi MD;  Location:  OR     ORTHOPEDIC SURGERY      radius, ulnar - open fracture     TUBAL LIGATION       wisdom teeth       Current Outpatient Medications   Medication Sig Dispense Refill     Acetaminophen 325 MG CAPS Take 325-650 mg by mouth every 4 hours as needed       Ascorbic Acid (VITAMIN C) 500 MG CAPS Take 1,000 mg by mouth 2 times daily       Cholecalciferol (VITAMIN D) 2000 UNIT tablet Take 1 tablet by mouth daily.       cyanocobalamin (VITAMIN B-12) 1000 MCG SUBL sublingual tablet Place 1 tablet (1,000 mcg) under the tongue daily Start right after surgery. 90 tablet 3     [START ON 2022] enoxaparin ANTICOAGULANT (LOVENOX) 40 MG/0.4ML syringe Inject 0.4 mLs (40 mg) Subcutaneous every 12 hours for 21 days 16.8 mL 0     levothyroxine (SYNTHROID/LEVOTHROID) 75 MCG tablet Take 1 tablet (75 mcg) by mouth daily 90 tablet 1     [START ON 2022] omeprazole (PRILOSEC) 20 MG DR capsule Take 1 capsule (20 mg) by mouth daily Start day after surgery, open contents and sprinkle on food for first 6 weeks. Take daily for 3 months after surgery. 90 capsule 0     Pediatric  Multivitamins-Iron (MULTIVITAMINS PLUS IRON CHILD) 18 MG CHEW Take 1 chew tab by mouth 2 times daily Ok to substitute with any chewable that contains 18 mg of iron, Vitamin A, Thiamine and Zinc. 180 tablet 3     spironolactone (ALDACTONE) 25 MG tablet Take 1 tablet (25 mg) by mouth daily 90 tablet 3       Allergies   Allergen Reactions     Latex      Added based on information entered during case entry, please review and add reactions, type, and severity as needed     No Known Allergies         Social History     Tobacco Use     Smoking status: Never Smoker     Smokeless tobacco: Never Used   Substance Use Topics     Alcohol use: Yes     Comment: rare, once monthly.     Family History   Problem Relation Age of Onset     Cancer Father      Depression Brother         bipolar     Diabetes Mother      Arthritis Maternal Grandmother         rheumatoid arthritis     Diabetes Paternal Grandmother      Cancer Other         breast     History   Drug Use No         Objective     /82 (BP Location: Right arm)   Pulse 84   Temp 97.7  F (36.5  C) (Tympanic)   Wt 142 kg (313 lb)   SpO2 97%   BMI 47.59 kg/m      Physical Exam    GENERAL APPEARANCE: healthy, alert and no distress     EYES: EOMI, PERRL     HENT: ear canals and TM's normal     NECK: no adenopathy, no asymmetry, masses, or scars and thyroid normal to palpation     RESP: lungs clear to auscultation - no rales, rhonchi or wheezes     CV: regular rates and rhythm, normal S1 S2, no S3 or S4 and no murmur, click or rub     ABDOMEN:  Soft, obese, nontender, no HSM or masses and bowel sounds normal     MS: extremities normal- no gross deformities noted, no evidence of inflammation in joints, FROM in all extremities.     SKIN: no suspicious lesions or rashes     NEURO: Normal strength and tone, sensory exam grossly normal, mentation intact and speech normal     PSYCH: mentation appears normal. and affect normal/bright     LYMPHATICS: No cervical adenopathy    Recent  Labs   Lab Test 09/23/21  0828   HGB 13.8         POTASSIUM 4.3   CR 0.85   A1C 5.9*        Diagnostics:    Results for orders placed or performed in visit on 05/05/22   Comprehensive metabolic panel     Status: Normal   Result Value Ref Range    Sodium 139 133 - 144 mmol/L    Potassium 4.4 3.4 - 5.3 mmol/L    Chloride 107 94 - 109 mmol/L    Carbon Dioxide (CO2) 27 20 - 32 mmol/L    Anion Gap 5 3 - 14 mmol/L    Urea Nitrogen 16 7 - 30 mg/dL    Creatinine 0.82 0.52 - 1.04 mg/dL    Calcium 9.3 8.5 - 10.1 mg/dL    Glucose 95 70 - 99 mg/dL    Alkaline Phosphatase 79 40 - 150 U/L    AST 11 0 - 45 U/L    ALT 26 0 - 50 U/L    Protein Total 7.3 6.8 - 8.8 g/dL    Albumin 3.6 3.4 - 5.0 g/dL    Bilirubin Total 0.4 0.2 - 1.3 mg/dL    GFR Estimate 85 >60 mL/min/1.73m2   INR     Status: Normal   Result Value Ref Range    INR 0.90 0.85 - 1.15   Partial thromboplastin time     Status: Normal   Result Value Ref Range    aPTT 26 22 - 38 Seconds   UA with Microscopic reflex to Culture     Status: Abnormal    Specimen: Urine, Clean Catch   Result Value Ref Range    Color Urine Yellow Colorless, Straw, Light Yellow, Yellow    Appearance Urine Clear Clear    Glucose Urine Negative Negative mg/dL    Bilirubin Urine Negative Negative    Ketones Urine Negative Negative mg/dL    Specific Gravity Urine 1.015 1.003 - 1.035    Blood Urine Trace (A) Negative    pH Urine 5.5 5.0 - 7.0    Protein Albumin Urine Negative Negative mg/dL    Urobilinogen Urine 0.2 0.2, 1.0 E.U./dL    Nitrite Urine Negative Negative    Leukocyte Esterase Urine Negative Negative   CBC with platelets and differential     Status: None   Result Value Ref Range    WBC Count 7.2 4.0 - 11.0 10e3/uL    RBC Count 4.26 3.80 - 5.20 10e6/uL    Hemoglobin 13.9 11.7 - 15.7 g/dL    Hematocrit 41.8 35.0 - 47.0 %    MCV 98 78 - 100 fL    MCH 32.6 26.5 - 33.0 pg    MCHC 33.3 31.5 - 36.5 g/dL    RDW 11.8 10.0 - 15.0 %    Platelet Count 182 150 - 450 10e3/uL    % Neutrophils  59 %    % Lymphocytes 30 %    % Monocytes 6 %    % Eosinophils 5 %    % Basophils 0 %    % Immature Granulocytes 0 %    Absolute Neutrophils 4.2 1.6 - 8.3 10e3/uL    Absolute Lymphocytes 2.1 0.8 - 5.3 10e3/uL    Absolute Monocytes 0.4 0.0 - 1.3 10e3/uL    Absolute Eosinophils 0.4 0.0 - 0.7 10e3/uL    Absolute Basophils 0.0 0.0 - 0.2 10e3/uL    Absolute Immature Granulocytes 0.0 <=0.4 10e3/uL   Urine Microscopic     Status: Normal   Result Value Ref Range    RBC Urine 0-2 0-2 /HPF /HPF    WBC Urine None Seen 0-5 /HPF /HPF    Narrative    Urine Culture not indicated   Hemoglobin A1c     Status: Normal   Result Value Ref Range    Hemoglobin A1C 5.6 0.0 - 5.6 %   TSH     Status: Normal   Result Value Ref Range    TSH 2.00 0.40 - 4.00 mU/L   CBC with Platelets & Differential     Status: None    Narrative    The following orders were created for panel order CBC with Platelets & Differential.  Procedure                               Abnormality         Status                     ---------                               -----------         ------                     CBC with platelets and d...[[id number]]                      Final result                 Please view results for these tests on the individual orders.   Results for orders placed or performed in visit on 05/05/22   XR Chest 2 Views     Status: None    Narrative    XR CHEST 2 VW  5/5/2022 12:12 PM       INDICATION: Preop testing, morbid obesity  COMPARISON: 9/23/2021       Impression    IMPRESSION: Negative chest. No significant change from previous.    GENE CONLEY MD         SYSTEM ID:  [id number]        EKG: sinus bradycardia, normal axis, normal intervals, no acute ST/T changes c/w ischemia, no LVH by voltage criteria, unchanged from previous tracings    Revised Cardiac Risk Index (RCRI):  The patient has the following serious cardiovascular risks for perioperative complications:   - No serious cardiac risks = 0 points     RCRI Interpretation: 0 points:  Class I (very low risk - 0.4% complication rate)           Signed Electronically by: Nani Eli MD  Copy of this evaluation report is provided to requesting physician.

## 2022-05-12 ENCOUNTER — DOCUMENTATION ONLY (OUTPATIENT)
Dept: SURGERY | Facility: CLINIC | Age: 57
End: 2022-05-12

## 2022-05-12 NOTE — PROGRESS NOTES
I have rescheduled Lucina from Monday Jay 16, to Monday July 25, 2022 @ 7:30 am.  She will have her pre op and labs redone before surgery and will schedule her COVID test to be on July 21.

## 2022-05-13 NOTE — TELEPHONE ENCOUNTER
Tasklist and schedule updated.    Kady Ceja RN, CBN  [facility]  P [phone]  F [phone]

## 2022-07-08 DIAGNOSIS — Z01.818 PRE-OP TESTING: Primary | ICD-10-CM

## 2022-07-08 DIAGNOSIS — E66.01 MORBID OBESITY (H): ICD-10-CM

## 2022-07-08 NOTE — PROGRESS NOTES
Pre-op labs ordered for upcoming H&P appt on 7/14/2022 with  at [facility].     Kady Ceja RN, CBN  [facility]  P [phone]  F [phone]

## 2022-07-14 ENCOUNTER — OFFICE VISIT (OUTPATIENT)
Dept: FAMILY MEDICINE | Facility: CLINIC | Age: 57
End: 2022-07-14
Payer: COMMERCIAL

## 2022-07-14 VITALS
WEIGHT: 293 LBS | TEMPERATURE: 98 F | DIASTOLIC BLOOD PRESSURE: 84 MMHG | RESPIRATION RATE: 20 BRPM | BODY MASS INDEX: 47.59 KG/M2 | HEART RATE: 68 BPM | SYSTOLIC BLOOD PRESSURE: 124 MMHG | OXYGEN SATURATION: 98 %

## 2022-07-14 DIAGNOSIS — R60.9 EDEMA, UNSPECIFIED TYPE: ICD-10-CM

## 2022-07-14 DIAGNOSIS — E03.9 ACQUIRED HYPOTHYROIDISM: ICD-10-CM

## 2022-07-14 DIAGNOSIS — Z15.89 HISTORY OF MTHFR MUTATION: ICD-10-CM

## 2022-07-14 DIAGNOSIS — Z01.818 PRE-OP TESTING: ICD-10-CM

## 2022-07-14 DIAGNOSIS — E66.01 MORBID OBESITY (H): ICD-10-CM

## 2022-07-14 DIAGNOSIS — Z01.818 PREOP GENERAL PHYSICAL EXAM: Primary | ICD-10-CM

## 2022-07-14 DIAGNOSIS — R03.0 ELEVATED BLOOD PRESSURE READING WITHOUT DIAGNOSIS OF HYPERTENSION: ICD-10-CM

## 2022-07-14 DIAGNOSIS — R73.03 PREDIABETES: ICD-10-CM

## 2022-07-14 LAB
ALBUMIN SERPL-MCNC: 3.5 G/DL (ref 3.4–5)
ALP SERPL-CCNC: 81 U/L (ref 40–150)
ALT SERPL W P-5'-P-CCNC: 31 U/L (ref 0–50)
ANION GAP SERPL CALCULATED.3IONS-SCNC: 5 MMOL/L (ref 3–14)
APTT PPP: 26 SECONDS (ref 22–38)
AST SERPL W P-5'-P-CCNC: 12 U/L (ref 0–45)
BASOPHILS # BLD AUTO: 0 10E3/UL (ref 0–0.2)
BASOPHILS NFR BLD AUTO: 0 %
BILIRUB SERPL-MCNC: 0.2 MG/DL (ref 0.2–1.3)
BUN SERPL-MCNC: 23 MG/DL (ref 7–30)
CALCIUM SERPL-MCNC: 9.5 MG/DL (ref 8.5–10.1)
CHLORIDE BLD-SCNC: 105 MMOL/L (ref 94–109)
CO2 SERPL-SCNC: 31 MMOL/L (ref 20–32)
CREAT SERPL-MCNC: 0.68 MG/DL (ref 0.52–1.04)
EOSINOPHIL # BLD AUTO: 0.4 10E3/UL (ref 0–0.7)
EOSINOPHIL NFR BLD AUTO: 6 %
ERYTHROCYTE [DISTWIDTH] IN BLOOD BY AUTOMATED COUNT: 12 % (ref 10–15)
GFR SERPL CREATININE-BSD FRML MDRD: >90 ML/MIN/1.73M2
GLUCOSE BLD-MCNC: 115 MG/DL (ref 70–99)
HCT VFR BLD AUTO: 40.1 % (ref 35–47)
HGB BLD-MCNC: 13.4 G/DL (ref 11.7–15.7)
IMM GRANULOCYTES # BLD: 0 10E3/UL
IMM GRANULOCYTES NFR BLD: 0 %
INR PPP: 0.94 (ref 0.85–1.15)
LYMPHOCYTES # BLD AUTO: 2 10E3/UL (ref 0.8–5.3)
LYMPHOCYTES NFR BLD AUTO: 26 %
MCH RBC QN AUTO: 33.3 PG (ref 26.5–33)
MCHC RBC AUTO-ENTMCNC: 33.4 G/DL (ref 31.5–36.5)
MCV RBC AUTO: 100 FL (ref 78–100)
MONOCYTES # BLD AUTO: 0.4 10E3/UL (ref 0–1.3)
MONOCYTES NFR BLD AUTO: 6 %
NEUTROPHILS # BLD AUTO: 4.9 10E3/UL (ref 1.6–8.3)
NEUTROPHILS NFR BLD AUTO: 63 %
PLATELET # BLD AUTO: 162 10E3/UL (ref 150–450)
POTASSIUM BLD-SCNC: 3.9 MMOL/L (ref 3.4–5.3)
PROT SERPL-MCNC: 7.1 G/DL (ref 6.8–8.8)
RBC # BLD AUTO: 4.03 10E6/UL (ref 3.8–5.2)
SODIUM SERPL-SCNC: 141 MMOL/L (ref 133–144)
TSH SERPL DL<=0.005 MIU/L-ACNC: 1.8 MU/L (ref 0.4–4)
WBC # BLD AUTO: 7.9 10E3/UL (ref 4–11)

## 2022-07-14 PROCEDURE — 36415 COLL VENOUS BLD VENIPUNCTURE: CPT | Performed by: FAMILY MEDICINE

## 2022-07-14 PROCEDURE — 85730 THROMBOPLASTIN TIME PARTIAL: CPT | Performed by: FAMILY MEDICINE

## 2022-07-14 PROCEDURE — 80050 GENERAL HEALTH PANEL: CPT | Performed by: FAMILY MEDICINE

## 2022-07-14 PROCEDURE — 85610 PROTHROMBIN TIME: CPT | Performed by: FAMILY MEDICINE

## 2022-07-14 PROCEDURE — 99214 OFFICE O/P EST MOD 30 MIN: CPT | Performed by: FAMILY MEDICINE

## 2022-07-14 RX ORDER — LEVOTHYROXINE SODIUM 75 UG/1
75 TABLET ORAL DAILY
Qty: 90 TABLET | Refills: 3 | Status: SHIPPED | OUTPATIENT
Start: 2022-07-14 | End: 2023-08-03

## 2022-07-14 RX ORDER — SPIRONOLACTONE 25 MG/1
25 TABLET ORAL DAILY
Qty: 90 TABLET | Refills: 3 | Status: ON HOLD | OUTPATIENT
Start: 2022-07-14 | End: 2022-07-26

## 2022-07-14 ASSESSMENT — PAIN SCALES - GENERAL: PAINLEVEL: NO PAIN (0)

## 2022-07-14 NOTE — PROGRESS NOTES
Gloria Verdguo is a 56 year old presenting for the following health issues:  Pre-Op Exam      History of Present Illness       Reason for visit:  Pre Op    She eats 4 or more servings of fruits and vegetables daily.She consumes 0 sweetened beverage(s) daily.She exercises with enough effort to increase her heart rate 30 to 60 minutes per day.  She exercises with enough effort to increase her heart rate 5 days per week.   She is taking medications regularly.         [place] [id number] [place] [id number]  Phone: [phone]  Fax: [phone]  Primary Provider: Roberto Carlos Khanna  Pre-op Performing Provider: ROBERTO CARLOS KHANNA    \  PREOPERATIVE EVALUATION:  Today's date: 7/14/2022     Lucina Santamaria is a 56 year old female who presents for a preoperative evaluation.    Surgical Information:  Surgery/Procedure: LAPAROSCOPIC HAFSA-EN-Y, GASTRIC BYPASS  Surgery Location: [facility]  Surgeon: Dr. Schneider  Surgery Date: 7/25/2022  Time of Surgery:   Where patient plans to recover: At home with family  Fax number for surgical facility: Note does not need to be faxed, will be available electronically in Epic.       Type of Anesthesia Anticipated: General    Assessment & Plan     The proposed surgical procedure is considered INTERMEDIATE risk.    Preop/pre-op testing  Medically optimized for surgery    Morbid obesity (H)  Bariatric surgery planned    Edema, unspecified  On spironolactone    Elevated blood pressure without diagnosis of hypertension   On spironolactone for edema  monitor    Acquired hypothyroidism  On replacement  - TSH; Future  - TSH    Prediabetes  Surgery will help    History of MTHFR mutation  Plan is to use enoxaparin bid for 21 days after surgery        Risks and Recommendations:  The patient has the following additional risks and recommendations for perioperative complications:   - Morbid obesity (BMI >40)    Medication  Instructions:  Patient is to take all scheduled medications on the day of surgery EXCEPT for modifications listed below:  hold spironolactone    RECOMMENDATION:  APPROVAL GIVEN to proceed with proposed procedure, without further diagnostic evaluation.          Subjective         HPI related to upcoming procedure: long history of obesity since high school. Has tried multiple weight loss programs.      preop questions used from 5/3/22  Preop Questions 5/3/2022   1. Have you ever had a heart attack or stroke? No   2. Have you ever had surgery on your heart or blood vessels, such as a stent placement, a coronary artery bypass, or surgery on an artery in your head, neck, heart, or legs? No   3. Do you have chest pain with activity? No   4. Do you have a history of  heart failure? No   5. Do you currently have a cold, bronchitis or symptoms of other infection? No   6. Do you have a cough, shortness of breath, or wheezing? YES - long term COVID   7. Do you or anyone in your family have previous history of blood clots? YES - history of PE, unprovoked, MTHFR heterozygous   8. Do you or does anyone in your family have a serious bleeding problem such as prolonged bleeding following surgeries or cuts? No   9. Have you ever had problems with anemia or been told to take iron pills? No   10. Have you had any abnormal blood loss such as black, tarry or bloody stools, or abnormal vaginal bleeding? No   11. Have you ever had a blood transfusion? No   12. Are you willing to have a blood transfusion if it is medically needed before, during, or after your surgery? Yes   13. Have you or any of your relatives ever had problems with anesthesia? No   14. Do you have sleep apnea, excessive snoring or daytime drowsiness? No   15. Do you have any artifical heart valves or other implanted medical devices like a pacemaker, defibrillator, or continuous glucose monitor? No   16. Do you have artificial joints? No   17. Are you allergic to latex?  YES:    18. Is there any chance that you may be pregnant? No         Health Care Directive:  Patient does not have a Health Care Directive or Living Will: Patient states has Advance Directive and will bring in a copy to clinic.     Preoperative Review of :   reviewed - no record of controlled substances prescribed.        History of PE, was found to have MTHR mutation. Has been on a baby aspirin. Has already stopped it.      Hypothyroidism  On levothyroxine         TSH   Date Value Ref Range Status   05/05/2022 2.00 0.40 - 4.00 mU/L Final   06/09/2016 3.10 0.40 - 4.00 mU/L Final         Prediabetes  Strong FH        Lab Results   Component Value Date     A1C 5.6 05/05/2022     A1C 5.9 09/23/2021        Review of Systems  CONSTITUTIONAL: NEGATIVE for fever, chills, change in weight  INTEGUMENTARY/SKIN: NEGATIVE for worrisome rashes, moles or lesions  ENT/MOUTH: NEGATIVE for ear, mouth and throat problems  RESP: NEGATIVE for significant cough or SOB  CV: NEGATIVE for chest pain, palpitations or peripheral edema  GI: NEGATIVE for nausea, abdominal pain, heartburn, or change in bowel habits  : NEGATIVE for frequency, dysuria, or hematuria  MUSCULOSKELETAL: NEGATIVE for significant arthralgias or myalgia  NEURO: NEGATIVE for weakness, dizziness or paresthesias  ENDOCRINE: NEGATIVE for temperature intolerance, skin/hair changes  PSYCHIATRIC: NEGATIVE for changes in mood or affect      Patient Active Problem List    Diagnosis Date Noted     Pneumonia 05/05/2022     Priority: Medium     covid       History of MTHFR mutation 04/27/2022     Priority: Medium     Hypothyroidism 09/27/2021     Priority: Medium     Elevated blood pressure reading without diagnosis of hypertension 09/23/2021     Priority: Medium     Edema, unspecified type 09/23/2021     Priority: Medium     Obesity, morbid, BMI 50 or higher (H) 09/24/2018     Priority: Medium     CARDIOVASCULAR SCREENING; LDL GOAL LESS THAN 130 05/31/2012     Priority:  Medium      Past Medical History:   Diagnosis Date     Acquired hypothyroidism      Arthritis     sacro iliac and hip, right ankle.     Chronic constipation     since menopause. fiber/hydration helps.     Complication of anesthesia     nauseated w/ morphine previously after arm surgery.     COVID-19     .     Gallbladder problem     s/p cholecystectomy.     Gestational diabetes      Head injury     childhood concussions     History of MTHFR mutation      Long COVID     Long term Covid     Morbid obesity (H)      Pneumonia     covid     Prediabetes      Pulmonary emboli (H)      Pulmonary embolism (H)     wisdome teeth out while on OCPs and MTHFR gene mutation history.     Pulmonary embolism (H)     age 23, OCPs/dental extraction provoked. reports some MTHFR gene issues, unsure if homo or heterozygous.     Past Surgical History:   Procedure Laterality Date     APPENDECTOMY       BREAST BIOPSY, RT/LT      right breast - benign      SECTION       COLONOSCOPY N/A 11/10/2021    Procedure: COLONOSCOPY, WITH POLYPECTOMY;  Surgeon: Lynn Doshi MD;  Location:  GI     GYN SURGERY      tubal ligation     LAPAROSCOPIC CHOLECYSTECTOMY N/A 2019    Procedure: CHOLECYSTECTOMY, LAPAROSCOPIC;  Surgeon: Lynn Doshi MD;  Location:  OR     ORTHOPEDIC SURGERY      radius, ulnar - open fracture     TUBAL LIGATION       wisdom teeth       Current Outpatient Medications   Medication Sig Dispense Refill     Acetaminophen 325 MG CAPS Take 325-650 mg by mouth every 4 hours as needed       Ascorbic Acid (VITAMIN C) 500 MG CAPS Take 1,000 mg by mouth 2 times daily       Cholecalciferol (VITAMIN D) 2000 UNIT tablet Take 1 tablet by mouth daily.       cyanocobalamin (VITAMIN B-12) 1000 MCG SUBL sublingual tablet Place 1 tablet (1,000 mcg) under the tongue daily Start right after surgery. 90 tablet 3     levothyroxine (SYNTHROID/LEVOTHROID) 75 MCG tablet Take 1 tablet (75 mcg) by  mouth daily 90 tablet 3     omeprazole (PRILOSEC) 20 MG DR capsule Take 1 capsule (20 mg) by mouth daily Start day after surgery, open contents and sprinkle on food for first 6 weeks. Take daily for 3 months after surgery. 90 capsule 0     Pediatric Multivitamins-Iron (MULTIVITAMINS PLUS IRON CHILD) 18 MG CHEW Take 1 chew tab by mouth 2 times daily Ok to substitute with any chewable that contains 18 mg of iron, Vitamin A, Thiamine and Zinc. 180 tablet 3     spironolactone (ALDACTONE) 25 MG tablet Take 1 tablet (25 mg) by mouth daily 90 tablet 3       Allergies   Allergen Reactions     Latex      Added based on information entered during case entry, please review and add reactions, type, and severity as needed     No Known Allergies         Social History     Tobacco Use     Smoking status: Never Smoker     Smokeless tobacco: Never Used   Substance Use Topics     Alcohol use: Yes     Comment: rare, once monthly.     Family History   Problem Relation Age of Onset     Cancer Father      Depression Brother         bipolar     Diabetes Mother      Arthritis Maternal Grandmother         rheumatoid arthritis     Diabetes Paternal Grandmother      Cancer Other         breast     History   Drug Use No         Objective     /84 (BP Location: Right arm)   Pulse 68   Temp 98  F (36.7  C) (Tympanic)   Resp 20   Wt 142 kg (313 lb)   SpO2 98%   BMI 47.59 kg/m      Physical Exam  GENERAL APPEARANCE: healthy, alert and no distress  HENT: mask in place  NECK: no adenopathy, no asymmetry, masses, or scars and thyroid normal to palpation  RESP: lungs clear to auscultation - no rales, rhonchi or wheezes  CV: regular rate and rhythm, normal S1 S2, no S3 or S4 and no murmur, click or rub   ABDOMEN: soft, obese, nontender, no HSM or masses and bowel sounds normal  NEURO: Normal strength and tone, sensory exam grossly normal, mentation intact and speech normal    Recent Labs   Lab Test 07/14/22  1822 05/05/22  1202  09/23/21  0828   HGB 13.4 13.9 13.8    182 172   INR 0.94 0.90  --     139 137   POTASSIUM 3.9 4.4 4.3   CR 0.68 0.82 0.85   A1C  --  5.6 5.9*        Diagnostics:  See above  EKG: sinus bradycardia, normal axis, normal intervals, no acute ST/T changes c/w ischemia, no LVH by voltage criteria, unchanged from previous tracings from 5/2/22, not repeated    Revised Cardiac Risk Index (RCRI):  The patient has the following serious cardiovascular risks for perioperative complications:   - No serious cardiac risks = 0 points     RCRI Interpretation: 0 points: Class I (very low risk - 0.4% complication rate)           Signed Electronically by: Nani Eli MD  Copy of this evaluation report is provided to requesting physician.

## 2022-07-15 ENCOUNTER — MYC MEDICAL ADVICE (OUTPATIENT)
Dept: SURGERY | Facility: CLINIC | Age: 57
End: 2022-07-15

## 2022-07-21 ENCOUNTER — LAB (OUTPATIENT)
Dept: LAB | Facility: CLINIC | Age: 57
End: 2022-07-21
Payer: COMMERCIAL

## 2022-07-21 DIAGNOSIS — Z11.59 ENCOUNTER FOR SCREENING FOR OTHER VIRAL DISEASES: ICD-10-CM

## 2022-07-21 DIAGNOSIS — E66.01 MORBID OBESITY (H): ICD-10-CM

## 2022-07-21 DIAGNOSIS — Z01.818 PRE-OP TESTING: ICD-10-CM

## 2022-07-21 LAB
ALBUMIN UR-MCNC: NEGATIVE MG/DL
APPEARANCE UR: CLEAR
BACTERIA #/AREA URNS HPF: ABNORMAL /HPF
BILIRUB UR QL STRIP: NEGATIVE
COLOR UR AUTO: YELLOW
GLUCOSE UR STRIP-MCNC: NEGATIVE MG/DL
HGB UR QL STRIP: NEGATIVE
KETONES UR STRIP-MCNC: NEGATIVE MG/DL
LEUKOCYTE ESTERASE UR QL STRIP: NEGATIVE
MUCOUS THREADS #/AREA URNS LPF: PRESENT /LPF
NITRATE UR QL: NEGATIVE
PH UR STRIP: 7 [PH] (ref 5–7)
RBC #/AREA URNS AUTO: ABNORMAL /HPF
SP GR UR STRIP: >=1.03 (ref 1–1.03)
SQUAMOUS #/AREA URNS AUTO: ABNORMAL /LPF
UROBILINOGEN UR STRIP-ACNC: 0.2 E.U./DL
WBC #/AREA URNS AUTO: ABNORMAL /HPF

## 2022-07-21 PROCEDURE — U0003 INFECTIOUS AGENT DETECTION BY NUCLEIC ACID (DNA OR RNA); SEVERE ACUTE RESPIRATORY SYNDROME CORONAVIRUS 2 (SARS-COV-2) (CORONAVIRUS DISEASE [COVID-19]), AMPLIFIED PROBE TECHNIQUE, MAKING USE OF HIGH THROUGHPUT TECHNOLOGIES AS DESCRIBED BY CMS-2020-01-R: HCPCS

## 2022-07-21 PROCEDURE — U0005 INFEC AGEN DETEC AMPLI PROBE: HCPCS

## 2022-07-21 PROCEDURE — 81001 URINALYSIS AUTO W/SCOPE: CPT

## 2022-07-22 LAB — SARS-COV-2 RNA RESP QL NAA+PROBE: NEGATIVE

## 2022-07-22 NOTE — TELEPHONE ENCOUNTER
I read CLAU Hillman response to patient. Pt verbalized understanding.     Pt aware FMLA forms have been received.    Graciela CALERO [facility]  Surgery Clinic [facility] [place]  [facility] - [address] [address]  Office: [phone]  Fax: [phone]

## 2022-07-25 ENCOUNTER — ANESTHESIA (OUTPATIENT)
Dept: SURGERY | Facility: HOSPITAL | Age: 57
DRG: 621 | End: 2022-07-25
Payer: COMMERCIAL

## 2022-07-25 ENCOUNTER — HOSPITAL ENCOUNTER (INPATIENT)
Facility: HOSPITAL | Age: 57
LOS: 1 days | Discharge: HOME | DRG: 621 | End: 2022-07-26
Attending: SURGERY | Admitting: SURGERY
Payer: COMMERCIAL

## 2022-07-25 ENCOUNTER — ANESTHESIA EVENT (OUTPATIENT)
Dept: SURGERY | Facility: HOSPITAL | Age: 57
DRG: 621 | End: 2022-07-25
Payer: COMMERCIAL

## 2022-07-25 DIAGNOSIS — Z98.84 S/P GASTRIC BYPASS: ICD-10-CM

## 2022-07-25 DIAGNOSIS — R60.9 EDEMA, UNSPECIFIED TYPE: ICD-10-CM

## 2022-07-25 DIAGNOSIS — E66.01 MORBID OBESITY DUE TO EXCESS CALORIES (H): Primary | ICD-10-CM

## 2022-07-25 DIAGNOSIS — R03.0 ELEVATED BLOOD PRESSURE READING WITHOUT DIAGNOSIS OF HYPERTENSION: ICD-10-CM

## 2022-07-25 LAB — PLATELET # BLD AUTO: 146 10E3/UL (ref 150–450)

## 2022-07-25 PROCEDURE — 710N000009 HC RECOVERY PHASE 1, LEVEL 1, PER MIN: Performed by: SURGERY

## 2022-07-25 PROCEDURE — 999N000157 HC STATISTIC RCP TIME EA 10 MIN

## 2022-07-25 PROCEDURE — 250N000009 HC RX 250: Performed by: NURSE ANESTHETIST, CERTIFIED REGISTERED

## 2022-07-25 PROCEDURE — 370N000017 HC ANESTHESIA TECHNICAL FEE, PER MIN: Performed by: SURGERY

## 2022-07-25 PROCEDURE — 250N000009 HC RX 250: Performed by: SURGERY

## 2022-07-25 PROCEDURE — 250N000011 HC RX IP 250 OP 636: Performed by: NURSE PRACTITIONER

## 2022-07-25 PROCEDURE — 85049 AUTOMATED PLATELET COUNT: CPT | Performed by: SURGERY

## 2022-07-25 PROCEDURE — 43644 LAP GASTRIC BYPASS/ROUX-EN-Y: CPT | Mod: AS | Performed by: NURSE PRACTITIONER

## 2022-07-25 PROCEDURE — 250N000011 HC RX IP 250 OP 636: Performed by: SURGERY

## 2022-07-25 PROCEDURE — 250N000011 HC RX IP 250 OP 636: Performed by: NURSE ANESTHETIST, CERTIFIED REGISTERED

## 2022-07-25 PROCEDURE — 258N000003 HC RX IP 258 OP 636: Performed by: ANESTHESIOLOGY

## 2022-07-25 PROCEDURE — 999N000141 HC STATISTIC PRE-PROCEDURE NURSING ASSESSMENT: Performed by: SURGERY

## 2022-07-25 PROCEDURE — 258N000003 HC RX IP 258 OP 636: Performed by: NURSE ANESTHETIST, CERTIFIED REGISTERED

## 2022-07-25 PROCEDURE — 272N000001 HC OR GENERAL SUPPLY STERILE: Performed by: SURGERY

## 2022-07-25 PROCEDURE — 0D164ZA BYPASS STOMACH TO JEJUNUM, PERCUTANEOUS ENDOSCOPIC APPROACH: ICD-10-PCS | Performed by: SURGERY

## 2022-07-25 PROCEDURE — 250N000013 HC RX MED GY IP 250 OP 250 PS 637: Performed by: ANESTHESIOLOGY

## 2022-07-25 PROCEDURE — 250N000013 HC RX MED GY IP 250 OP 250 PS 637: Performed by: SURGERY

## 2022-07-25 PROCEDURE — 36415 COLL VENOUS BLD VENIPUNCTURE: CPT | Performed by: SURGERY

## 2022-07-25 PROCEDURE — 250N000009 HC RX 250: Performed by: ANESTHESIOLOGY

## 2022-07-25 PROCEDURE — 360N000077 HC SURGERY LEVEL 4, PER MIN: Performed by: SURGERY

## 2022-07-25 PROCEDURE — 250N000013 HC RX MED GY IP 250 OP 250 PS 637: Performed by: NURSE PRACTITIONER

## 2022-07-25 PROCEDURE — 250N000011 HC RX IP 250 OP 636: Performed by: ANESTHESIOLOGY

## 2022-07-25 PROCEDURE — 120N000001 HC R&B MED SURG/OB

## 2022-07-25 PROCEDURE — 999N000127 HC STATISTIC PERIPHERAL IV START W US GUIDANCE

## 2022-07-25 PROCEDURE — 43644 LAP GASTRIC BYPASS/ROUX-EN-Y: CPT | Performed by: SURGERY

## 2022-07-25 PROCEDURE — 258N000003 HC RX IP 258 OP 636: Performed by: NURSE PRACTITIONER

## 2022-07-25 PROCEDURE — 250N000025 HC SEVOFLURANE, PER MIN: Performed by: SURGERY

## 2022-07-25 RX ORDER — GLYCOPYRROLATE 0.2 MG/ML
INJECTION, SOLUTION INTRAMUSCULAR; INTRAVENOUS PRN
Status: DISCONTINUED | OUTPATIENT
Start: 2022-07-25 | End: 2022-07-25

## 2022-07-25 RX ORDER — KETOROLAC TROMETHAMINE 30 MG/ML
30 INJECTION, SOLUTION INTRAMUSCULAR; INTRAVENOUS EVERY 6 HOURS
Status: DISCONTINUED | OUTPATIENT
Start: 2022-07-25 | End: 2022-07-26 | Stop reason: HOSPADM

## 2022-07-25 RX ORDER — DEXMEDETOMIDINE HYDROCHLORIDE 4 UG/ML
0.5 INJECTION, SOLUTION INTRAVENOUS CONTINUOUS
Status: DISCONTINUED | OUTPATIENT
Start: 2022-07-25 | End: 2022-07-25

## 2022-07-25 RX ORDER — ONDANSETRON 2 MG/ML
4 INJECTION INTRAMUSCULAR; INTRAVENOUS EVERY 30 MIN PRN
Status: DISCONTINUED | OUTPATIENT
Start: 2022-07-25 | End: 2022-07-25 | Stop reason: HOSPADM

## 2022-07-25 RX ORDER — ONDANSETRON 2 MG/ML
INJECTION INTRAMUSCULAR; INTRAVENOUS PRN
Status: DISCONTINUED | OUTPATIENT
Start: 2022-07-25 | End: 2022-07-25

## 2022-07-25 RX ORDER — MAGNESIUM SULFATE 4 G/50ML
4 INJECTION INTRAVENOUS ONCE
Status: COMPLETED | OUTPATIENT
Start: 2022-07-25 | End: 2022-07-25

## 2022-07-25 RX ORDER — FENTANYL CITRATE 50 UG/ML
25 INJECTION, SOLUTION INTRAMUSCULAR; INTRAVENOUS EVERY 5 MIN PRN
Status: DISCONTINUED | OUTPATIENT
Start: 2022-07-25 | End: 2022-07-25 | Stop reason: HOSPADM

## 2022-07-25 RX ORDER — HEPARIN SODIUM 5000 [USP'U]/.5ML
5000 INJECTION, SOLUTION INTRAVENOUS; SUBCUTANEOUS ONCE
Status: COMPLETED | OUTPATIENT
Start: 2022-07-25 | End: 2022-07-25

## 2022-07-25 RX ORDER — MORPHINE SULFATE 1 MG/ML
200 INJECTION, SOLUTION EPIDURAL; INTRATHECAL; INTRAVENOUS ONCE
Status: DISCONTINUED | OUTPATIENT
Start: 2022-07-25 | End: 2022-07-25 | Stop reason: HOSPADM

## 2022-07-25 RX ORDER — GABAPENTIN 250 MG/5ML
250 SOLUTION ORAL EVERY 8 HOURS SCHEDULED
Status: DISCONTINUED | OUTPATIENT
Start: 2022-07-25 | End: 2022-07-26 | Stop reason: HOSPADM

## 2022-07-25 RX ORDER — ONDANSETRON 2 MG/ML
4 INJECTION INTRAMUSCULAR; INTRAVENOUS EVERY 6 HOURS PRN
Status: DISCONTINUED | OUTPATIENT
Start: 2022-07-25 | End: 2022-07-26 | Stop reason: HOSPADM

## 2022-07-25 RX ORDER — NALOXONE HYDROCHLORIDE 0.4 MG/ML
0.2 INJECTION, SOLUTION INTRAMUSCULAR; INTRAVENOUS; SUBCUTANEOUS
Status: DISCONTINUED | OUTPATIENT
Start: 2022-07-25 | End: 2022-07-25

## 2022-07-25 RX ORDER — NALBUPHINE HYDROCHLORIDE 10 MG/ML
5 INJECTION, SOLUTION INTRAMUSCULAR; INTRAVENOUS; SUBCUTANEOUS EVERY 4 HOURS PRN
Status: DISCONTINUED | OUTPATIENT
Start: 2022-07-25 | End: 2022-07-25 | Stop reason: HOSPADM

## 2022-07-25 RX ORDER — LIDOCAINE 40 MG/G
CREAM TOPICAL
Status: DISCONTINUED | OUTPATIENT
Start: 2022-07-25 | End: 2022-07-26 | Stop reason: HOSPADM

## 2022-07-25 RX ORDER — OXYCODONE HYDROCHLORIDE 5 MG/1
5 TABLET ORAL EVERY 4 HOURS PRN
Status: DISCONTINUED | OUTPATIENT
Start: 2022-07-25 | End: 2022-07-25 | Stop reason: HOSPADM

## 2022-07-25 RX ORDER — NALOXONE HYDROCHLORIDE 0.4 MG/ML
0.4 INJECTION, SOLUTION INTRAMUSCULAR; INTRAVENOUS; SUBCUTANEOUS
Status: DISCONTINUED | OUTPATIENT
Start: 2022-07-25 | End: 2022-07-25

## 2022-07-25 RX ORDER — KETOROLAC TROMETHAMINE 30 MG/ML
30 INJECTION, SOLUTION INTRAMUSCULAR; INTRAVENOUS ONCE
Status: COMPLETED | OUTPATIENT
Start: 2022-07-25 | End: 2022-07-25

## 2022-07-25 RX ORDER — SODIUM CHLORIDE, SODIUM LACTATE, POTASSIUM CHLORIDE, CALCIUM CHLORIDE 600; 310; 30; 20 MG/100ML; MG/100ML; MG/100ML; MG/100ML
INJECTION, SOLUTION INTRAVENOUS CONTINUOUS
Status: DISCONTINUED | OUTPATIENT
Start: 2022-07-25 | End: 2022-07-25 | Stop reason: HOSPADM

## 2022-07-25 RX ORDER — PROCHLORPERAZINE MALEATE 10 MG
10 TABLET ORAL EVERY 6 HOURS PRN
Status: DISCONTINUED | OUTPATIENT
Start: 2022-07-25 | End: 2022-07-26 | Stop reason: HOSPADM

## 2022-07-25 RX ORDER — ENOXAPARIN SODIUM 100 MG/ML
40 INJECTION SUBCUTANEOUS EVERY 24 HOURS
Status: DISCONTINUED | OUTPATIENT
Start: 2022-07-26 | End: 2022-07-26 | Stop reason: HOSPADM

## 2022-07-25 RX ORDER — FENTANYL CITRATE 50 UG/ML
100 INJECTION, SOLUTION INTRAMUSCULAR; INTRAVENOUS ONCE
Status: COMPLETED | OUTPATIENT
Start: 2022-07-25 | End: 2022-07-25

## 2022-07-25 RX ORDER — HYDROMORPHONE HYDROCHLORIDE 1 MG/ML
0.4 INJECTION, SOLUTION INTRAMUSCULAR; INTRAVENOUS; SUBCUTANEOUS EVERY 5 MIN PRN
Status: DISCONTINUED | OUTPATIENT
Start: 2022-07-25 | End: 2022-07-25 | Stop reason: HOSPADM

## 2022-07-25 RX ORDER — DEXAMETHASONE SODIUM PHOSPHATE 10 MG/ML
INJECTION, SOLUTION INTRAMUSCULAR; INTRAVENOUS PRN
Status: DISCONTINUED | OUTPATIENT
Start: 2022-07-25 | End: 2022-07-25

## 2022-07-25 RX ORDER — TRAMADOL HYDROCHLORIDE 50 MG/1
100 TABLET ORAL EVERY 6 HOURS PRN
Status: DISCONTINUED | OUTPATIENT
Start: 2022-07-25 | End: 2022-07-26 | Stop reason: HOSPADM

## 2022-07-25 RX ORDER — EPHEDRINE SULFATE 50 MG/ML
INJECTION, SOLUTION INTRAMUSCULAR; INTRAVENOUS; SUBCUTANEOUS PRN
Status: DISCONTINUED | OUTPATIENT
Start: 2022-07-25 | End: 2022-07-25

## 2022-07-25 RX ORDER — CEFAZOLIN SODIUM/WATER 3 G/30 ML
3 SYRINGE (ML) INTRAVENOUS
Status: COMPLETED | OUTPATIENT
Start: 2022-07-25 | End: 2022-07-25

## 2022-07-25 RX ORDER — NALBUPHINE HYDROCHLORIDE 10 MG/ML
5 INJECTION, SOLUTION INTRAMUSCULAR; INTRAVENOUS; SUBCUTANEOUS EVERY 4 HOURS PRN
Status: DISCONTINUED | OUTPATIENT
Start: 2022-07-25 | End: 2022-07-26 | Stop reason: HOSPADM

## 2022-07-25 RX ORDER — LORAZEPAM 2 MG/ML
.5-1 INJECTION INTRAMUSCULAR
Status: DISCONTINUED | OUTPATIENT
Start: 2022-07-25 | End: 2022-07-25 | Stop reason: HOSPADM

## 2022-07-25 RX ORDER — MORPHINE SULFATE 1 MG/ML
INJECTION, SOLUTION EPIDURAL; INTRATHECAL; INTRAVENOUS
Status: COMPLETED | OUTPATIENT
Start: 2022-07-25 | End: 2022-07-25

## 2022-07-25 RX ORDER — ACETAMINOPHEN 325 MG/1
975 TABLET ORAL ONCE
Status: COMPLETED | OUTPATIENT
Start: 2022-07-25 | End: 2022-07-25

## 2022-07-25 RX ORDER — ONDANSETRON 4 MG/1
4 TABLET, ORALLY DISINTEGRATING ORAL EVERY 6 HOURS PRN
Status: DISCONTINUED | OUTPATIENT
Start: 2022-07-25 | End: 2022-07-26 | Stop reason: HOSPADM

## 2022-07-25 RX ORDER — ONDANSETRON 2 MG/ML
4 INJECTION INTRAMUSCULAR; INTRAVENOUS
Status: COMPLETED | OUTPATIENT
Start: 2022-07-25 | End: 2022-07-25

## 2022-07-25 RX ORDER — ENALAPRILAT 1.25 MG/ML
1.25 INJECTION INTRAVENOUS EVERY 6 HOURS PRN
Status: DISCONTINUED | OUTPATIENT
Start: 2022-07-25 | End: 2022-07-26 | Stop reason: HOSPADM

## 2022-07-25 RX ORDER — DEXTROSE MONOHYDRATE, SODIUM CHLORIDE, AND POTASSIUM CHLORIDE 50; 1.49; 4.5 G/1000ML; G/1000ML; G/1000ML
INJECTION, SOLUTION INTRAVENOUS CONTINUOUS
Status: DISCONTINUED | OUTPATIENT
Start: 2022-07-25 | End: 2022-07-26 | Stop reason: HOSPADM

## 2022-07-25 RX ORDER — ACETAMINOPHEN 325 MG/1
975 TABLET ORAL ONCE
Status: DISCONTINUED | OUTPATIENT
Start: 2022-07-25 | End: 2022-07-25 | Stop reason: HOSPADM

## 2022-07-25 RX ORDER — LEVOTHYROXINE SODIUM 25 UG/1
75 TABLET ORAL DAILY
Status: DISCONTINUED | OUTPATIENT
Start: 2022-07-26 | End: 2022-07-26 | Stop reason: HOSPADM

## 2022-07-25 RX ORDER — HALOPERIDOL 5 MG/ML
1 INJECTION INTRAMUSCULAR
Status: DISCONTINUED | OUTPATIENT
Start: 2022-07-25 | End: 2022-07-25 | Stop reason: HOSPADM

## 2022-07-25 RX ORDER — ACETAMINOPHEN 10 MG/ML
1000 INJECTION, SOLUTION INTRAVENOUS EVERY 6 HOURS
Status: DISCONTINUED | OUTPATIENT
Start: 2022-07-25 | End: 2022-07-26 | Stop reason: HOSPADM

## 2022-07-25 RX ORDER — MEPERIDINE HYDROCHLORIDE 25 MG/ML
12.5 INJECTION INTRAMUSCULAR; INTRAVENOUS; SUBCUTANEOUS EVERY 5 MIN PRN
Status: DISCONTINUED | OUTPATIENT
Start: 2022-07-25 | End: 2022-07-25 | Stop reason: HOSPADM

## 2022-07-25 RX ORDER — KETAMINE HYDROCHLORIDE 10 MG/ML
INJECTION INTRAMUSCULAR; INTRAVENOUS PRN
Status: DISCONTINUED | OUTPATIENT
Start: 2022-07-25 | End: 2022-07-25

## 2022-07-25 RX ORDER — ONDANSETRON 4 MG/1
4 TABLET, ORALLY DISINTEGRATING ORAL EVERY 30 MIN PRN
Status: DISCONTINUED | OUTPATIENT
Start: 2022-07-25 | End: 2022-07-25 | Stop reason: HOSPADM

## 2022-07-25 RX ORDER — PROPOFOL 10 MG/ML
INJECTION, EMULSION INTRAVENOUS PRN
Status: DISCONTINUED | OUTPATIENT
Start: 2022-07-25 | End: 2022-07-25

## 2022-07-25 RX ORDER — KETOROLAC TROMETHAMINE 30 MG/ML
15 INJECTION, SOLUTION INTRAMUSCULAR; INTRAVENOUS
Status: DISCONTINUED | OUTPATIENT
Start: 2022-07-25 | End: 2022-07-25 | Stop reason: HOSPADM

## 2022-07-25 RX ORDER — CEFAZOLIN SODIUM/WATER 3 G/30 ML
3 SYRINGE (ML) INTRAVENOUS SEE ADMIN INSTRUCTIONS
Status: DISCONTINUED | OUTPATIENT
Start: 2022-07-25 | End: 2022-07-25 | Stop reason: HOSPADM

## 2022-07-25 RX ORDER — LORAZEPAM 2 MG/ML
.5-1 INJECTION INTRAMUSCULAR EVERY 6 HOURS PRN
Status: DISCONTINUED | OUTPATIENT
Start: 2022-07-25 | End: 2022-07-26 | Stop reason: HOSPADM

## 2022-07-25 RX ORDER — BUPIVACAINE HYDROCHLORIDE AND EPINEPHRINE 2.5; 5 MG/ML; UG/ML
INJECTION, SOLUTION INFILTRATION; PERINEURAL PRN
Status: DISCONTINUED | OUTPATIENT
Start: 2022-07-25 | End: 2022-07-25 | Stop reason: HOSPADM

## 2022-07-25 RX ORDER — LIDOCAINE 40 MG/G
CREAM TOPICAL
Status: DISCONTINUED | OUTPATIENT
Start: 2022-07-25 | End: 2022-07-25 | Stop reason: HOSPADM

## 2022-07-25 RX ORDER — ACETAMINOPHEN 325 MG/1
975 TABLET ORAL EVERY 6 HOURS
Status: DISCONTINUED | OUTPATIENT
Start: 2022-07-25 | End: 2022-07-26 | Stop reason: HOSPADM

## 2022-07-25 RX ORDER — GABAPENTIN 300 MG/1
600 CAPSULE ORAL
Status: COMPLETED | OUTPATIENT
Start: 2022-07-25 | End: 2022-07-25

## 2022-07-25 RX ORDER — DEXMEDETOMIDINE HYDROCHLORIDE 4 UG/ML
INJECTION, SOLUTION INTRAVENOUS
Status: DISCONTINUED
Start: 2022-07-25 | End: 2022-07-25 | Stop reason: HOSPADM

## 2022-07-25 RX ADMIN — ROCURONIUM BROMIDE 50 MG: 50 INJECTION, SOLUTION INTRAVENOUS at 11:36

## 2022-07-25 RX ADMIN — KETAMINE HYDROCHLORIDE 50 MG: 10 INJECTION, SOLUTION INTRAMUSCULAR; INTRAVENOUS at 12:21

## 2022-07-25 RX ADMIN — ACETAMINOPHEN 975 MG: 325 SOLUTION ORAL at 22:02

## 2022-07-25 RX ADMIN — KETOROLAC TROMETHAMINE 30 MG: 30 INJECTION, SOLUTION INTRAMUSCULAR; INTRAVENOUS at 19:25

## 2022-07-25 RX ADMIN — ACETAMINOPHEN 975 MG: 325 TABLET ORAL at 10:03

## 2022-07-25 RX ADMIN — MAGNESIUM SULFATE HEPTAHYDRATE 4 G: 80 INJECTION, SOLUTION INTRAVENOUS at 13:39

## 2022-07-25 RX ADMIN — SUGAMMADEX 200 MG: 100 INJECTION, SOLUTION INTRAVENOUS at 13:34

## 2022-07-25 RX ADMIN — PROPOFOL 200 MG: 10 INJECTION, EMULSION INTRAVENOUS at 11:36

## 2022-07-25 RX ADMIN — NALBUPHINE HYDROCHLORIDE 5 MG: 10 INJECTION, SOLUTION INTRAMUSCULAR; INTRAVENOUS; SUBCUTANEOUS at 15:34

## 2022-07-25 RX ADMIN — Medication 0.2 MG: at 11:31

## 2022-07-25 RX ADMIN — FENTANYL CITRATE 50 MCG: 50 INJECTION, SOLUTION INTRAMUSCULAR; INTRAVENOUS at 12:06

## 2022-07-25 RX ADMIN — HEPARIN SODIUM 5000 UNITS: 5000 INJECTION, SOLUTION INTRAVENOUS; SUBCUTANEOUS at 11:44

## 2022-07-25 RX ADMIN — GABAPENTIN 600 MG: 300 CAPSULE ORAL at 10:03

## 2022-07-25 RX ADMIN — MAGNESIUM SULFATE HEPTAHYDRATE 4 G: 80 INJECTION, SOLUTION INTRAVENOUS at 10:17

## 2022-07-25 RX ADMIN — POTASSIUM CHLORIDE, DEXTROSE MONOHYDRATE AND SODIUM CHLORIDE: 150; 5; 450 INJECTION, SOLUTION INTRAVENOUS at 14:10

## 2022-07-25 RX ADMIN — MIDAZOLAM 2 MG: 1 INJECTION INTRAMUSCULAR; INTRAVENOUS at 11:30

## 2022-07-25 RX ADMIN — LIDOCAINE HYDROCHLORIDE 9 ML: 10 INJECTION, SOLUTION INFILTRATION; PERINEURAL at 11:36

## 2022-07-25 RX ADMIN — SODIUM CHLORIDE, POTASSIUM CHLORIDE, SODIUM LACTATE AND CALCIUM CHLORIDE: 600; 310; 30; 20 INJECTION, SOLUTION INTRAVENOUS at 10:18

## 2022-07-25 RX ADMIN — NALBUPHINE HYDROCHLORIDE 5 MG: 10 INJECTION, SOLUTION INTRAMUSCULAR; INTRAVENOUS; SUBCUTANEOUS at 22:04

## 2022-07-25 RX ADMIN — ONDANSETRON 4 MG: 2 INJECTION INTRAMUSCULAR; INTRAVENOUS at 11:36

## 2022-07-25 RX ADMIN — ONDANSETRON 4 MG: 2 INJECTION INTRAMUSCULAR; INTRAVENOUS at 10:05

## 2022-07-25 RX ADMIN — FENTANYL CITRATE 100 MCG: 50 INJECTION, SOLUTION INTRAMUSCULAR; INTRAVENOUS at 11:36

## 2022-07-25 RX ADMIN — PHENYLEPHRINE HYDROCHLORIDE 100 MCG: 10 INJECTION INTRAVENOUS at 13:12

## 2022-07-25 RX ADMIN — DEXAMETHASONE SODIUM PHOSPHATE 10 MG: 10 INJECTION, SOLUTION INTRAMUSCULAR; INTRAVENOUS at 11:36

## 2022-07-25 RX ADMIN — Medication 10 MG: at 11:36

## 2022-07-25 RX ADMIN — FENTANYL CITRATE 50 MCG: 50 INJECTION, SOLUTION INTRAMUSCULAR; INTRAVENOUS at 12:15

## 2022-07-25 RX ADMIN — GABAPENTIN 250 MG: 250 SUSPENSION ORAL at 22:03

## 2022-07-25 RX ADMIN — SUGAMMADEX 80 MG: 100 INJECTION, SOLUTION INTRAVENOUS at 13:35

## 2022-07-25 RX ADMIN — Medication 3 G: at 11:44

## 2022-07-25 RX ADMIN — SODIUM CHLORIDE, POTASSIUM CHLORIDE, SODIUM LACTATE AND CALCIUM CHLORIDE: 600; 310; 30; 20 INJECTION, SOLUTION INTRAVENOUS at 12:12

## 2022-07-25 RX ADMIN — ROCURONIUM BROMIDE 10 MG: 50 INJECTION, SOLUTION INTRAVENOUS at 11:50

## 2022-07-25 RX ADMIN — POTASSIUM CHLORIDE, DEXTROSE MONOHYDRATE AND SODIUM CHLORIDE: 150; 5; 450 INJECTION, SOLUTION INTRAVENOUS at 22:49

## 2022-07-25 RX ADMIN — DEXMEDETOMIDINE HYDROCHLORIDE 0.5 MCG/KG/HR: 400 INJECTION INTRAVENOUS at 11:47

## 2022-07-25 RX ADMIN — FAMOTIDINE 20 MG: 10 INJECTION INTRAVENOUS at 10:11

## 2022-07-25 RX ADMIN — ACETAMINOPHEN 1000 MG: 10 INJECTION, SOLUTION INTRAVENOUS at 16:12

## 2022-07-25 RX ADMIN — ONDANSETRON 4 MG: 2 INJECTION INTRAMUSCULAR; INTRAVENOUS at 19:22

## 2022-07-25 RX ADMIN — KETOROLAC TROMETHAMINE 30 MG: 30 INJECTION, SOLUTION INTRAMUSCULAR at 14:16

## 2022-07-25 RX ADMIN — GLYCOPYRROLATE 0.2 MG: 0.2 INJECTION, SOLUTION INTRAMUSCULAR; INTRAVENOUS at 11:52

## 2022-07-25 ASSESSMENT — ACTIVITIES OF DAILY LIVING (ADL)
ADLS_ACUITY_SCORE: 35
ADLS_ACUITY_SCORE: 18

## 2022-07-25 NOTE — ANESTHESIA PROCEDURE NOTES
Intrathecal injection Procedure Note    Pre-Procedure   Staff -        Anesthesiologist:  Jersey Amador MD       Performed By: anesthesiologist       Location: OR       Procedure Start/Stop Times: 7/25/2022 11:27 AM and 7/25/2022 11:31 PM       Pre-Anesthestic Checklist: patient identified, IV checked, risks and benefits discussed, informed consent, monitors and equipment checked, pre-op evaluation, at physician/surgeon's request and post-op pain management  Timeout:       Correct Patient: Yes        Correct Procedure: Yes        Correct Site: Yes        Correct Position: Yes   Procedure Documentation  Procedure: intrathecal injection       Patient Position: sitting       Patient Prep/Sterile Barriers: sterile gloves, patient draped       Skin prep: Chloraprep       Insertion Site: L2-3. (midline approach).       Needle Gauge: 25.        Needle Length (Inches): 3.5        Spinal Needle Type: Pencan       Introducer used       Introducer: 20 G       # of attempts: 1 and  # of redirects:  1    Assessment/Narrative         Paresthesias: No.       CSF fluid: clear.    Medication(s) Administered   Morphine PF 1 mg/mL (Intrathecal) - Intrathecal   0.2 mg - 7/25/2022 11:31:00 AM  Medication Administration Time: 7/25/2022 11:27 AM

## 2022-07-25 NOTE — ANESTHESIA PREPROCEDURE EVALUATION
Anesthesia Pre-Procedure Evaluation    Patient: Lucina Santamaria   MRN: [id number] : 1965        Preoperative Diagnosis: Special screening for malignant neoplasms, colon [Z12.11]    Procedure : Procedure(s):  COLONOSCOPY          Past Medical History:   Diagnosis Date     Acquired hypothyroidism      Arthritis     sacro iliac and hip, right ankle.     Chronic constipation     since menopause. fiber/hydration helps.     Coagulation disorder (H)      Complication of anesthesia     nauseated w/ morphine previously after arm surgery.     COVID-19     .     Gallbladder problem     s/p cholecystectomy.     Head injury     childhood concussions     Heterozygous for MTHFR gene mutation      History of MTHFR mutation      Hypertension      Long COVID     Long term Covid     Morbid obesity (H)      Pneumonia     covid     Prediabetes      Pulmonary emboli (H)      Pulmonary embolism (H)     wisdome teeth out while on OCPs and MTHFR gene mutation history.     Pulmonary embolism (H)     age 23, OCPs/dental extraction provoked. reports some MTHFR gene issues, unsure if homo or heterozygous.      Past Surgical History:   Procedure Laterality Date     APPENDECTOMY       BREAST BIOPSY, RT/LT      right breast - benign      SECTION       COLONOSCOPY N/A 11/10/2021    Procedure: COLONOSCOPY, WITH POLYPECTOMY;  Surgeon: Lynn Doshi MD;  Location:  GI     GYN SURGERY      tubal ligation     LAPAROSCOPIC CHOLECYSTECTOMY N/A 2019    Procedure: CHOLECYSTECTOMY, LAPAROSCOPIC;  Surgeon: Lynn Doshi MD;  Location:  OR     ORTHOPEDIC SURGERY      radius, ulnar - open fracture     TUBAL LIGATION       wisdom teeth        Allergies   Allergen Reactions     Latex      Added based on information entered during case entry, please review and add reactions, type, and severity as needed     Morphine Itching     No Known Allergies       Social History     Tobacco Use     Smoking  status: Never Smoker     Smokeless tobacco: Never Used   Substance Use Topics     Alcohol use: Yes     Comment: rare, once monthly.      Wt Readings from Last 1 Encounters:   07/25/22 142.9 kg (315 lb)        Anesthesia Evaluation   Pt has had prior anesthetic. Type: General.    History of anesthetic complications  - PONV.      ROS/MED HX  ENT/Pulmonary:     (+) recent URI, resolved,     Neurologic: Comment: Hx closed head injury      Cardiovascular:  - neg cardiovascular ROS   (+) hypertension-----    METS/Exercise Tolerance:     Hematologic: Comments: Hx Pulmonary embolism    (+) History of blood clots,     Musculoskeletal:   (+) arthritis,     GI/Hepatic:  - neg GI/hepatic ROS     Renal/Genitourinary:  - neg Renal ROS     Endo: Comment: Hx Gestational diabetes    (+) Obesity,     Psychiatric/Substance Use:  - neg psychiatric ROS     Infectious Disease: Comment: Hx COVID 19 - 11/29/20      Malignancy:  - neg malignancy ROS     Other:  - neg other ROS          Physical Exam    Airway  airway exam normal      Mallampati: II   TM distance: > 3 FB   Neck ROM: full   Mouth opening: > 3 cm    Respiratory Devices and Support         Dental  no notable dental history         Cardiovascular   cardiovascular exam normal       Rhythm and rate: regular and normal     Pulmonary   pulmonary exam normal        breath sounds clear to auscultation           OUTSIDE LABS:  CBC:   Lab Results   Component Value Date    WBC 7.9 07/14/2022    WBC 7.2 05/05/2022    HGB 13.4 07/14/2022    HGB 13.9 05/05/2022    HCT 40.1 07/14/2022    HCT 41.8 05/05/2022     07/14/2022     05/05/2022     BMP:   Lab Results   Component Value Date     07/14/2022     05/05/2022    POTASSIUM 3.9 07/14/2022    POTASSIUM 4.4 05/05/2022    CHLORIDE 105 07/14/2022    CHLORIDE 107 05/05/2022    CO2 31 07/14/2022    CO2 27 05/05/2022    BUN 23 07/14/2022    BUN 16 05/05/2022    CR 0.68 07/14/2022    CR 0.82 05/05/2022     (H)  07/14/2022    GLC 95 05/05/2022     COAGS:   Lab Results   Component Value Date    PTT 26 07/14/2022    INR 0.94 07/14/2022     POC:   Lab Results   Component Value Date    HCG Negative 09/24/2018     HEPATIC:   Lab Results   Component Value Date    ALBUMIN 3.5 07/14/2022    PROTTOTAL 7.1 07/14/2022    ALT 31 07/14/2022    AST 12 07/14/2022    ALKPHOS 81 07/14/2022    BILITOTAL 0.2 07/14/2022     OTHER:   Lab Results   Component Value Date    A1C 5.6 05/05/2022    LAURA 9.5 07/14/2022    LIPASE 49 (L) 12/31/2019    TSH 1.80 07/14/2022    T4 1.18 09/23/2021    CRP 21.3 (H) 01/19/2022    SED 14 01/19/2022       Anesthesia Plan    ASA Status:  2   NPO Status:  NPO Appropriate    Anesthesia Type: MAC.     - Reason for MAC: straight local not clinically adequate   Induction: Intravenous, Propofol.   Maintenance: TIVA.        Consents    Anesthesia Plan(s) and associated risks, benefits, and realistic alternatives discussed. Questions answered and patient/representative(s) expressed understanding.    - Discussed:     - Discussed with:  Patient      - Extended Intubation/Ventilatory Support Discussed: No.      - Patient is DNR/DNI Status: No    Use of blood products discussed: No .     Postoperative Care    Pain management: IV analgesics.        Comments:    Other Comments: Decadron, Zofran.  Precedex infusion.  Toradol, Tylenol.  Ketamine (0.5 mg/kg).  Magnesium.  ITN for POP per surgeon request.                Jersey Amador MD

## 2022-07-25 NOTE — ANESTHESIA POSTPROCEDURE EVALUATION
Patient: Lucina Santamaria    Procedure: Procedure(s):  LAPAROSCOPIC HAFSA-EN-Y, GASTRIC BYPASS       Anesthesia Type:  MAC    Note:  Disposition: Outpatient   Postop Pain Control: Uneventful            Sign Out: Well controlled pain   PONV: No   Neuro/Psych: Uneventful            Sign Out: Acceptable/Baseline neuro status   Airway/Respiratory: Uneventful            Sign Out: Acceptable/Baseline resp. status   CV/Hemodynamics: Uneventful            Sign Out: Acceptable CV status; No obvious hypovolemia; No obvious fluid overload   Other NRE: NONE   DID A NON-ROUTINE EVENT OCCUR? No    Event details/Postop Comments:  Mild itching, will provide Nubain.  Otherwise doing well with good pain control, no nausea or vomiting.           Last vitals:  Vitals Value Taken Time   /61 07/25/22 1430   Temp 36.3  C (97.3  F) 07/25/22 1344   Pulse 53 07/25/22 1456   Resp 16 07/25/22 1456   SpO2 95 % 07/25/22 1456   Vitals shown include unvalidated device data.    Electronically Signed By: Jersey Amador MD  July 25, 2022  2:57 PM

## 2022-07-25 NOTE — OP NOTE
OP NOTE:  LAPAROSCOPIC HAFSA-EN-Y GASTRIC BYPASS    Name:  Lucina Santamaria  PCP:  Nani Pierson  Procedure Date:  7/25/2022    Past Medical History:   Diagnosis Date     Acquired hypothyroidism      Arthritis     sacro iliac and hip, right ankle.     Chronic constipation     since menopause. fiber/hydration helps.     Coagulation disorder (H)      Complication of anesthesia     nauseated w/ morphine previously after arm surgery.     COVID-19     2020, November.     Gallbladder problem     s/p cholecystectomy.     Head injury     childhood concussions     Heterozygous for MTHFR gene mutation      History of MTHFR mutation      Hypertension      Long COVID     Long term Covid     Morbid obesity (H)      Pneumonia     covid     Prediabetes      Pulmonary emboli (H) 1989     Pulmonary embolism (H)     wisdome teeth out while on OCPs and MTHFR gene mutation history.     Pulmonary embolism (H)     age 23, OCPs/dental extraction provoked. reports some MTHFR gene issues, unsure if homo or heterozygous.     BMI at time of introduction into the Bariatric Surgery Program: 48  Obesity related comorbid conditions: Hypertension, prediabetes, arthritis  She is at elevated risk given her history of pulmonary emboli and deep vein thrombosis.  1. The patient's body mass index (BMI) is or has been greater than or equal to 35 kg/m2  .  2. The patient has at least one co-morbidity related to obesity (as outlined above).  3. The patient has been previously unsuccessful with medical treatment for obesity.    Surgery:  LAPAROSCOPIC HAFSA-EN-Y GASTRIC BYPASS    Pre-Procedure Diagnosis:  Morbid Obesity    Post-Procedure Diagnosis:    Morbid Obesity    Surgeon(s):  North Schneider MD  Surg Assist:  Erika Jamison CNP;  her assistance was required for exposure and visualization    .  Anesthesia Type:  GET      Findings:  Morbid Obesity    Operative Report:    Patient is brought to the operating room placed in the supine position  and given general endotracheal anesthesia.  she was sterilely prepped and draped in the usual surgical fashion.  A timeout was undertaken before starting the surgery.    A 12 mm trocar was advanced into the left supraumbilical area under direct visualization of the surgery laparoscope and taken into the intra-peritoneal cavity.  A pneumoperitoneum was brought up to 15 mmHg.  The additional trochars were placed in the usual fashion for Kris-en-Y gastric bypass.  I placed a 5 mm trocar in the subxiphoid position removed the trocar and replaced with a Amanda liver retractor which was then attached to the iron intern to stabilize and retract the left lobe of the liver up and away from the upper aspect of the stomach.  A 5 mm trochars placed in the right upper quadrant.  A 12 mm trocar was placed in the right supraumbilical region and two 5 mm trochars were placed in the left upper abdomen all under direct visualization of the laparoscope.    The gastric pouch was formed by taking down parietal peritoneum along the left side of the gastroesophageal junction.  The left tashi was exposed and the finger dissector is used to create a space behind the upper part of the stomach to the left of the gastro-esophageal junction.  The tissues were then dissected along the lesser curvature of the stomach and entering into the lesser sac.  A RUPA 60 stapler with a green load was fired transversely across the stomach at this midportion of the lesser curve.  The staple line was then turned vertical and paralleled the lesser curve and with a series of RUPA 60 green load staples with seam guard the vertical portion of the gastric pouch staple line was formed.  Some dissection was done under the stomach from inside the Lesser Sac with the Harmonic scalpel to connect through to that dissection along the left aspect of the gastroesophageal junction.  With this window opened I am able to complete the gastric pouch with excellent  visualization.    The omentum and colon were retracted superiorly and the ligament of Treitz was identified.  The proximal jejunum was measured out for 80 cm away from the ligament of Treitz.  I brought the jejunum up and sutured it to the newly formed gastric pouch with a running 2-0 Polysorb suture which was used to imbricate the staple line along the distal aspect of the gastric pouch.  A 32 Luxembourgish orogastric tube was advanced on into the gastric pouch some minor tension was put on the oral gastric tube distally and against the pressure of the tube I was able to advance the Harmonic scalpel into the gastric pouch.  I then made a small gastrotomy with the Harmonic scalpel and retracted the oral gastric tube.  I made a enterotomy in the jejunum in the corresponding position.  I then advanced a blue load stapler in through the lumen of the jejunum and through the gastric pouch and for a 30 millimeter segment stapled the gastric pouch to the jejunum forming the gastrojejunal anastomosis.  The opening where the stapler had been removed was then closed with a running 2-0 Polysorb suture..  The 32 Luxembourgish orogastric tube was then advanced down through the gastric pouch again across to the gastrojejunal anastomosis and out into the Kris limb.  A second row of sutures were placed using a 3-0 strata fix that sutured the jejunum to the gastric pouch for a two-layered sutured closure across the gastrojejunal anastomosis.  The proximal aspect of the jejunum leading into this anastomosis was isolated and a window through the underlying mesentery was cleared with Harmonic scalpel.  The jejunum was divided with a white load of the RUPA 60 mm stapler.    The Kris limb was measured out for a length of 150 cm then approximated the  end of  the biliopancreatic limb and sutured these 2 loops of bowel side by side with a 2-0 Polysorb suture.  I made an enterotomy in the antimesenteric side of both the segments of bowel and did a 60 mm  Endo RUPA stapler line anastomosing these 2 segments of small bowel.  The stapler was removed and the open enterotomy was closed with a running 2-0 Polysorb suture.        I then closed the mesenteric defect with a running 2-0 Polysorb suture.  And finally closed the Ricci's defect where the Kris limb comes up and over the omentum and the colon to get to the gastic pouch. This was sutured closed with a running 2-0 Polysorb suture.     The gastrojejunal anastomosis anastomosis was tested with air by clamping the Kris limb and infusing air into the oral gastric tube while having the gastrojejunal anastomosis submerged under normal saline I did not see any air leakage.  The fluid was aspirated from this area.    The abdominal cavity was evaluated for hemostasis and found to be clean and dry.  Pneumoperitoneum was removed after taking out the Amanda liver retractor the trochars were removed and each of the trocar sites were closed with 4-0 subcuticular Monocryl sutures.  The wounds are dressed with Telfa and Tegaderm the patient was extubated taken to recovery there is no complications with this procedure    Estimated Blood Loss:   5 cc    Specimens:    * No specimens in log *       Drains:        Complications:    None    North Schneider MD     Date: 7/25/2022  Time: 3:52 PM

## 2022-07-25 NOTE — PHARMACY-ADMISSION MEDICATION HISTORY
Pharmacy Note - Admission Medication History   ______________________________________________________________________    Prior To Admission (PTA) med list completed and updated in EMR.       PTA Med List   Medication Sig Last Dose     Acetaminophen 325 MG CAPS Take 325-650 mg by mouth every 4 hours as needed      Ascorbic Acid (VITAMIN C) 500 MG CAPS Take 1,000 mg by mouth 2 times daily Past Month     Cholecalciferol (VITAMIN D) 2000 UNIT tablet Take 1 tablet by mouth daily. Past Month     cyanocobalamin (VITAMIN B-12) 1000 MCG SUBL sublingual tablet Place 1 tablet (1,000 mcg) under the tongue daily Start right after surgery.      levothyroxine (SYNTHROID/LEVOTHROID) 75 MCG tablet Take 1 tablet (75 mcg) by mouth daily 7/25/2022 at am     omeprazole (PRILOSEC) 20 MG DR capsule Take 1 capsule (20 mg) by mouth daily Start day after surgery, open contents and sprinkle on food for first 6 weeks. Take daily for 3 months after surgery.      Pediatric Multivitamins-Iron (MULTIVITAMINS PLUS IRON CHILD) 18 MG CHEW Take 1 chew tab by mouth 2 times daily Ok to substitute with any chewable that contains 18 mg of iron, Vitamin A, Thiamine and Zinc.      spironolactone (ALDACTONE) 25 MG tablet Take 1 tablet (25 mg) by mouth daily Past Month       Information source(s): Patient, Clinic records and [facility]    Method of interview communication: in-person    Patient was asked about OTC/herbal products specifically.  PTA med list reflects this.    Based on the pharmacist's assessment, the PTA med list information appears reliable    Allergies were reviewed, assessed, and updated with the patient.      Patient does not use any multi-dose medications prior to admission.     Thank you for the opportunity to participate in the care of this patient.      Saeid Sanders [facility]     7/25/2022     9:33 AM

## 2022-07-25 NOTE — ANESTHESIA PROCEDURE NOTES
Airway       Patient location during procedure: OR       Procedure Start/Stop Times: 7/25/2022 11:40 AM  Staff -        Performed By: CRNAIndications and Patient Condition       Indications for airway management: marj-procedural       Induction type:intravenous       Mask difficulty assessment: 1 - vent by mask    Final Airway Details       Final airway type: endotracheal airway       Successful airway: ETT - single  Endotracheal Airway Details        ETT size (mm): 7.0       Cuffed: yes       Successful intubation technique: video laryngoscopy       VL Blade Size: Glidescope 3       Grade View of Cords: 1       Adjucts: stylet       Position: Right       Measured from: lips       Secured at (cm): 21       Bite block used: None    Post intubation assessment        Placement verified by: capnometry, equal breath sounds and chest rise        Number of attempts at approach: 1       Number of other approaches attempted: 0       Secured with: silk tape       Ease of procedure: easy       Dentition: Intact    Medication(s) Administered   Medication Administration Time: 7/25/2022 11:40 AM

## 2022-07-25 NOTE — H&P
HISTORY AND PHYSICAL UPDATE:    I have assessed the patient and evaluated the chart and and verify the patient's clinical status has not changed since our last documentation.  The patient is ready to move forward with the planned surgery.  Lungs: Clear to auscultation  Heart: Regular rate and rhythm    HPI: Lucina Santamaria is a 56 year old female here today for consideration of metabolic and bariatric surgery. she has had weight problems since high school. she Has tried multiple weight loss programs in the past with moderate success.    she carries most of his/her obesity in through their abdomen, and hips.   This pt does not have Hypertention.   This pt does not have GERD.   The pt does not have Sleep Apnea.   This pt does not have diabetes.     Allergies:No known allergies          Past Medical History:   Diagnosis Date     Arthritis       sacro iliac and hip, right ankle.     Chronic constipation       since menopause. fiber/hydration helps.     Complication of anesthesia       nauseated w/ morphine previously after arm surgery.     COVID-19       .     Gallbladder problem       s/p cholecystectomy.     Gestational diabetes      Head injury       childhood concussions     Pneumonia       covid     Pulmonary emboli (H)      Pulmonary embolism (H)       wisdome teeth out while on OCPs and MTHFR gene mutation history.     Pulmonary embolism (H)       age 23, OCPs/dental extraction provoked. reports some MTHFR gene issues, unsure if homo or heterozygous.               Past Surgical History:   Procedure Laterality Date     APPENDECTOMY        BREAST BIOPSY, RT/LT        right breast - benign      SECTION        COLONOSCOPY N/A 11/10/2021     Procedure: COLONOSCOPY, WITH POLYPECTOMY;  Surgeon: Lynn Doshi MD;  Location:  GI     GYN SURGERY        tubal ligation     LAPAROSCOPIC CHOLECYSTECTOMY N/A 2019     Procedure: CHOLECYSTECTOMY, LAPAROSCOPIC;  Surgeon:  "Lynn Doshi MD;  Location: [facility] OR     ORTHOPEDIC SURGERY   1991     radius, ulnar - open fracture     TUBAL LIGATION         wisdom teeth             CURRENT MEDS:              Family History   Problem Relation Age of Onset     Cancer Father       Depression Brother           bipolar     Diabetes Mother       Arthritis Maternal Grandmother           rheumatoid arthritis     Diabetes Paternal Grandmother       Cancer Other           breast          reports that she has never smoked. She has never used smokeless tobacco. She reports current alcohol use. She reports that she does not use drugs.     Review of Systems - 12 point Review of Systems is negative except for the issues mentioned above.     PSYCHIATRIC: she has undergone a lifestyle assessment and has been deemed a good candidate for bariatric surgery by the psychologist.     BP (!) 130/90   Ht 1.727 m (5' 8\")   Wt 142.8 kg (314 lb 14.4 oz)   BMI 47.88 kg/m    Body mass index is 47.88 kg/m .     EXAM:  GENERAL: This is a well-developed 56 year old female who appears her stated age  HEAD & NECK: Grossly normal.  No palpable thyroid lesions  CARDIAC: RRR without murmur  CHEST/LUNG:  Clear to auscultation  ABDOMEN: Obese.  Nontender.  No hernias or masses appreciated.  LYMPHATIC:  No significant adenopathy appreciated.    EXTREMITIES: Grossly normal.  No evidence of chronic venous stasis.    NEUROLOGIC: Focally intact  INTEGUMENT: No open lesions or ulcers  PSYCHIATRIC: Normal affect. she has a good grasp on the nature of her obesity and the treatment options.     LABS:        Lab Results   Component Value Date     WBC 7.2 09/23/2021     WBC 8.2 12/31/2019     HGB 13.8 09/23/2021     HGB 13.1 12/31/2019     HCT 41.1 09/23/2021     HCT 39.6 12/31/2019     MCV 99 09/23/2021      12/31/2019      09/23/2021      12/31/2019              Lab Results   Component Value Date     ALT 32 09/23/2021     AST 16 09/23/2021     ALKPHOS 78 09/23/2021 "         Assessment/Plan: 56 year old female who is an excellent candidate for bariatric and metabolic surgery.  After a careful conversation with the patient it was decided that a  Laparoscopic Kris-en-Y Gastric Bypass. would be her preferred option. She does have a history of DVT and Pulmonary Embolus.  She takes a baby asprin.  She will need to stop the baby asprin after having this surgery.      North Schneider  [facility]; surgeons  [phone]

## 2022-07-25 NOTE — ANESTHESIA CARE TRANSFER NOTE
Patient: Lucina Santamaria    Procedure: Procedure(s):  LAPAROSCOPIC HAFSA-EN-Y, GASTRIC BYPASS       Diagnosis: Morbid obesity (H) [E66.01]  Diagnosis Additional Information: No value filed.    Anesthesia Type:   MAC     Note:    Oropharynx: oropharynx clear of all foreign objects  Level of Consciousness: awake  Oxygen Supplementation: face mask  Level of Supplemental Oxygen (L/min / FiO2): 6  Independent Airway: airway patency satisfactory and stable  Dentition: dentition unchanged  Vital Signs Stable: post-procedure vital signs reviewed and stable  Report to RN Given: handoff report given  Patient transferred to: PACU    Handoff Report: Identifed the Patient, Identified the Reponsible Provider, Reviewed the pertinent medical history, Discussed the surgical course, Reviewed Intra-OP anesthesia mangement and issues during anesthesia, Set expectations for post-procedure period and Allowed opportunity for questions and acknowledgement of understanding      Vitals:  Vitals Value Taken Time   /66 07/25/22 1344   Temp 36.3  C (97.3  F) 07/25/22 1344   Pulse 67 07/25/22 1345   Resp 14 07/25/22 1345   SpO2 100 % 07/25/22 1345   Vitals shown include unvalidated device data.    Electronically Signed By: YARON Felton CRNA  July 25, 2022  1:46 PM

## 2022-07-26 VITALS
HEART RATE: 52 BPM | WEIGHT: 293 LBS | HEIGHT: 68 IN | BODY MASS INDEX: 44.41 KG/M2 | RESPIRATION RATE: 20 BRPM | TEMPERATURE: 97.7 F | OXYGEN SATURATION: 97 % | SYSTOLIC BLOOD PRESSURE: 130 MMHG | DIASTOLIC BLOOD PRESSURE: 86 MMHG

## 2022-07-26 LAB — PLATELET # BLD AUTO: 151 10E3/UL (ref 150–450)

## 2022-07-26 PROCEDURE — 36415 COLL VENOUS BLD VENIPUNCTURE: CPT | Performed by: SURGERY

## 2022-07-26 PROCEDURE — 85049 AUTOMATED PLATELET COUNT: CPT | Performed by: SURGERY

## 2022-07-26 PROCEDURE — 250N000011 HC RX IP 250 OP 636: Performed by: NURSE PRACTITIONER

## 2022-07-26 PROCEDURE — 250N000013 HC RX MED GY IP 250 OP 250 PS 637: Performed by: NURSE PRACTITIONER

## 2022-07-26 RX ORDER — GABAPENTIN 250 MG/5ML
250 SOLUTION ORAL EVERY 8 HOURS PRN
Qty: 60 ML | Refills: 0 | Status: SHIPPED | OUTPATIENT
Start: 2022-07-26 | End: 2022-08-05

## 2022-07-26 RX ORDER — CHOLECALCIFEROL (VITAMIN D3) 50 MCG
50 TABLET ORAL DAILY
COMMUNITY
Start: 2022-07-26

## 2022-07-26 RX ORDER — SPIRONOLACTONE 25 MG/1
25 TABLET ORAL DAILY
Qty: 90 TABLET | Refills: 3 | COMMUNITY
Start: 2022-07-26 | End: 2023-01-25

## 2022-07-26 RX ORDER — MULTIVIT-MIN/IRON/FOLIC ACID/K 18-600-40
1000 CAPSULE ORAL 2 TIMES DAILY
COMMUNITY
Start: 2022-07-26 | End: 2023-07-26

## 2022-07-26 RX ADMIN — KETOROLAC TROMETHAMINE 30 MG: 30 INJECTION, SOLUTION INTRAMUSCULAR; INTRAVENOUS at 07:59

## 2022-07-26 RX ADMIN — Medication 1000 MCG: at 09:02

## 2022-07-26 RX ADMIN — OMEPRAZOLE 20 MG: 20 CAPSULE, DELAYED RELEASE ORAL at 07:56

## 2022-07-26 RX ADMIN — KETOROLAC TROMETHAMINE 30 MG: 30 INJECTION, SOLUTION INTRAMUSCULAR; INTRAVENOUS at 01:16

## 2022-07-26 RX ADMIN — LEVOTHYROXINE SODIUM 75 MCG: 0.03 TABLET ORAL at 09:02

## 2022-07-26 RX ADMIN — ACETAMINOPHEN 975 MG: 325 SOLUTION ORAL at 05:13

## 2022-07-26 RX ADMIN — ACETAMINOPHEN 975 MG: 325 SOLUTION ORAL at 09:02

## 2022-07-26 RX ADMIN — ENOXAPARIN SODIUM 40 MG: 40 INJECTION SUBCUTANEOUS at 01:18

## 2022-07-26 RX ADMIN — NALBUPHINE HYDROCHLORIDE 5 MG: 10 INJECTION, SOLUTION INTRAMUSCULAR; INTRAVENOUS; SUBCUTANEOUS at 05:15

## 2022-07-26 RX ADMIN — Medication 1 TABLET: at 09:02

## 2022-07-26 RX ADMIN — KETOROLAC TROMETHAMINE 30 MG: 30 INJECTION, SOLUTION INTRAMUSCULAR; INTRAVENOUS at 13:58

## 2022-07-26 RX ADMIN — GABAPENTIN 250 MG: 250 SUSPENSION ORAL at 13:57

## 2022-07-26 RX ADMIN — GABAPENTIN 250 MG: 250 SUSPENSION ORAL at 05:20

## 2022-07-26 ASSESSMENT — ACTIVITIES OF DAILY LIVING (ADL)
ADLS_ACUITY_SCORE: 18

## 2022-07-26 NOTE — PROGRESS NOTES
General Surgery Progress Note    POST OP DAY  # 1, status post Laparoscopic HAFSA--EN-Y GASTRIC BYPASS    Subjective:   Pt is feeling well, pain is well controlled  Pt is urinating without a catheter    Vitals:    07/25/22 2109 07/25/22 2231 07/25/22 2325 07/26/22 0731   BP:  121/58 122/59 130/86   BP Location:  Right arm Right arm Right arm   Patient Position:       Cuff Size:       Pulse:  55 55 52   Resp:  18 18 20   Temp:  98.2  F (36.8  C) 98.2  F (36.8  C) 97.7  F (36.5  C)   TempSrc:  Oral Oral Oral   SpO2: 97% 97% 98% 97%   Weight:       Height:           Physical Exam:  Lungs:  CTA  CV:       RRR  Ab:       Soft, + BS, dressings are clean dry and intact    Assessment:  Pt is progressing well.  Their pain is well controlled.  They have advanced through their clear liquids and are working on their full liquid diet without nausea or vomiting.    Plan: They will be ready for discharge today.    North Schneider MD  [facility] Surgeons  [phone]

## 2022-07-26 NOTE — DISCHARGE SUMMARY
Physician Discharge Summary    Primary Care Physician:  Nani Pierson    Discharge Provider: YARON Dominguez CNP  Admission Date: 7/25/2022  Discharge Date: 7/26/2022     Primary Diagnosis at Discharge:   Patient Active Problem List   Diagnosis     CARDIOVASCULAR SCREENING; LDL GOAL LESS THAN 130     Obesity, morbid, BMI 50 or higher (H)     Elevated blood pressure reading without diagnosis of hypertension     Edema, unspecified type     History of MTHFR mutation     Hypothyroidism     Pneumonia     Morbid obesity due to excess calories (H)      Disposition: Home or Self Care  Condition at Discharge: Stable     Surgery: Kris en Y gastric bypass    Hospital Summary:   Lucina Santamaria was admitted for morbid obesity.  she underwent an uncomplicated laparoscopic gastric bypass.  Following a brief recovery in the PACU, she was transferred to the Med/Surg floor for the remainder of her stay.  her post operative course was uneventful..  On POD # 1 she was discharged home tolerating a bariatric full liquid diet, ambulating without assistance, and pain controlled with oral analgesics.        Discharge Medications:      Medication List      Started    gabapentin 250 MG/5ML solution  Commonly known as: NEURONTIN  250 mg, Oral, EVERY 8 HOURS PRN        Modified    * Acetaminophen 325 MG Caps  What changed: additional instructions     * Tylenol Dissolve Packs 500 MG Pack  Generic drug: Acetaminophen  1,000 mg, Oral, EVERY 6 HOURS  What changed: You were already taking a medication with the same name, and this prescription was added. Make sure you understand how and when to take each.     spironolactone 25 MG tablet  Commonly known as: ALDACTONE  What changed: additional instructions     Vitamin C 500 MG Caps  What changed: additional instructions     vitamin D3 50 mcg (2000 units) tablet  Commonly known as: CHOLECALCIFEROL  What changed: additional instructions         * This list has 2 medication(s) that are the  same as other medications prescribed for you. Read the directions carefully, and ask your doctor or other care provider to review them with you.                Discharge Instructions:  Follow up appointment with Primary Care Physician: Nani Pierson  Follow up appointment with Dr. Schneider in 2 weeks  Attend the post-op dietary class as scheduled    Post operative instructions:     Diet:   You will remain on a full liquid diet until you follow up in the post op meeting with the dietician.  It is extremely important to follow the liquid diet to allow for optimal healing and to prevent food from becoming lodged at the pouch outlet.    Protein is an important part of the diet after surgery; therefore, it is necessary to drink fluids that are high in protein.  Proteins are building blocks that help you heal after surgery and help preserve your muscle mass while you are losing weight.      Including carbohydrates in the diet is also important after surgery to prevent your body from burning fat for energy (Ketosis).  These carbohydrates include: unsweetened applesauce, blended canned fruit (in its own juice), Stage I baby food fruit, thin cream of wheat, light yogurt (no fruit chunks), or 100% fruit juice diluted (50% juice/50% water).     Remember to take 1 Multivitamin with Iron 2 times daily and 1000 mcg of Sublingual B-12 daily.     Aim for 40-50 grams of protein daily during this week.    Sip 48-64 ounces of liquid per day in addition to full liquid meals/supplements.    After 1 week of the full liquid diet, you will advance to the pureed diet, as instructed.      Activity: You should continue to be active at home including ambulating frequently.  If possible try to limit the amount of time spent in bed.    Restrictions: you should avoid lifting anything more than 20 pounds or strenuous physical activity for a total of 2 weeks.  This is to help reduce the risk of hernia  formation at your port sites.  Walking  does not count as strenuous physical activity.  You are safe to walk up and down stairs.  Following 2 weeks he may resume all normal physical activity.    Wound / drain care:You may remove your outer dressings after a period of 48 hours.  The small white strips on the incisions act like artificial scabs, and will begin to peel at the edges at around 7-10 days.  These can then be removed.     Bathing: You may shower after 48 hours from surgery.  It is ok to get your incisions wet, but avoid rubbing them.  Avoid soaking in bath tubs, or swimming in lakes, pools, or streams for 4 weeks following surgery.        Erika Jamison, CNP  [phone]  [facility] General and Bariatric Surgery

## 2022-07-26 NOTE — DISCHARGE INSTRUCTIONS
If you need to be seen in the emergency department for any reason in the next 30 days, please return to [facility]. The bariatric team should be involved in your care/diet even if the problem is unrelated to your surgery. If you are worried about whether or not you need to be seen or if something is wrong, please call your bariatric team at [phone]. Thank you

## 2022-07-26 NOTE — PLAN OF CARE
Goal Outcome Evaluation:       1509... Pt was discharged to home, instructions/teaching were done and pt verbalized understanding.

## 2022-07-26 NOTE — PLAN OF CARE
Goal Outcome Evaluation:            Care Plan Progress Note:    Name: Lucina Santamaria  :   1965  MRN:   [id number]    Problem: Bariatric Procedure  Goal: Prevent post-op bariatric complications.  Appropriate oral intake.  Discharge needs are met.  Intervention:   Post Op Day 0/1 (progress as patient tolerates)   -Notify MD of excessive nausea   -NPO per MD orders; read exceptions to the order   -Toothette with 1/2 inch warm water at bedside until able to take PO   -Do not give ice chips, straws, or carbonation    -Whenever drinking liquids, patient must be upright with both feet on the floor   -No more than 30mL per sip   -All liquids must be at room or warm temperature   -After 2 hours of 30mL PO q30min, you may take 30mL as tolerated and advance to a clear liquid diet    -No oral pills until after the patient tolerates the 2 hours of 30mL sips (exceptions: sublingual medications can be given anytime; liquid gabapentin can be given after 1 hours of sips)   -Strict intake and output   -Bladder scan every 4 hours until voiding freely   -If tolerating sips, start bariatric clear liquid diet   -If tolerating bariatric clears, advance to a bariatric full liquid diet  Discharge Planning   -Educate patient about pill size   -Patient should take no pill greater than 1/4 inch   -Send pill cutter home with patient   -Nurse to review home discharge instructions  Outcome:    Shift  Intake: Pt completed 2 hours sips then started clears; tolerating bariatric clears. Intake total 390 ml.  Output: 590 ml void; minor color.  Bladder Scan: 28 ml.  Pain: Denies pain  Incision: Incision in clean, dry and intact with 2 sites with small sanguinous drainage from PACU; No new bleed.  Nausea: PRN Zofran given for nausea. No emesis.  Activity: Ambulated to the bathroom and hallways up to 150 feet.  Incentive Spirometry: 2500 ml; tolerated well.  Abdominal Binder: On    PRN Nubain given for itching per pt request; per pt  effective.  Scheduled Toradol and Tylenol given per order.  Maintenance IV fluid D5%,0.45%NS,K+20mEq at 125 ml/hr running continuous per order. italia James updated on Pt' fluctuating pulse; no new order. Will monitor.    Carly Fuller RN  7/25/2022  8:28 PM

## 2022-07-26 NOTE — CARE PLAN
Care Plan Progress Note:    Name: Lucina Santamaria  :   1965  MRN:   [id number]    Problem: Bariatric Procedure  Goal: Prevent post-op bariatric complications.  Appropriate oral intake.  Discharge needs are met.  Intervention:   Post Op Day 0/1 (progress as patient tolerates)   -Notify MD of excessive nausea   -NPO per MD orders; read exceptions to the order   -Toothette with 1/2 inch warm water at bedside until able to take PO   -Do not give ice chips, straws, or carbonation    -Whenever drinking liquids, patient must be upright with both feet on the floor   -No more than 30mL per sip   -All liquids must be at room or warm temperature   -After 2 hours of 30mL PO q30min, you may take 30mL as tolerated and advance to a clear liquid diet    -No oral pills until after the patient tolerates the 2 hours of 30mL sips (exceptions: sublingual medications can be given anytime; liquid gabapentin can be given after 1 hours of sips)   -Strict intake and output   -Bladder scan every 4 hours until voiding freely   -If tolerating sips, start bariatric clear liquid diet   -If tolerating bariatric clears, advance to a bariatric full liquid diet  Discharge Planning   -Educate patient about pill size   -Patient should take no pill greater than 1/4 inch   -Send pill cutter home with patient   -Nurse to review home discharge instructions  Outcome: Pt not passing gas or BM yet otherwise she is doing great. Nubain given x1. Advanced to Full bariatric diet. Saline lock now. Fine crackles to LLL otherwise diminished.     Shift : 0457-6823  Intake:840  Output:1000  Bladder Scan: N/A  Pain:denies  Incision:-scant dry serosanguineous drainage  Nausea: denies  Activity:SBA. Walked x2 on the hallway ++ 750 ft  Incentive Spirometry: good.  Abdominal Binder: in place    Lin Styles RN  2022  6:33 AM

## 2022-07-26 NOTE — PROGRESS NOTES
Bariatric Surgery Progress Note    1 Day Post-Op    Procedure(s):  LAPAROSCOPIC HAFSA-EN-Y, GASTRIC BYPASS    Subjective:   Patient reports pain is controlled. Patient is tolerating bariatric full liquid diet, ambulating, voiding, and passing flatus. Patient denies fever, chills, nausea, vomiting, SOB, CP, and leg pain.    Patient Vitals for the past 24 hrs:   BP Temp Temp src Pulse Resp SpO2   07/26/22 0731 130/86 97.7  F (36.5  C) Oral 52 20 97 %   07/25/22 2325 122/59 98.2  F (36.8  C) Oral 55 18 98 %   07/25/22 2231 121/58 98.2  F (36.8  C) Oral 55 18 97 %   07/25/22 2109 -- -- -- -- -- 97 %   07/25/22 2105 (!) 140/57 98.3  F (36.8  C) Oral (!) 47 18 92 %   07/25/22 2020 107/57 -- -- (!) 45 -- --   07/25/22 1949 125/59 98  F (36.7  C) Oral 63 18 97 %   07/25/22 1946 -- -- -- 52 -- --   07/25/22 1942 127/58 98  F (36.7  C) Oral (!) 44 18 95 %   07/25/22 1920 136/64 98.1  F (36.7  C) Oral (!) 48 18 90 %   07/25/22 1800 137/71 -- -- 57 20 99 %   07/25/22 1730 120/58 -- -- (!) 47 12 97 %   07/25/22 1700 119/58 -- -- 55 8 97 %   07/25/22 1630 120/57 -- -- 54 17 97 %   07/25/22 1600 132/66 -- -- 50 19 97 %   07/25/22 1530 (!) 145/67 -- -- 58 21 98 %   07/25/22 1500 121/58 -- -- 54 10 99 %   07/25/22 1430 131/61 -- -- 62 16 91 %   07/25/22 1415 (!) 141/63 -- -- 58 13 100 %   07/25/22 1400 137/57 -- -- 63 14 100 %       Physical Exam:  General: A/O, NAD  Ab:       Soft, + BS, expected ttp POD 1; The wound/ dressings are clean, dry, and intact  :      Patient is voiding adequate amount    Admission on 07/25/2022   Component Date Value     Platelet Count 07/25/2022 146 (A)     Platelet Count 07/26/2022 151        Assessment/Plan:   Patient is progressing well after surgery. Once she is meeting oral intake goals, she should be able to discharge home today. Discharge orders and instructions are placed    Discussed discharge instructions with the patient along with signs and symptoms to watch for post-op.  Patient  verbalized understanding of the plan, including:    - Use of incentive spirometer and walking as tolerated   - Use of abdominal binder if needed   - Importance of getting 48-64 ounces of water in daily for hydration    - Use of medication cups for measuring out sips for the next 3-4 days.   - Bariatric full liquid diet for the next 1 week(s).   - Drink a protein shake providing 20-30 grams of protein in addition to meals daily; aim for a total of 40 grams of protein daily   - Attend the post-op dietary class next Monday at 10 am   - Take omeprazole 20 mg daily for the next 3 months and avoid/eliminate NSAID usage   - Take chewable MVI with 18mg iron twice a day (no gummies) and SL B12 1,000-2,500 mcg daily.    - Call Bariatric clinic with any questions or concerns   - If patient needs to be seen in the emergency department for any reason, she needs to return to [facility].    Pt is scheduled to follow up at Bariatric Clinic next week.  Patient verbalized understanding of the plan. Bariatric nurse clinician will call her in a couple days when she gets home to check in with her.    Greater than 45 minutes were spent with this patient with more than 50% of that time spent on education.    Erika Jamison, CNP  [phone]  [facility] General and Bariatric Surgery

## 2022-07-27 ENCOUNTER — PATIENT OUTREACH (OUTPATIENT)
Dept: CARE COORDINATION | Facility: CLINIC | Age: 57
End: 2022-07-27

## 2022-07-27 DIAGNOSIS — Z71.89 OTHER SPECIFIED COUNSELING: ICD-10-CM

## 2022-07-27 NOTE — PROGRESS NOTES
Federal Family and Medical Leave Act forms received, completed and signed on providers behalf.    Leave start date: 07/25/2022    Leave end date: 08/29/2022    RTW on 08/29/2022 with no restrictions.     Forms faxed to: [phone], attn: [facility].    Forms labeled and sent to HIM for scanning.    Graciela Blancas  [facility] [address] [address]  Office: [phone]  Fax: [phone]

## 2022-07-27 NOTE — PROGRESS NOTES
Clinic Care Coordination Contact  [facility]/Voicemail       Clinical Data: Care Coordinator Outreach  Outreach attempted x 1.  Left message on patient's voicemail with call back information and requested return call.  Plan: Care Coordinator will try to reach patient again in 1-2 business days.    ELA Zapata  [phone]  [facility]

## 2022-07-28 NOTE — PROGRESS NOTES
Clinic Care Coordination Contact  [facility]/Voicemail       Clinical Data: Care Coordinator Outreach  Outreach attempted x 2.  Left message on patient's voicemail with call back information and requested return call.  Plan: Care Coordinator will do no further outreaches at this time.    ELA Zapata  [phone]  [facility]

## 2022-07-29 ENCOUNTER — TELEPHONE (OUTPATIENT)
Dept: SURGERY | Facility: CLINIC | Age: 57
End: 2022-07-29

## 2022-07-29 NOTE — TELEPHONE ENCOUNTER
Post-Op Phone Call  [facility] Weight Management    Surgeon: North Schneider M.D.  Date of Surgery: 7/25/2022  Discharge Date: 7/26/2022    Date/Time Called:   Date: 7/29/2022 Time: 1:17 PM   Attempt: First    Patient unavailable, message left to call [facility]ariat[facility] with questions/problems? Yes    Call completed by:   Kady Ceja RN, CBN  [facility]  P [phone]  F [phone]

## 2022-08-02 ENCOUNTER — ALLIED HEALTH/NURSE VISIT (OUTPATIENT)
Dept: SURGERY | Facility: CLINIC | Age: 57
End: 2022-08-02
Payer: COMMERCIAL

## 2022-08-02 DIAGNOSIS — Z98.84 BARIATRIC SURGERY STATUS: Primary | ICD-10-CM

## 2022-08-02 DIAGNOSIS — Z71.3 NUTRITIONAL COUNSELING: ICD-10-CM

## 2022-08-02 PROCEDURE — 99024 POSTOP FOLLOW-UP VISIT: CPT

## 2022-08-02 NOTE — PROGRESS NOTES
Time in: 10:00a   /Time out: 10:50a      Pt presents for 1 week post op dietitian follow up. Reports tolerating full liquids well. Denies n/v, constipation/diarrhea, or significant pain. Taking MVI and SL B12 appropriately. Taking 60 oz fluid/day and 45 grams protein. Educated pt on pureed and soft to bariatric regular diets. Provided grocery list and sample meal plan for each diet stage.  Pt will begin pureed diet on 8-2-22 and advance as tolerated to softs/bariatric regular on 8-17-22. Instructed pt to begin 500 mg calcium citrate BID and 5000IU Vitamin D3 on 8-17-22. Pt to follow up with RD at 3 months post op.

## 2022-08-05 ENCOUNTER — VIRTUAL VISIT (OUTPATIENT)
Dept: SURGERY | Facility: CLINIC | Age: 57
End: 2022-08-05
Payer: COMMERCIAL

## 2022-08-05 VITALS — HEIGHT: 68 IN | BODY MASS INDEX: 44.41 KG/M2 | WEIGHT: 293 LBS

## 2022-08-05 DIAGNOSIS — Z98.84 S/P GASTRIC BYPASS: Primary | ICD-10-CM

## 2022-08-05 PROCEDURE — 99024 POSTOP FOLLOW-UP VISIT: CPT | Performed by: SURGERY

## 2022-08-05 RX ORDER — ENOXAPARIN SODIUM 100 MG/ML
40 INJECTION SUBCUTANEOUS 2 TIMES DAILY
COMMUNITY
End: 2022-12-26

## 2022-08-05 NOTE — LETTER
"    8/5/2022         RE: Lucina Santamaria  [address] [address]        Dear Colleague,    Thank you for referring your patient, Lucina Santamaria, [facility] Please see a copy of my visit note below.    Lucina Santamaria is 56 year old  female who presents for a billable video visit today.    How would you like to obtain your AVS? MyChart  If dropped from the video visit, the video invitation should be resent by: Text to cell phone: [phone]  Will anyone else be joining your video visit? No      Video Start Time: 8:00 AM      Provider Notes:   HPI: Pt is here for follow up of a Laparoscopic Kris-en-Y Gastric Bypass. she is doing well. Taking po well. No vomiting. No fevers or chills. Ambulating without problems.       Ht 1.727 m (5' 8\")   Wt 138.3 kg (305 lb)   BMI 46.38 kg/m    [unfilled]  Body mass index is 46.38 kg/m .    EXAM:  GENERAL:Appears well  ABDOMEN: Incisions healing well      Assessment/Plan: Pt s/p Laparoscopic Kris-en-Y Gastric Bypass. Doing well. Drinking 60 oz a day.  Diet and activity discussed. she will f/u with us at the Bariatric Center in 3 months.    North Schneider MD ,MD  [facility] Department of Surgery      Video-Visit Details    Type of service:  Video Visit    Video End Time (time video stopped): 8:22 AM  Originating Location (pt. Location): Home    Distant Location (provider location):  [facility] SURGERY CLINIC AND BARIATRICS [facility]     Platform used for Video Visit: Carmenza      Again, thank you for allowing me to participate in the care of your patient.        Sincerely,        North Schneider MD    "

## 2022-08-05 NOTE — PROGRESS NOTES
"Lucina Santamaria is 56 year old  female who presents for a billable video visit today.    How would you like to obtain your AVS? MyChart  If dropped from the video visit, the video invitation should be resent by: Text to cell phone: [phone]  Will anyone else be joining your video visit? No      Video Start Time: 8:00 AM      Provider Notes:   HPI: Pt is here for follow up of a Laparoscopic Kris-en-Y Gastric Bypass. she is doing well. Taking po well. No vomiting. No fevers or chills. Ambulating without problems.       Ht 1.727 m (5' 8\")   Wt 138.3 kg (305 lb)   BMI 46.38 kg/m    [unfilled]  Body mass index is 46.38 kg/m .    EXAM:  GENERAL:Appears well  ABDOMEN: Incisions healing well      Assessment/Plan: Pt s/p Laparoscopic Kris-en-Y Gastric Bypass. Doing well. Drinking 60 oz a day.  Diet and activity discussed. she will f/u with us at the Bariatric Center in 3 months.    North Schneider MD ,MD  [facility] Department of Surgery      Video-Visit Details    Type of service:  Video Visit    Video End Time (time video stopped): 8:22 AM  Originating Location (pt. Location): Home    Distant Location (provider location):  [facility] SURGERY CLINIC AND BARIATRICS CARE [place]     Platform used for Video Visit: Carmenza  "

## 2022-08-09 ENCOUNTER — TELEPHONE (OUTPATIENT)
Dept: FAMILY MEDICINE | Facility: CLINIC | Age: 57
End: 2022-08-09

## 2022-08-09 DIAGNOSIS — T78.3XXA ANGIOEDEMA, INITIAL ENCOUNTER: ICD-10-CM

## 2022-08-09 DIAGNOSIS — L50.9 HIVES: Primary | ICD-10-CM

## 2022-08-09 RX ORDER — PREDNISONE 20 MG/1
20 TABLET ORAL 2 TIMES DAILY
Qty: 10 TABLET | Refills: 0 | Status: SHIPPED | OUTPATIENT
Start: 2022-08-09 | End: 2022-08-14

## 2022-08-09 NOTE — TELEPHONE ENCOUNTER
Patient woke up yesterday with periorbital swelling right eye, took benadryl. Spread to left eye area and face, worse today. Took zyrec in am, as well as benadryl.   Prednisone ordered, if responding, can decrease to 20 mg daily

## 2022-08-26 ENCOUNTER — VIRTUAL VISIT (OUTPATIENT)
Dept: SURGERY | Facility: CLINIC | Age: 57
End: 2022-08-26
Payer: COMMERCIAL

## 2022-08-26 DIAGNOSIS — K91.2 POSTOPERATIVE INTESTINAL MALABSORPTION: ICD-10-CM

## 2022-08-26 DIAGNOSIS — Z71.3 NUTRITIONAL COUNSELING: ICD-10-CM

## 2022-08-26 DIAGNOSIS — Z98.84 BARIATRIC SURGERY STATUS: Primary | ICD-10-CM

## 2022-08-26 PROCEDURE — 99024 POSTOP FOLLOW-UP VISIT: CPT | Performed by: DIETITIAN, REGISTERED

## 2022-08-26 NOTE — LETTER
8/26/2022         RE: Lucina Santamaria  [address] [place] [address]        Dear Colleague,    Thank you for referring your patient, Lucina Santamaria, to the [facility] SURGERY CLINIC AND BARIATRICS CARE [place]. Please see a copy of my visit note below.    Pt presents for 1 month post op dietitian follow up visit. Reports tolerating soft food diet well. Denies n/v, constipation/diarrhea, or significant pain. Taking MVI and Vitamin B12 appropriately.Instructed pt to begin taking 500 mg calcium citrate BID and 5000 IU Vitamin D3. Educated pt on soft to bariatric regular diets, medications, developing an exercise regimen, and other dietary points of care. Provided  Education handout on items discussed. Pt to follow up with CHRISTOPH at 3 months post op.     Have you been to the hospital or seen by a doctor for any reason since your surgery? Yes     If yes:  Have you been seen in an outpatient clinic or doctors office after your surgery? Yes   If yes, was this a routine follow-up? 2 week post-op   Date of visit: 8/5/2022  Have you experienced any health problems since your surgery? Hives     Did you go to an Emergency Department or hospital after your surgery? No    Did you have additional surgery(ies) during this hospitalization? No   Date of surgery: 7/25/2022        Again, thank you for allowing me to participate in the care of your patient.        Sincerely,        Cindy Vanegas RD

## 2022-08-26 NOTE — PROGRESS NOTES
Pt presents for 1 month post op dietitian follow up visit. Reports tolerating soft food diet well. Denies n/v, constipation/diarrhea, or significant pain. Taking MVI and Vitamin B12 appropriately.Instructed pt to begin taking 500 mg calcium citrate BID and 5000 IU Vitamin D3. Educated pt on soft to bariatric regular diets, medications, developing an exercise regimen, and other dietary points of care. Provided  Education handout on items discussed. Pt to follow up with RD at 3 months post op.     Have you been to the hospital or seen by a doctor for any reason since your surgery? Yes     If yes:  Have you been seen in an outpatient clinic or doctors office after your surgery? Yes   If yes, was this a routine follow-up? 2 week post-op   Date of visit: 8/5/2022  Have you experienced any health problems since your surgery? Hives     Did you go to an Emergency Department or hospital after your surgery? No    Did you have additional surgery(ies) during this hospitalization? No   Date of surgery: 7/25/2022

## 2022-09-01 ENCOUNTER — TELEPHONE (OUTPATIENT)
Dept: FAMILY MEDICINE | Facility: CLINIC | Age: 57
End: 2022-09-01

## 2022-09-01 DIAGNOSIS — L50.9 HIVES: Primary | ICD-10-CM

## 2022-09-01 DIAGNOSIS — Z98.84 STATUS POST BARIATRIC SURGERY: ICD-10-CM

## 2022-09-01 RX ORDER — DOXYCYCLINE HYCLATE 100 MG
100 TABLET ORAL 2 TIMES DAILY
Qty: 20 TABLET | Refills: 0 | Status: SHIPPED | OUTPATIENT
Start: 2022-09-01 | End: 2022-09-11

## 2022-09-01 NOTE — TELEPHONE ENCOUNTER
Since bypass surgery she has been getting hives, various locations.   Improved on prednisone, but returned.   Had researched it, and did happen to 1% of patients, 75% responded to a 10 day course of doxycycline. She would like to try.   ordered

## 2022-09-08 ENCOUNTER — TELEPHONE (OUTPATIENT)
Dept: FAMILY MEDICINE | Facility: CLINIC | Age: 57
End: 2022-09-08

## 2022-09-08 DIAGNOSIS — Z98.84 STATUS POST BARIATRIC SURGERY: ICD-10-CM

## 2022-09-08 DIAGNOSIS — L50.9 HIVES: Primary | ICD-10-CM

## 2022-09-08 DIAGNOSIS — B37.31 YEAST VAGINITIS: ICD-10-CM

## 2022-09-08 RX ORDER — PREDNISONE 20 MG/1
TABLET ORAL
Qty: 30 TABLET | Refills: 0 | Status: SHIPPED | OUTPATIENT
Start: 2022-09-08 | End: 2023-01-25

## 2022-09-08 RX ORDER — FLUCONAZOLE 150 MG/1
150 TABLET ORAL ONCE
Qty: 1 TABLET | Refills: 0 | Status: SHIPPED | OUTPATIENT
Start: 2022-09-08 | End: 2022-09-08

## 2022-10-24 ENCOUNTER — VIRTUAL VISIT (OUTPATIENT)
Dept: SURGERY | Facility: CLINIC | Age: 57
End: 2022-10-24
Payer: COMMERCIAL

## 2022-10-24 DIAGNOSIS — Z71.3 NUTRITIONAL COUNSELING: ICD-10-CM

## 2022-10-24 DIAGNOSIS — Z98.84 BARIATRIC SURGERY STATUS: Primary | ICD-10-CM

## 2022-10-24 DIAGNOSIS — K91.2 POSTOPERATIVE INTESTINAL MALABSORPTION: ICD-10-CM

## 2022-10-24 PROCEDURE — 99024 POSTOP FOLLOW-UP VISIT: CPT | Performed by: DIETITIAN, REGISTERED

## 2022-10-24 NOTE — PROGRESS NOTES
Lucina Santamaria is a 57 year old who is being evaluated via a billable video visit.      How would you like to obtain your AVS? MyChart  If the video visit is dropped, the invitation should be resent by: Send to e-mail at: [email or web address]  Will anyone else be joining your video visit? No      Video Start Time: 8:00 AM      Post-op Surgical Weight Loss Diet Evaluation     Assessment:  Pt presents for 3 months post-op RD visit, s/p RNY ZARINA on 7/25/2022 with Dr. Schneider. Today we reviewed current eating habits and level of physical activity, and instructed on the changes that are required for successful bariatric outcomes.    Patient Progress: going well    Initial weight: 315 lbs  Current weight: 270 lbs   Weight change: 45 lbs (down)    There is no height or weight on file to calculate BMI.    Patient Active Problem List   Diagnosis     CARDIOVASCULAR SCREENING; LDL GOAL LESS THAN 130     Obesity, morbid, BMI 50 or higher (H)     Elevated blood pressure reading without diagnosis of hypertension     Edema, unspecified type     History of MTHFR mutation     Hypothyroidism     Pneumonia     Morbid obesity due to excess calories (H)       Diabetes: No     Vitamins   Multi Vit with Iron: yes  Calcium Citrate: yes  B12: yes  D3: yes    Nausea: yes-however due to COVID  Hair loss:yes    Diet Recall/Time:   Breakfast: eggs, cottage cheese or yogurt with fruit, oats  Lunch: leftovers   Dinner: chicken or beef (shredded), vegetables, occasional bread (35 calorie) or mashed potatoes or split pea and ham soup     Typical Snacks: Protein Shake     Proteins/Veg/Fruits/CHO (NOT well tolerated): nothing really     Estimated protein intake: 60 grams    Estimated portion size per meals:1/4-1/2 cup/meal    Meal Duration:20-30 minutes; at work tends to be an issue    Fluid-meal separation:  Fluids are  30min before and 30 minutes after meals.    Fluid Intake  Water: at least 48 oz/day; occasional V8 juice   Caffeine: on  "occasion coffee     Exercise: 30 minutes/day (raking, yardwork, walking)      PES statement:      (NC-1.4) Altered GI Function related to Alteration in gastrointestinal tract structure and/or function/ Decreased functional length of the GI tract as evidenced by Weight loss of 14% initial body weight; Gastric bypass surgery    Intervention    Discussion  1. Discussed 3 months Post-Op Nutritional Guidelines for RNY GB  2. Recommended to consume 15-20gm protein at 3 meals daily, along with protein supplement/\"planned protein containing snack\" of 15-30gm protein, to reach goal of 60-80 gm protein daily.  3. Educated on post-op vitamin regimen: Multi Vit + iron 2x/day, calcium citrate 400-600 mg 2x/day, 2448-3790 mcg of Sublingual B-12 daily, and 5000 IU Vitamin D3 daily (MVI and calcium can be taken at the same time BID)  4. Reviewed lean protein sources  5. Bariatric Plate Method-  including lean/low fat protein at each meal, including a vegetable/fruit, and limiting carbohydrate intake to less than 25% of plate volume.     Instructions  1. Include 15-20gm protein at each meal, along with protein supplement/\"planned protein containing snack\" of 15-30gm protein, to reach goal of 60-80 gm protein daily.  2. Increase fluid intake to 64oz daily: choose plain or calorie/carbonation-free beverages.  3. Incorporate daily structured activity, 30-60 minutes most days of the week  4. Recommended pt to start taking: Multi Vit + iron 2x/day, calcium citrate 400-600 mg 2x/day, 2824-3380 mcg of Sublingual B-12 daily, and 5000 IU Vitamin D3 daily. (MVI and calcium can be taken at the same time)  5. Read food labels more consistently: keeping total fat grams <10, total sugar grams <10, fiber >3gm per serving.  6. Increase vegetable/fruit intake, by having a vegetable or fruit with each meal daily.  7. Practice plate method: 1/2 plate lean/low fat protein source, vegetable/fruit, <25% of plate complex carbohydrates.  8. Separate fluids " 30 minutes before/after meal times.  9. Practice eating off of smaller plates/bowls, chewing to applesauce consistency, taking 20-30 minutes to eat in a calm/relaxed environment without distractions of tv/email/cell phone.    Handouts provided:  3 months Post-Op Nutritional Guidelines for RNY Gb    Assessment/Plan:    Pt to follow up for 6 months post-op visit with bariatrician       Video-Visit Details    Type of service:  Video Visit    Video End Time:8:21 AM    Originating Location (pt. Location): Home    Distant Location (provider location):  [facility] SURGERY CLINIC AND BARIATRICS CARE [place]     Platform used for Video Visit: Carmenza Vanegas RD

## 2022-10-24 NOTE — LETTER
10/24/2022         RE: Lucina Santamaria  [address]  [place] [address]        Dear Colleague,    Thank you for referring your patient, Lucina Santamaria, to the [facility] [place]. Please see a copy of my visit note below.    Lucina Santamaria is a 57 year old who is being evaluated via a billable video visit.      How would you like to obtain your AVS? MyChart  If the video visit is dropped, the invitation should be resent by: Send to e-mail at: [email or web address]  Will anyone else be joining your video visit? No      Video Start Time: 8:00 AM      Post-op Surgical Weight Loss Diet Evaluation     Assessment:  Pt presents for 3 months post-op RD visit, s/p RNY ZARINA on 7/25/2022 with Dr. Schneider. Today we reviewed current eating habits and level of physical activity, and instructed on the changes that are required for successful bariatric outcomes.    Patient Progress: going well    Initial weight: 315 lbs  Current weight: 270 lbs   Weight change: 45 lbs (down)    There is no height or weight on file to calculate BMI.    Patient Active Problem List   Diagnosis     CARDIOVASCULAR SCREENING; LDL GOAL LESS THAN 130     Obesity, morbid, BMI 50 or higher (H)     Elevated blood pressure reading without diagnosis of hypertension     Edema, unspecified type     History of MTHFR mutation     Hypothyroidism     Pneumonia     Morbid obesity due to excess calories (H)       Diabetes: No     Vitamins   Multi Vit with Iron: yes  Calcium Citrate: yes  B12: yes  D3: yes    Nausea: yes-however due to COVID  Hair loss:yes    Diet Recall/Time:   Breakfast: eggs, cottage cheese or yogurt with fruit, oats  Lunch: leftovers   Dinner: chicken or beef (shredded), vegetables, occasional bread (35 calorie) or mashed potatoes or split pea and ham soup     Typical Snacks: Protein Shake     Proteins/Veg/Fruits/CHO (NOT well tolerated): nothing really     Estimated protein intake: 60  "grams    Estimated portion size per meals:1/4-1/2 cup/meal    Meal Duration:20-30 minutes; at work tends to be an issue    Fluid-meal separation:  Fluids are  30min before and 30 minutes after meals.    Fluid Intake  Water: at least 48 oz/day; occasional V8 juice   Caffeine: on occasion coffee     Exercise: 30 minutes/day (raking, yardwork, walking)      PES statement:      (NC-1.4) Altered GI Function related to Alteration in gastrointestinal tract structure and/or function/ Decreased functional length of the GI tract as evidenced by Weight loss of 14% initial body weight; Gastric bypass surgery    Intervention    Discussion  1. Discussed 3 months Post-Op Nutritional Guidelines for RNY GB  2. Recommended to consume 15-20gm protein at 3 meals daily, along with protein supplement/\"planned protein containing snack\" of 15-30gm protein, to reach goal of 60-80 gm protein daily.  3. Educated on post-op vitamin regimen: Multi Vit + iron 2x/day, calcium citrate 400-600 mg 2x/day, 6170-6098 mcg of Sublingual B-12 daily, and 5000 IU Vitamin D3 daily (MVI and calcium can be taken at the same time BID)  4. Reviewed lean protein sources  5. Bariatric Plate Method-  including lean/low fat protein at each meal, including a vegetable/fruit, and limiting carbohydrate intake to less than 25% of plate volume.     Instructions  1. Include 15-20gm protein at each meal, along with protein supplement/\"planned protein containing snack\" of 15-30gm protein, to reach goal of 60-80 gm protein daily.  2. Increase fluid intake to 64oz daily: choose plain or calorie/carbonation-free beverages.  3. Incorporate daily structured activity, 30-60 minutes most days of the week  4. Recommended pt to start taking: Multi Vit + iron 2x/day, calcium citrate 400-600 mg 2x/day, 0213-0241 mcg of Sublingual B-12 daily, and 5000 IU Vitamin D3 daily. (MVI and calcium can be taken at the same time)  5. Read food labels more consistently: keeping total fat " grams <10, total sugar grams <10, fiber >3gm per serving.  6. Increase vegetable/fruit intake, by having a vegetable or fruit with each meal daily.  7. Practice plate method: 1/2 plate lean/low fat protein source, vegetable/fruit, <25% of plate complex carbohydrates.  8. Separate fluids 30 minutes before/after meal times.  9. Practice eating off of smaller plates/bowls, chewing to applesauce consistency, taking 20-30 minutes to eat in a calm/relaxed environment without distractions of tv/email/cell phone.    Handouts provided:  3 months Post-Op Nutritional Guidelines for RNY Gb    Assessment/Plan:    Pt to follow up for 6 months post-op visit with bariatrician       Video-Visit Details    Type of service:  Video Visit    Video End Time:8:21 AM    Originating Location (pt. Location): Home    Distant Location (provider location):  [facility] [place]     Platform used for Video Visit: Carmenza Vanegas RD          Again, thank you for allowing me to participate in the care of your patient.        Sincerely,        Cindy Vanegas RD

## 2022-11-21 ENCOUNTER — HEALTH MAINTENANCE LETTER (OUTPATIENT)
Age: 57
End: 2022-11-21

## 2022-12-26 ENCOUNTER — MYC MEDICAL ADVICE (OUTPATIENT)
Dept: SURGERY | Facility: CLINIC | Age: 57
End: 2022-12-26

## 2022-12-26 DIAGNOSIS — Z98.84 BARIATRIC SURGERY STATUS: Primary | ICD-10-CM

## 2022-12-26 DIAGNOSIS — Z87.898 HISTORY OF PREDIABETES: ICD-10-CM

## 2022-12-26 DIAGNOSIS — K91.2 POSTOPERATIVE INTESTINAL MALABSORPTION: ICD-10-CM

## 2022-12-26 NOTE — TELEPHONE ENCOUNTER
Internal 6 months post-op RNY labs needed for appointment with Arnulfo Stanley MD on 1/25/2023.

## 2023-01-05 ENCOUNTER — TELEPHONE (OUTPATIENT)
Dept: FAMILY MEDICINE | Facility: CLINIC | Age: 58
End: 2023-01-05

## 2023-01-05 DIAGNOSIS — J01.90 ACUTE SINUSITIS WITH SYMPTOMS > 10 DAYS: Primary | ICD-10-CM

## 2023-01-24 ENCOUNTER — LAB (OUTPATIENT)
Dept: LAB | Facility: CLINIC | Age: 58
End: 2023-01-24
Payer: COMMERCIAL

## 2023-01-24 DIAGNOSIS — Z87.898 HISTORY OF PREDIABETES: ICD-10-CM

## 2023-01-24 DIAGNOSIS — K91.2 POSTOPERATIVE INTESTINAL MALABSORPTION: ICD-10-CM

## 2023-01-24 DIAGNOSIS — Z98.84 BARIATRIC SURGERY STATUS: ICD-10-CM

## 2023-01-24 LAB
ALBUMIN SERPL BCG-MCNC: 4.2 G/DL (ref 3.5–5.2)
ALP SERPL-CCNC: 92 U/L (ref 35–104)
ALT SERPL W P-5'-P-CCNC: 15 U/L (ref 10–35)
ANION GAP SERPL CALCULATED.3IONS-SCNC: 9 MMOL/L (ref 7–15)
AST SERPL W P-5'-P-CCNC: 16 U/L (ref 10–35)
BILIRUB SERPL-MCNC: 0.4 MG/DL
BUN SERPL-MCNC: 11 MG/DL (ref 6–20)
CALCIUM SERPL-MCNC: 9.9 MG/DL (ref 8.6–10)
CHLORIDE SERPL-SCNC: 105 MMOL/L (ref 98–107)
CHOLEST SERPL-MCNC: 166 MG/DL
CREAT SERPL-MCNC: 0.67 MG/DL (ref 0.51–0.95)
DEPRECATED HCO3 PLAS-SCNC: 27 MMOL/L (ref 22–29)
ERYTHROCYTE [DISTWIDTH] IN BLOOD BY AUTOMATED COUNT: 12.1 % (ref 10–15)
FERRITIN SERPL-MCNC: 158 NG/ML (ref 11–328)
FOLATE SERPL-MCNC: 9.4 NG/ML (ref 4.6–34.8)
GFR SERPL CREATININE-BSD FRML MDRD: >90 ML/MIN/1.73M2
GLUCOSE SERPL-MCNC: 107 MG/DL (ref 70–99)
HBA1C MFR BLD: 5.3 % (ref 0–5.6)
HCT VFR BLD AUTO: 42.2 % (ref 35–47)
HDLC SERPL-MCNC: 60 MG/DL
HGB BLD-MCNC: 14.2 G/DL (ref 11.7–15.7)
LDLC SERPL CALC-MCNC: 82 MG/DL
MCH RBC QN AUTO: 33.6 PG (ref 26.5–33)
MCHC RBC AUTO-ENTMCNC: 33.6 G/DL (ref 31.5–36.5)
MCV RBC AUTO: 100 FL (ref 78–100)
NONHDLC SERPL-MCNC: 106 MG/DL
PLATELET # BLD AUTO: 187 10E3/UL (ref 150–450)
POTASSIUM SERPL-SCNC: 4.3 MMOL/L (ref 3.4–5.3)
PROT SERPL-MCNC: 7.2 G/DL (ref 6.4–8.3)
PTH-INTACT SERPL-MCNC: 47 PG/ML (ref 15–65)
RBC # BLD AUTO: 4.22 10E6/UL (ref 3.8–5.2)
SODIUM SERPL-SCNC: 141 MMOL/L (ref 136–145)
TRIGL SERPL-MCNC: 118 MG/DL
VIT B12 SERPL-MCNC: 306 PG/ML (ref 232–1245)
WBC # BLD AUTO: 6.2 10E3/UL (ref 4–11)

## 2023-01-24 PROCEDURE — 85027 COMPLETE CBC AUTOMATED: CPT

## 2023-01-24 PROCEDURE — 36415 COLL VENOUS BLD VENIPUNCTURE: CPT

## 2023-01-24 PROCEDURE — 84590 ASSAY OF VITAMIN A: CPT | Mod: 90

## 2023-01-24 PROCEDURE — 84425 ASSAY OF VITAMIN B-1: CPT | Mod: 90

## 2023-01-24 PROCEDURE — 84630 ASSAY OF ZINC: CPT | Mod: 90

## 2023-01-24 PROCEDURE — 83970 ASSAY OF PARATHORMONE: CPT

## 2023-01-24 PROCEDURE — 99000 SPECIMEN HANDLING OFFICE-LAB: CPT

## 2023-01-24 PROCEDURE — 83036 HEMOGLOBIN GLYCOSYLATED A1C: CPT

## 2023-01-24 PROCEDURE — 80061 LIPID PANEL: CPT

## 2023-01-24 PROCEDURE — 80053 COMPREHEN METABOLIC PANEL: CPT

## 2023-01-24 PROCEDURE — 82746 ASSAY OF FOLIC ACID SERUM: CPT

## 2023-01-24 PROCEDURE — 82306 VITAMIN D 25 HYDROXY: CPT

## 2023-01-24 PROCEDURE — 82607 VITAMIN B-12: CPT

## 2023-01-24 PROCEDURE — 82728 ASSAY OF FERRITIN: CPT

## 2023-01-25 ENCOUNTER — VIRTUAL VISIT (OUTPATIENT)
Dept: SURGERY | Facility: CLINIC | Age: 58
End: 2023-01-25
Payer: COMMERCIAL

## 2023-01-25 VITALS — WEIGHT: 245 LBS | HEIGHT: 68 IN | BODY MASS INDEX: 37.13 KG/M2

## 2023-01-25 DIAGNOSIS — Z98.84 S/P GASTRIC BYPASS: ICD-10-CM

## 2023-01-25 DIAGNOSIS — K91.2 POSTOPERATIVE MALABSORPTION: Primary | ICD-10-CM

## 2023-01-25 DIAGNOSIS — E66.812 OBESITY, CLASS II, BMI 35-39.9: ICD-10-CM

## 2023-01-25 DIAGNOSIS — Z87.898 HISTORY OF PREDIABETES: ICD-10-CM

## 2023-01-25 PROCEDURE — 99214 OFFICE O/P EST MOD 30 MIN: CPT | Mod: 95 | Performed by: EMERGENCY MEDICINE

## 2023-01-25 NOTE — LETTER
1/25/2023         RE: Lucina Santamaria  [address]  [place] [address]        Dear Colleague,    Thank you for referring your patient, Lucina Santamaria, to the [facility] [place]. Please see a copy of my visit note below.    Bariatric Follow Up Visit with a History of Previous Bariatric Surgery     Date of visit: 1/24/2023  Physician: Arnulfo Stanley MD, MD  Primary Care Provider:  Nani Pierson  Lucina Santamaria   57 year old  female    Date of Surgery: 7/25/22  Initial Weight: 333 lbs  Initial BMI: 50.6.  Today's Weight:   Wt Readings from Last 1 Encounters:   01/25/23 111.1 kg (245 lb)     Weight history:   Wt Readings from Last 4 Encounters:   01/25/23 111.1 kg (245 lb)   08/05/22 138.3 kg (305 lb)   07/25/22 142.9 kg (315 lb)   07/14/22 142 kg (313 lb)      Body mass index is 37.25 kg/m .      Assessment and Plan     Assessment: Lucina is a 57 year old year old female who is 6 months s/p  Kris en Y Gastric Bypass with Dr. Schneider.  She is doing very well with excellent bariatric habits/vitamin support. Weight is down 88 lbs from introductory weight, 13 BMI points lower and  a 26.4% total body weight reduction thus far.      Her 1/24/23 labs are partially back today and showed excellent lipids, borderline low B12, likely related to some difficulty she had tolerating B12 liquid (very itchy and hives). She's using sublingual now and less issues but can still feel sensitive to it. She tried eliminating other food triggers and not clear that she was reacting to anything else. She was open to using the 1000mcg of SL B12 3 days weekly unless clear allergy issues are triggered. If developing clear association/worsening reactions then check with Allergist would be warranted for which additives she's reacting to. .         Lucina Santamaria feels as if she has achieved the goals she hoped to accomplish through bariatric surgery and weight loss.    No diagnosis  found.      Current Outpatient Medications:      Ascorbic Acid (VITAMIN C) 500 MG CAPS, Take 1,000 mg by mouth 2 times daily Do not start taking after surgery until advised by dietician in post-op class., Disp: , Rfl:      cyanocobalamin (VITAMIN B-12) 1000 MCG SUBL sublingual tablet, Place 1 tablet (1,000 mcg) under the tongue daily Start right after surgery., Disp: 90 tablet, Rfl: 3     levothyroxine (SYNTHROID/LEVOTHROID) 75 MCG tablet, Take 1 tablet (75 mcg) by mouth daily, Disp: 90 tablet, Rfl: 3     Pediatric Multivitamins-Iron (MULTIVITAMINS PLUS IRON CHILD) 18 MG CHEW, Take 1 chew tab by mouth 2 times daily Ok to substitute with any chewable that contains 18 mg of iron, Vitamin A, Thiamine and Zinc., Disp: 180 tablet, Rfl: 3     vitamin D3 (CHOLECALCIFEROL) 50 mcg (2000 units) tablet, Take 1 tablet by mouth daily Do not start taking after surgery until advised by dietician in post-op class., Disp: , Rfl:     Plan:    Return in about 6 months (around 7/25/2023) for Follow up, with me.    Bariatric Surgery Review     Interim History/LifeChanges: has been doing well. May get hives from B12 liquid.    Patient Concerns: check in   Appetite (1-10): good  GERD: no.    Reviewed whether any need/indication for screening EGD today and we will deferred.  Typically, a screening EGD is recommend post op year 2-3 if no symptoms to assess health of esophagus/bariatric surgery and sooner if difficult to control GERD or persistent pain/dysphagia sx despite behavior modification.    Medication changes:     Vitamin Intake:   B-12   irregular use due to some itchiness.    MVI  Flintstones. Will switch    Vitamin D  2000 IUs   Calcium   dairy.     Other  protein bar/shake. Power crunch bar is 20g protein. Quick Crunch is another type. Will bring to work if longer days.              LABS: partially reviewed    Nausea none since first month. Intolerant to rice.   Vomiting no  Constipation no  Diarrhea no  Rashes no  Hair Loss yes.  "Lots but has slowed.   Reactive Hypoglycemia none. Avoiding sweets helps.   Light Headedness none.   Moods none.      Most recent labs:  Lab Results   Component Value Date    WBC 6.2 01/24/2023    HGB 14.2 01/24/2023    HCT 42.2 01/24/2023     01/24/2023     01/24/2023     Lab Results   Component Value Date    CHOL 166 01/24/2023     Lab Results   Component Value Date    HDL 60 01/24/2023     No components found for: LDLCALC  Lab Results   Component Value Date    TRIG 118 01/24/2023     No results found for: CHOLHDL  Lab Results   Component Value Date    ALT 15 01/24/2023    AST 16 01/24/2023    ALKPHOS 92 01/24/2023     No results found for: HGBA1C  Lab Results   Component Value Date    B12 306 01/24/2023     No components found for: VITDT1  Lab Results   Component Value Date    FERNANDO 158 01/24/2023     Lab Results   Component Value Date    PTHI 47 01/24/2023     Lab Results   Component Value Date    ZN 89.1 09/23/2021     Lab Results   Component Value Date    VIB1WB 102 09/23/2021     Lab Results   Component Value Date    TSH 1.80 07/14/2022     No results found for: TEST    Habits:  Tobacco/Nicotine/THC exposure? no   NSAID use? no   Alcohol use? Glass of wine over holidays.    Caffeine Habits? Rarely decaf       Exercise Routine: started up before candida w/ home weight sets. 3 days weekly for at least 20 minutes. 45-60min walking   3 meals/day? yes  Protein 60-80g/day? yes  Water Separate from meals? yes  Calorie Containing Beverages: no  Restaurant eating/wk: n/a  Sleep Habits:  Working hard to get 8 hours. Most nights.   CPAP Use? /na  Contraception: n/a  DEXA:will be due in 18-36 months.  Discussed annual screening to start at age 45 and continue to age 55 if scoring \"low risk\". DEXA scan recommended at age 55 regardless as long as at least 2 years have transpired from their bariatric surgery.    Social History     Social History     Socioeconomic History     Marital status:      Spouse " name: Not on file     Number of children: Not on file     Years of education: Not on file     Highest education level: Not on file   Occupational History     Not on file   Tobacco Use     Smoking status: Never     Smokeless tobacco: Never   Substance and Sexual Activity     Alcohol use: Yes     Comment: rare, once monthly.     Drug use: No     Sexual activity: Yes     Partners: Male     Birth control/protection: Surgical   Other Topics Concern     Parent/sibling w/ CABG, MI or angioplasty before 65F 55M? Not Asked   Social History Narrative     Not on file     Social Determinants of Health     Financial Resource Strain: Not on file   Food Insecurity: Not on file   Transportation Needs: Not on file   Physical Activity: Not on file   Stress: Not on file   Social Connections: Not on file   Intimate Partner Violence: Not on file   Housing Stability: Not on file       Past Medical History     Past Medical History:   Diagnosis Date     Acquired hypothyroidism      Arthritis     sacro iliac and hip, right ankle.     Chronic constipation     since menopause. fiber/hydration helps.     Coagulation disorder (H)      Complication of anesthesia     nauseated w/ morphine previously after arm surgery.     COVID-19     .     Gallbladder problem     s/p cholecystectomy.     Head injury     childhood concussions     Heterozygous for MTHFR gene mutation      History of MTHFR mutation      Hypertension      Long COVID     Long term Covid     Morbid obesity (H)      Pneumonia     covid     Prediabetes      Pulmonary emboli (H)      Pulmonary embolism (H)     wisdome teeth out while on OCPs and MTHFR gene mutation history.     Pulmonary embolism (H)     age 23, OCPs/dental extraction provoked. reports some MTHFR gene issues, unsure if homo or heterozygous.     Past Surgical History:   Procedure Laterality Date     APPENDECTOMY       BREAST BIOPSY, RT/LT      right breast - benign      SECTION        "COLONOSCOPY N/A 11/10/2021    Procedure: COLONOSCOPY, WITH POLYPECTOMY;  Surgeon: Lynn Doshi MD;  Location:  GI     CREATION, GASTRIC BYPASS, KRIS-EN-Y, LAPAROSCOPIC N/A 2022    Procedure: LAPAROSCOPIC KRIS-EN-Y, GASTRIC BYPASS;  Surgeon: North Schneider MD;  Location: [facility] OR     GYN SURGERY      tubal ligation     LAPAROSCOPIC CHOLECYSTECTOMY N/A 2019    Procedure: CHOLECYSTECTOMY, LAPAROSCOPIC;  Surgeon: Lynn Doshi MD;  Location:  OR     ORTHOPEDIC SURGERY      radius, ulnar - open fracture     TUBAL LIGATION       wisdom teeth         Problem List     Patient Active Problem List   Diagnosis     CARDIOVASCULAR SCREENING; LDL GOAL LESS THAN 130     Obesity, morbid, BMI 50 or higher (H)     Elevated blood pressure reading without diagnosis of hypertension     Edema, unspecified type     History of MTHFR mutation     Hypothyroidism     Pneumonia     Morbid obesity due to excess calories (H)     Medications     [unfilled]  Surgical History     Past Surgical History  She has a past surgical history that includes appendectomy (); orthopedic surgery (); GYN surgery ();  section (); breast biopsy, rt/lt (); Laparoscopic cholecystectomy (N/A, 2019); wisdom teeth; tubal ligation; Colonoscopy (N/A, 11/10/2021); and Creation, Gastric Bypass, Kris-En-Y, Laparoscopic (N/A, 2022).    Objective-Exam     Constitutional:  Ht 1.727 m (5' 8\")   Wt 111.1 kg (245 lb)   BMI 37.25 kg/m    [unfilled]   General:  Pleasant and in no acute distress   Eyes:  EOMI  ENT:  Airway patent    Neck:  Respiratory: Normal respiratory effort, no cough, .  CV:    Gastrointestinal: n/a  Musculoskeletal: muscle mass WNL  Skin: color without pallor hair thick/long but she states she's lost a lot.,   Psychiatric: alert and oriented X3, mood and affect normal    Counseling     We reviewed the important post op bariatric recommendations:  -eating 3 meals " daily  -eating protein first, getting >60gm protein daily  -eating slowly, chewing food well  -avoiding/limiting calorie containing beverages  -drinking water 15-30 minutes before or after meals  -limiting restaurant or cafeteria eating to twice a week or less    We discussed the importance of restorative sleep and stress management in maintaining a healthy weight.  We discussed the National Weight Control Registry healthy weight maintenance strategies and ways to optimize metabolism.  We discussed the importance of physical activity including cardiovascular and strength training in maintaining a healthier weight.    We discussed the importance of life-long vitamin supplementation and life-long  follow-up.    Lucina was reminded that, to avoid marginal ulcers she should avoid tobacco at all, alcohol in excess, caffeine in excess, and NSAIDS (unless indicated for cardioprotection or othewise and opposed by a PPI).    Lucina Santamaria is 57 year old  female who presents for a billable video visit today.    How would you like to obtain your AVS? MyChart  If dropped from the video visit, the video invitation should be resent by: Text to cell phone: [phone]  Will anyone else be joining your video visit? No      Video Start Time: 12:00 PM    Are there any specific questions or needs that you would like addressed at your visit today?           Video-Visit Details    Type of service:  Video Visit    Platform used for Video Visit: WheresTheBus    Video End Time (time video stopped): 12:36 PM    Originating Location (pt. Location): Home        Distant Location (provider location):  On-site    Distant Location (provider location):  [facility]ATRICS [facility] [place] Stanley MD    [facility].  2023  7:23 PM  [phone] (clinic phone)  [phone] (fax)    No images are attached to the encounter.  Medical Decision Makin minutes spent on the  date of the encounter doing chart review, history and exam, documentation and further activities per the note      Again, thank you for allowing me to participate in the care of your patient.        Sincerely,        Arnulfo Stanley MD

## 2023-01-25 NOTE — PATIENT INSTRUCTIONS
Plan:  Great work thus far with over 13 point BMI reduction, 26% body weight reduction and good vitamin/nutritional support.    2. Avoid liquid B12 and just use sublingual 1000mcg B12 at least 2-3 days weekly to support your needs.     3. Continue getting about 100mcg/day from all sources of vitamin D.  If switching to the Equate Complete multivitamin twice daily, your current 2000IU D3 supplement should hit total intake goal of 100mcg/day (4000IUs).    4. Continue strength and endurance work to help toning/muscle mass/bone density support and metabolic drive over the long term.     5. Follow up with dietician in 2-3 months and repeat visit with me in 6 months. We'll repeat labs again at that 1 year post surgery visit.      [facility]  Nutritional Guidelines  Gastric Bypass 6 Months Post Op    General Guidelines and Helpful Hints:  Eat 3 meals per day + protein supplement(s). No snacks between meals.  Do not skip meals.  This can cause overeating at the next meal and will prevent adequate protein and nutritional intake.  Aim for 60-80 grams of protein per day.   Always eat your protein first. This assists with optimal nutrition and helps you stay full longer.  To achieve daily protein goals, it is recommended to drink approved protein supplement between meals.  Follow appropriate portion size: Use measuring cups to be accurate.    Months Post Op Portion Size per Meal   6 months 1/2 cup   7-8 months 1/2 - 2/3 cup   8-9 months 2/3 - 3/4 cup   10-12 months 3/4 - 1 cup   12 months and beyond 1 cup maximum     Continue to use saucer/salad plates, infant/toddler silverware to keep portion sizes small and take small bites.  Eat S-L-O-W-L-Y to make each meal last 20-30 minutes. Always stop eating when satisfied.  Continue to use caution with foods containing skins, peels or membranes. Chew well!  Aim for 64 oz. of calorie-free fluids daily.  Continue to avoid caffeine, carbonation and alcohol.  Remember to  avoid drinking during meals, 15-30 minutes before and 30 minutes after.  Aim for 30-60 minutes of physical activity most days of the week.  If having trouble tolerating meat, try using a crock-pot, tinfoil tent, steamer or other moist cooking method to create tender meats. Add broth or low-fat gravy to help meat stay moist.   Avoid high sugar and high fat foods to prevent dumping syndrome.  Check nutrition labels for less than 10 grams of sugar and less than 10 grams of fat per serving.  Continue Taking Vitamins/Minerals:  1502-3308 mcg of Sublingual B-12 daily  1 Multivitamin with Iron twice daily (chewable or swallow tabs)  500-600 mg Calcium Citrate twice daily (chewable or swallow tabs)  5000 IU Vitamin D3 daily    Sample Grocery List    Protein:  Fat free Greek or light yogurt (less than 10 grams sugar)  Fat free or low-fat cottage cheese  String cheese or reduced fat cheese slices  Tuna, salmon, crab, egg, or chicken salad made with light or fat free mayonnaise  Egg or Egg Substitute  Lean/extra lean turkey, beef, bison, venison (ground, sirloin, round, flank)  Pork loin or tenderloin (grilled, baked, broiled)  Fish such as salmon, tuna, trout, tilapia, etc. (grilled, baked, broiled)  Tender cuts of lean (skinless) turkey or chicken  Lean deli meats: turkey, lean ham, chicken, lean roast beef  Beans such as kidney, garbanzo, black, stein, or low-fat/fat free refried beans  Peanut butter (natural preferred). Limit to 1 Tbsp. per day.  Low-fat meatloaf (made with lean ground beef or turkey)  Sloppy Joes made with low-sugar ketchup and lean ground beef or turkey  Soy or vegetable protein (i.e. vegan crumbles, soy/veggie burger, tofu)  Hummus    Vegetables:  Fresh: cooked or raw (as tolerated)  Frozen vegetables  Canned vegetables (low sodium or no salt added, rinse before cooking/eating)  (Ok to have skins/peels/membranes/seeds - just chew well)    Fruits:  Fresh fruit  Frozen fruit (no sugar added)  Canned fruit  (packed in its own juice, NOT syrup)  (Ok to have skins/peels/membranes/seeds - just chew well)    Starch:  Unsweetened whole-grain hot cereal (or high fiber cold cereal, dry)  Toasted whole wheat bread or [place] Thins  Whole grain crackers  Baked/boiled/mashed potato/sweet potato  Cooked whole grain pasta, brown rice, or other cooked whole grains  Starchy vegetables: corn, peas, winter squash      Protein Supplement:   Ready to drink protein shake with:  15-30 grams protein per serving  Less than 10 grams total carbohydrate per serving   Protein powder mixed with:   Skim or 1% milk  Low fat or fat free Lactaid milk, plain or no sugar added soymilk  Water     Fats: (use in moderation)  1 teaspoon of soft tub margarine  1 teaspoon olive oil, canola oil, or peanut oil  1 tablespoon of low-fat hidalgo or salad dressing     Sample Menu for 6 months after Gastric Bypass    You do NOT need to eat/drink the full portion sizes listed below  Always stop when you are satisfied  Breakfast 6 Tbsp. 1% cottage cheese   2 Tbsp. peaches    Lunch 2 oz. lean hamburger or veggie burger  1-2 tsp. salsa   Supplement Approved Protein Shake   (Have between meals throughout the day)   Dinner 6 Tbsp. chicken breast  1-2 Tbsp. green beans     Breakfast 2 Eggs or   cup scrambled egg substitute product   Lunch 7 Tbsp. low-fat Sloppy Larry mixture   2 high fiber crackers   Supplement Approved Protein Shake   (Have between meals throughout the day)   Dinner 6 Tbsp. grilled, broiled, or baked lemon pepper salmon  2 Tbsp. asparagus     Breakfast 6 Tbsp. light yogurt with 2 Tbsp. Grape Nuts or high fiber cereal    Lunch   cup of chili made with extra lean ground beef and kidney beans   Dinner 5 Tbsp. pork loin made in a crock pot  2 Tbsp. cooked broccoli  1 Tbsp. baked potato with 2 sprays of spray margarine    Supplement Approved Protein Shake   (Have between meals throughout the day)     Breakfast 6 Tbsp. lean ham  2 Tbsp. seedless melon   Lunch 7  Tbsp. diced turkey with 1 teaspoon low-fat gravy  1 Tbsp. green beans   Dinner 6 Tbsp. extra lean ground beef mixed with low sugar spaghetti sauce  2 Tbsp. whole wheat pasta   Supplement Approved Protein Shake   (Have between meals throughout the day)     Breakfast 2 oz turkey or soy based sausage stephania    Lunch 6 Tbsp. low-fat cottage cheese  2 Tbsp. pineapple   Supplement Approved Protein Shake   (Have between meals throughout the day)   Dinner 7 Tbsp. beef tenderloin  1 Tbsp. asparagus     Breakfast 1/2 of a whole wheat English Muffin (toasted)  1 Tbsp. creamy natural peanut butter   Lunch   cup of black bean soup with 1 Tbsp. low fat shredded cheese   Dinner 7 Tbsp. low-fat turkey meat loaf   1 Tbsp. cooked carrots   Supplement Approved Protein Shake   (Have between meals throughout the day)     Breakfast 6 Tbsp. scrambled egg or scrambled egg substitute  1 Tbsp. finely chopped bell pepper  1 Tbsp. low fat shredded cheese   Lunch 7 Tbsp. lean diced ham  1 Tbsp. mandarin oranges   Supplement Approved Protein Shake   (Have between meals throughout the day)   Dinner 6 Tbsp. flaked fish   2 Tbsp. mashed sweet potato      LEAN PROTEIN SOURCES  Getting 20-30 grams of protein, 3 meals daily, is appropriate for most people, some need more but more than about 40 grams per meal is not useful.  General rule is drinking one ounce of water per gram of protein eaten over the course of the day:  70 grams of protein each day, drink 70 oz of water.  Protein Source Portion Calories Grams of Protein                           Nonfat, plain Greek yogurt    (10 grams sugar or less) 3/4 cup (6 oz)  12-17   Light Yogurt (10 grams sugar or less) 3/4 cup (6 oz)  6-8   Protein Shake 1 shake 110-180 15-30   Skim/1% Milk or lactose-free milk 1 cup ( 8 oz)  8   Plain or light, flavored soymilk 1 cup  7-8   Plain or light, hemp milk 1 cup 110 6   Fat Free or 1% Cottage Cheese 1/2 cup 90 15   Part skim ricotta cheese  1/2 cup 100 14   Part skim or reduced fat cheese slices 1 ounce 65-80 8     Mozzarella String Cheese 1 80 8   Canned tuna, chicken, crab or salmon  (canned in water)  1/2 cup 100 15-20   White fish (broiled, grilled, baked) 3 ounces 100 21   Welcome/Tuna (broiled, grilled, baked) 3 ounces 150-180 21   Shrimp, Scallops, Lobster, Crab 3 ounces 100 21   Pork loin, Pork Tenderloin 3 ounces 150 21   Boneless, skinless chicken /turkey breast                          (broiled, grilled, baked) 3 ounces 120 21   [place], [place], [place], and Venison 3 ounces 120 21   Lean cuts of red meat and pork (sirloin,   round, tenderloin, flank, ground 93%-96%) 3 ounces 170 21   Lean or Extra Lean Ground Turkey 1/2 cup 150 20   90-95% Lean [place] Burger 1 stephania 140-180 21   Low-fat casserole with lean meat 3/4 cup 200 17   Luncheon Meats                                                        (turkey, lean ham, roast beef, chicken) 3 ounces 100 21   Egg (boiled, poached, scrambled) 1 Egg 60 7   Egg Substitute 1/2 cup 70 10   Nuts (limit to 1 serving per day)  3 Tbsp. 150 7   Nut [place] (peanut, almond)  Limit to 1 serving or less daily 1 Tbsp. 90 4   Soy Burger (varies) 1  15   Garbanzo, Black, [place] Beans 1/2 cup 110 7   Refried Beans 1/2 cup 100 7   Kidney and Lima beans 1/2 cup 110 7   Tempeh 3 oz 175 18   Vegan crumbles 1/2 cup 100 14   Tofu 1/2 cup 110 14   Chili (beans and extra lean beef or turkey) 1 cup 200 23   Lentil Stew/Soup 1 cup 150 12   Black Bean Soup 1 cup 175 12

## 2023-01-25 NOTE — PROGRESS NOTES
Bariatric Follow Up Visit with a History of Previous Bariatric Surgery     Date of visit: 1/24/2023  Physician: Arnulfo Stanley MD, MD  Primary Care Provider:  Nani Pierson  Lucina Santamaria   57 year old  female    Date of Surgery: 7/25/22  Initial Weight: 333 lbs  Initial BMI: 50.6.  Today's Weight:   Wt Readings from Last 1 Encounters:   01/25/23 111.1 kg (245 lb)     Weight history:   Wt Readings from Last 4 Encounters:   01/25/23 111.1 kg (245 lb)   08/05/22 138.3 kg (305 lb)   07/25/22 142.9 kg (315 lb)   07/14/22 142 kg (313 lb)      Body mass index is 37.25 kg/m .      Assessment and Plan     Assessment: Lucina is a 57 year old year old female who is 6 months s/p  Kris en Y Gastric Bypass with Dr. Schneider.  She is doing very well with excellent bariatric habits/vitamin support. Weight is down 88 lbs from introductory weight, 13 BMI points lower and  a 26.4% total body weight reduction thus far.      Her 1/24/23 labs are partially back today and showed excellent lipids, borderline low B12, likely related to some difficulty she had tolerating B12 liquid (very itchy and hives). She's using sublingual now and less issues but can still feel sensitive to it. She tried eliminating other food triggers and not clear that she was reacting to anything else. She was open to using the 1000mcg of SL B12 3 days weekly unless clear allergy issues are triggered. If developing clear association/worsening reactions then check with Allergist would be warranted for which additives she's reacting to. .         Lucina Santamaria feels as if she has achieved the goals she hoped to accomplish through bariatric surgery and weight loss.    No diagnosis found.      Current Outpatient Medications:      Ascorbic Acid (VITAMIN C) 500 MG CAPS, Take 1,000 mg by mouth 2 times daily Do not start taking after surgery until advised by dietician in post-op class., Disp: , Rfl:      cyanocobalamin (VITAMIN B-12) 1000 MCG SUBL sublingual  tablet, Place 1 tablet (1,000 mcg) under the tongue daily Start right after surgery., Disp: 90 tablet, Rfl: 3     levothyroxine (SYNTHROID/LEVOTHROID) 75 MCG tablet, Take 1 tablet (75 mcg) by mouth daily, Disp: 90 tablet, Rfl: 3     Pediatric Multivitamins-Iron (MULTIVITAMINS PLUS IRON CHILD) 18 MG CHEW, Take 1 chew tab by mouth 2 times daily Ok to substitute with any chewable that contains 18 mg of iron, Vitamin A, Thiamine and Zinc., Disp: 180 tablet, Rfl: 3     vitamin D3 (CHOLECALCIFEROL) 50 mcg (2000 units) tablet, Take 1 tablet by mouth daily Do not start taking after surgery until advised by dietician in post-op class., Disp: , Rfl:     Plan:    Return in about 6 months (around 7/25/2023) for Follow up, with me.    Bariatric Surgery Review     Interim History/LifeChanges: has been doing well. May get hives from B12 liquid.    Patient Concerns: check in   Appetite (1-10): good  GERD: no.    Reviewed whether any need/indication for screening EGD today and we will deferred.  Typically, a screening EGD is recommend post op year 2-3 if no symptoms to assess health of esophagus/bariatric surgery and sooner if difficult to control GERD or persistent pain/dysphagia sx despite behavior modification.    Medication changes:     Vitamin Intake:   B-12   irregular use due to some itchiness.    MVI  Flintstones. Will switch    Vitamin D  2000 IUs   Calcium   dairy.     Other  protein bar/shake. Power crunch bar is 20g protein. Quick Crunch is another type. Will bring to work if longer days.              LABS: partially reviewed    Nausea none since first month. Intolerant to rice.   Vomiting no  Constipation no  Diarrhea no  Rashes no  Hair Loss yes. Lots but has slowed.   Reactive Hypoglycemia none. Avoiding sweets helps.   Light Headedness none.   Moods none.      Most recent labs:  Lab Results   Component Value Date    WBC 6.2 01/24/2023    HGB 14.2 01/24/2023    HCT 42.2 01/24/2023     01/24/2023      "01/24/2023     Lab Results   Component Value Date    CHOL 166 01/24/2023     Lab Results   Component Value Date    HDL 60 01/24/2023     No components found for: LDLCALC  Lab Results   Component Value Date    TRIG 118 01/24/2023     No results found for: CHOLHDL  Lab Results   Component Value Date    ALT 15 01/24/2023    AST 16 01/24/2023    ALKPHOS 92 01/24/2023     No results found for: HGBA1C  Lab Results   Component Value Date    B12 306 01/24/2023     No components found for: VITDT1  Lab Results   Component Value Date    FERNANDO 158 01/24/2023     Lab Results   Component Value Date    PTHI 47 01/24/2023     Lab Results   Component Value Date    ZN 89.1 09/23/2021     Lab Results   Component Value Date    VIB1WB 102 09/23/2021     Lab Results   Component Value Date    TSH 1.80 07/14/2022     No results found for: TEST    Habits:  Tobacco/Nicotine/THC exposure? no   NSAID use? no   Alcohol use? Glass of wine over holidays.    Caffeine Habits? Rarely decaf       Exercise Routine: started up before candida w/ home weight sets. 3 days weekly for at least 20 minutes. 45-60min walking   3 meals/day? yes  Protein 60-80g/day? yes  Water Separate from meals? yes  Calorie Containing Beverages: no  Restaurant eating/wk: n/a  Sleep Habits:  Working hard to get 8 hours. Most nights.   CPAP Use? /na  Contraception: n/a  DEXA:will be due in 18-36 months.  Discussed annual screening to start at age 45 and continue to age 55 if scoring \"low risk\". DEXA scan recommended at age 55 regardless as long as at least 2 years have transpired from their bariatric surgery.    Social History     Social History     Socioeconomic History     Marital status:      Spouse name: Not on file     Number of children: Not on file     Years of education: Not on file     Highest education level: Not on file   Occupational History     Not on file   Tobacco Use     Smoking status: Never     Smokeless tobacco: Never   Substance and Sexual Activity "     Alcohol use: Yes     Comment: rare, once monthly.     Drug use: No     Sexual activity: Yes     Partners: Male     Birth control/protection: Surgical   Other Topics Concern     Parent/sibling w/ CABG, MI or angioplasty before 65F 55M? Not Asked   Social History Narrative     Not on file     Social Determinants of Health     Financial Resource Strain: Not on file   Food Insecurity: Not on file   Transportation Needs: Not on file   Physical Activity: Not on file   Stress: Not on file   Social Connections: Not on file   Intimate Partner Violence: Not on file   Housing Stability: Not on file       Past Medical History     Past Medical History:   Diagnosis Date     Acquired hypothyroidism      Arthritis     sacro iliac and hip, right ankle.     Chronic constipation     since menopause. fiber/hydration helps.     Coagulation disorder (H)      Complication of anesthesia     nauseated w/ morphine previously after arm surgery.     COVID-19     .     Gallbladder problem     s/p cholecystectomy.     Head injury     childhood concussions     Heterozygous for MTHFR gene mutation      History of MTHFR mutation      Hypertension      Long COVID     Long term Covid     Morbid obesity (H)      Pneumonia     covid     Prediabetes      Pulmonary emboli (H)      Pulmonary embolism (H)     wisdome teeth out while on OCPs and MTHFR gene mutation history.     Pulmonary embolism (H)     age 23, OCPs/dental extraction provoked. reports some MTHFR gene issues, unsure if homo or heterozygous.     Past Surgical History:   Procedure Laterality Date     APPENDECTOMY       BREAST BIOPSY, RT/LT      right breast - benign      SECTION       COLONOSCOPY N/A 11/10/2021    Procedure: COLONOSCOPY, WITH POLYPECTOMY;  Surgeon: Lynn Doshi MD;  Location:  GI     CREATION, GASTRIC BYPASS, HAFSA-EN-Y, LAPAROSCOPIC N/A 2022    Procedure: LAPAROSCOPIC HAFSA-EN-Y, GASTRIC BYPASS;  Surgeon: [place] Schneider  "MD Daniel;  Location[place] [facility] [place]     GYN SURGERY      tubal ligation     LAPAROSCOPIC CHOLECYSTECTOMY N/A 2019    Procedure: CHOLECYSTECTOMY, LAPAROSCOPIC;  Surgeon: Lynn Doshi MD;  Location:  OR     ORTHOPEDIC SURGERY      radius, ulnar - open fracture     TUBAL LIGATION       wisdom teeth         Problem List     Patient Active Problem List   Diagnosis     CARDIOVASCULAR SCREENING; LDL GOAL LESS THAN 130     Obesity, morbid, BMI 50 or higher (H)     Elevated blood pressure reading without diagnosis of hypertension     Edema, unspecified type     History of MTHFR mutation     Hypothyroidism     Pneumonia     Morbid obesity due to excess calories (H)     Medications     [unfilled]  Surgical History     Past Surgical History  She has a past surgical history that includes appendectomy (); orthopedic surgery (); GYN surgery ();  section (); breast biopsy, rt/lt (); Laparoscopic cholecystectomy (N/A, 2019); wisdom teeth; tubal ligation; Colonoscopy (N/A, 11/10/2021); and Creation, Gastric Bypass, Kris-En-Y, Laparoscopic (N/A, 2022).    Objective-Exam     Constitutional:  Ht 1.727 m (5' 8\")   Wt 111.1 kg (245 lb)   BMI 37.25 kg/m    [unfilled]   General:  Pleasant and in no acute distress   Eyes:  EOMI  ENT:  Airway patent    Neck:  Respiratory: Normal respiratory effort, no cough, .  CV:    Gastrointestinal: n/a  Musculoskeletal: muscle mass WNL  Skin: color without pallor hair thick/long but she states she's lost a lot.,   Psychiatric: alert and oriented X3, mood and affect normal    Counseling     We reviewed the important post op bariatric recommendations:  -eating 3 meals daily  -eating protein first, getting >60gm protein daily  -eating slowly, chewing food well  -avoiding/limiting calorie containing beverages  -drinking water 15-30 minutes before or after meals  -limiting restaurant or cafeteria eating to twice a week or less    We " discussed the importance of restorative sleep and stress management in maintaining a healthy weight.  We discussed the National Weight Control Registry healthy weight maintenance strategies and ways to optimize metabolism.  We discussed the importance of physical activity including cardiovascular and strength training in maintaining a healthier weight.    We discussed the importance of life-long vitamin supplementation and life-long  follow-up.    Lucina was reminded that, to avoid marginal ulcers she should avoid tobacco at all, alcohol in excess, caffeine in excess, and NSAIDS (unless indicated for cardioprotection or othewise and opposed by a PPI).    Lucina Santamaria is 57 year old  female who presents for a billable video visit today.    How would you like to obtain your AVS? MyChart  If dropped from the video visit, the video invitation should be resent by: Text to cell phone: [phone]  Will anyone else be joining your video visit? No      Video Start Time: 12:00 PM    Are there any specific questions or needs that you would like addressed at your visit today?           Video-Visit Details    Type of service:  Video Visit    Platform used for Video Visit: Etogas    Video End Time (time video stopped): 12:36 PM    Originating Location (pt. Location): Home        Distant Location (provider location):  On-site    Distant Location (provider location):  [facility] Stanley MD    [facility].  2023  7:23 PM  [phone] (clinic phone)  [phone] (fax)    No images are attached to the encounter.  Medical Decision Makin minutes spent on the date of the encounter doing chart review, history and exam, documentation and further activities per the note

## 2023-01-26 LAB — ZINC SERPL-MCNC: 80.1 UG/DL

## 2023-01-27 ENCOUNTER — DOCUMENTATION ONLY (OUTPATIENT)
Dept: OTHER | Age: 58
End: 2023-01-27

## 2023-01-27 LAB
ANNOTATION COMMENT IMP: NORMAL
RETINYL PALMITATE SERPL-MCNC: <0.02 MG/L
VIT A SERPL-MCNC: 0.34 MG/L

## 2023-01-30 LAB
DEPRECATED CALCIDIOL+CALCIFEROL SERPL-MC: <26 UG/L (ref 20–75)
VIT B1 PYROPHOSHATE BLD-SCNC: 118 NMOL/L
VITAMIN D2 SERPL-MCNC: <5 UG/L
VITAMIN D3 SERPL-MCNC: 21 UG/L

## 2023-04-24 ENCOUNTER — VIRTUAL VISIT (OUTPATIENT)
Dept: SURGERY | Facility: CLINIC | Age: 58
End: 2023-04-24
Payer: COMMERCIAL

## 2023-04-24 DIAGNOSIS — Z71.3 NUTRITIONAL COUNSELING: ICD-10-CM

## 2023-04-24 DIAGNOSIS — K91.2 POSTOPERATIVE MALABSORPTION: ICD-10-CM

## 2023-04-24 DIAGNOSIS — Z98.84 S/P GASTRIC BYPASS: Primary | ICD-10-CM

## 2023-04-24 PROCEDURE — 97803 MED NUTRITION INDIV SUBSEQ: CPT | Mod: VID | Performed by: DIETITIAN, REGISTERED

## 2023-04-24 NOTE — LETTER
"    4/24/2023         RE: Lucina Santamaria  [address] [facility] [address] [facility] [address]        Dear Colleague,    Thank you for referring your patient, Lucina Santamaria, to the [facility] SURGERY CLINIC AND BARIATRICS CARE [place]. Please see a copy of my visit note below.    Lucina Santamaria is a 57 year old who is being evaluated via a billable video visit.      How would you like to obtain your AVS? MyChart  If the video visit is dropped, the invitation should be resent by: Send to e-mail at: [email or web address]  Will anyone else be joining your video visit? No  {If patient encounters technical issues they should call [phone]      Post-op Surgical Weight Loss Diet Evaluation     Assessment:  Pt presents for 9 Months post-op RD visit, s/p RNY GB on 7/25/2022 with Dr. Schneider. Today we reviewed current eating habits and level of physical activity, and instructed on the changes that are required for successful bariatric outcomes.    Patient Progress: going well, no major concerns    Initial weight: 315 lbs  Current weight: 232 lbs  Weight change: 83 lbs    There is no height or weight on file to calculate BMI.    Patient Active Problem List   Diagnosis     CARDIOVASCULAR SCREENING; LDL GOAL LESS THAN 130     Obesity, morbid, BMI 50 or higher (H)     Elevated blood pressure reading without diagnosis of hypertension     Edema, unspecified type     History of MTHFR mutation     Hypothyroidism     Pneumonia     Morbid obesity due to excess calories (H)         Vitamins   Multi Vit with Iron: yes  Calcium Citrate: yes  B12: yes  D3: yes    Do you experience hunger? Yes-more so lately, question if if is more hormone related   Do you have \"dumping\" syndrome?No   Constipation: no-on occasion, notices the iron contributes to this    Diet Recall/Time:   Breakfast: egg bites (ham, spinach, cheese) or breakfast sandwich (on 45 calorie bread-1 slice) or yogurt with berries and high protein granola  Lunch: egg " "salad, cheese crisps  Dinner: shrimp with ricardo sauce and veggies or soup (homemade split pea with ham)    Typical Snacks: power crunch bar or Premier Protein Bar    Proteins/Veg/Fruits/CHO (NOT well tolerated): rice     Estimated protein intake: 60+ grams      Meal Duration:20-30 minutes     Fluid-meal separation:  Fluids are  30min before and 30 minutes after meals.    Fluid Intake  Water: at least 96 oz/day-SF Flavoring added  Caffeine: 1 cup of coffee in the AM  Other: V8 Juice in the AM 1 time/day    Exercise: walking 3 times/week for 45 minutes      PES statement:      (NC-1.4) Altered GI Function related to Alteration in gastrointestinal tract structure and/or function/ Decreased functional length of the GI tract as evidenced by Weight loss of 26% initial body weight; Gastric bypass surgery    Intervention    Discussion  1. Discussed 9 Months Post-Op Nutritional Guidelines for RNY GB  2. Recommended to consume 15-20gm protein at 3 meals daily, along with protein supplement/\"planned protein containing snack\" of 15-30gm protein, to reach goal of 60-80 gm protein daily.  3. Educated on post-op vitamin regimen: Multi Vit + iron 2x/day, calcium citrate 400-600 mg 2x/day, 9744-1707 mcg of Sublingual B-12 daily, and 5000 IU Vitamin D3 daily (MVI and calcium can be taken at the same time BID)  4. Reviewed lean protein sources  5. Bariatric Plate Method-  including lean/low fat protein at each meal, including a vegetable/fruit, and limiting carbohydrate intake to less than 25% of plate volume.     Instructions  1. Include 15-20gm protein at each meal, along with protein supplement/\"planned protein containing snack\" of 15-30gm protein, to reach goal of 60-80 gm protein daily.  2. Increase fluid intake to 64oz daily: choose plain or calorie/carbonation-free beverages.  3. Incorporate daily structured activity, 30-60 minutes most days of the week  4. Recommended pt to start taking: Multi Vit + iron 2x/day, " calcium citrate 400-600 mg 2x/day, 3582-2917 mcg of Sublingual B-12 daily, and 5000 IU Vitamin D3 daily. (MVI and calcium can be taken at the same time)  5. Read food labels more consistently: keeping total fat grams <10, total sugar grams <10, fiber >3gm per serving.  6. Increase vegetable/fruit intake, by having a vegetable or fruit with each meal daily.  7. Practice plate method: 1/2 plate lean/low fat protein source, vegetable/fruit, <25% of plate complex carbohydrates.  8. Separate fluids 30 minutes before/after meal times.  9. Practice eating off of smaller plates/bowls, chewing to applesauce consistency, taking 20-30 minutes to eat in a calm/relaxed environment without distractions of tv/email/cell phone.    Handouts provided:  9 Months Post-Op Nutritional Guidelines for RNY GB    Assessment/Plan:    Pt to follow up for 1 year post-op visit with bariatrician       Video-Visit Details    Type of service:  Video Visit    Video Start Time (time video started): 10:24 am    Video End Time (time video stopped): 10:45 am    Originating Location (pt. Location): Home        Distant Location (provider location):  Off-site    Mode of Communication:  Video Conference via [facility]    Physician has received verbal consent for a Video Visit from the patient? Yes    Cindy Vanegas RD              Again, thank you for allowing me to participate in the care of your patient.        Sincerely,        Cindy Vanegas RD

## 2023-04-24 NOTE — PROGRESS NOTES
"Lucina Santamaria is a 57 year old who is being evaluated via a billable video visit.      How would you like to obtain your AVS? MyChart  If the video visit is dropped, the invitation should be resent by: Send to e-mail at: [email or web address]  Will anyone else be joining your video visit? No  {If patient encounters technical issues they should call [phone]      Post-op Surgical Weight Loss Diet Evaluation     Assessment:  Pt presents for 9 Months post-op RD visit, s/p RNY GB on 7/25/2022 with Dr. Schneider. Today we reviewed current eating habits and level of physical activity, and instructed on the changes that are required for successful bariatric outcomes.    Patient Progress: going well, no major concerns    Initial weight: 315 lbs  Current weight: 232 lbs  Weight change: 83 lbs    There is no height or weight on file to calculate BMI.    Patient Active Problem List   Diagnosis     CARDIOVASCULAR SCREENING; LDL GOAL LESS THAN 130     Obesity, morbid, BMI 50 or higher (H)     Elevated blood pressure reading without diagnosis of hypertension     Edema, unspecified type     History of MTHFR mutation     Hypothyroidism     Pneumonia     Morbid obesity due to excess calories (H)         Vitamins   Multi Vit with Iron: yes  Calcium Citrate: yes  B12: yes  D3: yes    Do you experience hunger? Yes-more so lately, question if if is more hormone related   Do you have \"dumping\" syndrome?No   Constipation: no-on occasion, notices the iron contributes to this    Diet Recall/Time:   Breakfast: egg bites (ham, spinach, cheese) or breakfast sandwich (on 45 calorie bread-1 slice) or yogurt with berries and high protein granola  Lunch: egg salad, cheese crisps  Dinner: shrimp with ricardo sauce and veggies or soup (homemade split pea with ham)    Typical Snacks: power crunch bar or Premier Protein Bar    Proteins/Veg/Fruits/CHO (NOT well tolerated): rice     Estimated protein intake: 60+ grams      Meal " "Duration:20-30 minutes     Fluid-meal separation:  Fluids are  30min before and 30 minutes after meals.    Fluid Intake  Water: at least 96 oz/day-SF Flavoring added  Caffeine: 1 cup of coffee in the AM  Other: V8 Juice in the AM 1 time/day    Exercise: walking 3 times/week for 45 minutes      PES statement:      (NC-1.4) Altered GI Function related to Alteration in gastrointestinal tract structure and/or function/ Decreased functional length of the GI tract as evidenced by Weight loss of 26% initial body weight; Gastric bypass surgery    Intervention    Discussion  1. Discussed 9 Months Post-Op Nutritional Guidelines for RNY GB  2. Recommended to consume 15-20gm protein at 3 meals daily, along with protein supplement/\"planned protein containing snack\" of 15-30gm protein, to reach goal of 60-80 gm protein daily.  3. Educated on post-op vitamin regimen: Multi Vit + iron 2x/day, calcium citrate 400-600 mg 2x/day, 9460-6064 mcg of Sublingual B-12 daily, and 5000 IU Vitamin D3 daily (MVI and calcium can be taken at the same time BID)  4. Reviewed lean protein sources  5. Bariatric Plate Method-  including lean/low fat protein at each meal, including a vegetable/fruit, and limiting carbohydrate intake to less than 25% of plate volume.     Instructions  1. Include 15-20gm protein at each meal, along with protein supplement/\"planned protein containing snack\" of 15-30gm protein, to reach goal of 60-80 gm protein daily.  2. Increase fluid intake to 64oz daily: choose plain or calorie/carbonation-free beverages.  3. Incorporate daily structured activity, 30-60 minutes most days of the week  4. Recommended pt to start taking: Multi Vit + iron 2x/day, calcium citrate 400-600 mg 2x/day, 2711-6784 mcg of Sublingual B-12 daily, and 5000 IU Vitamin D3 daily. (MVI and calcium can be taken at the same time)  5. Read food labels more consistently: keeping total fat grams <10, total sugar grams <10, fiber >3gm per " serving.  6. Increase vegetable/fruit intake, by having a vegetable or fruit with each meal daily.  7. Practice plate method: 1/2 plate lean/low fat protein source, vegetable/fruit, <25% of plate complex carbohydrates.  8. Separate fluids 30 minutes before/after meal times.  9. Practice eating off of smaller plates/bowls, chewing to applesauce consistency, taking 20-30 minutes to eat in a calm/relaxed environment without distractions of tv/email/cell phone.    Handouts provided:  9 Months Post-Op Nutritional Guidelines for RNY GB    Assessment/Plan:    Pt to follow up for 1 year post-op visit with bariatrician       Video-Visit Details    Type of service:  Video Visit    Video Start Time (time video started): 10:24 am    Video End Time (time video stopped): 10:45 am    Originating Location (pt. Location): Home        Distant Location (provider location):  Off-site    Mode of Communication:  Video Conference via [facility]    Physician has received verbal consent for a Video Visit from the patient? Yes    Cindy Vanegas, RD

## 2023-05-01 ENCOUNTER — HOSPITAL ENCOUNTER (OUTPATIENT)
Dept: MAMMOGRAPHY | Facility: CLINIC | Age: 58
Discharge: HOME | End: 2023-05-01
Attending: FAMILY MEDICINE | Admitting: FAMILY MEDICINE
Payer: COMMERCIAL

## 2023-05-01 DIAGNOSIS — Z12.31 VISIT FOR SCREENING MAMMOGRAM: ICD-10-CM

## 2023-05-01 PROCEDURE — 77067 SCR MAMMO BI INCL CAD: CPT

## 2023-07-07 ENCOUNTER — MYC MEDICAL ADVICE (OUTPATIENT)
Dept: SURGERY | Facility: CLINIC | Age: 58
End: 2023-07-07
Payer: COMMERCIAL

## 2023-07-07 DIAGNOSIS — K91.2 POSTOPERATIVE INTESTINAL MALABSORPTION: ICD-10-CM

## 2023-07-07 DIAGNOSIS — Z98.84 S/P GASTRIC BYPASS: Primary | ICD-10-CM

## 2023-07-07 DIAGNOSIS — K91.2 POSTOPERATIVE MALABSORPTION: ICD-10-CM

## 2023-07-07 NOTE — TELEPHONE ENCOUNTER
Nancy Robert MA  P Bariatric Surgery [facility]  Patient has one year annual visit on  7/26 and needs labs ordered. Thank you

## 2023-07-26 ENCOUNTER — VIRTUAL VISIT (OUTPATIENT)
Dept: SURGERY | Facility: CLINIC | Age: 58
End: 2023-07-26
Payer: COMMERCIAL

## 2023-07-26 VITALS — BODY MASS INDEX: 35.16 KG/M2 | HEIGHT: 68 IN | WEIGHT: 232 LBS

## 2023-07-26 DIAGNOSIS — K91.2 POSTOPERATIVE MALABSORPTION: Primary | ICD-10-CM

## 2023-07-26 PROCEDURE — 99214 OFFICE O/P EST MOD 30 MIN: CPT | Mod: VID | Performed by: EMERGENCY MEDICINE

## 2023-07-26 NOTE — LETTER
7/26/2023         RE: Lucina Santamaria  [address] [address]        Dear Colleague,    Thank you for referring your patient, Lucina Santamaria, to the [facility] SURGERY CLINIC AND BARIATRICS CARE [place]. Please see a copy of my visit note below.    Bariatric Follow Up Visit with a History of Previous Bariatric Surgery     Date of visit: 7/26/2023  Physician: Arnulfo Stanley MD, MD  Primary Care Provider:  Nani Pierson  Lucina Santamaria   57 year old  female    Date of Surgery: 7/25/22  Initial Weight: 333 lbs  Initial BMI: 50.6  Today's Weight:   Wt Readings from Last 1 Encounters:   07/26/23 105.2 kg (232 lb)     Weight history:   Wt Readings from Last 4 Encounters:   07/26/23 105.2 kg (232 lb)   01/25/23 111.1 kg (245 lb)   08/05/22 138.3 kg (305 lb)   07/25/22 142.9 kg (315 lb)      Body mass index is 35.28 kg/m .      Assessment and Plan     Assessment: Lucina is a 57 year old year old female who is one year s/p  Kris en Y Gastric Bypass with Dr. Schneider.  She has been feeling overally happy about her procedure this year.  Weight is down 101 lbs, a 30.3 % total body weight reduction.  She's been fairly steady in weight the last 3 months. Not currently interested in medication support but if weight is drifting up, given persistent Class II obesity we could consider addition of medication.    Her 7/23/23 labs show lower end of normal range B12 levels and vitamin D levels but otherwise all vitamin support looks good. Reviewed intake goals today.  She's struggled with hives and itching with sublingual cyanocobalamin versions and feels confident that the B12 supplements (she's tried a few different brands  and forms) is the cause. We can see if she tolerates methylcobalamin forms better and increase dose to reduce frequency of use. Every 7-10 days should maintain levels reasonably overtime if dosed in the 2500-5000mcg/dose range.    Lucina Santamaria feels as if she has achieved the  goals she hoped to accomplish through bariatric surgery and weight loss.    No diagnosis found.      Current Outpatient Medications:      cyanocobalamin (VITAMIN B-12) 1000 MCG SUBL sublingual tablet, Place 1 tablet (1,000 mcg) under the tongue daily Start right after surgery., Disp: 90 tablet, Rfl: 3     levothyroxine (SYNTHROID/LEVOTHROID) 75 MCG tablet, Take 1 tablet (75 mcg) by mouth daily, Disp: 90 tablet, Rfl: 3     Pediatric Multivitamins-Iron (MULTIVITAMINS PLUS IRON CHILD) 18 MG CHEW, Take 1 chew tab by mouth 2 times daily Ok to substitute with any chewable that contains 18 mg of iron, Vitamin A, Thiamine and Zinc., Disp: 180 tablet, Rfl: 3     vitamin D3 (CHOLECALCIFEROL) 50 mcg (2000 units) tablet, Take 1 tablet by mouth daily Do not start taking after surgery until advised by dietician in post-op class., Disp: , Rfl:     Plan:  Great work with 30.3% total body weight reduction this past year. Continue mindful bariatric methods to optimize good, lean proteins and diversity of foods.  Continue good separation of water from meals and hydrating well between meals.   2.  Increase vitamin D to 8092-5438 international unit(s)/day (75-100mcg) from all sources to optimize levels/bone health going forward.   3. Reaction to cyanocobalamin versions of B12, try 2500mcg of sublingual methylcobalamin to see if better tolerated.  Dosing every 7-10 days at this dose should prevent deficiency over time.  4. Continue finding active pursuits to destress from work/demands of life. Great work with fitness regimen thus far.  5. If weight creeping up and medication assistance for appetite suppression is desired, let me know.   6. Aged, red meat once weekly or twice weekly can help B12 levels as well.   7. I've had a few patients do well with athletic greens drinks/supplements for vitamin support as well.      No follow-ups on file.    Bariatric Surgery Review     Interim History/LifeChanges: lots of computer work and later  "nights lately. Struggles with some work/life balance.      Patient Concerns: reacts strongly to B12, hives/itchiness.   Appetite (1-10): has to be mindful of water and packing lunches to avoid snacking. Meal preps on sundays to prepare to avoid impulsive drives.  GERD: none..    Reviewed whether any need/indication for screening EGD today and we will deferred.  Typically, a screening EGD is recommend post op year 2-3 if no symptoms to assess health of esophagus/bariatric surgery and sooner if difficult to control GERD or persistent pain/dysphagia sx despite behavior modification.    Medication changes:     Vitamin Intake:   B-12   Hard to tolerate   MVI  Generic 2 daily.   Vitamin D  1000 international unit(s), increased to 3000 international unit(s) in winter   Calcium   Not regularly.     Other  Protein powder, 3-4 days weekly. [employer] Elevation Whey protein blend 30g/serving              LABS: \"Reviewed    Nausea no  Vomiting no  Constipation no  Diarrhea no  Rashes only w/ B12 dosings. Some abdominal pannus rash. Hx of twins. Not much worse since surgery but always has used [employer] powder.   Venostasis /varicosities stable since childbirth. Continues to wear compression socks to help.   Hair Loss none.   Reactive Hypoglycemia avoids triggers food.  Tolerated 2 bites of ice cream. Rice is still hard for her to tolerate.   Light Headedness none.   Moods \"excited\" about how she's doing.      Most recent labs:  Lab Results   Component Value Date    WBC 5.3 07/23/2023    HGB 13.7 07/23/2023    HCT 40.9 07/23/2023     07/23/2023     07/23/2023     Lab Results   Component Value Date    CHOL 166 01/24/2023     Lab Results   Component Value Date    HDL 60 01/24/2023     No components found for: LDLCALC  Lab Results   Component Value Date    TRIG 118 01/24/2023     No results found for: CHOLHDL  Lab Results   Component Value Date    ALT 16 07/23/2023    AST 16 07/23/2023    ALKPHOS 75 07/23/2023     No " "results found for: HGBA1C  Lab Results   Component Value Date    B12 307 07/23/2023     No components found for: VITDT1  Lab Results   Component Value Date    FERNANDO 130 07/23/2023     Lab Results   Component Value Date    PTHI 45 07/23/2023     Lab Results   Component Value Date    ZN 78.7 07/23/2023     Lab Results   Component Value Date    VIB1WB 109 07/23/2023     Lab Results   Component Value Date    TSH 1.93 07/23/2023     No results found for: TEST    Habits:  Tobacco/Nicotine/THC exposure? no   NSAID use? no   Alcohol use? no   Caffeine Habits? Cup in the AM. Half caff.        Exercise Routine: walking, gardening. Treadmill and weights at home. Trying to do 45 minutes 5 days weekly so enjoys outdoor walks.   3 meals/day? yes  Protein 60-80g/day? Yes, 80g/day.   Water Separate from meals? yes  Calorie Containing Beverages: rare  Restaurant eating/wk: less than once monthly.  Sleep Habits:  good  CPAP Use? never  Contraception: post menopausal. 51 yo.   DEXA:will be due next year..  Discussed annual screening to start at age 45 and continue to age 55 if scoring \"low risk\". DEXA scan recommended at age 55 regardless as long as at least 2 years have transpired from their bariatric surgery.    Social History     Social History     Socioeconomic History     Marital status:      Spouse name: Not on file     Number of children: Not on file     Years of education: Not on file     Highest education level: Not on file   Occupational History     Not on file   Tobacco Use     Smoking status: Never     Smokeless tobacco: Never   Substance and Sexual Activity     Alcohol use: Yes     Comment: rare, once monthly.     Drug use: No     Sexual activity: Yes     Partners: Male     Birth control/protection: Surgical   Other Topics Concern     Parent/sibling w/ CABG, MI or angioplasty before 65F 55M? Not Asked   Social History Narrative     Not on file     Social Determinants of Health     Financial Resource Strain: Not on " file   Food Insecurity: Not on file   Transportation Needs: Not on file   Physical Activity: Not on file   Stress: Not on file   Social Connections: Not on file   Intimate Partner Violence: Not on file   Housing Stability: Not on file       Past Medical History     Past Medical History:   Diagnosis Date     Acquired hypothyroidism      Arthritis     sacro iliac and hip, right ankle.     Chronic constipation     since menopause. fiber/hydration helps.     Coagulation disorder (H)      Complication of anesthesia     nauseated w/ morphine previously after arm surgery.     COVID-19     .     Gallbladder problem     s/p cholecystectomy.     Head injury     childhood concussions     Heterozygous for MTHFR gene mutation      History of MTHFR mutation      Hypertension      Long COVID     Long term Covid     Morbid obesity (H)      Pneumonia     covid     Prediabetes      Pulmonary emboli (H)      Pulmonary embolism (H)     wisdome teeth out while on OCPs and MTHFR gene mutation history.     Pulmonary embolism (H)     age 23, OCPs/dental extraction provoked. reports some MTHFR gene issues, unsure if homo or heterozygous.     Past Surgical History:   Procedure Laterality Date     APPENDECTOMY       BREAST BIOPSY, RT/LT      right breast - benign      SECTION       COLONOSCOPY N/A 11/10/2021    Procedure: COLONOSCOPY, WITH POLYPECTOMY;  Surgeon: Lynn Doshi MD;  Location:  GI     CREATION, GASTRIC BYPASS, HAFSA-EN-Y, LAPAROSCOPIC N/A 2022    Procedure: LAPAROSCOPIC HAFSA-EN-Y, GASTRIC BYPASS;  Surgeon: North Schneider MD;  [place] [facility] [place]     GYN SURGERY      tubal ligation     LAPAROSCOPIC CHOLECYSTECTOMY N/A 2019    Procedure: CHOLECYSTECTOMY, LAPAROSCOPIC;  Surgeon: Lynn Doshi MD;  Location:  OR     ORTHOPEDIC SURGERY      radius, ulnar - open fracture     TUBAL LIGATION       wisdom teeth         Problem List     Patient Active Problem  "List   Diagnosis     CARDIOVASCULAR SCREENING; LDL GOAL LESS THAN 130     Obesity, morbid, BMI 50 or higher (H)     Elevated blood pressure reading without diagnosis of hypertension     Edema, unspecified type     History of MTHFR mutation     Hypothyroidism     Pneumonia     Morbid obesity due to excess calories (H)     Medications     [unfilled]  Surgical History     Past Surgical History  She has a past surgical history that includes appendectomy (); orthopedic surgery (); GYN surgery ();  section (); breast biopsy, rt/lt (); Laparoscopic cholecystectomy (N/A, 2019); wisdom teeth; tubal ligation; Colonoscopy (N/A, 11/10/2021); and Creation, Gastric Bypass, Kris-En-Y, Laparoscopic (N/A, 2022).    Objective-Exam     Constitutional:  Ht 1.727 m (5' 8\")   Wt 105.2 kg (232 lb)   BMI 35.28 kg/m    [unfilled]   General:  Pleasant and in no acute distress   Eyes:  EOMI  ENT:  Airway patent.     Neck:  Respiratory: Normal respiratory effort, no cough, .  CV:    Gastrointestinal:   Musculoskeletal: muscle mass WNL  Skin: color without pallor hair long and thick,   Psychiatric: alert and oriented X3, mood and affect normal    Counseling     We reviewed the important post op bariatric recommendations:  -eating 3 meals daily  -eating protein first, getting >60gm protein daily  -eating slowly, chewing food well  -avoiding/limiting calorie containing beverages  -drinking water 15-30 minutes before or after meals  -limiting restaurant or cafeteria eating to twice a week or less    We discussed the importance of restorative sleep and stress management in maintaining a healthy weight.  We discussed the National Weight Control Registry healthy weight maintenance strategies and ways to optimize metabolism.  We discussed the importance of physical activity including cardiovascular and strength training in maintaining a healthier weight.    We discussed the importance of life-long " vitamin supplementation and life-long  follow-up.    Lucina was reminded that, to avoid marginal ulcers she should avoid tobacco at all, alcohol in excess, caffeine in excess, and NSAIDS (unless indicated for cardioprotection or othewise and opposed by a PPI).    Arnulfo Stanley MD    [facility].  2023  9:22 AM  [phone] (clinic phone)  [phone] (fax)    No images are attached to the encounter.  Medical Decision Makin minutes spent by me on the date of the encounter doing chart review, history and exam, documentation and further activities per the note      Lucina Santamaria is 57 year old  female who presents for a billable video visit today.    How would you like to obtain your AVS? MyChart  If dropped from the video visit, the video invitation should be resent by: Text to cell phone: [phone]  Will anyone else be joining your video visit? No      Video Start Time:  12:00pm    Are there any specific questions or needs that you would like addressed at your visit today? Only concern is the b12 causing her itching regardless of how takes it. Feels weight is stagnant. RD visit scheduled.     Provider Notes:       Video-Visit Details    Type of service:  Video Visit    Platform used for Video Visit: "[employer]    Video End Time (time video stopped): 12:33 PM    Originating Location (pt. Location): Home        Distant Location (provider location):  On-site    Distant Location (provider location):  [facility] SURGERY [facility] AND BARIATRICS CARE [place]       Again, thank you for allowing me to participate in the care of your patient.        Sincerely,        Arnulfo Stanley MD

## 2023-07-26 NOTE — PATIENT INSTRUCTIONS
Plan:  Great work with 30.3% total body weight reduction this past year. Continue mindful bariatric methods to optimize good, lean proteins and diversity of foods.  Continue good separation of water from meals and hydrating well between meals.   2.  Increase vitamin D to 2197-4399 international unit(s)/day (75-100mcg) from all sources to optimize levels/bone health going forward.   3. Reaction to cyanocobalamin versions of B12, try 2500mcg of sublingual methylcobalamin to see if better tolerated.  Dosing every 7-10 days at this dose should prevent deficiency over time.  4. Continue finding active pursuits to destress from work/demands of life. Great work with fitness regimen thus far.  5. If weight creeping up and medication assistance for appetite suppression is desired, let me know.   6. Aged, red meat once weekly or twice weekly can help B12 levels as well.   7. I've had a few patients do well with athletic greens drinks/supplements for vitamin support as well.        [facility]  Nutritional Guidelines  Gastric Bypass 12 Months Post Op    General Guidelines and Helpful Hints:  Eat 3 meals per day + protein supplement(s). No snacks between meals.  Do not skip meals.  This can cause overeating at the next meal and will prevent adequate protein and nutritional intake.  Aim for 60-80 grams of protein per day.  Always eat your protein first. This assists with optimal nutrition and helps you stay full longer.  Depending on your portion size, you may need to drink approved protein supplement between meals to achieve protein goals. Follow recommendations of your Dietitian.   Eat your protein first, and then follow with fiber.   It is not necessary to count your fiber, but 15-20 grams per day is recommended.    Add fiber by including fruits, vegetables, whole grains, and beans.   Portions should be about 1 cup per meal. Use measuring cups to be accurate.  Continue to use saucer/salad plates, infant/toddler  silverware to keep portion sizes small and take small bites.  Eat S-L-O-W-L-Y to make each meal last 20-30 minutes. Always stop eating when satisfied.  Continue to use caution with foods containing skins, peels or membranes. Chew well!  Aim for 64 oz. of calorie-free fluids daily.  Continue to avoid caffeine and carbonation. If you choose to drink alcohol, do so in moderation.   Remember to avoid drinking during meals, 15-30 minutes before and 30 minutes after.  Exercise is kessler for continued weight loss and weight maintenance. Aim for 30-60 minutes of physical activity most days of the week. Include cardiovascular and strength training.  If having trouble tolerating meat, try using a crock-pot, tinfoil tent, steamer or other moist cooking method to create tender meats. Add broth or low-fat gravy to help meat stay moist.   Avoid high sugar and high fat foods to prevent dumping syndrome.  Check nutrition labels for less than 10 grams of sugar and less than 10 grams of fat per serving.  Continue Taking Vitamins/Minerals:  9000-4219 mcg of Sublingual B-12 every 7-10 days.  1 Multivitamin with Iron twice daily (chewable or swallow tabs)  500-600 mg Calcium Citrate  daily (chewable or swallow tabs)  1830-0819 IU Vitamin D3 daily    Sample Grocery List    Protein:  Fat free Greek or light yogurt (less than 10 grams sugar)  Fat free or low-fat cottage cheese  String cheese or reduced fat cheese slices  Tuna, salmon, crab, egg, or chicken salad made with light or fat free mayonnaise  Egg or Egg Substitute  Lean/extra lean turkey, beef, bison, venison (ground, sirloin, round, flank)  Pork loin or tenderloin (grilled, baked, broiled)  Fish such as salmon, tuna, trout, tilapia, etc. (grilled, baked, broiled)  Tender cuts of lean (skinless) turkey or chicken  Lean deli meats: turkey, lean ham, chicken, lean roast beef  Beans such as kidney, garbanzo, black, stein, or low-fat/fat free refried beans  Peanut butter (natural  preferred). Limit to 1 Tbsp. per day.  Low-fat meatloaf (made with lean ground beef or turkey)  Sloppy Joes made with low-sugar ketchup and lean ground beef or turkey  Soy or vegetable protein (i.e. vegan crumbles, soy/veggie burger, tofu)  Hummus    Vegetables:  Fresh: cooked or raw (as tolerated)  Frozen vegetables  Canned vegetables (low sodium or no salt added, rinse before cooking/eating)  (Ok to have skins/peels/membranes/seeds - just chew well)    Fruits:  Fresh fruit  Frozen fruit (no sugar added)  Canned fruit (packed in its own juice, NOT syrup)  (Ok to have skins/peels/membranes/seeds - just chew well)    Starch:  Unsweetened whole-grain hot cereal (or high fiber cold cereal, dry)  Toasted whole wheat bread or [place] Thins  Whole grain crackers  Baked/boiled/mashed potato/sweet potato  Cooked whole grain pasta, brown rice, or other cooked whole grains  Starchy vegetables: corn, peas, winter squash      Protein Supplement:   Ready to drink protein shake with:  15-30 grams protein per serving  Less than 10 grams total carbohydrate per serving   Protein powder mixed with:   Skim or 1% milk  Low fat or fat free Lactaid milk, plain or no sugar added soymilk  Water     Fats: (use in moderation)  1 teaspoon of soft tub margarine  1 teaspoon olive oil, canola oil, or peanut oil  1 tablespoon of low-fat hidalgo or salad dressing     Sample Menu for 12 months after Gastric Bypass    You do NOT need to eat/drink the full portion sizes listed below  Always stop when you are satisfied    Breakfast   cup 1% cottage cheese     cup diced peaches   Lunch   slice whole grain bread/toast with 1 tsp. light hidalgo  2 oz. sliced lean turkey, ham, or chicken    cup carrots   Supplement Approved protein supplement (as needed between meals)   Dinner   cup 93% lean ground beef mixed with 2 Tbsp. marinara sauce     cup green beans    cup whole grain pasta     Breakfast   cup egg substitute or 2 egg whites, scrambled   1 oz low fat  shredded cheese    cup sautéed chopped vegetables mixed in   Lunch 1 cup chili made with lean ground beef or turkey   Supplement 6 oz light Greek yogurt (as needed)   Dinner 3 oz  grilled, broiled, or baked lemon pepper salmon  2 Tbsp. grilled asparagus  2 Tbsp. baked sweet potato     Breakfast   cup whole grain oatmeal made with skim milk and unflavored protein powder added    cup blueberries       Lunch 3 oz  meatloaf made with lean ground turkey    cup steamed broccoli   Dinner 3 oz pork loin made in a crock pot seasoned with a spice rub    cup cooked carrots   Supplement Approved protein supplement (as needed between meals)     Breakfast   cup egg scramble made with egg substitute and turkey sausage    whole grain English muffin with 1 teaspoon low sugar jelly   Lunch 3 oz seasoned, skinless grilled chicken     cup grilled vegetables   Dinner 2 oz lean beef    cup brown rice    cup strawberries   Supplement 1 string cheese (as needed)     Breakfast 6 ounces light Greek yogurt    cup unsweetened mixed berries   Lunch 3 oz shrimp, with low-sugar cocktail sauce for dipping    cup pea pods   Supplement   cup fat free cottage cheese (as needed)   Dinner 3 oz tender turkey breast (made in crock pot for extra moisture)    of a small whole wheat dinner roll     Breakfast     cup low fat cottage cheese    cup strawberries   Lunch   cup black bean soup  4 whole grain crackers   Supplement 1 cup skim milk with scoop of protein powder added (as needed)   Dinner 3 oz. grilled tilapia with lemon pepper seasoning    cup grilled bell peppers     Breakfast 2 ounces turkey sausage stephania    whole wheat English muffin   Supplement Approved protein supplement (as needed between meals)   Lunch 3 oz lean ham, turkey, or chicken     cup tomatoes   Dinner 2 oz. sirloin steak    cup mixed vegetables    cup brown rice      LEAN PROTEIN SOURCES  Getting 20-30 grams of protein, 3 meals daily, is appropriate for most people, some need more  but more than about 40 grams per meal is not useful.  General rule is drinking one ounce of water per gram of protein eaten over the course of the day:  70 grams of protein each day, drink 70 oz of water.  Protein Source Portion Calories Grams of Protein                           Nonfat, plain Greek yogurt    (10 grams sugar or less) 3/4 cup (6 oz)  12-17   Light Yogurt (10 grams sugar or less) 3/4 cup (6 oz)  6-8   Protein Shake 1 shake 110-180 15-30   Skim/1% Milk or lactose-free milk 1 cup ( 8 oz)  8   Plain or light, flavored soymilk 1 cup  7-8   Plain or light, hemp milk 1 cup 110 6   Fat Free or 1% Cottage Cheese 1/2 cup 90 15   Part skim ricotta cheese 1/2 cup 100 14   Part skim or reduced fat cheese slices 1 ounce 65-80 8     Mozzarella String Cheese 1 80 8   Canned tuna, chicken, crab or salmon  (canned in water)  1/2 cup 100 15-20   White fish (broiled, grilled, baked) 3 ounces 100 21   [place]/Tuna (broiled, grilled, baked) 3 ounces 150-180 21   Shrimp, Scallops, Lobster, Crab 3 ounces 100 21   Pork loin, Pork Tenderloin 3 ounces 150 21   Boneless, skinless chicken /turkey breast                          (broiled, grilled, baked) 3 ounces 120 21   [place], [place], [place], and Venison 3 ounces 120 21   Lean cuts of red meat and pork (sirloin,   round, tenderloin, flank, ground 93%-96%) 3 ounces 170 21   Lean or Extra Lean Ground Turkey 1/2 cup 150 20   90-95% Lean [place] Burger 1 stephania 140-180 21   Low-fat casserole with lean meat 3/4 cup 200 17   Luncheon Meats                                                        (turkey, lean ham, roast beef, chicken) 3 ounces 100 21   Egg (boiled, poached, scrambled) 1 Egg 60 7   Egg Substitute 1/2 cup 70 10   Nuts (limit to 1 serving per day)  3 Tbsp. 150 7   Nut [place] (peanut, almond)  Limit to 1 serving or less daily 1 Tbsp. 90 4   Soy Burger (varies) 1  15   Garbanzo, Black, [place] Beans 1/2 cup 110 7   Refried Beans 1/2 cup 100 7    Kidney and Lima beans 1/2 cup 110 7   Tempeh 3 oz 175 18   Vegan crumbles 1/2 cup 100 14   Tofu 1/2 cup 110 14   Chili (beans and extra lean beef or turkey) 1 cup 200 23   Lentil Stew/Soup 1 cup 150 12   Black Bean Soup 1 cup 175 12

## 2023-07-26 NOTE — PROGRESS NOTES
Bariatric Follow Up Visit with a History of Previous Bariatric Surgery     Date of visit: 7/26/2023  Physician: Arnulfo Stanley MD, MD  Primary Care Provider:  Nani Pierson  Lucina Santamaria   57 year old  female    Date of Surgery: 7/25/22  Initial Weight: 333 lbs  Initial BMI: 50.6  Today's Weight:   Wt Readings from Last 1 Encounters:   07/26/23 105.2 kg (232 lb)     Weight history:   Wt Readings from Last 4 Encounters:   07/26/23 105.2 kg (232 lb)   01/25/23 111.1 kg (245 lb)   08/05/22 138.3 kg (305 lb)   07/25/22 142.9 kg (315 lb)      Body mass index is 35.28 kg/m .      Assessment and Plan     Assessment: Lucina is a 57 year old year old female who is one year s/p  Kris en Y Gastric Bypass with Dr. Schneider.  She has been feeling overally happy about her procedure this year.  Weight is down 101 lbs, a 30.3 % total body weight reduction.  She's been fairly steady in weight the last 3 months. Not currently interested in medication support but if weight is drifting up, given persistent Class II obesity we could consider addition of medication.    Her 7/23/23 labs show lower end of normal range B12 levels and vitamin D levels but otherwise all vitamin support looks good. Reviewed intake goals today.  She's struggled with hives and itching with sublingual cyanocobalamin versions and feels confident that the B12 supplements (she's tried a few different brands  and forms) is the cause. We can see if she tolerates methylcobalamin forms better and increase dose to reduce frequency of use. Every 7-10 days should maintain levels reasonably overtime if dosed in the 2500-5000mcg/dose range.    Lucina Santamaria feels as if she has achieved the goals she hoped to accomplish through bariatric surgery and weight loss.    No diagnosis found.      Current Outpatient Medications:     cyanocobalamin (VITAMIN B-12) 1000 MCG SUBL sublingual tablet, Place 1 tablet (1,000 mcg) under the tongue daily Start right after  surgery., Disp: 90 tablet, Rfl: 3    levothyroxine (SYNTHROID/LEVOTHROID) 75 MCG tablet, Take 1 tablet (75 mcg) by mouth daily, Disp: 90 tablet, Rfl: 3    Pediatric Multivitamins-Iron (MULTIVITAMINS PLUS IRON CHILD) 18 MG CHEW, Take 1 chew tab by mouth 2 times daily Ok to substitute with any chewable that contains 18 mg of iron, Vitamin A, Thiamine and Zinc., Disp: 180 tablet, Rfl: 3    vitamin D3 (CHOLECALCIFEROL) 50 mcg (2000 units) tablet, Take 1 tablet by mouth daily Do not start taking after surgery until advised by dietician in post-op class., Disp: , Rfl:     Plan:  Great work with 30.3% total body weight reduction this past year. Continue mindful bariatric methods to optimize good, lean proteins and diversity of foods.  Continue good separation of water from meals and hydrating well between meals.   2.  Increase vitamin D to 9308-6758 international unit(s)/day (75-100mcg) from all sources to optimize levels/bone health going forward.   3. Reaction to cyanocobalamin versions of B12, try 2500mcg of sublingual methylcobalamin to see if better tolerated.  Dosing every 7-10 days at this dose should prevent deficiency over time.  4. Continue finding active pursuits to destress from work/demands of life. Great work with fitness regimen thus far.  5. If weight creeping up and medication assistance for appetite suppression is desired, let me know.   6. Aged, red meat once weekly or twice weekly can help B12 levels as well.   7. I've had a few patients do well with athletic greens drinks/supplements for vitamin support as well.      No follow-ups on file.    Bariatric Surgery Review     Interim History/LifeChanges: lots of computer work and later nights lately. Struggles with some work/life balance.      Patient Concerns: reacts strongly to B12, hives/itchiness.   Appetite (1-10): has to be mindful of water and packing lunches to avoid snacking. Meal preps on sundays to prepare to avoid impulsive drives.  GERD: none..  "   Reviewed whether any need/indication for screening EGD today and we will deferred.  Typically, a screening EGD is recommend post op year 2-3 if no symptoms to assess health of esophagus/bariatric surgery and sooner if difficult to control GERD or persistent pain/dysphagia sx despite behavior modification.    Medication changes:     Vitamin Intake:   B-12   Hard to tolerate   MVI  Generic 2 daily.   Vitamin D  1000 international unit(s), increased to 3000 international unit(s) in winter   Calcium   Not regularly.     Other  Protein powder, 3-4 days weekly. [employer] Elevation Whey protein blend 30g/serving              LABS: \"Reviewed    Nausea no  Vomiting no  Constipation no  Diarrhea no  Rashes only w/ B12 dosings. Some abdominal pannus rash. Hx of twins. Not much worse since surgery but always has used [place] powder.   Venostasis /varicosities stable since childbirth. Continues to wear compression socks to help.   Hair Loss none.   Reactive Hypoglycemia avoids triggers food.  Tolerated 2 bites of ice cream. Rice is still hard for her to tolerate.   Light Headedness none.   Moods \"excited\" about how she's doing.      Most recent labs:  Lab Results   Component Value Date    WBC 5.3 07/23/2023    HGB 13.7 07/23/2023    HCT 40.9 07/23/2023     07/23/2023     07/23/2023     Lab Results   Component Value Date    CHOL 166 01/24/2023     Lab Results   Component Value Date    HDL 60 01/24/2023     No components found for: LDLCALC  Lab Results   Component Value Date    TRIG 118 01/24/2023     No results found for: CHOLHDL  Lab Results   Component Value Date    ALT 16 07/23/2023    AST 16 07/23/2023    ALKPHOS 75 07/23/2023     No results found for: HGBA1C  Lab Results   Component Value Date    B12 307 07/23/2023     No components found for: VITDT1  Lab Results   Component Value Date    FERNANDO 130 07/23/2023     Lab Results   Component Value Date    PTHI 45 07/23/2023     Lab Results   Component Value Date    ZN " "78.7 07/23/2023     Lab Results   Component Value Date    VIB1WB 109 07/23/2023     Lab Results   Component Value Date    TSH 1.93 07/23/2023     No results found for: TEST    Habits:  Tobacco/Nicotine/THC exposure? no   NSAID use? no   Alcohol use? no   Caffeine Habits? Cup in the AM. Half caff.        Exercise Routine: walking, gardening. Treadmill and weights at home. Trying to do 45 minutes 5 days weekly so enjoys outdoor walks.   3 meals/day? yes  Protein 60-80g/day? Yes, 80g/day.   Water Separate from meals? yes  Calorie Containing Beverages: rare  Restaurant eating/wk: less than once monthly.  Sleep Habits:  good  CPAP Use? never  Contraception: post menopausal. 53 yo.   DEXA:will be due next year..  Discussed annual screening to start at age 45 and continue to age 55 if scoring \"low risk\". DEXA scan recommended at age 55 regardless as long as at least 2 years have transpired from their bariatric surgery.    Social History     Social History     Socioeconomic History    Marital status:      Spouse name: Not on file    Number of children: Not on file    Years of education: Not on file    Highest education level: Not on file   Occupational History    Not on file   Tobacco Use    Smoking status: Never    Smokeless tobacco: Never   Substance and Sexual Activity    Alcohol use: Yes     Comment: rare, once monthly.    Drug use: No    Sexual activity: Yes     Partners: Male     Birth control/protection: Surgical   Other Topics Concern    Parent/sibling w/ CABG, MI or angioplasty before 65F 55M? Not Asked   Social History Narrative    Not on file     Social Determinants of Health     Financial Resource Strain: Not on file   Food Insecurity: Not on file   Transportation Needs: Not on file   Physical Activity: Not on file   Stress: Not on file   Social Connections: Not on file   Intimate Partner Violence: Not on file   Housing Stability: Not on file       Past Medical History     Past Medical History: "   Diagnosis Date    Acquired hypothyroidism     Arthritis     sacro iliac and hip, right ankle.    Chronic constipation     since menopause. fiber/hydration helps.    Coagulation disorder (H)     Complication of anesthesia     nauseated w/ morphine previously after arm surgery.    COVID-19     .    Gallbladder problem     s/p cholecystectomy.    Head injury     childhood concussions    Heterozygous for MTHFR gene mutation     History of MTHFR mutation     Hypertension     Long COVID     Long term Covid    Morbid obesity (H)     Pneumonia     covid    Prediabetes     Pulmonary emboli (H)     Pulmonary embolism (H)     wisdome teeth out while on OCPs and MTHFR gene mutation history.    Pulmonary embolism (H)     age 23, OCPs/dental extraction provoked. reports some MTHFR gene issues, unsure if homo or heterozygous.     Past Surgical History:   Procedure Laterality Date    APPENDECTOMY      BREAST BIOPSY, RT/LT      right breast - benign     SECTION      COLONOSCOPY N/A 11/10/2021    Procedure: COLONOSCOPY, WITH POLYPECTOMY;  Surgeon: Lynn Doshi MD;  Location:  GI    CREATION, GASTRIC BYPASS, HAFSA-EN-Y, LAPAROSCOPIC N/A 2022    Procedure: LAPAROSCOPIC HAFSA-EN-Y, GASTRIC BYPASS;  Surgeon: North Schneider MD;  Location: [facility] OR    GYN SURGERY      tubal ligation    LAPAROSCOPIC CHOLECYSTECTOMY N/A 2019    Procedure: CHOLECYSTECTOMY, LAPAROSCOPIC;  Surgeon: Lynn Doshi MD;  Location:  OR    ORTHOPEDIC SURGERY      radius, ulnar - open fracture    TUBAL LIGATION      wisdom teeth         Problem List     Patient Active Problem List   Diagnosis    CARDIOVASCULAR SCREENING; LDL GOAL LESS THAN 130    Obesity, morbid, BMI 50 or higher (H)    Elevated blood pressure reading without diagnosis of hypertension    Edema, unspecified type    History of MTHFR mutation    Hypothyroidism    Pneumonia    Morbid obesity due to excess calories (H)  "    Medications     [unfilled]  Surgical History     Past Surgical History  She has a past surgical history that includes appendectomy (); orthopedic surgery (); GYN surgery ();  section (); breast biopsy, rt/lt (); Laparoscopic cholecystectomy (N/A, 2019); wisdom teeth; tubal ligation; Colonoscopy (N/A, 11/10/2021); and Creation, Gastric Bypass, Kris-En-Y, Laparoscopic (N/A, 2022).    Objective-Exam     Constitutional:  Ht 1.727 m (5' 8\")   Wt 105.2 kg (232 lb)   BMI 35.28 kg/m    [unfilled]   General:  Pleasant and in no acute distress   Eyes:  EOMI  ENT:  Airway patent.     Neck:  Respiratory: Normal respiratory effort, no cough, .  CV:    Gastrointestinal:   Musculoskeletal: muscle mass WNL  Skin: color without pallor hair long and thick,   Psychiatric: alert and oriented X3, mood and affect normal    Counseling     We reviewed the important post op bariatric recommendations:  -eating 3 meals daily  -eating protein first, getting >60gm protein daily  -eating slowly, chewing food well  -avoiding/limiting calorie containing beverages  -drinking water 15-30 minutes before or after meals  -limiting restaurant or cafeteria eating to twice a week or less    We discussed the importance of restorative sleep and stress management in maintaining a healthy weight.  We discussed the National Weight Control Registry healthy weight maintenance strategies and ways to optimize metabolism.  We discussed the importance of physical activity including cardiovascular and strength training in maintaining a healthier weight.    We discussed the importance of life-long vitamin supplementation and life-long  follow-up.    Lucina was reminded that, to avoid marginal ulcers she should avoid tobacco at all, alcohol in excess, caffeine in excess, and NSAIDS (unless indicated for cardioprotection or othewise and opposed by a PPI).    Arnulfo Stanley MD    [facility] " Clinic.  2023  9:22 AM  [phone] (clinic phone)  [phone] (fax)    No images are attached to the encounter.  Medical Decision Makin minutes spent by me on the date of the encounter doing chart review, history and exam, documentation and further activities per the note      Lucina Santamaria is 57 year old  female who presents for a billable video visit today.    How would you like to obtain your AVS? MyChart  If dropped from the video visit, the video invitation should be resent by: Text to cell phone: [phone]  Will anyone else be joining your video visit? No      Video Start Time:  12:00pm    Are there any specific questions or needs that you would like addressed at your visit today? Only concern is the b12 causing her itching regardless of how takes it. Feels weight is stagnant. RD visit scheduled.     Provider Notes:       Video-Visit Details    Type of service:  Video Visit    Platform used for Video Visit: [employer]    Video End Time (time video stopped): 12:33 PM    Originating Location (pt. Location): Home        Distant Location (provider location):  On-site    Distant Location (provider location):  [facility] SURGERY [facility] BARI[facility] CARE [place]

## 2023-08-03 DIAGNOSIS — E03.9 ACQUIRED HYPOTHYROIDISM: Primary | ICD-10-CM

## 2023-08-03 RX ORDER — LEVOTHYROXINE SODIUM 75 UG/1
75 TABLET ORAL DAILY
Qty: 90 TABLET | Refills: 3 | Status: SHIPPED | OUTPATIENT
Start: 2023-08-03 | End: 2024-05-02

## 2023-08-03 NOTE — TELEPHONE ENCOUNTER
TSH   Date Value Ref Range Status   07/23/2023 1.93 0.30 - 4.20 uIU/mL Final   07/14/2022 1.80 0.40 - 4.00 mU/L Final   06/09/2016 3.10 0.40 - 4.00 mU/L Final

## 2023-08-10 ENCOUNTER — OFFICE VISIT (OUTPATIENT)
Dept: FAMILY MEDICINE | Facility: CLINIC | Age: 58
End: 2023-08-10
Payer: COMMERCIAL

## 2023-08-10 VITALS
HEIGHT: 67 IN | BODY MASS INDEX: 36.26 KG/M2 | OXYGEN SATURATION: 99 % | SYSTOLIC BLOOD PRESSURE: 130 MMHG | WEIGHT: 231 LBS | DIASTOLIC BLOOD PRESSURE: 82 MMHG | HEART RATE: 77 BPM | RESPIRATION RATE: 20 BRPM | TEMPERATURE: 97.4 F

## 2023-08-10 DIAGNOSIS — D22.9 BENIGN MOLE: ICD-10-CM
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
## 2023-08-10 DIAGNOSIS — B35.1 ONYCHOMYCOSIS: Primary | ICD-10-CM

## 2023-08-10 DIAGNOSIS — E03.9 ACQUIRED HYPOTHYROIDISM: ICD-10-CM

## 2023-08-10 DIAGNOSIS — K42.9 UMBILICAL HERNIA WITHOUT OBSTRUCTION AND WITHOUT GANGRENE: ICD-10-CM

## 2023-08-10 PROCEDURE — 17110 DESTRUCTION B9 LES UP TO 14: CPT | Performed by: FAMILY MEDICINE

## 2023-08-10 PROCEDURE — 99214 OFFICE O/P EST MOD 30 MIN: CPT | Mod: 25 | Performed by: FAMILY MEDICINE

## 2023-08-10 RX ORDER — DOXYCYCLINE HYCLATE 100 MG
TABLET ORAL
Qty: 10 TABLET | Refills: 0 | Status: SHIPPED | OUTPATIENT
Start: 2023-08-10 | End: 2024-04-30

## 2023-08-10 RX ORDER — TERBINAFINE HYDROCHLORIDE 250 MG/1
250 TABLET ORAL DAILY
Qty: 90 TABLET | Refills: 0 | Status: SHIPPED | OUTPATIENT
Start: 2023-08-10 | End: 2023-11-08

## 2023-08-10 ASSESSMENT — PAIN SCALES - GENERAL: PAINLEVEL: MILD PAIN (2)

## 2023-08-10 NOTE — PROGRESS NOTES
"  Assessment & Plan     Onychomycosis  Will treat. Will like do pulse therapy  - terbinafine (LAMISIL) 250 MG tablet; Take 1 tablet (250 mg) by mouth daily for 90 days    Acquired hypothyroidism  Well controlled  Continue current dose of levothyroxine     Umbilical hernia without obstruction and without gangrene  May choose to have fixed after weight loss stabalizes    Tick bite, unspecified site, sequela  To take prophylaxis as needed for tick bites  - doxycycline hyclate (VIBRA-TABS) 100 MG tablet; Take 2 at once for deer tick bites    Benign mole  As very pruritic, cryotherapy applied x 2               BMI:   Estimated body mass index is 36.73 kg/m  as calculated from the following:    Height as of this encounter: 1.689 m (5' 6.5\").    Weight as of this encounter: 104.8 kg (231 lb).   S/p bariatric surgery, continues to lose weight        Nani Eli MD  [facility]    Gloria Verdugo is a 57 year old, presenting for the following health issues:  Thyroid Disease      8/10/2023     8:00 AM   Additional Questions   Roomed by Elda NUR       Thyroid Disease    History of Present Illness       Hypothyroidism:     Since last visit, patient describes the following symptoms::  Constipation, Dry skin, Hair loss and Weight loss    Weight loss::  >20 lbs.    Reason for visit:  Thyroid nails hernia    She eats 2-3 servings of fruits and vegetables daily.She consumes 0 sweetened beverage(s) daily.She exercises with enough effort to increase her heart rate 30 to 60 minutes per day.  She exercises with enough effort to increase her heart rate 5 days per week.   She is taking medications regularly.     hypothyroidism   On levothyroxine   TSH   Date Value Ref Range Status   07/23/2023 1.93 0.30 - 4.20 uIU/mL Final   07/14/2022 1.80 0.40 - 4.00 mU/L Final   06/09/2016 3.10 0.40 - 4.00 mU/L Final      Toe nail fungus  Would like treatment     Has an umbilical hernia, or just below. Has had for a " "long time, really stuck out when was pregnant. Hurts sometimes. Not always apparent, more so no that she has lost weight.     Has a lesion on her back, is very pruritic x long time    Has had multiple tick bites[place]    S/p bariatric surgery  Has lost almost 90 lbs      Review of Systems   Abdominal pain as above      Objective    /82 (BP Location: Right arm)   Pulse 77   Temp 97.4  F (36.3  C) (Tympanic)   Resp 20   Ht 1.689 m (5' 6.5\")   Wt 104.8 kg (231 lb)   SpO2 99%   BMI 36.73 kg/m    Body mass index is 36.73 kg/m .  Physical Exam   GENERAL: healthy, alert and no distress  EYES: Eyes grossly normal to inspection, PERRL and conjunctivae and sclerae normal  NECK: no adenopathy, no asymmetry, masses, or scars and thyroid normal to palpation  RESP: lungs clear to auscultation - no rales, rhonchi or wheezes  CV: regular rate and rhythm, normal S1 S2, no S3 or S4, no murmur, click or rub, compression stockings in place, removed to view toenails, mild peripheral edema   ABDOMEN: soft, obese, nontender, no hepatosplenomegaly, area of hernia just below umbilicus, subtle protrusion, tender  Toe nails with polish, are thickened and discolored  Skin: 4 mm skin colored, partially pedunculated mole on mid back                "

## 2023-08-13 PROBLEM — K42.9 UMBILICAL HERNIA WITHOUT OBSTRUCTION AND WITHOUT GANGRENE: Status: ACTIVE | Noted: 2023-08-13

## 2023-08-13 PROBLEM — E66.01 MORBID OBESITY DUE TO EXCESS CALORIES (H): Status: RESOLVED | Noted: 2022-07-25 | Resolved: 2023-08-13

## 2023-08-13 PROBLEM — B35.1 ONYCHOMYCOSIS: Status: ACTIVE | Noted: 2023-08-13

## 2023-08-13 PROBLEM — E66.01 OBESITY, MORBID, BMI 50 OR HIGHER (H): Status: RESOLVED | Noted: 2018-09-24 | Resolved: 2023-08-13

## 2023-11-25 ENCOUNTER — HEALTH MAINTENANCE LETTER (OUTPATIENT)
Age: 58
End: 2023-11-25

## 2023-12-18 ENCOUNTER — VIRTUAL VISIT (OUTPATIENT)
Dept: SURGERY | Facility: CLINIC | Age: 58
End: 2023-12-18
Payer: COMMERCIAL

## 2023-12-18 DIAGNOSIS — Z98.84 S/P GASTRIC BYPASS: Primary | ICD-10-CM

## 2023-12-18 DIAGNOSIS — Z71.3 NUTRITIONAL COUNSELING: ICD-10-CM

## 2023-12-18 DIAGNOSIS — K91.2 POSTOPERATIVE MALABSORPTION: ICD-10-CM

## 2023-12-18 PROCEDURE — 97803 MED NUTRITION INDIV SUBSEQ: CPT | Mod: VID | Performed by: DIETITIAN, REGISTERED

## 2023-12-18 NOTE — LETTER
12/18/2023         RE: Lucina Santamaria  [address] [address]        Dear Colleague,    Thank you for referring your patient, Lucina Santamaria, to the [facility] [place]. Please see a copy of my visit note below.    Lucina Santamaria is a 58 year old who is being evaluated via a billable video visit.      How would you like to obtain your AVS? MyChart  If the video visit is dropped, the invitation should be resent by: Send to e-mail at: [email or web address]  Will anyone else be joining your video visit? No  {If patient encounters technical issues they should call [phone]      Post-op Surgical Weight Loss Diet Evaluation     Assessment:  Pt presents for  18 Months  post-op RD visit, s/p RNY GB on 7/25/2022 with Dr. Schneider. Today we reviewed current eating habits and level of physical activity, and instructed on the changes that are required for successful bariatric outcomes.    Patient Progress: going really well, no concerns.  Really trying to focus on increased protein along with increased water consumption, especially in the winter months. Patient reports that she tries to plan ahead for the work work when it comes to protein (protein bars, tuna, cheese stick, beef stick, etc...)     Initial weight: 315 lbs   Current weight: 222 lbs   Weight change: 93 lbs (down)     There is no height or weight on file to calculate BMI.    Patient Active Problem List   Diagnosis     CARDIOVASCULAR SCREENING; LDL GOAL LESS THAN 130     Elevated blood pressure reading without diagnosis of hypertension     Edema, unspecified type     History of MTHFR mutation     Hypothyroidism     Pneumonia     Umbilical hernia without obstruction and without gangrene     Onychomycosis         Vitamins   Multi Vit with Iron: yes  Calcium Citrate: yes  B12: yes-takes it 4 times/week due to itching  D3: yes    Constipation: yes-if she doesn't drink enough water throughout the  "day      Diet Recall/Time:   Breakfast: eggs or cottage cheese or greek yogurt with blueberries, granola  Lunch: chicken breast or meatballs or protein bar or shrimp (6-7), broccoli or cauliflower or tuna   Dinner: similar to lunches     Typical Snacks: protein bar or tuna fish with crackers or cheese cubes or nutty bar (on occasion)     Proteins/Veg/Fruits/CHO (NOT well tolerated): ice cream    Estimated protein intake: 60-80 grams    Estimated portion size per meals:1 cup/meal    Meal Duration:20-30 minutes    Fluid-meal separation:  Fluids are  30min before and 30 minutes after meals.    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  Educated on post-op vitamin regimen: Multi Vit + iron 2x/day, calcium citrate 400-600 mg 2x/day, 2961-1782 mcg of Sublingual B-12 daily, and 5000 IU Vitamin D3 daily (MVI and calcium can be taken at the same time BID)  Reviewed lean protein sources  Bariatric Plate Method-  including lean/low fat protein at each meal, including a vegetable/fruit, and limiting carbohydrate intake to less than 25% of plate volume.     Instructions  Include 15-20gm protein at each meal, along with protein supplement/\"planned protein containing snack\" of 15-30gm protein, to reach goal of 60-80 gm protein daily.  Increase fluid intake to 64oz daily: choose plain or calorie/carbonation-free " beverages.  Incorporate daily structured activity, 30-60 minutes most days of the week  Recommended pt to start taking: Multi Vit + iron 2x/day, calcium citrate 400-600 mg 2x/day, 0459-7086 mcg of Sublingual B-12 daily, and 5000 IU Vitamin D3 daily. (MVI and calcium can be taken at the same time)  Read food labels more consistently: keeping total fat grams <10, total sugar grams <10, fiber >3gm per serving.  Increase vegetable/fruit intake, by having a vegetable or fruit with each meal daily.  Practice plate method: 1/2 plate lean/low fat protein source, vegetable/fruit, <25% of plate complex carbohydrates.  Separate fluids 30 minutes before/after meal times.  Practice eating off of smaller plates/bowls, chewing to applesauce consistency, taking 20-30 minutes to eat in a calm/relaxed environment without distractions of tv/email/cell phone.    Handouts provided:  18 Months and Beyond  Post-Op Nutritional Guidelines for RNY GB    Assessment/Plan:    Pt to follow up for  2 years  post-op visit with bariatrician       Video-Visit Details    Type of service:  Video Visit    Video Start Time (time video started):  10:15 am     Video End Time (time video stopped):  10:34 am    Originating Location (pt. Location): Home      Distant Location (provider location):  Off-site    Mode of Communication:  Video Conference via [facility]    Physician has received verbal consent for a Video Visit from the patient? Yes    Cindy Vanegas RD              Again, thank you for allowing me to participate in the care of your patient.        Sincerely,        Cindy Vanegas RD

## 2023-12-18 NOTE — PROGRESS NOTES
Lucina Santamaria is a 58 year old who is being evaluated via a billable video visit.      How would you like to obtain your AVS? MyChart  If the video visit is dropped, the invitation should be resent by: Send to e-mail at: [email or web address]  Will anyone else be joining your video visit? No  {If patient encounters technical issues they should call [phone]      Post-op Surgical Weight Loss Diet Evaluation     Assessment:  Pt presents for  18 Months  post-op RD visit, s/p RNY GB on 7/25/2022 with Dr. Schneider. Today we reviewed current eating habits and level of physical activity, and instructed on the changes that are required for successful bariatric outcomes.    Patient Progress: going really well, no concerns.  Really trying to focus on increased protein along with increased water consumption, especially in the winter months. Patient reports that she tries to plan ahead for the work work when it comes to protein (protein bars, tuna, cheese stick, beef stick, etc...)     Initial weight: 315 lbs   Current weight: 222 lbs   Weight change: 93 lbs (down)     There is no height or weight on file to calculate BMI.    Patient Active Problem List   Diagnosis    CARDIOVASCULAR SCREENING; LDL GOAL LESS THAN 130    Elevated blood pressure reading without diagnosis of hypertension    Edema, unspecified type    History of MTHFR mutation    Hypothyroidism    Pneumonia    Umbilical hernia without obstruction and without gangrene    Onychomycosis         Vitamins   Multi Vit with Iron: yes  Calcium Citrate: yes  B12: yes-takes it 4 times/week due to itching  D3: yes    Constipation: yes-if she doesn't drink enough water throughout the day      Diet Recall/Time:   Breakfast: eggs or cottage cheese or greek yogurt with blueberries, granola  Lunch: chicken breast or meatballs or protein bar or shrimp (6-7), broccoli or cauliflower or tuna   Dinner: similar to lunches     Typical Snacks: protein bar or tuna fish with  "crackers or cheese cubes or nutty bar (on occasion)     Proteins/Veg/Fruits/CHO (NOT well tolerated): ice cream    Estimated protein intake: 60-80 grams    Estimated portion size per meals:1 cup/meal    Meal Duration:20-30 minutes    Fluid-meal separation:  Fluids are  30min before and 30 minutes after meals.    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  Educated on post-op vitamin regimen: Multi Vit + iron 2x/day, calcium citrate 400-600 mg 2x/day, 5918-1055 mcg of Sublingual B-12 daily, and 5000 IU Vitamin D3 daily (MVI and calcium can be taken at the same time BID)  Reviewed lean protein sources  Bariatric Plate Method-  including lean/low fat protein at each meal, including a vegetable/fruit, and limiting carbohydrate intake to less than 25% of plate volume.     Instructions  Include 15-20gm protein at each meal, along with protein supplement/\"planned protein containing snack\" of 15-30gm protein, to reach goal of 60-80 gm protein daily.  Increase fluid intake to 64oz daily: choose plain or calorie/carbonation-free beverages.  Incorporate daily structured activity, 30-60 minutes most days of the week  Recommended pt to start taking: Multi Vit + iron 2x/day, calcium citrate 400-600 mg 2x/day, 6627-4600 mcg of Sublingual B-12 daily, and 5000 IU Vitamin D3 daily. (MVI and calcium can be taken at the same " time)  Read food labels more consistently: keeping total fat grams <10, total sugar grams <10, fiber >3gm per serving.  Increase vegetable/fruit intake, by having a vegetable or fruit with each meal daily.  Practice plate method: 1/2 plate lean/low fat protein source, vegetable/fruit, <25% of plate complex carbohydrates.  Separate fluids 30 minutes before/after meal times.  Practice eating off of smaller plates/bowls, chewing to applesauce consistency, taking 20-30 minutes to eat in a calm/relaxed environment without distractions of tv/email/cell phone.    Handouts provided:  18 Months and Beyond  Post-Op Nutritional Guidelines for RNY GB    Assessment/Plan:    Pt to follow up for  2 years  post-op visit with bariatrician       Video-Visit Details    Type of service:  Video Visit    Video Start Time (time video started):  10:15 am     Video End Time (time video stopped):  10:34 am    Originating Location (pt. Location): Home      Distant Location (provider location):  Off-site    Mode of Communication:  Video Conference via [facility]    Physician has received verbal consent for a Video Visit from the patient? Yes    Cindy Vanegas, RD

## 2024-02-06 ENCOUNTER — TELEPHONE (OUTPATIENT)
Dept: FAMILY MEDICINE | Facility: CLINIC | Age: 59
End: 2024-02-06
Payer: COMMERCIAL

## 2024-02-06 NOTE — TELEPHONE ENCOUNTER
Patient Quality Outreach    Patient is due for the following:   Physical Preventive Adult Physical    Next Steps:   Schedule a Adult Preventative    Type of outreach:    Phone, spoke to patient/parent. Appointment scheduled      Questions for provider review:    None           Gemma Painter CMA

## 2024-04-02 ENCOUNTER — TELEPHONE (OUTPATIENT)
Dept: FAMILY MEDICINE | Facility: CLINIC | Age: 59
End: 2024-04-02
Payer: COMMERCIAL

## 2024-04-02 DIAGNOSIS — J01.00 ACUTE NON-RECURRENT MAXILLARY SINUSITIS: Primary | ICD-10-CM

## 2024-04-30 ENCOUNTER — LAB (OUTPATIENT)
Dept: LAB | Facility: CLINIC | Age: 59
End: 2024-04-30
Payer: COMMERCIAL

## 2024-04-30 ENCOUNTER — ANCILLARY PROCEDURE (OUTPATIENT)
Dept: GENERAL RADIOLOGY | Facility: CLINIC | Age: 59
End: 2024-04-30
Attending: FAMILY MEDICINE
Payer: COMMERCIAL

## 2024-04-30 DIAGNOSIS — E03.9 ACQUIRED HYPOTHYROIDISM: ICD-10-CM

## 2024-04-30 DIAGNOSIS — G89.29 CHRONIC FOOT PAIN, LEFT: ICD-10-CM

## 2024-04-30 DIAGNOSIS — Z98.84 STATUS POST BARIATRIC SURGERY: ICD-10-CM

## 2024-04-30 DIAGNOSIS — R73.9 ELEVATED BLOOD SUGAR: ICD-10-CM

## 2024-04-30 DIAGNOSIS — E03.9 ACQUIRED HYPOTHYROIDISM: Primary | ICD-10-CM

## 2024-04-30 DIAGNOSIS — M79.672 CHRONIC FOOT PAIN, LEFT: ICD-10-CM

## 2024-04-30 LAB
ALBUMIN SERPL BCG-MCNC: 4.1 G/DL (ref 3.5–5.2)
ALP SERPL-CCNC: 83 U/L (ref 40–150)
ALT SERPL W P-5'-P-CCNC: 16 U/L (ref 0–50)
ANION GAP SERPL CALCULATED.3IONS-SCNC: 10 MMOL/L (ref 7–15)
AST SERPL W P-5'-P-CCNC: 16 U/L (ref 0–45)
BASOPHILS # BLD AUTO: 0 10E3/UL (ref 0–0.2)
BASOPHILS NFR BLD AUTO: 1 %
BILIRUB SERPL-MCNC: 0.2 MG/DL
BUN SERPL-MCNC: 15 MG/DL (ref 6–20)
CALCIUM SERPL-MCNC: 9.5 MG/DL (ref 8.6–10)
CHLORIDE SERPL-SCNC: 109 MMOL/L (ref 98–107)
CREAT SERPL-MCNC: 0.74 MG/DL (ref 0.51–0.95)
DEPRECATED HCO3 PLAS-SCNC: 26 MMOL/L (ref 22–29)
EGFRCR SERPLBLD CKD-EPI 2021: >90 ML/MIN/1.73M2
EOSINOPHIL # BLD AUTO: 0.5 10E3/UL (ref 0–0.7)
EOSINOPHIL NFR BLD AUTO: 7 %
ERYTHROCYTE [DISTWIDTH] IN BLOOD BY AUTOMATED COUNT: 12.2 % (ref 10–15)
GLUCOSE SERPL-MCNC: 119 MG/DL (ref 70–99)
HCT VFR BLD AUTO: 40.8 % (ref 35–47)
HGB BLD-MCNC: 13.5 G/DL (ref 11.7–15.7)
IMM GRANULOCYTES # BLD: 0 10E3/UL
IMM GRANULOCYTES NFR BLD: 0 %
LYMPHOCYTES # BLD AUTO: 2.3 10E3/UL (ref 0.8–5.3)
LYMPHOCYTES NFR BLD AUTO: 35 %
MAGNESIUM SERPL-MCNC: 2.2 MG/DL (ref 1.7–2.3)
MCH RBC QN AUTO: 33.8 PG (ref 26.5–33)
MCHC RBC AUTO-ENTMCNC: 33.1 G/DL (ref 31.5–36.5)
MCV RBC AUTO: 102 FL (ref 78–100)
MONOCYTES # BLD AUTO: 0.3 10E3/UL (ref 0–1.3)
MONOCYTES NFR BLD AUTO: 5 %
NEUTROPHILS # BLD AUTO: 3.4 10E3/UL (ref 1.6–8.3)
NEUTROPHILS NFR BLD AUTO: 53 %
PLATELET # BLD AUTO: 171 10E3/UL (ref 150–450)
POTASSIUM SERPL-SCNC: 4.2 MMOL/L (ref 3.4–5.3)
PROT SERPL-MCNC: 6.6 G/DL (ref 6.4–8.3)
RBC # BLD AUTO: 4 10E6/UL (ref 3.8–5.2)
SODIUM SERPL-SCNC: 145 MMOL/L (ref 135–145)
TSH SERPL DL<=0.005 MIU/L-ACNC: 1.31 UIU/ML (ref 0.3–4.2)
WBC # BLD AUTO: 6.4 10E3/UL (ref 4–11)

## 2024-04-30 PROCEDURE — 82607 VITAMIN B-12: CPT

## 2024-04-30 PROCEDURE — 73630 X-RAY EXAM OF FOOT: CPT | Mod: TC | Performed by: RADIOLOGY

## 2024-04-30 PROCEDURE — 83735 ASSAY OF MAGNESIUM: CPT

## 2024-04-30 PROCEDURE — 82306 VITAMIN D 25 HYDROXY: CPT

## 2024-04-30 PROCEDURE — 80053 COMPREHEN METABOLIC PANEL: CPT

## 2024-04-30 PROCEDURE — 36415 COLL VENOUS BLD VENIPUNCTURE: CPT

## 2024-04-30 PROCEDURE — 85025 COMPLETE CBC W/AUTO DIFF WBC: CPT

## 2024-04-30 PROCEDURE — 84443 ASSAY THYROID STIM HORMONE: CPT

## 2024-04-30 PROCEDURE — 83036 HEMOGLOBIN GLYCOSYLATED A1C: CPT

## 2024-04-30 SDOH — HEALTH STABILITY: PHYSICAL HEALTH: ON AVERAGE, HOW MANY DAYS PER WEEK DO YOU ENGAGE IN MODERATE TO STRENUOUS EXERCISE (LIKE A BRISK WALK)?: 6 DAYS

## 2024-04-30 SDOH — HEALTH STABILITY: PHYSICAL HEALTH: ON AVERAGE, HOW MANY MINUTES DO YOU ENGAGE IN EXERCISE AT THIS LEVEL?: 30 MIN

## 2024-04-30 ASSESSMENT — SOCIAL DETERMINANTS OF HEALTH (SDOH): HOW OFTEN DO YOU GET TOGETHER WITH FRIENDS OR RELATIVES?: TWICE A WEEK

## 2024-05-01 LAB
VIT B12 SERPL-MCNC: 453 PG/ML (ref 232–1245)
VIT D+METAB SERPL-MCNC: 18 NG/ML (ref 20–50)

## 2024-05-02 ENCOUNTER — OFFICE VISIT (OUTPATIENT)
Dept: FAMILY MEDICINE | Facility: CLINIC | Age: 59
End: 2024-05-02
Payer: COMMERCIAL

## 2024-05-02 VITALS
WEIGHT: 220 LBS | RESPIRATION RATE: 20 BRPM | SYSTOLIC BLOOD PRESSURE: 138 MMHG | HEIGHT: 68 IN | HEART RATE: 69 BPM | OXYGEN SATURATION: 99 % | DIASTOLIC BLOOD PRESSURE: 70 MMHG | BODY MASS INDEX: 33.34 KG/M2

## 2024-05-02 DIAGNOSIS — R73.9 ELEVATED BLOOD SUGAR: ICD-10-CM

## 2024-05-02 DIAGNOSIS — M79.672 CHRONIC FOOT PAIN, LEFT: ICD-10-CM

## 2024-05-02 DIAGNOSIS — Z00.00 ROUTINE GENERAL MEDICAL EXAMINATION AT A HEALTH CARE FACILITY: Primary | ICD-10-CM

## 2024-05-02 DIAGNOSIS — D22.9 BENIGN MOLE: ICD-10-CM

## 2024-05-02 DIAGNOSIS — Z98.84 STATUS POST BARIATRIC SURGERY: ICD-10-CM

## 2024-05-02 DIAGNOSIS — E03.9 ACQUIRED HYPOTHYROIDISM: ICD-10-CM

## 2024-05-02 DIAGNOSIS — G89.29 CHRONIC FOOT PAIN, LEFT: ICD-10-CM

## 2024-05-02 LAB — HBA1C MFR BLD: 5.1 % (ref 0–5.6)

## 2024-05-02 PROCEDURE — 99396 PREV VISIT EST AGE 40-64: CPT | Performed by: FAMILY MEDICINE

## 2024-05-02 PROCEDURE — 87624 HPV HI-RISK TYP POOLED RSLT: CPT | Performed by: FAMILY MEDICINE

## 2024-05-02 PROCEDURE — G0145 SCR C/V CYTO,THINLAYER,RESCR: HCPCS | Performed by: FAMILY MEDICINE

## 2024-05-02 RX ORDER — LEVOTHYROXINE SODIUM 88 UG/1
88 TABLET ORAL DAILY
Qty: 90 TABLET | Refills: 3 | Status: SHIPPED | OUTPATIENT
Start: 2024-05-02

## 2024-05-02 ASSESSMENT — PAIN SCALES - GENERAL: PAINLEVEL: MILD PAIN (2)

## 2024-05-02 NOTE — PATIENT INSTRUCTIONS
Preventive Care Advice   This is general advice given by our system to help you stay healthy. However, your care team may have specific advice just for you. Please talk to your care team about your preventive care needs.  Nutrition  Eat 5 or more servings of fruits and vegetables each day.  Try wheat bread, brown rice and whole grain pasta (instead of white bread, rice, and pasta).  Get enough calcium and vitamin D. Check the label on foods and aim for 100% of the RDA (recommended daily allowance).  Lifestyle  Exercise at least 150 minutes each week   (30 minutes a day, 5 days a week).  Do muscle strengthening activities 2 days a week. These help control your weight and prevent disease.  No smoking.  Wear sunscreen to prevent skin cancer.  Have a dental exam and cleaning every 6 months.  Yearly exams  See your health care team every year to talk about:  Any changes in your health.  Any medicines your care team has prescribed.  Preventive care, family planning, and ways to prevent chronic diseases.  Shots (vaccines)   HPV shots (up to age 26), if you've never had them before.  Hepatitis B shots (up to age 59), if you've never had them before.  COVID-19 shot: Get this shot when it's due.  Flu shot: Get a flu shot every year.  Tetanus shot: Get a tetanus shot every 10 years.  Pneumococcal, hepatitis A, and RSV shots: Ask your care team if you need these based on your risk.  Shingles shot (for age 50 and up).  General health tests  Diabetes screening:  Starting at age 35, Get screened for diabetes at least every 3 years.  If you are younger than age 35, ask your care team if you should be screened for diabetes.  Cholesterol test: At age 39, start having a cholesterol test every 5 years, or more often if advised.  Bone density scan (DEXA): At age 50, ask your care team if you should have this scan for osteoporosis (brittle bones).  Hepatitis C: Get tested at least once in your life.  STIs (sexually transmitted  infections)  Before age 24: Ask your care team if you should be screened for STIs.  After age 24: Get screened for STIs if you're at risk. You are at risk for STIs (including HIV) if:  You are sexually active with more than one person.  You don't use condoms every time.  You or a partner was diagnosed with a sexually transmitted infection.  If you are at risk for HIV, ask about PrEP medicine to prevent HIV.  Get tested for HIV at least once in your life, whether you are at risk for HIV or not.  Cancer screening tests  Cervical cancer screening: If you have a cervix, begin getting regular cervical cancer screening tests at age 21. Most people who have regular screenings with normal results can stop after age 65. Talk about this with your provider.  Breast cancer scan (mammogram): If you've ever had breasts, begin having regular mammograms starting at age 40. This is a scan to check for breast cancer.  Colon cancer screening: It is important to start screening for colon cancer at age 45.  Have a colonoscopy test every 10 years (or more often if you're at risk) Or, ask your provider about stool tests like a FIT test every year or Cologuard test every 3 years.  To learn more about your testing options, visit: https://www.Copytele/946283.pdf.  For help making a decision, visit: https://bit.ly/xo53736.  Prostate cancer screening test: If you have a prostate and are age 55 to 69, ask your provider if you would benefit from a yearly prostate cancer screening test.  Lung cancer screening: If you are a current or former smoker age 50 to 80, ask your care team if ongoing lung cancer screenings are right for you.  For informational purposes only. Not to replace the advice of your health care provider. Copyright   2023 [employer]. All rights reserved. Clinically reviewed by the [facility]itions [facility]. [employer] 459969 - REV 01/24.    Learning About Stress  What is stress?     Stress is your  body's response to a hard situation. Your body can have a physical, emotional, or mental response. Stress is a fact of life for most people, and it affects everyone differently. What causes stress for you may not be stressful for someone else.  A lot of things can cause stress. You may feel stress when you go on a job interview, take a test, or run a race. This kind of short-term stress is normal and even useful. It can help you if you need to work hard or react quickly. For example, stress can help you finish an important job on time.  Long-term stress is caused by ongoing stressful situations or events. Examples of long-term stress include long-term health problems, ongoing problems at work, or conflicts in your family. Long-term stress can harm your health.  How does stress affect your health?  When you are stressed, your body responds as though you are in danger. It makes hormones that speed up your heart, make you breathe faster, and give you a burst of energy. This is called the fight-or-flight stress response. If the stress is over quickly, your body goes back to normal and no harm is done.  But if stress happens too often or lasts too long, it can have bad effects. Long-term stress can make you more likely to get sick, and it can make symptoms of some diseases worse. If you tense up when you are stressed, you may develop neck, shoulder, or low back pain. Stress is linked to high blood pressure and heart disease.  Stress also harms your emotional health. It can make you dozier, tense, or depressed. Your relationships may suffer, and you may not do well at work or school.  What can you do to manage stress?  You can try these things to help manage stress:   Do something active. Exercise or activity can help reduce stress. Walking is a great way to get started. Even everyday activities such as housecleaning or yard work can help.  Try yoga or hong chi. These techniques combine exercise and meditation. You may need  some training at first to learn them.  Do something you enjoy. For example, listen to music or go to a movie. Practice your hobby or do volunteer work.  Meditate. This can help you relax, because you are not worrying about what happened before or what may happen in the future.  Do guided imagery. Imagine yourself in any setting that helps you feel calm. You can use online videos, books, or a teacher to guide you.  Do breathing exercises. For example:  From a standing position, bend forward from the waist with your knees slightly bent. Let your arms dangle close to the floor.  Breathe in slowly and deeply as you return to a standing position. Roll up slowly and lift your head last.  Hold your breath for just a few seconds in the standing position.  Breathe out slowly and bend forward from the waist.  Let your feelings out. Talk, laugh, cry, and express anger when you need to. Talking with supportive friends or family, a counselor, or a pily leader about your feelings is a healthy way to relieve stress. Avoid discussing your feelings with people who make you feel worse.  Write. It may help to write about things that are bothering you. This helps you find out how much stress you feel and what is causing it. When you know this, you can find better ways to cope.  What can you do to prevent stress?  You might try some of these things to help prevent stress:  Manage your time. This helps you find time to do the things you want and need to do.  Get enough sleep. Your body recovers from the stresses of the day while you are sleeping.  Get support. Your family, friends, and community can make a difference in how you experience stress.  Limit your news feed. Avoid or limit time on social media or news that may make you feel stressed.  Do something active. Exercise or activity can help reduce stress. Walking is a great way to get started.  Where can you learn more?  Go to https://www.healthwise.net/patiented  Enter N032 in the  "search box to learn more about \"Learning About Stress.\"  Current as of: October 24, 2023               Content Version: 14.0    3470-5189 [employer].   Care instructions adapted under license by your healthcare professional. If you have questions about a medical condition or this instruction, always ask your healthcare professional. [employer] disclaims any warranty or liability for your use of this information.      "

## 2024-05-02 NOTE — NURSING NOTE
"Chief Complaint   Patient presents with    Physical       Initial /70 (BP Location: Right arm)   Pulse 69   Resp 20   Ht 1.715 m (5' 7.5\")   Wt 99.8 kg (220 lb)   SpO2 99%   BMI 33.95 kg/m   Estimated body mass index is 33.95 kg/m  as calculated from the following:    Height as of this encounter: 1.715 m (5' 7.5\").    Weight as of this encounter: 99.8 kg (220 lb).    Patient presents to the clinic using No DME    Is there anyone who you would like to be able to receive your results? No  If yes have patient fill out TERESA      "

## 2024-05-02 NOTE — PROGRESS NOTES
"Preventive Care Visit  [facility]  Nani Eli MD, Family Medicine  May 2, 2024      Assessment & Plan     Routine general medical examination at a health care facility  - Pap screen with HPV - recommended age 30 - 65 years    Acquired hypothyroidism  Adjust slightly  - levothyroxine (SYNTHROID/LEVOTHROID) 88 MCG tablet; Take 1 tablet (88 mcg) by mouth daily    Elevated blood sugar  - Hemoglobin A1c; Future    Status post bariatric surgery  Doing well    Chronic foot pain, left  Will monitor for now    Mole  Will monitor for now    Patient has been advised of split billing requirements and indicates understanding: Yes          BMI  Estimated body mass index is 33.95 kg/m  as calculated from the following:    Height as of this encounter: 1.715 m (5' 7.5\").    Weight as of this encounter: 99.8 kg (220 lb).       Counseling  Appropriate preventive services were discussed with this patient, including applicable screening as appropriate for fall prevention, nutrition, physical activity, Tobacco-use cessation, weight loss and cognition.  Checklist reviewing preventive services available has been given to the patient.  Reviewed patient's diet, addressing concerns and/or questions.           Subjective   Lucina is a 58 year old, presenting for the following:  Physical        5/2/2024    10:23 AM   Additional Questions   Roomed by [facility] Directive  Patient does not have a Health Care Directive or Living Will: Advance Directive received and scanned. Click on Code in the patient header to view.    HPI      Thyroid follow up - needs refills  TSH   Date Value Ref Range Status   04/30/2024 1.31 0.30 - 4.20 uIU/mL Final   07/14/2022 1.80 0.40 - 4.00 mU/L Final   06/09/2016 3.10 0.40 - 4.00 mU/L Final      Has a lot of constipation and dry skin, wonders if can go up a little on the levothyroxine     Chronic left foot pain  Xray was negative for fracture, has first MTP           " 4/30/2024   General Health   How would you rate your overall physical health? Good   Feel stress (tense, anxious, or unable to sleep) To some extent   (!) STRESS CONCERN      4/30/2024   Nutrition   Three or more servings of calcium each day? Yes   Diet: Other   If other, please elaborate: Bariatric   How many servings of fruit and vegetables per day? 4 or more   How many sweetened beverages each day? 0-1         4/30/2024   Exercise   Days per week of moderate/strenous exercise 6 days   Average minutes spent exercising at this level 30 min         4/30/2024   Social Factors   Frequency of gathering with friends or relatives Twice a week   Worry food won't last until get money to buy more No   Food not last or not have enough money for food? No   Do you have housing?  Yes   Are you worried about losing your housing? No   Lack of transportation? No   Unable to get utilities (heat,electricity)? No         4/30/2024   Fall Risk   Fallen 2 or more times in the past year? No   Trouble with walking or balance? No          4/30/2024   Dental   Dentist two times every year? Yes            Today's PHQ-2 Score:       5/1/2024     8:37 PM   PHQ-2 ( 1999 [employer])   Q1: Little interest or pleasure in doing things 0   Q2: Feeling down, depressed or hopeless 0   PHQ-2 Score 0   Q1: Little interest or pleasure in doing things Not at all   Q2: Feeling down, depressed or hopeless Not at all   PHQ-2 Score 0           4/30/2024   Substance Use   Alcohol more than 3/day or more than 7/wk No   Do you use any other substances recreationally? No     Social History     Tobacco Use    Smoking status: Never    Smokeless tobacco: Never   Vaping Use    Vaping status: Never Used   Substance Use Topics    Alcohol use: Not Currently     Comment: rare, once monthly.    Drug use: No           5/1/2023   LAST FHS-7 RESULTS   1st degree relative breast or ovarian cancer No   Any relative bilateral breast cancer No   Any male have breast cancer No   Any  ONE woman have BOTH breast AND ovarian cancer No   Any woman with breast cancer before 50yrs Yes   2 or more relatives with breast AND/OR ovarian cancer No   2 or more relatives with breast AND/OR bowel cancer No        Mammogram Screening - Mammogram every 1-2 years updated in Health Maintenance based on mutual decision making        4/30/2024   STI Screening   New sexual partner(s) since last STI/HIV test? No     History of abnormal Pap smear: NO - age 30-65 PAP every 5 years with negative HPV co-testing recommended        Latest Ref Rng & Units 9/23/2021     7:46 AM 9/24/2018    11:02 AM 9/24/2018    10:30 AM   PAP / HPV   PAP  Negative for Intraepithelial Lesion or Malignancy (NILM)      PAP (Historical)   NIL     HPV 16 DNA Negative Negative   Negative    HPV 18 DNA Negative Negative   Negative    Other HR HPV Negative Negative   Negative      ASCVD Risk   The 10-year ASCVD risk score (Noam DIXON, et al., 2019) is: 2%    Values used to calculate the score:      Age: 58 years      Sex: Female      Is Non- : No      Diabetic: No      Tobacco smoker: No      Systolic Blood Pressure: 138 mmHg      Is BP treated: No      HDL Cholesterol: 60 mg/dL      Total Cholesterol: 166 mg/dL           Reviewed and updated as needed this visit by Provider                    Past Medical History:   Diagnosis Date    Acquired hypothyroidism     Arthritis     sacro iliac and hip, right ankle.    Chronic constipation     since menopause. fiber/hydration helps.    Coagulation disorder (H24)     Complication of anesthesia     nauseated w/ morphine previously after arm surgery.    COVID-19     2020, November.    Gallbladder problem     s/p cholecystectomy.    Head injury     childhood concussions    Heterozygous for MTHFR gene mutation     History of MTHFR mutation     Hypertension     Long COVID     Long term Covid    Morbid obesity (H)     Pneumonia     covid    Prediabetes     Pulmonary emboli (H)  "    Pulmonary embolism (H)     wisdome teeth out while on OCPs and MTHFR gene mutation history.    Pulmonary embolism (H)     age 23, OCPs/dental extraction provoked. reports some MTHFR gene issues, unsure if homo or heterozygous.     Past Surgical History:   Procedure Laterality Date    APPENDECTOMY  1971    BREAST BIOPSY, RT/LT      right breast - benign     SECTION      COLONOSCOPY N/A 11/10/2021    Procedure: COLONOSCOPY, WITH POLYPECTOMY;  Surgeon: Lynn Doshi MD;  Location:  GI    CREATION, GASTRIC BYPASS, HAFSA-EN-Y, LAPAROSCOPIC N/A 2022    Procedure: LAPAROSCOPIC HAFSA-EN-Y, GASTRIC BYPASS;  Surgeon: North Schneider MD;  Location: [facility] OR    EYE SURGERY  epilasik left eye    GI SURGERY  [facility]     GYN SURGERY      tubal ligation    LAPAROSCOPIC CHOLECYSTECTOMY N/A 2019    Procedure: CHOLECYSTECTOMY, LAPAROSCOPIC;  Surgeon: Lynn Doshi MD;  Location:  OR    ORTHOPEDIC SURGERY      radius, ulnar - open fracture    TUBAL LIGATION      wisdom teeth       OB History    Para Term  AB Living   5 5 0 0 0 0   SAB IAB Ectopic Multiple Live Births   0 0 0 2 0      # Outcome Date GA Lbr Mesfin/2nd Weight Sex Type Anes PTL Lv   5 Para            4 Para            3 Para            2 Para            1 Para                  Review of Systems  Dry skin, constipation     Objective    Exam  /70 (BP Location: Right arm)   Pulse 69   Resp 20   Ht 1.715 m (5' 7.5\")   Wt 99.8 kg (220 lb)   SpO2 99%   BMI 33.95 kg/m     Estimated body mass index is 33.95 kg/m  as calculated from the following:    Height as of this encounter: 1.715 m (5' 7.5\").    Weight as of this encounter: 99.8 kg (220 lb).    Physical Exam  GENERAL: alert and no distress  EYES: Eyes grossly normal to inspection, PERRL and conjunctivae and sclerae normal  HENT: ear canals and TM's normal, nose and mouth without ulcers or lesions  NECK: no adenopathy, no asymmetry, " masses, or scars  RESP: lungs clear to auscultation - no rales, rhonchi or wheezes  CV: regular rate and rhythm, normal S1 S2, no S3 or S4, no murmur, click or rub, no peripheral edema  ABDOMEN: soft, nontender, no hepatosplenomegaly, no masses and bowel sounds normal   (female) w/bimanual: normal female external genitalia, normal urethral meatus, normal vaginal mucosa, and normal cervix/adnexa/uterus without masses or discharge  MS: no gross musculoskeletal defects noted, no edema  SKIN: multiple moles, one on center upper back 2.5 mm with a little darker pigment as picture below  NEURO: Normal strength and tone, mentation intact and speech normal  PSYCH: mentation appears normal, affect normal/bright       Media Information    Document Information    Other: Photograph      05/02/2024 11:48 AM   Attached To:   Office Visit on 5/2/24 with Nani Pierson MD   Source Information    Nani Pierson MD  [facility]   Document History       Media Information    Document Information    Other: Photograph      05/02/2024 11:48 AM   Attached To:   Office Visit on 5/2/24 with Nani Pierson MD   Source Information    Nani Pierson MD  [facility]   Document History        Signed Electronically by: Nani Pierson MD

## 2024-05-08 ENCOUNTER — VIRTUAL VISIT (OUTPATIENT)
Dept: FAMILY MEDICINE | Facility: CLINIC | Age: 59
End: 2024-05-08
Payer: COMMERCIAL

## 2024-05-08 DIAGNOSIS — F41.0 PANIC ATTACK: ICD-10-CM

## 2024-05-08 DIAGNOSIS — F43.21 GRIEF REACTION: Primary | ICD-10-CM

## 2024-05-08 LAB
BKR LAB AP GYN ADEQUACY: NORMAL
BKR LAB AP GYN INTERPRETATION: NORMAL
BKR LAB AP HPV REFLEX: NORMAL
BKR LAB AP PREVIOUS ABNORMAL: NORMAL
PATH REPORT.COMMENTS IMP SPEC: NORMAL
PATH REPORT.COMMENTS IMP SPEC: NORMAL
PATH REPORT.RELEVANT HX SPEC: NORMAL

## 2024-05-08 PROCEDURE — 99213 OFFICE O/P EST LOW 20 MIN: CPT | Mod: 95 | Performed by: NURSE PRACTITIONER

## 2024-05-08 RX ORDER — LORAZEPAM 0.5 MG/1
.5-1 TABLET ORAL EVERY 6 HOURS PRN
Qty: 20 TABLET | Refills: 0 | Status: SHIPPED | OUTPATIENT
Start: 2024-05-08

## 2024-05-08 ASSESSMENT — ANXIETY QUESTIONNAIRES
GAD7 TOTAL SCORE: 17
GAD7 TOTAL SCORE: 17
8. IF YOU CHECKED OFF ANY PROBLEMS, HOW DIFFICULT HAVE THESE MADE IT FOR YOU TO DO YOUR WORK, TAKE CARE OF THINGS AT HOME, OR GET ALONG WITH OTHER PEOPLE?: VERY DIFFICULT
3. WORRYING TOO MUCH ABOUT DIFFERENT THINGS: NEARLY EVERY DAY
4. TROUBLE RELAXING: NEARLY EVERY DAY
6. BECOMING EASILY ANNOYED OR IRRITABLE: SEVERAL DAYS
2. NOT BEING ABLE TO STOP OR CONTROL WORRYING: NEARLY EVERY DAY
GAD7 TOTAL SCORE: 17
1. FEELING NERVOUS, ANXIOUS, OR ON EDGE: NEARLY EVERY DAY
5. BEING SO RESTLESS THAT IT IS HARD TO SIT STILL: SEVERAL DAYS
IF YOU CHECKED OFF ANY PROBLEMS ON THIS QUESTIONNAIRE, HOW DIFFICULT HAVE THESE PROBLEMS MADE IT FOR YOU TO DO YOUR WORK, TAKE CARE OF THINGS AT HOME, OR GET ALONG WITH OTHER PEOPLE: VERY DIFFICULT
7. FEELING AFRAID AS IF SOMETHING AWFUL MIGHT HAPPEN: NEARLY EVERY DAY

## 2024-05-08 NOTE — PROGRESS NOTES
"Lucina is a 58 year old who is being evaluated via a billable video visit.    How would you like to obtain your AVS? MyChart  If the video visit is dropped, the invitation should be resent by: Text to cell phone: [phone]  Will anyone else be joining your video visit? No      Assessment & Plan     Grief reaction  Patient following up today with grief response and panic associated with recent loss of son few days ago.  Expected response of little sleep, grieving and panic.  Has a very good support system.  Discussed leaning on support system is much as needed during this time, taking time to rest and eat.    Panic attack  Having intermittent panic attacks throughout the day.  Does take time and steps away for deep breathing.  Having panic attacks more frequently.  Discussed the support system and rest as above.  Talked about using lorazepam as needed for panic attacks.  Prescription sent through to the pharmacy.  Follow-up as needed.  - LORazepam (ATIVAN) 0.5 MG tablet; Take 1-2 tablets (0.5-1 mg) by mouth every 6 hours as needed for anxiety          BMI  Estimated body mass index is 33.95 kg/m  as calculated from the following:    Height as of 5/2/24: 1.715 m (5' 7.5\").    Weight as of 5/2/24: 99.8 kg (220 lb).         See Patient Instructions    Subjective   Lucina is a 58 year old, presenting for the following health issues:  Mental Health Problem        5/8/2024    10:49 AM   Additional Questions   Roomed by Juanita     History of Present Illness       Mental Health Follow-up:  Patient presents to follow-up on Anxiety.    Patient's anxiety since last visit has been:  Worse  The patient is having other symptoms associated with anxiety.  Any significant life events: grief or loss  Patient is feeling anxious or having panic attacks.  Patient has no concerns about alcohol or drug use.    She eats 4 or more servings of fruits and vegetables daily.She consumes 0 sweetened beverage(s) daily.She exercises with " enough effort to increase her heart rate 30 to 60 minutes per day.  She exercises with enough effort to increase her heart rate 5 days per week.   She is taking medications regularly.       Abnormal Mood Symptoms  Onset/Duration: 05/05  Description: Grief response from death of son   Panic attacks after loss of son     History:  Recent stress or major life event: YES  Prior depression or anxiety: None  Family history of depression or anxiety: YES  Alcohol/drug use: No          Review of Systems  Constitutional, HEENT, cardiovascular, pulmonary, gi and gu systems are negative, except as otherwise noted.      Objective           Vitals:  No vitals were obtained today due to virtual visit.    Physical Exam   GENERAL: alert and no distress  EYES: Eyes grossly normal to inspection.  No discharge or erythema, or obvious scleral/conjunctival abnormalities.  RESP: No audible wheeze, cough, or visible cyanosis.    SKIN: Visible skin clear. No significant rash, abnormal pigmentation or lesions.  NEURO: Cranial nerves grossly intact.  Mentation and speech appropriate for age.  PSYCH: mentation appears normal, tearful, and judgement and insight intact    Diagnostic Test Results:  Labs reviewed in Epic  none        Video-Visit Details    Type of service:  Video Visit   Originating Location (pt. Location): Home    Distant Location (provider location):  On-site  Platform used for Video Visit: Carmenza  Signed Electronically by: Krista Reynoso DNP,, YARON CNP      Chart documentation with Dragon Voice recognition Software. Although reviewed after completion, some words and grammatical errors may remain.

## 2024-05-28 NOTE — PATIENT INSTRUCTIONS
Grief reaction  Patient following up today with grief response and panic associated with recent loss of son few days ago.  Expected response of little sleep, grieving and panic.  Has a very good support system.  Discussed leaning on support system is much as needed during this time, taking time to rest and eat.    Panic attack  Having intermittent panic attacks throughout the day.  Does take time and steps away for deep breathing.  Having panic attacks more frequently.  Discussed the support system and rest as above.  Talked about using lorazepam as needed for panic attacks.  Prescription sent through to the pharmacy.  Follow-up as needed.  - LORazepam (ATIVAN) 0.5 MG tablet; Take 1-2 tablets (0.5-1 mg) by mouth every 6 hours as needed for anxiety

## 2024-06-04 PROBLEM — Z53.9 ERRONEOUS ENCOUNTER--DISREGARD: Status: ACTIVE | Noted: 2024-06-04

## 2024-06-18 ENCOUNTER — MYC MEDICAL ADVICE (OUTPATIENT)
Dept: SURGERY | Facility: CLINIC | Age: 59
End: 2024-06-18
Payer: COMMERCIAL

## 2024-06-18 DIAGNOSIS — Z98.84 S/P GASTRIC BYPASS: ICD-10-CM

## 2024-06-18 DIAGNOSIS — K91.2 POSTOPERATIVE INTESTINAL MALABSORPTION: Primary | ICD-10-CM

## 2024-07-18 ENCOUNTER — TELEPHONE (OUTPATIENT)
Dept: SURGERY | Facility: CLINIC | Age: 59
End: 2024-07-18

## 2024-07-18 ENCOUNTER — VIRTUAL VISIT (OUTPATIENT)
Dept: SURGERY | Facility: CLINIC | Age: 59
End: 2024-07-18
Payer: COMMERCIAL

## 2024-07-18 VITALS — BODY MASS INDEX: 33.95 KG/M2 | WEIGHT: 224 LBS | HEIGHT: 68 IN

## 2024-07-18 DIAGNOSIS — Z98.84 S/P GASTRIC BYPASS: ICD-10-CM

## 2024-07-18 DIAGNOSIS — K91.2 POSTOPERATIVE MALABSORPTION: ICD-10-CM

## 2024-07-18 DIAGNOSIS — Z86.39 HISTORY OF MORBID OBESITY: ICD-10-CM

## 2024-07-18 DIAGNOSIS — K91.2 POSTOPERATIVE INTESTINAL MALABSORPTION: ICD-10-CM

## 2024-07-18 DIAGNOSIS — E03.9 HYPOTHYROIDISM, UNSPECIFIED TYPE: Primary | ICD-10-CM

## 2024-07-18 PROCEDURE — 99214 OFFICE O/P EST MOD 30 MIN: CPT | Mod: 95 | Performed by: EMERGENCY MEDICINE

## 2024-07-18 ASSESSMENT — PAIN SCALES - GENERAL: PAINLEVEL: MILD PAIN (2)

## 2024-07-18 NOTE — NURSING NOTE
Current patient location: [address] [place] [address]  [place] [address]    Is the patient currently in the state of [place]? YES    Visit mode:VIDEO    If the visit is dropped, the patient can be reconnected by: VIDEO VISIT: Send to e-mail at: [email or web address]    Will anyone else be joining the visit? NO  (If patient encounters technical issues they should call [phone]    How would you like to obtain your AVS? MyChart    Are changes needed to the allergy or medication list? No and Pt stated no changes to allergies    Are refills needed on medications prescribed by this physician? NO    Reason for visit: RECHECK    Maria Isabel MILTON

## 2024-07-18 NOTE — PROGRESS NOTES
Virtual Visit Details    Type of service:  Video Visit     Originating Location (pt. Location): Home    Distant Location (provider location):  On-site  Platform used for Video Visit: Carmenza

## 2024-07-18 NOTE — PROGRESS NOTES
Bariatric Follow Up Visit with a History of Previous Bariatric Surgery     Date of visit: 2024  Physician: Arnulfo Stanley MD, MD  Primary Care Provider:  Mandi Worthington  Lucina Santamaria   58 year old  female    Date of Surgery: 22  Initial Weight: 333 lbs  Initial BMI: 50.6  Today's Weight:   Wt Readings from Last 1 Encounters:   24 101.6 kg (224 lb)     Weight history:   Wt Readings from Last 4 Encounters:   24 101.6 kg (224 lb)   24 99.8 kg (220 lb)   08/10/23 104.8 kg (231 lb)   23 105.2 kg (232 lb)      Body mass index is 34.07 kg/m .      Assessment and Plan     Assessment: Lucina is a 58 year old year old female who is 2 years s/p  Kris en Y Gastric Bypass with Dr. Schneider.  In the interim her son has  after chronic disease. Grieving is ongoing but on the mend/getting support.    Bariatric labs are partially due. Vitamin D was low in April, B12 adequate but lower end of range as she's had some trouble tolerating B12 supplements in the past with itchiness. TSH was normal. CBC normal on 24 labs.  Higher MCV in April at 102fL folate pending.    .  her weight is down 109 lbs from introductory weight, a 32.7% total body weight reduction. Above average weight reduction and feeling good about her progress.     She's due for bone density scan anytime this year and can follow up this need with her PCP at her next annual check up.     Lucina Santamaria feels as if she is on track to achieve the goals she hoped to accomplish through bariatric surgery and weight loss.    No diagnosis found.      Current Outpatient Medications:     levothyroxine (SYNTHROID/LEVOTHROID) 88 MCG tablet, Take 1 tablet (88 mcg) by mouth daily, Disp: 90 tablet, Rfl: 3    LORazepam (ATIVAN) 0.5 MG tablet, Take 1-2 tablets (0.5-1 mg) by mouth every 6 hours as needed for anxiety, Disp: 20 tablet, Rfl: 0    Methylcobalamin 2500 MCG SUBL, Place 2,500 mcg under the tongue every 10 days, Disp: 50  tablet, Rfl: 0    Pediatric Multivitamins-Iron (MULTIVITAMINS PLUS IRON CHILD) 18 MG CHEW, Take 1 chew tab by mouth 2 times daily Ok to substitute with any chewable that contains 18 mg of iron, Vitamin A, Thiamine and Zinc., Disp: 180 tablet, Rfl: 3    vitamin D3 (CHOLECALCIFEROL) 50 mcg (2000 units) tablet, Take 1 tablet by mouth daily Do not start taking after surgery until advised by dietician in post-op class., Disp: , Rfl:     Plan:  Great work with over 109 lb reduction a 32.7% total body weight reduction.   Continue mindful bariatric methods to nourish your new body.   Check labs at your convenience.   B12 can be once weekly if using 2500mcg dose.   Given low D levels in April, aiming for 0236-1514 international unit(s)/day (100-125mcg) from all sources to be well supported and keep bone/dental and immune health optimal.   Continue annual check ups/labs. Feel free to schedule anytime with dietician if meal planning/dietary guidance desired.   I recommend getting a bone density scan anytime this next year with your PCP.  No follow-ups on file.    Bariatric Surgery Review     Interim History/LifeChanges: son  on May 4th. Chronically ill. Youngest son graduated from  and leaving for college. High stress/cortisol. Grief reaction. Will be heading back to work soon. Working on mental needs. More caffeine/sugar as a result.     Patient Concerns: follow up   Appetite (-10): ok  GERD: no.    Reviewed whether any need/indication for screening EGD today and we will deferred.  Typically, a screening EGD is recommend post op year 2-3 if no symptoms to assess health of esophagus/bariatric surgery and sooner if difficult to control GERD or persistent pain/dysphagia sx despite behavior modification.    Medication changes: recent adjustment to thyroid medications in May, desires recheck of TSH with her vitamin labs.    Vitamin Intake:   B-12   4x    MVI  Yes: flintstones   Vitamin D  2000 international unit(s).  "  Calcium   Sometimes. Hasn't been on recently. Dairy user.     Other  no              LABS: partially reviewed    Nausea no  Vomiting no  Constipation improved.  Diarrhea no  Rashes rare  Hair Loss no  Reactive Hypoglycemia no  Light Headedness no   Moods stressed/grief reaction       Most recent labs:  Lab Results   Component Value Date    WBC 6.4 04/30/2024    HGB 13.5 04/30/2024    HCT 40.8 04/30/2024     (H) 04/30/2024     04/30/2024     Lab Results   Component Value Date    CHOL 166 01/24/2023     Lab Results   Component Value Date    HDL 60 01/24/2023     No components found for: \"LDLCALC\"  Lab Results   Component Value Date    TRIG 118 01/24/2023     No results found for: \"CHOLHDL\"  Lab Results   Component Value Date    ALT 16 04/30/2024    AST 16 04/30/2024    ALKPHOS 83 04/30/2024     No results found for: \"HGBA1C\"  Lab Results   Component Value Date    B12 453 04/30/2024     No components found for: \"VITDT1\"  Lab Results   Component Value Date    FERNANDO 130 07/23/2023     Lab Results   Component Value Date    PTHI 45 07/23/2023     Lab Results   Component Value Date    ZN 78.7 07/23/2023     Lab Results   Component Value Date    VIB1WB 109 07/23/2023     Lab Results   Component Value Date    TSH 1.31 04/30/2024     No results found for: \"TEST\"    Habits:  Tobacco/Nicotine/THC exposure? no   NSAID use? no   Alcohol use? Rare if any   Caffeine Habits? Low dose exposure rarely.       Exercise Routine: yoga, walking regularly. Fishing.   3 meals/day? yes  Protein 60-80g/day? yes  Water Separate from meals? yes  Calorie Containing Beverages: rare  Restaurant eating/wk: occ  Sleep Habits:  n/a  CPAP Use? nevere  Contraception: post menopausal.   DEXA:due this year w/ PCP at next annual check up.  Discussed annual screening to start at age 45 and continue to age 55 if scoring \"low risk\". DEXA scan recommended at age 55 regardless as long as at least 2 years have transpired from their bariatric " surgery.    Social History     Social History     Socioeconomic History    Marital status:      Spouse name: Not on file    Number of children: Not on file    Years of education: Not on file    Highest education level: Not on file   Occupational History    Not on file   Tobacco Use    Smoking status: Never    Smokeless tobacco: Never   Vaping Use    Vaping status: Never Used   Substance and Sexual Activity    Alcohol use: Not Currently     Comment: rare, once monthly.    Drug use: No    Sexual activity: Yes     Partners: Male     Birth control/protection: Female Surgical   Other Topics Concern    Parent/sibling w/ CABG, MI or angioplasty before 65F 55M? No   Social History Narrative    Not on file     Social Determinants of Health     Financial Resource Strain: Low Risk  (4/30/2024)    Financial Resource Strain     Within the past 12 months, have you or your family members you live with been unable to get utilities (heat, electricity) when it was really needed?: No   Food Insecurity: Low Risk  (4/30/2024)    Food Insecurity     Within the past 12 months, did you worry that your food would run out before you got money to buy more?: No     Within the past 12 months, did the food you bought just not last and you didn t have money to get more?: No   Transportation Needs: Low Risk  (4/30/2024)    Transportation Needs     Within the past 12 months, has lack of transportation kept you from medical appointments, getting your medicines, non-medical meetings or appointments, work, or from getting things that you need?: No   Physical Activity: Sufficiently Active (4/30/2024)    Exercise Vital Sign     Days of Exercise per Week: 6 days     Minutes of Exercise per Session: 30 min   Stress: Stress Concern Present (4/30/2024)    [employer] [place] [employer] - Occupational Stress Questionnaire     Feeling of Stress : To some extent   Social Connections: Unknown (4/30/2024)    Social Connection and Isolation Panel  [NHANES]     Frequency of Communication with Friends and Family: Not on file     Frequency of Social Gatherings with Friends and Family: Twice a week     Attends Anglican Services: Not on file     Active Member of Clubs or Organizations: Not on file     Attends Club or Organization Meetings: Not on file     Marital Status: Not on file   Interpersonal Safety: Low Risk  (2024)    Interpersonal Safety     Do you feel physically and emotionally safe where you currently live?: Yes     Within the past 12 months, have you been hit, slapped, kicked or otherwise physically hurt by someone?: No     Within the past 12 months, have you been humiliated or emotionally abused in other ways by your partner or ex-partner?: No   Housing Stability: Low Risk  (2024)    Housing Stability     Do you have housing? : Yes     Are you worried about losing your housing?: No       Past Medical History     Past Medical History:   Diagnosis Date    Acquired hypothyroidism     Arthritis     sacro iliac and hip, right ankle.    Chronic constipation     since menopause. fiber/hydration helps.    Coagulation disorder (H24)     Complication of anesthesia     nauseated w/ morphine previously after arm surgery.    COVID-19     .    Gallbladder problem     s/p cholecystectomy.    Head injury     childhood concussions    Heterozygous for MTHFR gene mutation     History of MTHFR mutation     Hypertension     Long COVID     Long term Covid    Morbid obesity (H)     Pneumonia     covid    Prediabetes     Pulmonary emboli (H)     Pulmonary embolism (H)     wisdome teeth out while on OCPs and MTHFR gene mutation history.    Pulmonary embolism (H)     age 23, OCPs/dental extraction provoked. reports some MTHFR gene issues, unsure if homo or heterozygous.     Past Surgical History:   Procedure Laterality Date    APPENDECTOMY      BREAST BIOPSY, RT/LT      right breast - benign     SECTION      COLONOSCOPY N/A  "11/10/2021    Procedure: COLONOSCOPY, WITH POLYPECTOMY;  Surgeon: Lynn Doshi MD;  Location:  GI    CREATION, GASTRIC BYPASS, KRIS-EN-Y, LAPAROSCOPIC N/A 2022    Procedure: LAPAROSCOPIC KRIS-EN-Y, GASTRIC BYPASS;  Surgeon: North Schneider MD;  Location: [facility]    EYE SURGERY  epilasik left eye    GI SURGERY  Kris N Y     GYN SURGERY      tubal ligation    LAPAROSCOPIC CHOLECYSTECTOMY N/A 2019    Procedure: CHOLECYSTECTOMY, LAPAROSCOPIC;  Surgeon: Lynn Doshi MD;  Location:  OR    ORTHOPEDIC SURGERY      radius, ulnar - open fracture    TUBAL LIGATION      wisdom teeth         Problem List     Patient Active Problem List   Diagnosis    CARDIOVASCULAR SCREENING; LDL GOAL LESS THAN 130    Elevated blood pressure reading without diagnosis of hypertension    Edema, unspecified type    History of MTHFR mutation    Hypothyroidism    Pneumonia    Umbilical hernia without obstruction and without gangrene    Onychomycosis    ERRONEOUS ENCOUNTER--DISREGARD     Medications     [unfilled]  Surgical History     Past Surgical History  She has a past surgical history that includes appendectomy (); orthopedic surgery (); GYN surgery ();  section (); breast biopsy, rt/lt (); Laparoscopic cholecystectomy (N/A, 2019); wisdom teeth; tubal ligation; Colonoscopy (N/A, 11/10/2021); Creation, Gastric Bypass, Kris-En-Y, Laparoscopic (N/A, 2022); Eye surgery (epilasik left eye); and GI surgery (Kris N Y ).    Objective-Exam     Constitutional:  Ht 1.727 m (5' 7.99\")   Wt 101.6 kg (224 lb)   BMI 34.07 kg/m    [unfilled]   General:  Pleasant and in no acute distress   Eyes:  EOMI  ENT:  Airway patent    Neck:  Respiratory: Normal respiratory effort, no cough, .  CV:    Gastrointestinal:   Musculoskeletal: muscle mass WNL  Skin: color without pallor, hair thick, ,   Psychiatric: alert and oriented X3, mood and affect normal    Counseling "     We reviewed the important post op bariatric recommendations:  -eating 3 meals daily  -eating protein first, getting >60gm protein daily  -eating slowly, chewing food well  -avoiding/limiting calorie containing beverages  -drinking water 15-30 minutes before or after meals  -limiting restaurant or cafeteria eating to twice a week or less    We discussed the importance of restorative sleep and stress management in maintaining a healthy weight.  We discussed the National Weight Control Registry healthy weight maintenance strategies and ways to optimize metabolism.  We discussed the importance of physical activity including cardiovascular and strength training in maintaining a healthier weight.    We discussed the importance of life-long vitamin supplementation and life-long  follow-up.    Lucina was reminded that, to avoid marginal ulcers she should avoid tobacco at all, alcohol in excess, caffeine in excess, and NSAIDS (unless indicated for cardioprotection or othewise and opposed by a PPI).    Arnulfo Stanley MD    [facility].  2024  7:51 AM  [phone] (clinic phone)  [phone] (fax)    No images are attached to the encounter.  Medical Decision Makin minutes spent by me on the date of the encounter doing chart review, history and exam, documentation and further activities per the note

## 2024-07-18 NOTE — PATIENT INSTRUCTIONS
Plan:  Great work with over 109 lb reduction a 32.7% total body weight reduction.   Continue mindful bariatric methods to nourish your new body.   Check labs at your convenience.   B12 can be once weekly if using 2500mcg dose.   Given low D levels in April, aiming for 5078-1096 international unit(s)/day (100-125mcg) from all sources to be well supported and keep bone/dental and immune health optimal.   Continue annual check ups/labs. Feel free to schedule anytime with dietician if meal planning/dietary guidance desired.   I recommend getting a bone density scan anytime this next year with your PCP.    [facility]  Nutritional Guidelines  Gastric Bypass 18 Months Post Op and Beyond    General Guidelines and Helpful Hints:  Eat 3 meals per day + protein supplement(s). No snacks between meals.  Do not skip meals.  This can cause overeating at the next meal and will prevent adequate protein and nutritional intake.  Aim for 60-80 grams of protein per day.  Always eat your protein first. This assists with optimal nutrition and helps you stay full longer.  Depending on your portion size, you may need to drink approved protein supplement between meals to achieve protein goals. Follow recommendations of your Dietitian.   Eat your protein first, and then follow with fiber.   It is not necessary to count your fiber, but 15-20 grams per day is recommended.    Add fiber by including fruits, vegetables, whole grains, and beans.   Portions should remain about 1 cup per meal. Use measuring cups to be accurate.  Continue to use saucer/salad plates, infant/toddler silverware to keep portion sizes small and take small bites.  Eat S-L-O-W-L-Y to make each meal last 20-30 minutes. Always stop eating when satisfied.  Continue to use caution with foods containing skins, peels or membranes. Chew well!  Aim for 64 oz. of calorie-free fluids daily.  Continue to avoid caffeine and carbonation. If you choose to drink alcohol,  "do so in moderation.   Remember to avoid drinking during meals, 15-30 minutes before and 30 minutes after.  Exercise is kessler for continued weight loss and weight maintenance. 150 minutes weekly of moderate aerobic activity or 75 minutes of vigorous with 2 days or more a week of strength training. Try to get 20% or more of your steps each day at a brisk pace, as though hurrying to a bus stop. Look to get stronger this year.  If having trouble tolerating meat, try using a crock-pot, tinfoil tent, steamer or other moist cooking method to create tender meats. Add broth or low-fat gravy to help meat stay moist.   Avoid high sugar and high fat foods to prevent dumping syndrome.  Check nutrition labels for less than 10 grams of sugar and less than 10 grams of fat per serving.  Continue Taking Vitamins/Minerals:  1000 mcg of Sublingual B-12 at least 3 days weekly to average 350-500mcg/day. If using 2500mcg lozenges, 2 weekly.  If 5000 mcg, once weekly dosing works.  1 Complete Multivitamin with 18mg Iron twice daily (chewable or swallow tabs). Often sold as \"women's one a day\" if tablet but take twice daily.  500-600 mg Calcium Citrate twice daily (chewable or swallow tabs).  5000 IU Vitamin D3 daily.  If menstruating, you may need closer to 60mg of iron daily to prevent iron deficiency. An occasional \"boost\" of extra iron supplement during/after is reasonable if heavy flow.    Sample Grocery List    Protein:  Fat free Greek or light yogurt (less than 10 grams sugar)  Fat free or low-fat cottage cheese  String cheese or reduced fat cheese slices  Tuna, salmon, crab, egg, or chicken salad made with light or fat free mayonnaise  Egg or Egg Substitute  Lean/extra lean turkey, beef, bison, venison (ground, sirloin, round, flank)  Pork loin or tenderloin (grilled, baked, broiled)  Fish such as salmon, tuna, trout, tilapia, etc. (grilled, baked, broiled)  Tender cuts of lean (skinless) turkey or chicken  Lean deli meats: turkey, " lean ham, chicken, lean roast beef  Beans such as kidney, garbanzo, black, stein, or low-fat/fat free refried beans  Peanut butter (natural preferred). Limit to 1 Tbsp. per day.  Low-fat meatloaf (made with lean ground beef or turkey)  Sloppy Joes made with low-sugar ketchup and lean ground beef or turkey  Soy or vegetable protein (i.e. vegan crumbles, soy/veggie burger, tofu)  Hummus    Vegetables:  Fresh: cooked or raw (as tolerated)  Frozen vegetables  Canned vegetables (low sodium or no salt added, rinse before cooking/eating)  (Ok to have skins/peels/membranes/seeds - just chew well)    Fruits:  Fresh fruit  Frozen fruit (no sugar added)  Canned fruit (packed in its own juice, NOT syrup)  (Ok to have skins/peels/membranes/seeds - just chew well)    Starch:  Unsweetened whole-grain hot cereal (or high fiber cold cereal, dry)  Toasted whole wheat bread or [place] Thins  Whole grain crackers  Baked /boiled/mashed potato/sweet potato  Cooked whole grain pasta, brown rice, or other cooked whole grains  Starchy vegetables: corn, peas, winter squash    Protein Supplement:   Ready to drink protein shake with:  15-30 grams protein per serving  Less than 10 grams total carbohydrate per serving   Protein powder mixed with:   Skim or 1% milk  Low fat or fat free Lactaid milk, plain or no sugar added soymilk  Water     Fats: (use in moderation)  1 teaspoon of soft tub margarine  1 teaspoon olive oil, canola oil, or peanut oil  1 tablespoon of low-fat h[place]go or salad dressing     Sample Menu for 18+ months after Gastric Bypass    You do NOT need to eat/drink the full portion sizes listed below  Always stop when you are satisfied    Breakfast   cup 1% cottage cheese     cup mixed berries   Lunch 2 oz lean roast beef on   [place] Thin with 1 tsp. light hidalgo    small tomato, chopped, mixed with 1 tsp. light vinaigrette dressing   Supplement Approved protein supplement (if needed between meals)   Dinner 2 oz grilled salmon     cup salad greens with 1 tsp. light salad dressing and 1 tsp. ground flax seed    cup quinoa or brown rice     Breakfast   cup egg substitute with   cup sautéed chopped vegetables  2 light [place] Krisp crackers   Lunch Tuna Melt:   cup tuna mixed with 1 tsp. light hidalgo over   [place] Thin. Top with 2-3 slices cucumber and 1 oz slice of low fat cheese   Supplement 1 cup skim milk (if needed between meals)   Dinner 3 oz  grilled, broiled, or baked seasoned skinless chicken breast    cup asparagus     Breakfast   cup plain oatmeal made with skim or 1% milk with 1 Tbsp. flavored/unflavored protein powder added  1 mozzarella string cheese   Lunch 2 oz deli turkey breast  1/3 cup salad with 1 tsp. light salad dressing, 1/8 of a whole avocado and 1 Tbsp. sunflower seeds   Dinner 3 oz. pork loin made in a crock pot, seasoned with a spice rub    cup cooked carrots   Supplement Approved protein supplement (if needed between meals)     Breakfast 1 cup breakfast casserole made with egg substitute, turkey sausage,  and steamed, chopped bell peppers   Supplement  1 cup light Greek yogurt (if needed between meals)   Lunch 2 oz. teriyaki turkey    cup mashed sweet potato with 1-2 spritzes of spray butter    cup fresh pineapple   Dinner 3 oz low fat meatloaf    cup roasted garlic zucchini     Breakfast   cup leftover breakfast casserole    cup no sugar added applesauce with 1 Tbsp. unflavored protein powder and a sprinkle of cinnamon    Lunch 3 oz shrimp with 1-2 Tbsp. low-sugar cocktail sauce for dipping    c. whole wheat pasta drizzled with   tsp. olive oil   Supplement 1 cup skim/1% milk with scoop of protein powder (if needed between meals)   Dinner Grilled, seasoned kebob with 2 oz lean beef and   cup vegetables     Breakfast Breakfast pizza:   [place] Thin spread with 1 Tbsp. low sugar spaghetti sauce,   cup shredded low fat cheese, melted and 1 slice of [place] quintanilla     cup fresh fruit mixed with chopped almonds   Lunch   cup  black bean soup  4-5 whole grain crackers   Dinner 3 oz  tilapia with lemon pepper seasoning    cup stewed tomatoes   Supplement 1 string cheese (if needed between meals)     Breakfast 2 hard boiled eggs (discard 1 egg yolk)    whole wheat English Muffin with 1 tsp. low sugar jelly   Lunch   cup leftover black bean soup topped with 1-2 Tbsp. low fat cheese  2-3 light [place] Krisp crackers   Supplement Approved protein supplement (if needed between meals)   Dinner 3 oz sirloin steak    cup steamed broccoli      LEAN PROTEIN SOURCES  Getting 20-30 grams of protein, 3 meals daily, is appropriate for most people, some need more but more than about 40 grams per meal is not useful.  General rule is drinking one ounce of water per gram of protein eaten over the course of the day:  70 grams of protein each day, drink 70 oz of water.  Protein Source Portion Calories Grams of Protein                           Nonfat, plain Greek yogurt    (10 grams sugar or less) 3/4 cup (6 oz)  12-17   Light Yogurt (10 grams sugar or less) 3/4 cup (6 oz)  6-8   Protein Shake 1 shake 110-180 15-30   Skim/1% Milk or lactose-free milk 1 cup ( 8 oz)  8   Plain or light, flavored soymilk 1 cup  7-8   Plain or light, hemp milk 1 cup 110 6   Fat Free or 1% Cottage Cheese 1/2 cup 90 15   Part skim ricotta cheese 1/2 cup 100 14   Part skim or reduced fat cheese slices 1 ounce 65-80 8     Mozzarella String Cheese 1 80 8   Canned tuna, chicken, crab or salmon  (canned in water)  1/2 cup 100 15-20   White fish (broiled, grilled, baked) 3 ounces 100 21   [place]/Tuna (broiled, grilled, baked) 3 ounces 150-180 21   Shrimp, Scallops, Lobster, Crab 3 ounces 100 21   Pork loin, Pork Tenderloin 3 ounces 150 21   Boneless, skinless chicken /turkey breast                          (broiled, grilled, baked) 3 ounces 120 21   [place], [place], [place], and Venison 3 ounces 120 21   Lean cuts of red meat and pork (sirloin,   round, tenderloin, flank,  ground 93%-96%) 3 ounces 170 21   Lean or Extra Lean Ground Turkey 1/2 cup 150 20   90-95% Lean [place] Burger 1 stephania 140-180 21   Low-fat casserole with lean meat 3/4 cup 200 17   Luncheon Meats                                                        (turkey, lean ham, roast beef, chicken) 3 ounces 100 21   Egg (boiled, poached, scrambled) 1 Egg 60 7   Egg Substitute 1/2 cup 70 10   Nuts (limit to 1 serving per day)  3 Tbsp. 150 7   Nut [place] (peanut, almond)  Limit to 1 serving or less daily 1 Tbsp. 90 4   Soy Burger (varies) 1  15   Garbanzo, Black, [place] Beans 1/2 cup 110 7   Refried Beans 1/2 cup 100 7   Kidney and [place]ima beans 1/2 cup 110 7   Tempeh 3 oz 175 18   Vegan crumbles 1/2 cup 100 14   Tofu 1/2 cup 110 14   Chili (beans and extra lean beef or turkey) 1 cup 200 23   Lentil Stew/Soup 1 cup 150 12   Black Bean Soup 1 cup 175 12

## 2024-07-18 NOTE — LETTER
2024      Lucina Santamaria  [address] [facility] [address]      Dear Colleague,    Thank you for referring your patient, Lucina Santamaria, to the [facility] [place]. Please see a copy of my visit note below.    Bariatric Follow Up Visit with a History of Previous Bariatric Surgery     Date of visit: 2024  Physician: Arnulfo Stanley MD, MD  Primary Care Provider:  Mandi Worthington  Lucina Sanatmaria   58 year old  female    Date of Surgery: 22  Initial Weight: 333 lbs  Initial BMI: 50.6  Today's Weight:   Wt Readings from Last 1 Encounters:   24 101.6 kg (224 lb)     Weight history:   Wt Readings from Last 4 Encounters:   24 101.6 kg (224 lb)   24 99.8 kg (220 lb)   08/10/23 104.8 kg (231 lb)   23 105.2 kg (232 lb)      Body mass index is 34.07 kg/m .      Assessment and Plan     Assessment: Lucina is a 58 year old year old female who is 2 years s/p  Kris en Y Gastric Bypass with Dr. Schneider.  In the interim her son has  after chronic disease. Grieving is ongoing but on the mend/getting support.    Bariatric labs are partially due. Vitamin D was low in April, B12 adequate but lower end of range as she's had some trouble tolerating B12 supplements in the past with itchiness. TSH was normal. CBC normal on 24 labs.  Higher MCV in April at 102fL folate pending.    .  her weight is down 109 lbs from introductory weight, a 32.7% total body weight reduction. Above average weight reduction and feeling good about her progress.     She's due for bone density scan anytime this year and can follow up this need with her PCP at her next annual check up.     Lucina Santamaria feels as if she is on track to achieve the goals she hoped to accomplish through bariatric surgery and weight loss.    No diagnosis found.      Current Outpatient Medications:      levothyroxine (SYNTHROID/LEVOTHROID) 88 MCG tablet, Take 1 tablet (88 mcg) by mouth  daily, Disp: 90 tablet, Rfl: 3     LORazepam (ATIVAN) 0.5 MG tablet, Take 1-2 tablets (0.5-1 mg) by mouth every 6 hours as needed for anxiety, Disp: 20 tablet, Rfl: 0     Methylcobalamin 2500 MCG SUBL, Place 2,500 mcg under the tongue every 10 days, Disp: 50 tablet, Rfl: 0     Pediatric Multivitamins-Iron (MULTIVITAMINS PLUS IRON CHILD) 18 MG CHEW, Take 1 chew tab by mouth 2 times daily Ok to substitute with any chewable that contains 18 mg of iron, Vitamin A, Thiamine and Zinc., Disp: 180 tablet, Rfl: 3     vitamin D3 (CHOLECALCIFEROL) 50 mcg (2000 units) tablet, Take 1 tablet by mouth daily Do not start taking after surgery until advised by dietician in post-op class., Disp: , Rfl:     Plan:  Great work with over 109 lb reduction a 32.7% total body weight reduction.   Continue mindful bariatric methods to nourish your new body.   Check labs at your convenience.   B12 can be once weekly if using 2500mcg dose.   Given low D levels in April, aiming for 2920-4864 international unit(s)/day (100-125mcg) from all sources to be well supported and keep bone/dental and immune health optimal.   Continue annual check ups/labs. Feel free to schedule anytime with dietician if meal planning/dietary guidance desired.   I recommend getting a bone density scan anytime this next year with your PCP.  No follow-ups on file.    Bariatric Surgery Review     Interim History/LifeChanges: son  on May 4th. Chronically ill. Youngest son graduated from  and leaving for college. High stress/cortisol. Grief reaction. Will be heading back to work soon. Working on mental needs. More caffeine/sugar as a result.     Patient Concerns: follow up   Appetite (1-10): ok  GERD: no.    Reviewed whether any need/indication for screening EGD today and we will deferred.  Typically, a screening EGD is recommend post op year 2-3 if no symptoms to assess health of esophagus/bariatric surgery and sooner if difficult to control GERD or persistent  "pain/dysphagia sx despite behavior modification.    Medication changes: recent adjustment to thyroid medications in May, desires recheck of TSH with her vitamin labs.    Vitamin Intake:   B-12   4x    MVI  Yes: flintstones   Vitamin D  2000 international unit(s).   Calcium   Sometimes. Hasn't been on recently. Dairy user.     Other  no              LABS: partially reviewed    Nausea no  Vomiting no  Constipation improved.  Diarrhea no  Rashes rare  Hair Loss no  Reactive Hypoglycemia no  Light Headedness no   Moods stressed/grief reaction       Most recent labs:  Lab Results   Component Value Date    WBC 6.4 04/30/2024    HGB 13.5 04/30/2024    HCT 40.8 04/30/2024     (H) 04/30/2024     04/30/2024     Lab Results   Component Value Date    CHOL 166 01/24/2023     Lab Results   Component Value Date    HDL 60 01/24/2023     No components found for: \"LDLCALC\"  Lab Results   Component Value Date    TRIG 118 01/24/2023     No results found for: \"CHOLHDL\"  Lab Results   Component Value Date    ALT 16 04/30/2024    AST 16 04/30/2024    ALKPHOS 83 04/30/2024     No results found for: \"HGBA1C\"  Lab Results   Component Value Date    B12 453 04/30/2024     No components found for: \"VITDT1\"  Lab Results   Component Value Date    FERNANDO 130 07/23/2023     Lab Results   Component Value Date    PTHI 45 07/23/2023     Lab Results   Component Value Date    ZN 78.7 07/23/2023     Lab Results   Component Value Date    VIB1WB 109 07/23/2023     Lab Results   Component Value Date    TSH 1.31 04/30/2024     No results found for: \"TEST\"    Habits:  Tobacco/Nicotine/THC exposure? no   NSAID use? no   Alcohol use? Rare if any   Caffeine Habits? Low dose exposure rarely.       Exercise Routine: yoga, walking regularly. Fishing.   3 meals/day? yes  Protein 60-80g/day? yes  Water Separate from meals? yes  Calorie Containing Beverages: rare  Restaurant eating/wk: occ  Sleep Habits:  n/a  CPAP Use? nevere  Contraception: post " "menopausal.   DEXA:due this year w/ PCP at next annual check up.  Discussed annual screening to start at age 45 and continue to age 55 if scoring \"low risk\". DEXA scan recommended at age 55 regardless as long as at least 2 years have transpired from their bariatric surgery.    Social History     Social History     Socioeconomic History     Marital status:      Spouse name: Not on file     Number of children: Not on file     Years of education: Not on file     Highest education level: Not on file   Occupational History     Not on file   Tobacco Use     Smoking status: Never     Smokeless tobacco: Never   Vaping Use     Vaping status: Never Used   Substance and Sexual Activity     Alcohol use: Not Currently     Comment: rare, once monthly.     Drug use: No     Sexual activity: Yes     Partners: Male     Birth control/protection: Female Surgical   Other Topics Concern     Parent/sibling w/ CABG, MI or angioplasty before 65F 55M? No   Social History Narrative     Not on file     Social Determinants of Health     Financial Resource Strain: Low Risk  (4/30/2024)    Financial Resource Strain      Within the past 12 months, have you or your family members you live with been unable to get utilities (heat, electricity) when it was really needed?: No   Food Insecurity: Low Risk  (4/30/2024)    Food Insecurity      Within the past 12 months, did you worry that your food would run out before you got money to buy more?: No      Within the past 12 months, did the food you bought just not last and you didn t have money to get more?: No   Transportation Needs: Low Risk  (4/30/2024)    Transportation Needs      Within the past 12 months, has lack of transportation kept you from medical appointments, getting your medicines, non-medical meetings or appointments, work, or from getting things that you need?: No   Physical Activity: Sufficiently Active (4/30/2024)    Exercise Vital Sign      Days of Exercise per Week: 6 days      " Minutes of Exercise per Session: 30 min   Stress: Stress Concern Present (4/30/2024)    [employer] Model of Occupational Health - Occupational Stress Questionnaire      Feeling of Stress : To some extent   Social Connections: Unknown (4/30/2024)    Social Connection and Isolation Panel [NHANES]      Frequency of Communication with Friends and Family: Not on file      Frequency of Social Gatherings with Friends and Family: Twice a week      Attends Hoahaoism Services: Not on file      Active Member of Clubs or Organizations: Not on file      Attends Club or Organization Meetings: Not on file      Marital Status: Not on file   Interpersonal Safety: Low Risk  (5/2/2024)    Interpersonal Safety      Do you feel physically and emotionally safe where you currently live?: Yes      Within the past 12 months, have you been hit, slapped, kicked or otherwise physically hurt by someone?: No      Within the past 12 months, have you been humiliated or emotionally abused in other ways by your partner or ex-partner?: No   Housing Stability: Low Risk  (4/30/2024)    Housing Stability      Do you have housing? : Yes      Are you worried about losing your housing?: No       Past Medical History     Past Medical History:   Diagnosis Date     Acquired hypothyroidism      Arthritis     sacro iliac and hip, right ankle.     Chronic constipation     since menopause. fiber/hydration helps.     Coagulation disorder (H24)      Complication of anesthesia     nauseated w/ morphine previously after arm surgery.     COVID-19     2020, November.     Gallbladder problem     s/p cholecystectomy.     Head injury     childhood concussions     Heterozygous for MTHFR gene mutation      History of MTHFR mutation      Hypertension      Long COVID     Long term Covid     Morbid obesity (H)      Pneumonia     covid     Prediabetes      Pulmonary emboli (H) 1989     Pulmonary embolism (H)     wisdome teeth out while on OCPs and MTHFR gene mutation history.  "    Pulmonary embolism (H)     age 23, OCPs/dental extraction provoked. reports some MTHFR gene issues, unsure if homo or heterozygous.     Past Surgical History:   Procedure Laterality Date     APPENDECTOMY       BREAST BIOPSY, RT/LT      right breast - benign      SECTION       COLONOSCOPY N/A 11/10/2021    Procedure: COLONOSCOPY, WITH POLYPECTOMY;  Surgeon: Lynn Doshi MD;  Location:  GI     CREATION, GASTRIC BYPASS, KRIS-EN-Y, LAPAROSCOPIC N/A 2022    Procedure: LAPAROSCOPIC KRIS-EN-Y, GASTRIC BYPASS;  Surgeon: North Schneider MD;  [place] [facility] [place]     EYE SURGERY  epilasik left eye     GI SURGERY  Kris N Y      GYN SURGERY      tubal ligation     LAPAROSCOPIC CHOLECYSTECTOMY N/A 2019    Procedure: CHOLECYSTECTOMY, LAPAROSCOPIC;  Surgeon: Lynn Doshi MD;  Location:  OR     ORTHOPEDIC SURGERY      radius, ulnar - open fracture     TUBAL LIGATION       wisdom teeth         Problem List     Patient Active Problem List   Diagnosis     CARDIOVASCULAR SCREENING; LDL GOAL LESS THAN 130     Elevated blood pressure reading without diagnosis of hypertension     Edema, unspecified type     History of MTHFR mutation     Hypothyroidism     Pneumonia     Umbilical hernia without obstruction and without gangrene     Onychomycosis     ERRONEOUS ENCOUNTER--DISREGARD     Medications     [unfilled]  Surgical History     Past Surgical History  She has a past surgical history that includes appendectomy (); orthopedic surgery (); GYN surgery ();  section (); breast biopsy, rt/lt (); Laparoscopic cholecystectomy (N/A, 2019); wisdom teeth; tubal ligation; Colonoscopy (N/A, 11/10/2021); Creation, Gastric Bypass, Kris-En-Y, Laparoscopic (N/A, 2022); Eye surgery (epilasik left eye); and GI surgery (Kris N Y ).    Objective-Exam     Constitutional:  Ht 1.727 m (5' 7.99\")   Wt 101.6 kg (224 lb)   BMI 34.07 kg/m  "   [unfilled]   General:  Pleasant and in no acute distress   Eyes:  EOMI  ENT:  Airway patent    Neck:  Respiratory: Normal respiratory effort, no cough, .  CV:    Gastrointestinal:   Musculoskeletal: muscle mass WNL  Skin: color without pallor, hair thick, ,   Psychiatric: alert and oriented X3, mood and affect normal    Counseling     We reviewed the important post op bariatric recommendations:  -eating 3 meals daily  -eating protein first, getting >60gm protein daily  -eating slowly, chewing food well  -avoiding/limiting calorie containing beverages  -drinking water 15-30 minutes before or after meals  -limiting restaurant or cafeteria eating to twice a week or less    We discussed the importance of restorative sleep and stress management in maintaining a healthy weight.  We discussed the National Weight Control Registry healthy weight maintenance strategies and ways to optimize metabolism.  We discussed the importance of physical activity including cardiovascular and strength training in maintaining a healthier weight.    We discussed the importance of life-long vitamin supplementation and life-long  follow-up.    Lucina was reminded that, to avoid marginal ulcers she should avoid tobacco at all, alcohol in excess, caffeine in excess, and NSAIDS (unless indicated for cardioprotection or othewise and opposed by a PPI).    Arnulfo Stanley MD    [facility].  2024  7:51 AM  [phone] (clinic phone)  [phone] (fax)    No images are attached to the encounter.  Medical Decision Makin minutes spent by me on the date of the encounter doing chart review, history and exam, documentation and further activities per the note    Virtual Visit Details    Type of service:  Video Visit     Originating Location (pt. Location): Home    Distant Location (provider location):  On-site  Platform used for Video Visit: AmWell          Again, thank you for allowing me to participate in the care  of your patient.        Sincerely,        Arnulfo Stanley MD

## 2024-08-06 ENCOUNTER — VIRTUAL VISIT (OUTPATIENT)
Dept: FAMILY MEDICINE | Facility: CLINIC | Age: 59
End: 2024-08-06
Payer: COMMERCIAL

## 2024-08-06 ENCOUNTER — TELEPHONE (OUTPATIENT)
Dept: FAMILY MEDICINE | Facility: CLINIC | Age: 59
End: 2024-08-06

## 2024-08-06 DIAGNOSIS — L23.7 CONTACT DERMATITIS DUE TO POISON IVY: Primary | ICD-10-CM

## 2024-08-06 DIAGNOSIS — B02.9 HERPES ZOSTER WITHOUT COMPLICATION: ICD-10-CM

## 2024-08-06 PROCEDURE — 99443 PR PHYSICIAN TELEPHONE EVALUATION 21-30 MIN: CPT | Performed by: NURSE PRACTITIONER

## 2024-08-06 RX ORDER — CALCIUM CARBONATE 500 MG/1
2 TABLET, CHEWABLE ORAL 2 TIMES DAILY
COMMUNITY

## 2024-08-06 RX ORDER — PREDNISONE 20 MG/1
TABLET ORAL
Qty: 20 TABLET | Refills: 0 | Status: ON HOLD | OUTPATIENT
Start: 2024-08-06 | End: 2024-08-19

## 2024-08-06 RX ORDER — VALACYCLOVIR HYDROCHLORIDE 1 G/1
1000 TABLET, FILM COATED ORAL 3 TIMES DAILY
Qty: 21 TABLET | Refills: 0 | Status: ON HOLD | OUTPATIENT
Start: 2024-08-06 | End: 2024-08-19

## 2024-08-06 NOTE — PROGRESS NOTES
"Lucina is a 58 year old who is being evaluated via a billable telephone visit.    What phone number would you like to be contacted at? [phone]  How would you like to obtain your AVS? Josiahhart  Originating Location (pt. Location): Home    Distant Location (provider location):  Off-site    4:39 PM - 5:00 PM     Assessment & Plan     Contact dermatitis due to poison ivy  Patient developed poison ivy on bilateral legs Wednesday after walking through the woods.  Has been doing topical steroid cream and Benadryl which has helped some.  Rash continues to spread.  Discussed course of steroids, will send to the pharmacy.  Continue Benadryl and steroid cream as needed.  - predniSONE (DELTASONE) 20 MG tablet; Take 3 tabs by mouth daily x 3 days, then 2 tabs daily x 3 days, then 1 tab daily x 3 days, then 1/2 tab daily x 3 days.    Herpes zoster without complication  2 days after poison ivy onset, patient developed lower back numbness which developed into a dermatomal rash migrating into middle back and down into groin.  Significantly painful at night, difficulty sleeping.  Taking Tylenol as needed.  Symptoms consistent with shingles.  Will treat with course of antiviral and continue Tylenol as needed.  - valACYclovir (VALTREX) 1000 mg tablet; Take 1 tablet (1,000 mg) by mouth 3 times daily for 7 days          BMI  Estimated body mass index is 34.07 kg/m  as calculated from the following:    Height as of 7/18/24: 1.727 m (5' 7.99\").    Weight as of 7/18/24: 101.6 kg (224 lb).   Weight management plan: Not addressed today.       See Patient Instructions    Subjective   Lucina is a 58 year old, presenting for the following health issues:  Rash        8/6/2024    11:08 AM   Additional Questions   Roomed by Juanita CEDILLO     Rash  Onset/Duration: wednesday started with poison ivy and by Friday shingles started   Description  Location: lower back ~ upper middle back and down into groin. Worse at night.     Monday was out " scattering ashes; in the woods             Review of Systems  Constitutional, HEENT, cardiovascular, pulmonary, gi and gu systems are negative, except as otherwise noted.      Objective           Vitals:  No vitals were obtained today due to virtual visit.    Physical Exam   General: Alert and no distress //Respiratory: No audible wheeze, cough, or shortness of breath // Psychiatric:  Appropriate affect, tone, and pace of words      Diagnostic Test Results:  none        Phone call duration: 21 minutes  Signed Electronically by: Krista Reynoso DNP,, YARON CNP      Chart documentation with Dragon Voice recognition Software. Although reviewed after completion, some words and grammatical errors may remain.

## 2024-08-06 NOTE — PATIENT INSTRUCTIONS
Contact dermatitis due to poison ivy  Patient developed poison ivy on bilateral legs Wednesday after walking through the woods.  Has been doing topical steroid cream and Benadryl which has helped some.  Rash continues to spread.  Discussed course of steroids, will send to the pharmacy.  Continue Benadryl and steroid cream as needed.  - predniSONE (DELTASONE) 20 MG tablet; Take 3 tabs by mouth daily x 3 days, then 2 tabs daily x 3 days, then 1 tab daily x 3 days, then 1/2 tab daily x 3 days.    Herpes zoster without complication  2 days after poison ivy onset, patient developed lower back numbness which developed into a dermatomal rash migrating into middle back and down into groin.  Significantly painful at night, difficulty sleeping.  Taking Tylenol as needed.  Symptoms consistent with shingles.  Will treat with course of antiviral and continue Tylenol as needed.  - valACYclovir (VALTREX) 1000 mg tablet; Take 1 tablet (1,000 mg) by mouth 3 times daily for 7 days

## 2024-08-15 ENCOUNTER — ANESTHESIA EVENT (OUTPATIENT)
Dept: SURGERY | Facility: HOSPITAL | Age: 59
DRG: 353 | End: 2024-08-15
Payer: COMMERCIAL

## 2024-08-15 ENCOUNTER — APPOINTMENT (OUTPATIENT)
Dept: CT IMAGING | Facility: CLINIC | Age: 59
End: 2024-08-15
Attending: FAMILY MEDICINE
Payer: COMMERCIAL

## 2024-08-15 ENCOUNTER — ANESTHESIA (OUTPATIENT)
Dept: SURGERY | Facility: HOSPITAL | Age: 59
DRG: 353 | End: 2024-08-15
Payer: COMMERCIAL

## 2024-08-15 ENCOUNTER — HOSPITAL ENCOUNTER (EMERGENCY)
Facility: CLINIC | Age: 59
Discharge: ANOTHER HOSPITAL | End: 2024-08-15
Attending: FAMILY MEDICINE | Admitting: FAMILY MEDICINE
Payer: COMMERCIAL

## 2024-08-15 ENCOUNTER — HOSPITAL ENCOUNTER (INPATIENT)
Facility: HOSPITAL | Age: 59
LOS: 4 days | Discharge: HOME | DRG: 353 | End: 2024-08-19
Attending: EMERGENCY MEDICINE | Admitting: INTERNAL MEDICINE
Payer: COMMERCIAL

## 2024-08-15 VITALS
DIASTOLIC BLOOD PRESSURE: 65 MMHG | RESPIRATION RATE: 18 BRPM | BODY MASS INDEX: 34.77 KG/M2 | OXYGEN SATURATION: 94 % | WEIGHT: 228.6 LBS | HEART RATE: 79 BPM | TEMPERATURE: 99.8 F | SYSTOLIC BLOOD PRESSURE: 112 MMHG

## 2024-08-15 DIAGNOSIS — Z86.711 HISTORY OF PULMONARY EMBOLISM: ICD-10-CM

## 2024-08-15 DIAGNOSIS — K66.8 FREE INTRAPERITONEAL AIR: ICD-10-CM

## 2024-08-15 DIAGNOSIS — K63.1 PERFORATION OF INTESTINE (H): Primary | ICD-10-CM

## 2024-08-15 DIAGNOSIS — R07.81 PLEURITIC CHEST PAIN: ICD-10-CM

## 2024-08-15 DIAGNOSIS — K66.8 FREE INTRAPERITONEAL AIR: Primary | ICD-10-CM

## 2024-08-15 DIAGNOSIS — R10.12 ABDOMINAL PAIN, LEFT UPPER QUADRANT: ICD-10-CM

## 2024-08-15 LAB
ALBUMIN SERPL BCG-MCNC: 4.1 G/DL (ref 3.5–5.2)
ALP SERPL-CCNC: 73 U/L (ref 40–150)
ALT SERPL W P-5'-P-CCNC: 25 U/L (ref 0–50)
ANION GAP SERPL CALCULATED.3IONS-SCNC: 16 MMOL/L (ref 7–15)
AST SERPL W P-5'-P-CCNC: 13 U/L (ref 0–45)
BASOPHILS # BLD AUTO: 0 10E3/UL (ref 0–0.2)
BASOPHILS NFR BLD AUTO: 0 %
BILIRUB SERPL-MCNC: 0.3 MG/DL
BUN SERPL-MCNC: 20.1 MG/DL (ref 6–20)
CALCIUM SERPL-MCNC: 9.3 MG/DL (ref 8.8–10.4)
CHLORIDE SERPL-SCNC: 103 MMOL/L (ref 98–107)
CREAT SERPL-MCNC: 0.8 MG/DL (ref 0.51–0.95)
D DIMER PPP FEU-MCNC: 0.63 UG/ML FEU (ref 0–0.5)
EGFRCR SERPLBLD CKD-EPI 2021: 86 ML/MIN/1.73M2
EOSINOPHIL # BLD AUTO: 0.2 10E3/UL (ref 0–0.7)
EOSINOPHIL NFR BLD AUTO: 2 %
ERYTHROCYTE [DISTWIDTH] IN BLOOD BY AUTOMATED COUNT: 12.7 % (ref 10–15)
GLUCOSE SERPL-MCNC: 161 MG/DL (ref 70–99)
HCO3 SERPL-SCNC: 23 MMOL/L (ref 22–29)
HCT VFR BLD AUTO: 45.4 % (ref 35–47)
HGB BLD-MCNC: 15.4 G/DL (ref 11.7–15.7)
HOLD SPECIMEN: NORMAL
HOLD SPECIMEN: NORMAL
IMM GRANULOCYTES # BLD: 0 10E3/UL
IMM GRANULOCYTES NFR BLD: 0 %
LIPASE SERPL-CCNC: 11 U/L (ref 13–60)
LYMPHOCYTES # BLD AUTO: 2.6 10E3/UL (ref 0.8–5.3)
LYMPHOCYTES NFR BLD AUTO: 27 %
MCH RBC QN AUTO: 33.8 PG (ref 26.5–33)
MCHC RBC AUTO-ENTMCNC: 33.9 G/DL (ref 31.5–36.5)
MCV RBC AUTO: 100 FL (ref 78–100)
MONOCYTES # BLD AUTO: 0.5 10E3/UL (ref 0–1.3)
MONOCYTES NFR BLD AUTO: 5 %
NEUTROPHILS # BLD AUTO: 6.6 10E3/UL (ref 1.6–8.3)
NEUTROPHILS NFR BLD AUTO: 66 %
NRBC # BLD AUTO: 0 10E3/UL
NRBC BLD AUTO-RTO: 0 /100
PLATELET # BLD AUTO: 237 10E3/UL (ref 150–450)
POTASSIUM SERPL-SCNC: 3.8 MMOL/L (ref 3.4–5.3)
PROT SERPL-MCNC: 6.9 G/DL (ref 6.4–8.3)
RADIOLOGIST FLAGS: ABNORMAL
RBC # BLD AUTO: 4.55 10E6/UL (ref 3.8–5.2)
SODIUM SERPL-SCNC: 142 MMOL/L (ref 135–145)
TROPONIN T SERPL HS-MCNC: <6 NG/L
WBC # BLD AUTO: 9.9 10E3/UL (ref 4–11)

## 2024-08-15 PROCEDURE — 85379 FIBRIN DEGRADATION QUANT: CPT | Performed by: FAMILY MEDICINE

## 2024-08-15 PROCEDURE — P9045 ALBUMIN (HUMAN), 5%, 250 ML: HCPCS | Performed by: NURSE ANESTHETIST, CERTIFIED REGISTERED

## 2024-08-15 PROCEDURE — 250N000025 HC SEVOFLURANE, PER MIN: Performed by: SURGERY

## 2024-08-15 PROCEDURE — 36415 COLL VENOUS BLD VENIPUNCTURE: CPT | Performed by: FAMILY MEDICINE

## 2024-08-15 PROCEDURE — 272N000001 HC OR GENERAL SUPPLY STERILE: Performed by: SURGERY

## 2024-08-15 PROCEDURE — 250N000009 HC RX 250: Performed by: NURSE ANESTHETIST, CERTIFIED REGISTERED

## 2024-08-15 PROCEDURE — 84484 ASSAY OF TROPONIN QUANT: CPT | Performed by: FAMILY MEDICINE

## 2024-08-15 PROCEDURE — 258N000003 HC RX IP 258 OP 636: Performed by: ANESTHESIOLOGY

## 2024-08-15 PROCEDURE — 250N000011 HC RX IP 250 OP 636: Performed by: ANESTHESIOLOGY

## 2024-08-15 PROCEDURE — 44180 LAP ENTEROLYSIS: CPT | Performed by: NURSE ANESTHETIST, CERTIFIED REGISTERED

## 2024-08-15 PROCEDURE — 99285 EMERGENCY DEPT VISIT HI MDM: CPT | Mod: 25 | Performed by: FAMILY MEDICINE

## 2024-08-15 PROCEDURE — 250N000011 HC RX IP 250 OP 636: Performed by: NURSE ANESTHETIST, CERTIFIED REGISTERED

## 2024-08-15 PROCEDURE — 71275 CT ANGIOGRAPHY CHEST: CPT

## 2024-08-15 PROCEDURE — 250N000011 HC RX IP 250 OP 636: Performed by: SURGERY

## 2024-08-15 PROCEDURE — 999N000141 HC STATISTIC PRE-PROCEDURE NURSING ASSESSMENT: Performed by: SURGERY

## 2024-08-15 PROCEDURE — 710N000009 HC RECOVERY PHASE 1, LEVEL 1, PER MIN: Performed by: SURGERY

## 2024-08-15 PROCEDURE — 43659 UNLISTED LAPS PX STOMACH: CPT | Performed by: SURGERY

## 2024-08-15 PROCEDURE — 83690 ASSAY OF LIPASE: CPT | Performed by: FAMILY MEDICINE

## 2024-08-15 PROCEDURE — 80053 COMPREHEN METABOLIC PANEL: CPT | Performed by: FAMILY MEDICINE

## 2024-08-15 PROCEDURE — 96372 THER/PROPH/DIAG INJ SC/IM: CPT | Mod: 59 | Performed by: FAMILY MEDICINE

## 2024-08-15 PROCEDURE — 250N000009 HC RX 250: Performed by: FAMILY MEDICINE

## 2024-08-15 PROCEDURE — 250N000011 HC RX IP 250 OP 636: Performed by: EMERGENCY MEDICINE

## 2024-08-15 PROCEDURE — 258N000003 HC RX IP 258 OP 636: Performed by: EMERGENCY MEDICINE

## 2024-08-15 PROCEDURE — 250N000011 HC RX IP 250 OP 636: Performed by: FAMILY MEDICINE

## 2024-08-15 PROCEDURE — 99207 PR NO BILLABLE SERVICE THIS VISIT: CPT | Performed by: PHYSICIAN ASSISTANT

## 2024-08-15 PROCEDURE — 93010 ELECTROCARDIOGRAM REPORT: CPT | Performed by: FAMILY MEDICINE

## 2024-08-15 PROCEDURE — 44180 LAP ENTEROLYSIS: CPT | Performed by: ANESTHESIOLOGY

## 2024-08-15 PROCEDURE — 96376 TX/PRO/DX INJ SAME DRUG ADON: CPT | Mod: 59 | Performed by: FAMILY MEDICINE

## 2024-08-15 PROCEDURE — 96375 TX/PRO/DX INJ NEW DRUG ADDON: CPT | Mod: 59 | Performed by: FAMILY MEDICINE

## 2024-08-15 PROCEDURE — 96365 THER/PROPH/DIAG IV INF INIT: CPT | Mod: 59 | Performed by: FAMILY MEDICINE

## 2024-08-15 PROCEDURE — 93005 ELECTROCARDIOGRAM TRACING: CPT | Performed by: FAMILY MEDICINE

## 2024-08-15 PROCEDURE — 0WQF4ZZ REPAIR ABDOMINAL WALL, PERCUTANEOUS ENDOSCOPIC APPROACH: ICD-10-PCS | Performed by: SURGERY

## 2024-08-15 PROCEDURE — 250N000009 HC RX 250: Performed by: SURGERY

## 2024-08-15 PROCEDURE — 99140 ANES COMP EMERGENCY COND: CPT | Performed by: NURSE ANESTHETIST, CERTIFIED REGISTERED

## 2024-08-15 PROCEDURE — 99222 1ST HOSP IP/OBS MODERATE 55: CPT | Mod: 57 | Performed by: SURGERY

## 2024-08-15 PROCEDURE — 250N000011 HC RX IP 250 OP 636: Performed by: INTERNAL MEDICINE

## 2024-08-15 PROCEDURE — 370N000017 HC ANESTHESIA TECHNICAL FEE, PER MIN: Performed by: SURGERY

## 2024-08-15 PROCEDURE — 258N000003 HC RX IP 258 OP 636: Performed by: SURGERY

## 2024-08-15 PROCEDURE — 0DXU4ZW TRANSFER OMENTUM TO ABDOMINAL REGION, PERCUTANEOUS ENDOSCOPIC APPROACH: ICD-10-PCS | Performed by: SURGERY

## 2024-08-15 PROCEDURE — 250N000009 HC RX 250: Performed by: ANESTHESIOLOGY

## 2024-08-15 PROCEDURE — 99223 1ST HOSP IP/OBS HIGH 75: CPT | Performed by: INTERNAL MEDICINE

## 2024-08-15 PROCEDURE — 250N000009 HC RX 250: Performed by: PHYSICIAN ASSISTANT

## 2024-08-15 PROCEDURE — 258N000003 HC RX IP 258 OP 636: Performed by: NURSE ANESTHETIST, CERTIFIED REGISTERED

## 2024-08-15 PROCEDURE — 258N000003 HC RX IP 258 OP 636: Performed by: PHYSICIAN ASSISTANT

## 2024-08-15 PROCEDURE — 99285 EMERGENCY DEPT VISIT HI MDM: CPT | Mod: 25

## 2024-08-15 PROCEDURE — 85004 AUTOMATED DIFF WBC COUNT: CPT | Performed by: FAMILY MEDICINE

## 2024-08-15 PROCEDURE — 258N000003 HC RX IP 258 OP 636: Performed by: FAMILY MEDICINE

## 2024-08-15 PROCEDURE — 120N000001 HC R&B MED SURG/OB

## 2024-08-15 PROCEDURE — 258N000001 HC RX 258: Performed by: SURGERY

## 2024-08-15 PROCEDURE — 360N000077 HC SURGERY LEVEL 4, PER MIN: Performed by: SURGERY

## 2024-08-15 PROCEDURE — 96361 HYDRATE IV INFUSION ADD-ON: CPT | Performed by: FAMILY MEDICINE

## 2024-08-15 PROCEDURE — 49329 UNLSTD LAPS PX ABD PERTM&OMN: CPT | Performed by: SURGERY

## 2024-08-15 PROCEDURE — 49592 RPR AA HRN 1ST < 3 NCR/STRN: CPT | Mod: 51 | Performed by: SURGERY

## 2024-08-15 PROCEDURE — 74176 CT ABD & PELVIS W/O CONTRAST: CPT

## 2024-08-15 RX ORDER — PIPERACILLIN SODIUM, TAZOBACTAM SODIUM 3; .375 G/15ML; G/15ML
3.38 INJECTION, POWDER, LYOPHILIZED, FOR SOLUTION INTRAVENOUS ONCE
Status: COMPLETED | OUTPATIENT
Start: 2024-08-15 | End: 2024-08-15

## 2024-08-15 RX ORDER — AMOXICILLIN 250 MG
1 CAPSULE ORAL 2 TIMES DAILY
Status: DISCONTINUED | OUTPATIENT
Start: 2024-08-15 | End: 2024-08-19 | Stop reason: HOSPADM

## 2024-08-15 RX ORDER — LIDOCAINE 40 MG/G
CREAM TOPICAL
Status: DISCONTINUED | OUTPATIENT
Start: 2024-08-15 | End: 2024-08-16

## 2024-08-15 RX ORDER — ONDANSETRON 2 MG/ML
4 INJECTION INTRAMUSCULAR; INTRAVENOUS EVERY 30 MIN PRN
Status: DISCONTINUED | OUTPATIENT
Start: 2024-08-15 | End: 2024-08-15 | Stop reason: HOSPADM

## 2024-08-15 RX ORDER — ACETAMINOPHEN 500 MG
1000 TABLET ORAL 3 TIMES DAILY PRN
COMMUNITY

## 2024-08-15 RX ORDER — CEFAZOLIN SODIUM/WATER 2 G/20 ML
2 SYRINGE (ML) INTRAVENOUS SEE ADMIN INSTRUCTIONS
Status: DISCONTINUED | OUTPATIENT
Start: 2024-08-15 | End: 2024-08-16

## 2024-08-15 RX ORDER — KETAMINE HYDROCHLORIDE 10 MG/ML
INJECTION INTRAMUSCULAR; INTRAVENOUS PRN
Status: DISCONTINUED | OUTPATIENT
Start: 2024-08-15 | End: 2024-08-15

## 2024-08-15 RX ORDER — ACETAMINOPHEN 325 MG/1
650 TABLET ORAL EVERY 4 HOURS PRN
Status: DISCONTINUED | OUTPATIENT
Start: 2024-08-15 | End: 2024-08-19 | Stop reason: HOSPADM

## 2024-08-15 RX ORDER — SODIUM CHLORIDE, SODIUM LACTATE, POTASSIUM CHLORIDE, CALCIUM CHLORIDE 600; 310; 30; 20 MG/100ML; MG/100ML; MG/100ML; MG/100ML
INJECTION, SOLUTION INTRAVENOUS CONTINUOUS
Status: DISCONTINUED | OUTPATIENT
Start: 2024-08-15 | End: 2024-08-16

## 2024-08-15 RX ORDER — NALOXONE HYDROCHLORIDE 0.4 MG/ML
0.4 INJECTION, SOLUTION INTRAMUSCULAR; INTRAVENOUS; SUBCUTANEOUS
Status: DISCONTINUED | OUTPATIENT
Start: 2024-08-15 | End: 2024-08-19 | Stop reason: HOSPADM

## 2024-08-15 RX ORDER — SODIUM CHLORIDE 9 MG/ML
INJECTION, SOLUTION INTRAVENOUS CONTINUOUS
Status: DISCONTINUED | OUTPATIENT
Start: 2024-08-15 | End: 2024-08-15 | Stop reason: HOSPADM

## 2024-08-15 RX ORDER — HYDROXYZINE HYDROCHLORIDE 25 MG/1
25 TABLET, FILM COATED ORAL 3 TIMES DAILY PRN
COMMUNITY

## 2024-08-15 RX ORDER — ONDANSETRON 4 MG/1
4 TABLET, ORALLY DISINTEGRATING ORAL EVERY 6 HOURS PRN
Status: DISCONTINUED | OUTPATIENT
Start: 2024-08-15 | End: 2024-08-19 | Stop reason: HOSPADM

## 2024-08-15 RX ORDER — ONDANSETRON 2 MG/ML
4 INJECTION INTRAMUSCULAR; INTRAVENOUS EVERY 30 MIN PRN
Status: COMPLETED | OUTPATIENT
Start: 2024-08-15 | End: 2024-08-15

## 2024-08-15 RX ORDER — SODIUM CHLORIDE, SODIUM LACTATE, POTASSIUM CHLORIDE, CALCIUM CHLORIDE 600; 310; 30; 20 MG/100ML; MG/100ML; MG/100ML; MG/100ML
INJECTION, SOLUTION INTRAVENOUS CONTINUOUS
Status: DISCONTINUED | OUTPATIENT
Start: 2024-08-15 | End: 2024-08-15 | Stop reason: HOSPADM

## 2024-08-15 RX ORDER — POLYETHYLENE GLYCOL 3350 17 G/17G
17 POWDER, FOR SOLUTION ORAL DAILY
Status: DISCONTINUED | OUTPATIENT
Start: 2024-08-16 | End: 2024-08-19 | Stop reason: HOSPADM

## 2024-08-15 RX ORDER — FENTANYL CITRATE 50 UG/ML
25-100 INJECTION, SOLUTION INTRAMUSCULAR; INTRAVENOUS
Status: DISCONTINUED | OUTPATIENT
Start: 2024-08-15 | End: 2024-08-16

## 2024-08-15 RX ORDER — LIDOCAINE 40 MG/G
CREAM TOPICAL
Status: DISCONTINUED | OUTPATIENT
Start: 2024-08-15 | End: 2024-08-19 | Stop reason: HOSPADM

## 2024-08-15 RX ORDER — AMPICILLIN AND SULBACTAM 2; 1 G/1; G/1
3 INJECTION, POWDER, FOR SOLUTION INTRAMUSCULAR; INTRAVENOUS ONCE
Status: COMPLETED | OUTPATIENT
Start: 2024-08-15 | End: 2024-08-15

## 2024-08-15 RX ORDER — MULTIVIT WITH MINERALS/LUTEIN
1000 TABLET ORAL DAILY
Status: DISCONTINUED | OUTPATIENT
Start: 2024-08-15 | End: 2024-08-17

## 2024-08-15 RX ORDER — PROCHLORPERAZINE MALEATE 10 MG
10 TABLET ORAL EVERY 6 HOURS PRN
Status: DISCONTINUED | OUTPATIENT
Start: 2024-08-15 | End: 2024-08-19 | Stop reason: HOSPADM

## 2024-08-15 RX ORDER — ONDANSETRON 2 MG/ML
4 INJECTION INTRAMUSCULAR; INTRAVENOUS EVERY 6 HOURS PRN
Status: DISCONTINUED | OUTPATIENT
Start: 2024-08-15 | End: 2024-08-16

## 2024-08-15 RX ORDER — FENTANYL CITRATE 50 UG/ML
25 INJECTION, SOLUTION INTRAMUSCULAR; INTRAVENOUS EVERY 5 MIN PRN
Status: DISCONTINUED | OUTPATIENT
Start: 2024-08-15 | End: 2024-08-15 | Stop reason: HOSPADM

## 2024-08-15 RX ORDER — ACETAMINOPHEN 325 MG/1
975 TABLET ORAL EVERY 8 HOURS
Status: COMPLETED | OUTPATIENT
Start: 2024-08-16 | End: 2024-08-18

## 2024-08-15 RX ORDER — PROPOFOL 10 MG/ML
INJECTION, EMULSION INTRAVENOUS PRN
Status: DISCONTINUED | OUTPATIENT
Start: 2024-08-15 | End: 2024-08-15

## 2024-08-15 RX ORDER — ONDANSETRON 2 MG/ML
INJECTION INTRAMUSCULAR; INTRAVENOUS PRN
Status: DISCONTINUED | OUTPATIENT
Start: 2024-08-15 | End: 2024-08-15

## 2024-08-15 RX ORDER — LEVOTHYROXINE SODIUM 88 UG/1
88 TABLET ORAL DAILY
Status: DISCONTINUED | OUTPATIENT
Start: 2024-08-15 | End: 2024-08-17

## 2024-08-15 RX ORDER — CEFAZOLIN SODIUM/WATER 2 G/20 ML
2 SYRINGE (ML) INTRAVENOUS
Status: COMPLETED | OUTPATIENT
Start: 2024-08-15 | End: 2024-08-15

## 2024-08-15 RX ORDER — ONDANSETRON 2 MG/ML
4 INJECTION INTRAMUSCULAR; INTRAVENOUS EVERY 6 HOURS PRN
Status: DISCONTINUED | OUTPATIENT
Start: 2024-08-15 | End: 2024-08-19 | Stop reason: HOSPADM

## 2024-08-15 RX ORDER — POLYETHYLENE GLYCOL 3350 17 G/17G
17 POWDER, FOR SOLUTION ORAL 2 TIMES DAILY PRN
Status: DISCONTINUED | OUTPATIENT
Start: 2024-08-15 | End: 2024-08-19 | Stop reason: HOSPADM

## 2024-08-15 RX ORDER — FENTANYL CITRATE 50 UG/ML
50 INJECTION, SOLUTION INTRAMUSCULAR; INTRAVENOUS
Status: ACTIVE | OUTPATIENT
Start: 2024-08-15 | End: 2024-08-15

## 2024-08-15 RX ORDER — KETOROLAC TROMETHAMINE 30 MG/ML
30 INJECTION, SOLUTION INTRAMUSCULAR; INTRAVENOUS ONCE
Status: COMPLETED | OUTPATIENT
Start: 2024-08-15 | End: 2024-08-15

## 2024-08-15 RX ORDER — LIDOCAINE HYDROCHLORIDE AND EPINEPHRINE 10; 10 MG/ML; UG/ML
INJECTION, SOLUTION INFILTRATION; PERINEURAL PRN
Status: DISCONTINUED | OUTPATIENT
Start: 2024-08-15 | End: 2024-08-15 | Stop reason: HOSPADM

## 2024-08-15 RX ORDER — PIPERACILLIN SODIUM, TAZOBACTAM SODIUM 3; .375 G/15ML; G/15ML
3.38 INJECTION, POWDER, LYOPHILIZED, FOR SOLUTION INTRAVENOUS EVERY 8 HOURS
Status: DISCONTINUED | OUTPATIENT
Start: 2024-08-15 | End: 2024-08-19

## 2024-08-15 RX ORDER — CALCIUM CARBONATE 500 MG/1
1000 TABLET, CHEWABLE ORAL 2 TIMES DAILY
Status: DISCONTINUED | OUTPATIENT
Start: 2024-08-15 | End: 2024-08-17

## 2024-08-15 RX ORDER — LIDOCAINE 50 MG/G
OINTMENT TOPICAL 3 TIMES DAILY PRN
Status: DISCONTINUED | OUTPATIENT
Start: 2024-08-15 | End: 2024-08-19 | Stop reason: HOSPADM

## 2024-08-15 RX ORDER — HYDROMORPHONE HCL IN WATER/PF 6 MG/30 ML
0.5 PATIENT CONTROLLED ANALGESIA SYRINGE INTRAVENOUS ONCE
Status: COMPLETED | OUTPATIENT
Start: 2024-08-15 | End: 2024-08-16

## 2024-08-15 RX ORDER — PIPERACILLIN SODIUM, TAZOBACTAM SODIUM 3; .375 G/15ML; G/15ML
3.38 INJECTION, POWDER, LYOPHILIZED, FOR SOLUTION INTRAVENOUS ONCE
Status: DISCONTINUED | OUTPATIENT
Start: 2024-08-15 | End: 2024-08-19

## 2024-08-15 RX ORDER — CALCIUM CARBONATE 500 MG/1
1000 TABLET, CHEWABLE ORAL 4 TIMES DAILY PRN
Status: DISCONTINUED | OUTPATIENT
Start: 2024-08-15 | End: 2024-08-15

## 2024-08-15 RX ORDER — DEXAMETHASONE SODIUM PHOSPHATE 4 MG/ML
4 INJECTION, SOLUTION INTRA-ARTICULAR; INTRALESIONAL; INTRAMUSCULAR; INTRAVENOUS; SOFT TISSUE
Status: DISCONTINUED | OUTPATIENT
Start: 2024-08-15 | End: 2024-08-15 | Stop reason: HOSPADM

## 2024-08-15 RX ORDER — BISACODYL 10 MG
10 SUPPOSITORY, RECTAL RECTAL DAILY PRN
Status: DISCONTINUED | OUTPATIENT
Start: 2024-08-18 | End: 2024-08-19 | Stop reason: HOSPADM

## 2024-08-15 RX ORDER — SODIUM CHLORIDE, SODIUM LACTATE, POTASSIUM CHLORIDE, AND CALCIUM CHLORIDE .6; .31; .03; .02 G/100ML; G/100ML; G/100ML; G/100ML
IRRIGANT IRRIGATION PRN
Status: DISCONTINUED | OUTPATIENT
Start: 2024-08-15 | End: 2024-08-15 | Stop reason: HOSPADM

## 2024-08-15 RX ORDER — HYDROMORPHONE HCL IN WATER/PF 6 MG/30 ML
0.2 PATIENT CONTROLLED ANALGESIA SYRINGE INTRAVENOUS
Status: DISCONTINUED | OUTPATIENT
Start: 2024-08-15 | End: 2024-08-19 | Stop reason: HOSPADM

## 2024-08-15 RX ORDER — LIDOCAINE HYDROCHLORIDE 30 MG/G
CREAM TOPICAL 3 TIMES DAILY PRN
COMMUNITY
End: 2024-09-03

## 2024-08-15 RX ORDER — ONDANSETRON 4 MG/1
4 TABLET, ORALLY DISINTEGRATING ORAL EVERY 6 HOURS PRN
Status: DISCONTINUED | OUTPATIENT
Start: 2024-08-15 | End: 2024-08-16

## 2024-08-15 RX ORDER — NALOXONE HYDROCHLORIDE 1 MG/ML
0.1 INJECTION INTRAMUSCULAR; INTRAVENOUS; SUBCUTANEOUS
Status: DISCONTINUED | OUTPATIENT
Start: 2024-08-15 | End: 2024-08-15 | Stop reason: HOSPADM

## 2024-08-15 RX ORDER — MULTIVIT WITH IRON,MINERALS
1 TABLET,CHEWABLE ORAL 2 TIMES DAILY
Status: DISCONTINUED | OUTPATIENT
Start: 2024-08-15 | End: 2024-08-17

## 2024-08-15 RX ORDER — HYDROMORPHONE HCL IN WATER/PF 6 MG/30 ML
.2-.4 PATIENT CONTROLLED ANALGESIA SYRINGE INTRAVENOUS
Status: DISCONTINUED | OUTPATIENT
Start: 2024-08-15 | End: 2024-08-16

## 2024-08-15 RX ORDER — HEPARIN SODIUM 5000 [USP'U]/.5ML
5000 INJECTION, SOLUTION INTRAVENOUS; SUBCUTANEOUS EVERY 8 HOURS
Status: DISCONTINUED | OUTPATIENT
Start: 2024-08-16 | End: 2024-08-19 | Stop reason: HOSPADM

## 2024-08-15 RX ORDER — OXYCODONE HYDROCHLORIDE 5 MG/1
5 TABLET ORAL EVERY 4 HOURS PRN
Status: DISCONTINUED | OUTPATIENT
Start: 2024-08-15 | End: 2024-08-19 | Stop reason: HOSPADM

## 2024-08-15 RX ORDER — MAGNESIUM SULFATE 4 G/50ML
4 INJECTION INTRAVENOUS ONCE
Status: COMPLETED | OUTPATIENT
Start: 2024-08-15 | End: 2024-08-15

## 2024-08-15 RX ORDER — ONDANSETRON 2 MG/ML
INJECTION INTRAMUSCULAR; INTRAVENOUS
Status: COMPLETED
Start: 2024-08-15 | End: 2024-08-15

## 2024-08-15 RX ORDER — NALOXONE HYDROCHLORIDE 0.4 MG/ML
0.2 INJECTION, SOLUTION INTRAMUSCULAR; INTRAVENOUS; SUBCUTANEOUS
Status: DISCONTINUED | OUTPATIENT
Start: 2024-08-15 | End: 2024-08-19 | Stop reason: HOSPADM

## 2024-08-15 RX ORDER — TRIAMCINOLONE ACETONIDE 1 MG/G
CREAM TOPICAL 2 TIMES DAILY PRN
COMMUNITY

## 2024-08-15 RX ORDER — LIDOCAINE HYDROCHLORIDE 10 MG/ML
INJECTION, SOLUTION INFILTRATION; PERINEURAL PRN
Status: DISCONTINUED | OUTPATIENT
Start: 2024-08-15 | End: 2024-08-15

## 2024-08-15 RX ORDER — ACETAMINOPHEN 325 MG/1
650 TABLET ORAL EVERY 4 HOURS PRN
Status: DISCONTINUED | OUTPATIENT
Start: 2024-08-18 | End: 2024-08-16

## 2024-08-15 RX ORDER — ACETAMINOPHEN 650 MG/1
650 SUPPOSITORY RECTAL EVERY 4 HOURS PRN
Status: DISCONTINUED | OUTPATIENT
Start: 2024-08-15 | End: 2024-08-19 | Stop reason: HOSPADM

## 2024-08-15 RX ORDER — HYDROMORPHONE HYDROCHLORIDE 1 MG/ML
0.5 INJECTION, SOLUTION INTRAMUSCULAR; INTRAVENOUS; SUBCUTANEOUS
Status: DISCONTINUED | OUTPATIENT
Start: 2024-08-15 | End: 2024-08-15 | Stop reason: HOSPADM

## 2024-08-15 RX ORDER — SODIUM CHLORIDE 9 MG/ML
INJECTION, SOLUTION INTRAVENOUS CONTINUOUS
Status: DISCONTINUED | OUTPATIENT
Start: 2024-08-15 | End: 2024-08-17

## 2024-08-15 RX ORDER — HYDROMORPHONE HCL IN WATER/PF 6 MG/30 ML
0.4 PATIENT CONTROLLED ANALGESIA SYRINGE INTRAVENOUS
Status: DISCONTINUED | OUTPATIENT
Start: 2024-08-15 | End: 2024-08-19 | Stop reason: HOSPADM

## 2024-08-15 RX ORDER — FENTANYL CITRATE 50 UG/ML
50 INJECTION, SOLUTION INTRAMUSCULAR; INTRAVENOUS EVERY 5 MIN PRN
Status: DISCONTINUED | OUTPATIENT
Start: 2024-08-15 | End: 2024-08-15 | Stop reason: HOSPADM

## 2024-08-15 RX ORDER — LORAZEPAM 0.5 MG/1
.5-1 TABLET ORAL EVERY 6 HOURS PRN
Status: DISCONTINUED | OUTPATIENT
Start: 2024-08-15 | End: 2024-08-17

## 2024-08-15 RX ORDER — AMOXICILLIN 250 MG
2 CAPSULE ORAL 2 TIMES DAILY PRN
Status: DISCONTINUED | OUTPATIENT
Start: 2024-08-15 | End: 2024-08-19 | Stop reason: HOSPADM

## 2024-08-15 RX ORDER — HYDROXYZINE HYDROCHLORIDE 25 MG/1
25 TABLET, FILM COATED ORAL 3 TIMES DAILY PRN
Status: DISCONTINUED | OUTPATIENT
Start: 2024-08-15 | End: 2024-08-17

## 2024-08-15 RX ORDER — OXYCODONE HYDROCHLORIDE 5 MG/1
10 TABLET ORAL EVERY 4 HOURS PRN
Status: DISCONTINUED | OUTPATIENT
Start: 2024-08-15 | End: 2024-08-19 | Stop reason: HOSPADM

## 2024-08-15 RX ORDER — FENTANYL CITRATE 50 UG/ML
INJECTION, SOLUTION INTRAMUSCULAR; INTRAVENOUS PRN
Status: DISCONTINUED | OUTPATIENT
Start: 2024-08-15 | End: 2024-08-15

## 2024-08-15 RX ORDER — FLUCONAZOLE 2 MG/ML
200 INJECTION, SOLUTION INTRAVENOUS EVERY 24 HOURS
Status: DISCONTINUED | OUTPATIENT
Start: 2024-08-16 | End: 2024-08-19

## 2024-08-15 RX ORDER — ONDANSETRON 4 MG/1
4 TABLET, ORALLY DISINTEGRATING ORAL EVERY 30 MIN PRN
Status: DISCONTINUED | OUTPATIENT
Start: 2024-08-15 | End: 2024-08-15 | Stop reason: HOSPADM

## 2024-08-15 RX ORDER — IOPAMIDOL 755 MG/ML
500 INJECTION, SOLUTION INTRAVASCULAR ONCE
Status: COMPLETED | OUTPATIENT
Start: 2024-08-15 | End: 2024-08-15

## 2024-08-15 RX ORDER — AMOXICILLIN 250 MG
1 CAPSULE ORAL 2 TIMES DAILY PRN
Status: DISCONTINUED | OUTPATIENT
Start: 2024-08-15 | End: 2024-08-19 | Stop reason: HOSPADM

## 2024-08-15 RX ORDER — VITAMIN B COMPLEX
50 TABLET ORAL DAILY
Status: DISCONTINUED | OUTPATIENT
Start: 2024-08-15 | End: 2024-08-17

## 2024-08-15 RX ADMIN — PHENYLEPHRINE HYDROCHLORIDE 100 MCG: 10 INJECTION INTRAVENOUS at 16:21

## 2024-08-15 RX ADMIN — HYDROMORPHONE HYDROCHLORIDE 1 MG: 1 INJECTION, SOLUTION INTRAMUSCULAR; INTRAVENOUS; SUBCUTANEOUS at 09:54

## 2024-08-15 RX ADMIN — Medication 200 MG: at 17:35

## 2024-08-15 RX ADMIN — FENTANYL CITRATE 25 MCG: 50 INJECTION, SOLUTION INTRAMUSCULAR; INTRAVENOUS at 17:53

## 2024-08-15 RX ADMIN — ONDANSETRON 4 MG: 2 INJECTION INTRAMUSCULAR; INTRAVENOUS at 07:24

## 2024-08-15 RX ADMIN — AMPICILLIN SODIUM AND SULBACTAM SODIUM 3 G: 2; 1 INJECTION, POWDER, FOR SOLUTION INTRAMUSCULAR; INTRAVENOUS at 09:53

## 2024-08-15 RX ADMIN — Medication 2 G: at 15:58

## 2024-08-15 RX ADMIN — ROCURONIUM BROMIDE 10 MG: 50 INJECTION, SOLUTION INTRAVENOUS at 15:50

## 2024-08-15 RX ADMIN — PROPOFOL 200 MG: 10 INJECTION, EMULSION INTRAVENOUS at 15:50

## 2024-08-15 RX ADMIN — MIDAZOLAM 2 MG: 1 INJECTION INTRAMUSCULAR; INTRAVENOUS at 15:36

## 2024-08-15 RX ADMIN — ONDANSETRON 4 MG: 2 INJECTION INTRAMUSCULAR; INTRAVENOUS at 11:22

## 2024-08-15 RX ADMIN — Medication 100 MG: at 15:50

## 2024-08-15 RX ADMIN — IOPAMIDOL 70 ML: 755 INJECTION, SOLUTION INTRAVENOUS at 10:44

## 2024-08-15 RX ADMIN — PIPERACILLIN AND TAZOBACTAM 3.38 G: 3; .375 INJECTION, POWDER, FOR SOLUTION INTRAVENOUS at 18:13

## 2024-08-15 RX ADMIN — LIDOCAINE HYDROCHLORIDE 5 ML: 10 INJECTION, SOLUTION INFILTRATION; PERINEURAL at 15:50

## 2024-08-15 RX ADMIN — KETOROLAC TROMETHAMINE 30 MG: 30 INJECTION, SOLUTION INTRAMUSCULAR at 08:15

## 2024-08-15 RX ADMIN — PHENYLEPHRINE HYDROCHLORIDE 100 MCG: 10 INJECTION INTRAVENOUS at 16:57

## 2024-08-15 RX ADMIN — SODIUM CHLORIDE: 9 INJECTION, SOLUTION INTRAVENOUS at 11:28

## 2024-08-15 RX ADMIN — FENTANYL CITRATE 25 MCG: 50 INJECTION, SOLUTION INTRAMUSCULAR; INTRAVENOUS at 15:21

## 2024-08-15 RX ADMIN — PHENYLEPHRINE HYDROCHLORIDE 100 MCG: 10 INJECTION INTRAVENOUS at 16:50

## 2024-08-15 RX ADMIN — SODIUM CHLORIDE, POTASSIUM CHLORIDE, SODIUM LACTATE AND CALCIUM CHLORIDE: 600; 310; 30; 20 INJECTION, SOLUTION INTRAVENOUS at 15:08

## 2024-08-15 RX ADMIN — HYDROMORPHONE HYDROCHLORIDE 0.5 MG: 1 INJECTION, SOLUTION INTRAMUSCULAR; INTRAVENOUS; SUBCUTANEOUS at 07:57

## 2024-08-15 RX ADMIN — MAGNESIUM SULFATE HEPTAHYDRATE 4 G: 80 INJECTION, SOLUTION INTRAVENOUS at 15:11

## 2024-08-15 RX ADMIN — ALBUMIN HUMAN: 0.05 INJECTION, SOLUTION INTRAVENOUS at 16:03

## 2024-08-15 RX ADMIN — PHENYLEPHRINE HYDROCHLORIDE 100 MCG: 10 INJECTION INTRAVENOUS at 16:03

## 2024-08-15 RX ADMIN — HYDROMORPHONE HYDROCHLORIDE 1 MG: 1 INJECTION, SOLUTION INTRAMUSCULAR; INTRAVENOUS; SUBCUTANEOUS at 06:23

## 2024-08-15 RX ADMIN — ONDANSETRON 4 MG: 2 INJECTION INTRAMUSCULAR; INTRAVENOUS at 17:20

## 2024-08-15 RX ADMIN — ROCURONIUM BROMIDE 40 MG: 50 INJECTION, SOLUTION INTRAVENOUS at 16:17

## 2024-08-15 RX ADMIN — PHENYLEPHRINE HYDROCHLORIDE 100 MCG: 10 INJECTION INTRAVENOUS at 16:11

## 2024-08-15 RX ADMIN — PHENYLEPHRINE HYDROCHLORIDE 100 MCG: 10 INJECTION INTRAVENOUS at 16:38

## 2024-08-15 RX ADMIN — HYDROMORPHONE HYDROCHLORIDE 0.4 MG: 0.2 INJECTION, SOLUTION INTRAMUSCULAR; INTRAVENOUS; SUBCUTANEOUS at 21:18

## 2024-08-15 RX ADMIN — FENTANYL CITRATE 100 MCG: 50 INJECTION INTRAMUSCULAR; INTRAVENOUS at 15:50

## 2024-08-15 RX ADMIN — KETAMINE HYDROCHLORIDE 50 MG: 10 INJECTION INTRAMUSCULAR; INTRAVENOUS at 15:50

## 2024-08-15 RX ADMIN — FENTANYL CITRATE 25 MCG: 50 INJECTION, SOLUTION INTRAMUSCULAR; INTRAVENOUS at 15:07

## 2024-08-15 RX ADMIN — PHENYLEPHRINE HYDROCHLORIDE 100 MCG: 10 INJECTION INTRAVENOUS at 16:54

## 2024-08-15 RX ADMIN — PHENYLEPHRINE HYDROCHLORIDE 100 MCG: 10 INJECTION INTRAVENOUS at 15:57

## 2024-08-15 RX ADMIN — FENTANYL CITRATE 50 MCG: 50 INJECTION INTRAMUSCULAR; INTRAVENOUS at 17:22

## 2024-08-15 RX ADMIN — ONDANSETRON 4 MG: 2 INJECTION INTRAMUSCULAR; INTRAVENOUS at 15:12

## 2024-08-15 RX ADMIN — HYDROMORPHONE HYDROCHLORIDE 1 MG: 1 INJECTION, SOLUTION INTRAMUSCULAR; INTRAVENOUS; SUBCUTANEOUS at 11:23

## 2024-08-15 RX ADMIN — PHENYLEPHRINE HYDROCHLORIDE 100 MCG: 10 INJECTION INTRAVENOUS at 16:45

## 2024-08-15 RX ADMIN — SODIUM CHLORIDE 8 MG/HR: 9 INJECTION, SOLUTION INTRAVENOUS at 18:13

## 2024-08-15 RX ADMIN — SODIUM CHLORIDE 1000 ML: 9 INJECTION, SOLUTION INTRAVENOUS at 07:24

## 2024-08-15 RX ADMIN — HYDROMORPHONE HYDROCHLORIDE 0.5 MG: 1 INJECTION, SOLUTION INTRAMUSCULAR; INTRAVENOUS; SUBCUTANEOUS at 07:17

## 2024-08-15 RX ADMIN — FENTANYL CITRATE 25 MCG: 50 INJECTION, SOLUTION INTRAMUSCULAR; INTRAVENOUS at 14:59

## 2024-08-15 RX ADMIN — DEXMEDETOMIDINE HYDROCHLORIDE 20 MCG: 100 INJECTION, SOLUTION INTRAVENOUS at 15:50

## 2024-08-15 RX ADMIN — FENTANYL CITRATE 25 MCG: 50 INJECTION, SOLUTION INTRAMUSCULAR; INTRAVENOUS at 19:14

## 2024-08-15 RX ADMIN — PIPERACILLIN AND TAZOBACTAM 3.38 G: 3; .375 INJECTION, POWDER, FOR SOLUTION INTRAVENOUS at 22:32

## 2024-08-15 RX ADMIN — ONDANSETRON 4 MG: 2 INJECTION INTRAMUSCULAR; INTRAVENOUS at 07:56

## 2024-08-15 RX ADMIN — FENTANYL CITRATE 50 MCG: 50 INJECTION INTRAMUSCULAR; INTRAVENOUS at 17:08

## 2024-08-15 RX ADMIN — PHENYLEPHRINE HYDROCHLORIDE 100 MCG: 10 INJECTION INTRAVENOUS at 16:28

## 2024-08-15 RX ADMIN — SODIUM CHLORIDE 85 ML: 9 INJECTION, SOLUTION INTRAVENOUS at 10:41

## 2024-08-15 RX ADMIN — PHENYLEPHRINE HYDROCHLORIDE 100 MCG: 10 INJECTION INTRAVENOUS at 17:05

## 2024-08-15 ASSESSMENT — ACTIVITIES OF DAILY LIVING (ADL)
ADLS_ACUITY_SCORE: 22
ADLS_ACUITY_SCORE: 21
ADLS_ACUITY_SCORE: 36
ADLS_ACUITY_SCORE: 21
ADLS_ACUITY_SCORE: 36
ADLS_ACUITY_SCORE: 21
ADLS_ACUITY_SCORE: 36
ADLS_ACUITY_SCORE: 21

## 2024-08-15 ASSESSMENT — COLUMBIA-SUICIDE SEVERITY RATING SCALE - C-SSRS
1. IN THE PAST MONTH, HAVE YOU WISHED YOU WERE DEAD OR WISHED YOU COULD GO TO SLEEP AND NOT WAKE UP?: NO
2. HAVE YOU ACTUALLY HAD ANY THOUGHTS OF KILLING YOURSELF IN THE PAST MONTH?: NO
6. HAVE YOU EVER DONE ANYTHING, STARTED TO DO ANYTHING, OR PREPARED TO DO ANYTHING TO END YOUR LIFE?: NO
1. IN THE PAST MONTH, HAVE YOU WISHED YOU WERE DEAD OR WISHED YOU COULD GO TO SLEEP AND NOT WAKE UP?: NO
6. HAVE YOU EVER DONE ANYTHING, STARTED TO DO ANYTHING, OR PREPARED TO DO ANYTHING TO END YOUR LIFE?: NO
2. HAVE YOU ACTUALLY HAD ANY THOUGHTS OF KILLING YOURSELF IN THE PAST MONTH?: NO

## 2024-08-15 NOTE — MEDICATION SCRIBE - ADMISSION MEDICATION HISTORY
Medication Scribe Admission Medication History    Admission medication history is complete. The information provided in this note is only as accurate as the sources available at the time of the update.    Information Source(s): Patient and CareEverywhere/SureScripts via in-person    Pertinent Information: n/a    Changes made to PTA medication list:  Added: vitamin C, lidocaine, triamcinolone, hydroxyzine, tylenol  Deleted: None  Changed: updated prednisone regimen    Allergies reviewed with patient and updates made in EHR: yes    Medication History Completed By: JIMENEZ LEE 8/15/2024 11:56 AM    PTA Med List   Medication Sig Last Dose    acetaminophen (TYLENOL) 500 MG tablet Take 1,000 mg by mouth 3 times daily as needed for fever or pain 8/14/2024 at am    ascorbic acid 1000 MG TABS tablet Take 1,000 mg by mouth daily 8/14/2024 at am    calcium carbonate (TUMS) 500 MG chewable tablet Take 2 chew tab by mouth 2 times daily Past Month at unkn    hydrOXYzine HCl (ATARAX) 25 MG tablet Take 25 mg by mouth 3 times daily as needed for itching 8/14/2024 at am    levothyroxine (SYNTHROID/LEVOTHROID) 88 MCG tablet Take 1 tablet (88 mcg) by mouth daily 8/15/2024 at 0400    lidocaine HCl 3 % cream Apply topically 3 times daily as needed (shingles pain) 8/14/2024 at hs    LORazepam (ATIVAN) 0.5 MG tablet Take 1-2 tablets (0.5-1 mg) by mouth every 6 hours as needed for anxiety Past Month at unkn    Pediatric Multivitamins-Iron (MULTIVITAMINS PLUS IRON CHILD) 18 MG CHEW Take 1 chew tab by mouth 2 times daily Ok to substitute with any chewable that contains 18 mg of iron, Vitamin A, Thiamine and Zinc. 8/14/2024 at am    predniSONE (DELTASONE) 20 MG tablet Take 3 tabs by mouth daily x 3 days, then 2 tabs daily x 3 days, then 1 tab daily x 3 days, then 1/2 tab daily x 3 days. (Patient taking differently: Take 20 mg by mouth daily 1 tab daily x 3 days, then 1/2 tab daily x 3 days.) 8/14/2024 at am    triamcinolone (KENALOG) 0.1 %  external cream Apply topically 2 times daily as needed (poison ivy) 8/14/2024 at am    valACYclovir (VALTREX) 1000 mg tablet Take 1 tablet (1,000 mg) by mouth 3 times daily for 7 days 8/14/2024 at 1200    vitamin D3 (CHOLECALCIFEROL) 50 mcg (2000 units) tablet Take 50 mcg by mouth daily 8/14/2024 at am

## 2024-08-15 NOTE — H&P
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       Abdominal pain  Chest pain  H/O Kris-en-Y gastric bypass 7/25/22  Etiology likely related to perforated viscus   Trop normal x1, EKG is low voltage but without any overt ischemic changes, CTA chest negative for dissection or PE.   CT A/P shows Intraperitoneal free air   -- IV zosyn, IVF pain control, NPO  -- general surgery consultation  -- patient is medically stable for surgical intervention for now, would not delay exploratory surgery at this time.       H/O Kris-en-Y gastric bypass 7/25/22  H/O MTHFR mutation  -- continue home supplements once able to take PO      Recent diagnosis shingles right lower back and around flank  -- hold any further steroids  -- continue lidocaine cream   -- patient should have finished course of valtrex by now      Hypothyroidism: continue home replacement once able to take PO      H/O anxiety: continue home lorazepam once able to take PO      H/O PE: no longer on anticoagulation, CTA chest on day of admission negative for PE  -- recommend chemical DVT PPX post-op         Diet: NPO per Anesthesia Guidelines for Procedure/Surgery Except for: Meds    DVT Prophylaxis: Pneumatic Compression Devices  Gee Catheter: Not present  Lines: None     Cardiac Monitoring: ACTIVE order. Indication: Procedural area  Code Status: Full Code      Clinically Significant Risk Factors Present on Admission                          # Obesity: Estimated body mass index is 36.02 kg/m  as calculated from the following:    Height as of this encounter: 1.702 m (5' 7\").    Weight as of this encounter: 104.3 kg (230 " arlyn).                    Disposition Plan      Expected Discharge Date: 2024             Medically Ready for Discharge: Anticipated in 2-4 Days      Jaciel Kohli DO  Hospitalist Service  [facility]  Securely message with [employer] (more info)  Text page via [employer] Paging/Directory     ______________________________________________________________________    Chief Complaint   Abdominal pain and chest pain    History is obtained from the patient    History of Present Illness   Lucina Santamaria is a 58 year old female who presents with sudden onset abdominal pain and chest pain awakening her from sleep this AM. Denies any other associated complaints besides recent shingles diagnosis.       Past Medical History    Past Medical History:   Diagnosis Date    Acquired hypothyroidism     Arthritis     sacro iliac and hip, right ankle.    Chronic constipation     since menopause. fiber/hydration helps.    Coagulation disorder (H24)     Complication of anesthesia     nauseated w/ morphine previously after arm surgery.    COVID-19     .    Gallbladder problem     s/p cholecystectomy.    Head injury     childhood concussions    Heterozygous for MTHFR gene mutation     History of MTHFR mutation     Hypertension     Long COVID     Long term Covid    Morbid obesity (H)     Pneumonia     covid    Prediabetes     Pulmonary emboli (H)     Pulmonary embolism (H)     wisdome teeth out while on OCPs and MTHFR gene mutation history.    Pulmonary embolism (H)     age 23, OCPs/dental extraction provoked. reports some MTHFR gene issues, unsure if homo or heterozygous.       Past Surgical History   Past Surgical History:   Procedure Laterality Date    APPENDECTOMY      BREAST BIOPSY, RT/LT      right breast - benign     SECTION      COLONOSCOPY N/A 11/10/2021    Procedure: COLONOSCOPY, WITH POLYPECTOMY;  Surgeon: Lynn Doshi MD;  Location: [facility]    CREATION, GASTRIC  BYPASS, KRIS-EN-Y, LAPAROSCOPIC N/A 07/25/2022    Procedure: LAPAROSCOPIC KRIS-EN-Y, GASTRIC BYPASS;  Surgeon: North Schneider MD;  [place] [facility] [place]    EYE SURGERY  epilasik left eye    GI SURGERY  Kris N Y 2022    GYN SURGERY  2005    tubal ligation    LAPAROSCOPIC CHOLECYSTECTOMY N/A 12/31/2019    Procedure: CHOLECYSTECTOMY, LAPAROSCOPIC;  Surgeon: Lynn Doshi MD;  Location:  OR    ORTHOPEDIC SURGERY  1991    radius, ulnar - open fracture    TUBAL LIGATION      wisdom teeth         Prior to Admission Medications   Prior to Admission Medications   Prescriptions Last Dose Informant Patient Reported? Taking?   LORazepam (ATIVAN) 0.5 MG tablet   No No   Sig: Take 1-2 tablets (0.5-1 mg) by mouth every 6 hours as needed for anxiety   Pediatric Multivitamins-Iron (MULTIVITAMINS PLUS IRON CHILD) 18 MG CHEW   No No   Sig: Take 1 chew tab by mouth 2 times daily Ok to substitute with any chewable that contains 18 mg of iron, Vitamin A, Thiamine and Zinc.   acetaminophen (TYLENOL) 500 MG tablet   Yes No   Sig: Take 1,000 mg by mouth 3 times daily as needed for fever or pain   ascorbic acid 1000 MG TABS tablet   Yes No   Sig: Take 1,000 mg by mouth daily   calcium carbonate (TUMS) 500 MG chewable tablet   Yes No   Sig: Take 2 chew tab by mouth 2 times daily   hydrOXYzine HCl (ATARAX) 25 MG tablet   Yes No   Sig: Take 25 mg by mouth 3 times daily as needed for itching   levothyroxine (SYNTHROID/LEVOTHROID) 88 MCG tablet   No No   Sig: Take 1 tablet (88 mcg) by mouth daily   lidocaine HCl 3 % cream   Yes No   Sig: Apply topically 3 times daily as needed (shingles pain)   predniSONE (DELTASONE) 20 MG tablet   No No   Sig: Take 3 tabs by mouth daily x 3 days, then 2 tabs daily x 3 days, then 1 tab daily x 3 days, then 1/2 tab daily x 3 days.   Patient taking differently: Take 20 mg by mouth daily 1 tab daily x 3 days, then 1/2 tab daily x 3 days.   triamcinolone (KENALOG) 0.1 % external cream   Yes No   Sig:  Apply topically 2 times daily as needed (poison ivy)   valACYclovir (VALTREX) 1000 mg tablet   No No   Sig: Take 1 tablet (1,000 mg) by mouth 3 times daily for 7 days   vitamin D3 (CHOLECALCIFEROL) 50 mcg (2000 units) tablet   Yes No   Sig: Take 50 mcg by mouth daily      Facility-Administered Medications: None           Physical Exam   Vital Signs: Temp: 98.6  F (37  C)   BP: (!) 141/60 Pulse: 105   Resp: 16 SpO2: 93 %      Weight: 230 lbs 0 oz    General Appearance: In mild acute distress  RESPIRATORY: respirations nonlabired  CARDIOVASCULAR: 1+ le edema bilat.  SKIN/HAIR/NAILS: shingles noted right lower back  NEUROLOGIC: No focal arm or leg  weakness, speech is clear         Medical Decision Making       >75 MINUTES SPENT BY ME on the date of service doing chart review, history, exam, documentation & further activities per the note.      Data

## 2024-08-15 NOTE — ANESTHESIA CARE TRANSFER NOTE
Patient: Lucina Santamaria    Procedure: Procedure(s):  DIAGNOSTIC LAPAROSCOPY, LYSIS OF ADHESIONS, REPAIR OF UMBILLICAL HERNIA       Diagnosis: Free intraperitoneal air [K66.8]  Diagnosis Additional Information: No value filed.    Anesthesia Type:   General     Note:    Oropharynx: oropharynx clear of all foreign objects  Level of Consciousness: awake  Oxygen Supplementation: face mask  Level of Supplemental Oxygen (L/min / FiO2): 6L  Independent Airway: airway patency satisfactory and stable  Dentition: dentition unchanged  Vital Signs Stable: post-procedure vital signs reviewed and stable  Report to RN Given: handoff report given  Patient transferred to: PACU    Handoff Report: Identifed the Patient, Identified the Reponsible Provider, Reviewed the pertinent medical history, Discussed the surgical course, Reviewed Intra-OP anesthesia mangement and issues during anesthesia, Set expectations for post-procedure period and Allowed opportunity for questions and acknowledgement of understanding      Vitals:  Vitals Value Taken Time   /66 08/15/24 1746   Temp 36.2  C (97.16  F) 08/15/24 1747   Pulse 84 08/15/24 1747   Resp 16    SpO2 100 % 08/15/24 1747   Vitals shown include unfiled device data.    Electronically Signed By: YARON Hernandez CRNA  August 15, 2024  5:48 PM

## 2024-08-15 NOTE — ANESTHESIA PROCEDURE NOTES
Airway       Patient location during procedure: OR       Procedure Start/Stop Times: 8/15/2024 3:52 PM  Staff -        CRNA: Samreen Crump APRN CRNA       Performed By: CRNA  Consent for Airway        Urgency: elective  Indications and Patient Condition       Indications for airway management: marj-procedural       Induction type:intravenous       Mask difficulty assessment: 1 - vent by mask    Final Airway Details       Final airway type: endotracheal airway       Successful airway: ETT - single  Endotracheal Airway Details        ETT size (mm): 7.5       Cuffed: yes       Cuff volume (mL): 5       Successful intubation technique: direct laryngoscopy       DL Blade Type: MAC 3       Grade View of Cords: 1       Adjucts: stylet       Position: Right       Measured from: gums/teeth       Secured at (cm): 22       Bite block used: Soft    Post intubation assessment        Placement verified by: capnometry, equal breath sounds and chest rise        Number of attempts at approach: 1       Number of other approaches attempted: 0       Secured with: tape       Ease of procedure: easy       Dentition: Intact    Medication(s) Administered   Medication Administration Time: 8/15/2024 3:52 PM

## 2024-08-15 NOTE — ANESTHESIA PREPROCEDURE EVALUATION
Anesthesia Pre-Procedure Evaluation    Patient: Lucina Santamaria   MRN: [id number] : 1965        Procedure : Procedure(s):  DIAGNOSTIC LAPAROSCOPY  POSSIBLE OPEN          Past Medical History:   Diagnosis Date    Acquired hypothyroidism     Arthritis     sacro iliac and hip, right ankle.    Chronic constipation     since menopause. fiber/hydration helps.    Coagulation disorder (H24)     Complication of anesthesia     nauseated w/ morphine previously after arm surgery.    COVID-19     .    Gallbladder problem     s/p cholecystectomy.    Head injury     childhood concussions    Heterozygous for MTHFR gene mutation     History of MTHFR mutation     Hypertension     Long COVID     Long term Covid    Morbid obesity (H)     Pneumonia     covid    Prediabetes     Pulmonary emboli (H)     Pulmonary embolism (H)     wisdome teeth out while on OCPs and MTHFR gene mutation history.    Pulmonary embolism (H)     age 23, OCPs/dental extraction provoked. reports some MTHFR gene issues, unsure if homo or heterozygous.      Past Surgical History:   Procedure Laterality Date    APPENDECTOMY      BREAST BIOPSY, RT/LT      right breast - benign     SECTION      COLONOSCOPY N/A 11/10/2021    Procedure: COLONOSCOPY, WITH POLYPECTOMY;  Surgeon: Lynn Doshi MD;  Location:  GI    CREATION, GASTRIC BYPASS, HAFSA-EN-Y, LAPAROSCOPIC N/A 2022    Procedure: LAPAROSCOPIC HAFSA-EN-Y, GASTRIC BYPASS;  Surgeon: North Schneider MD;  [place] [facility] [place]    EYE SURGERY  epilas left eye    GI SURGERY  Hafsa N Y     GYN SURGERY      tubal ligation    LAPAROSCOPIC CHOLECYSTECTOMY N/A 2019    Procedure: CHOLECYSTECTOMY, LAPAROSCOPIC;  Surgeon: Lynn Doshi MD;  Location:  OR    ORTHOPEDIC SURGERY      radius, ulnar - open fracture    TUBAL LIGATION      wisdom teeth        Allergies   Allergen Reactions    Nsaids Other (See Comments)     Caution should be used  in the gastric bypass patient.    Latex Dermatitis     Contact dermatitis    Morphine Itching      Social History     Tobacco Use    Smoking status: Never    Smokeless tobacco: Never   Substance Use Topics    Alcohol use: Not Currently     Comment: rare, once monthly.      Wt Readings from Last 1 Encounters:   08/15/24 104.3 kg (230 lb)        Anesthesia Evaluation   Pt has had prior anesthetic.         ROS/MED HX  ENT/Pulmonary:     (+)                              recent URI,          Neurologic:  - neg neurologic ROS     Cardiovascular:     (+)  hypertension- -   -  - -                                      METS/Exercise Tolerance:     Hematologic:     (+) History of blood clots,               Musculoskeletal:       GI/Hepatic:       Renal/Genitourinary:       Endo:     (+)          thyroid problem,     Obesity,       Psychiatric/Substance Use:       Infectious Disease:     (+) Recent Fever,           Malignancy:       Other:  - neg other ROS          Physical Exam    Airway  airway exam normal      Mallampati: II   TM distance: > 3 FB   Neck ROM: full   Mouth opening: > 3 cm    Respiratory Devices and Support         Dental       (+) Minor Abnormalities - some fillings, tiny chips      Cardiovascular   cardiovascular exam normal       Rhythm and rate: regular and normal     Pulmonary   pulmonary exam normal        breath sounds clear to auscultation           OUTSIDE LABS:  CBC:   Lab Results   Component Value Date    WBC 9.9 08/15/2024    WBC 6.4 04/30/2024    HGB 15.4 08/15/2024    HGB 13.5 04/30/2024    HCT 45.4 08/15/2024    HCT 40.8 04/30/2024     08/15/2024     04/30/2024     BMP:   Lab Results   Component Value Date     08/15/2024     04/30/2024    POTASSIUM 3.8 08/15/2024    POTASSIUM 4.2 04/30/2024    CHLORIDE 103 08/15/2024    CHLORIDE 109 (H) 04/30/2024    CO2 23 08/15/2024    CO2 26 04/30/2024    BUN 20.1 (H) 08/15/2024    BUN 15.0 04/30/2024    CR 0.80 08/15/2024    CR 0.74  "04/30/2024     (H) 08/15/2024     (H) 04/30/2024     COAGS:   Lab Results   Component Value Date    PTT 26 07/14/2022    INR 0.94 07/14/2022     POC:   Lab Results   Component Value Date    HCG Negative 09/24/2018     HEPATIC:   Lab Results   Component Value Date    ALBUMIN 4.1 08/15/2024    PROTTOTAL 6.9 08/15/2024    ALT 25 08/15/2024    AST 13 08/15/2024    ALKPHOS 73 08/15/2024    BILITOTAL 0.3 08/15/2024     OTHER:   Lab Results   Component Value Date    A1C 5.1 04/30/2024    LAURA 9.3 08/15/2024    MAG 2.2 04/30/2024    LIPASE 11 (L) 08/15/2024    TSH 1.31 04/30/2024    T4 1.18 09/23/2021    CRP 21.3 (H) 01/19/2022    SED 14 01/19/2022       Anesthesia Plan    ASA Status:  2, emergent       Anesthesia Type: General.     - Airway: ETT   Induction: Propofol, Intravenous, RSI.   Maintenance: TIVA.   Techniques and Equipment:       - Drips/Meds: Dexmed. bolus, Ketamine     Consents    Anesthesia Plan(s) and associated risks, benefits, and realistic alternatives discussed. Questions answered and patient/representative(s) expressed understanding.     - Discussed:     - Discussed with:  Patient      - Extended Intubation/Ventilatory Support Discussed: No.           Postoperative Care    Pain management: IV analgesics, Multi-modal analgesia, Peripheral nerve block (Single Shot).   PONV prophylaxis: Ondansetron (or other 5HT-3), Dexamethasone or Solumedrol     Comments:               Brandon Coreas MD    I have reviewed the pertinent notes and labs in the chart from the past 30 days and (re)examined the patient.  Any updates or changes from those notes are reflected in this note.              # Obesity: Estimated body mass index is 36.02 kg/m  as calculated from the following:    Height as of this encounter: 1.702 m (5' 7\").    Weight as of this encounter: 104.3 kg (230 lb).      "

## 2024-08-15 NOTE — ED TRIAGE NOTES
Triage Assessment (Adult)       Row Name 08/15/24 0540          Triage Assessment    Airway WDL WDL        Respiratory WDL    Respiratory WDL WDL                   Abdominal pain that started about 0200.  Feels like a vice  on her stomach

## 2024-08-15 NOTE — ED TRIAGE NOTES
Pt reports awakened with sever abd pain LUQ radiates into chest. Was seen in [facility] ED and sent here for surgery.    Hx Rue-n-y a year ago

## 2024-08-15 NOTE — ED PROVIDER NOTES
SIGN OUT NOTE    Patient signed out to me at change of shift by Dr. Gregory.    This is a 58-year-old female, history of Kris-en-Y gastric bypass, presenting with acute onset of left upper quadrant abdominal pain.  She woke with the pain at 4 AM this morning.  History of PE, cholecystectomy, appendicitis.    Difficulty with IV placement, patient was given IM Dilaudid.  IV was unable to be placed, labs sent.  Normal CBC, troponin, LFTs and lipase.  Mildly elevated BUN/creatinine.  D-dimer city elevated at 0.63.    Plan is for CT scan of the abdomen, CT PE study.  She does have some pleuritic chest pain.    IV infiltrated in radiology.  Arm wrapped and elevated.  She has a second IV, very small in place.    I was called by radiology regarding abnormal finding of free air on CT scan.    Results for orders placed or performed during the hospital encounter of 08/15/24   Abd/pelvis CT - no contrast - Stone Protocol     Status: Abnormal (Preliminary result)   Result Value Ref Range    Radiologist flags Unexplained pneumoperitoneum (AA)     Narrative    CT ABDOMEN PELVIS WITHOUT CONTRAST 8/15/2024 8:44 AM    CLINICAL HISTORY: Abdominal pain.  Left upper quadrant abdominal pain.    TECHNIQUE: CT scan of the abdomen and pelvis was performed without IV  contrast. Multiplanar reformats were obtained. Dose reduction  techniques were used.  CONTRAST: None.    COMPARISON: None.    FINDINGS:   LOWER CHEST: No infiltrates or effusions. See CT chest report same  date.    HEPATOBILIARY: No significant mass or bile duct dilatation. No  calcified gallstones.     PANCREAS: No significant mass, duct dilatation, or inflammatory  change.    SPLEEN: Normal size.    ADRENAL GLANDS: No significant nodules.    KIDNEYS/BLADDER: No significant mass, stones, or hydronephrosis.    BOWEL: Large amount of intraperitoneal free air. There is some lesser  sac air. There is some thickening of the bowel around the distal  anastomosis, as well as the Kris limb  from the gastric bypass.    VASCULATURE: There are mild calcified atherosclerotic changes of the  visualized aorta and its branches. There is no evidence of aortic  aneurysm.    PELVIC ORGANS: No pelvic masses.    OTHER: Small amount of ascites. Fat-containing periumbilical hernia.    MUSCULOSKELETAL: No frankly destructive bony lesions.      Impression    IMPRESSION:   1.  Intraperitoneal free air, source is unclear. There is a small  amount of air in the lesser sac, question whether the upper GI tract  and/or gastric bypass may be the source of the perforation. This is  not certain.    [Critical Result: Unexplained pneumoperitoneum]    Finding was identified on 8/15/2024 8:45 AM.     DEEP Brink was contacted by me at 8/15/2024 8:53 AM  and verbalized understanding of the critical finding.    Comprehensive metabolic panel     Status: Abnormal   Result Value Ref Range    Sodium 142 135 - 145 mmol/L    Potassium 3.8 3.4 - 5.3 mmol/L    Carbon Dioxide (CO2) 23 22 - 29 mmol/L    Anion Gap 16 (H) 7 - 15 mmol/L    Urea Nitrogen 20.1 (H) 6.0 - 20.0 mg/dL    Creatinine 0.80 0.51 - 0.95 mg/dL    GFR Estimate 86 >60 mL/min/1.73m2    Calcium 9.3 8.8 - 10.4 mg/dL    Chloride 103 98 - 107 mmol/L    Glucose 161 (H) 70 - 99 mg/dL    Alkaline Phosphatase 73 40 - 150 U/L    AST 13 0 - 45 U/L    ALT 25 0 - 50 U/L    Protein Total 6.9 6.4 - 8.3 g/dL    Albumin 4.1 3.5 - 5.2 g/dL    Bilirubin Total 0.3 <=1.2 mg/dL   Lipase     Status: Abnormal   Result Value Ref Range    Lipase 11 (L) 13 - 60 U/L   D dimer quantitative     Status: Abnormal   Result Value Ref Range    D-Dimer Quantitative 0.63 (H) 0.00 - 0.50 ug/mL FEU    Narrative    This D-dimer assay is intended for use in conjunction with a clinical pretest probability assessment model to exclude pulmonary embolism (PE) and deep venous thrombosis (DVT) in outpatients suspected of PE or DVT. The cut-off value is 0.50 ug/mL FEU.    For patients 50 years of age or  older, the application of age-adjusted cut-off values for D-Dimer may increase the specificity without significant effect on sensitivity. The literature suggested calculation age adjusted cut-off in ug/L = age in years x 10 ug/L. The results in this laboratory are reported as ug/mL rather than ug/L. The calculation for age adjusted cut off in ug/mL= age in years x 0.01 ug/mL. For example, the cut off for a 76 year old male is 76 x 0.01 ug/mL = 0.76 ug/mL (760 ug/L).    M David et al. Age adjusted D-dimer cut-off levels to rule out pulmonary embolism: The ADJUST-PE Study. WILL 2014;311:3558-4208.; HJ Safia et al. Diagnostic accuracy of conventional or age adjusted D-dimer cutoff values in older patients with suspected venous thromboembolism. Systemic review and meta-analysis. BMJ 2013:346:f2492.   Troponin T, High Sensitivity     Status: Normal   Result Value Ref Range    Troponin T, High Sensitivity <6 <=14 ng/L   CBC with platelets and differential     Status: Abnormal   Result Value Ref Range    WBC Count 9.9 4.0 - 11.0 10e3/uL    RBC Count 4.55 3.80 - 5.20 10e6/uL    Hemoglobin 15.4 11.7 - 15.7 g/dL    Hematocrit 45.4 35.0 - 47.0 %     78 - 100 fL    MCH 33.8 (H) 26.5 - 33.0 pg    MCHC 33.9 31.5 - 36.5 g/dL    RDW 12.7 10.0 - 15.0 %    Platelet Count 237 150 - 450 10e3/uL    % Neutrophils 66 %    % Lymphocytes 27 %    % Monocytes 5 %    % Eosinophils 2 %    % Basophils 0 %    % Immature Granulocytes 0 %    NRBCs per 100 WBC 0 <1 /100    Absolute Neutrophils 6.6 1.6 - 8.3 10e3/uL    Absolute Lymphocytes 2.6 0.8 - 5.3 10e3/uL    Absolute Monocytes 0.5 0.0 - 1.3 10e3/uL    Absolute Eosinophils 0.2 0.0 - 0.7 10e3/uL    Absolute Basophils 0.0 0.0 - 0.2 10e3/uL    Absolute Immature Granulocytes 0.0 <=0.4 10e3/uL    Absolute NRBCs 0.0 10e3/uL   Extra Tube     Status: None    Narrative    The following orders were created for panel order Extra Tube.  Procedure                               Abnormality          Status                     ---------                               -----------         ------                     Extra Red Top Tube[[id number]]                               Final result                 Please view results for these tests on the individual orders.   Extra Red Top Tube     Status: None   Result Value Ref Range    Hold Specimen [facility]    CBC with platelets differential     Status: Abnormal    Narrative    The following orders were created for panel order CBC with platelets differential.  Procedure                               Abnormality         Status                     ---------                               -----------         ------                     CBC with platelets and d...[[id number]]  Abnormal            Final result               RBC and Platelet Morphology[[id number]]                                                   Please view results for these tests on the individual orders.        We have put in a call to speak with surgery.  I ordered Unasyn for concern of bowel perforation.  Will also try to have anesthesia place second IV.    Because patient is status post gastric bypass it was thought it would be best if she could be seen and evaluated in a facility with bariatric surgery specialists.  This was discussed with Dr. Lovelace.    Unfortunately, there are no beds available within the [place] system.  I did call and speak with Dr. Servin at [facility].  He feels patient needs to go to the operating room and has accepted her to [facility].  Would like to try to transfer ED to ED.  I spoke with the ED provider about the plan for transfer.    All of the results and plans have been discussed at length with the patient.  She has had significant improvement in her pain and understands the plan.  She would like to transfer via private car.  We discussed risks and benefits at length.  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  She has a cell phone available.  She knows to call 911 immediately if needed.  She was very comfortable with this plan.  She has been appropriately n.p.o. and is cleared for urgent/emergent surgery.    Clinical impression:  (R10.12) Abdominal pain, left upper quadrant    (R07.81) Pleuritic chest pain    (Z86.711) History of pulmonary embolism    (K66.8) Free intraperitoneal air              Beverly Schulz MD  08/16/24 0254

## 2024-08-15 NOTE — ED PROVIDER NOTES
[facility] ED Provider Note   Patient: Lucina Santamaria  MRN #:  [id number]  Date of Visit: August 15, 2024    CC:     Chief Complaint   Patient presents with    Abdominal Pain     HPI:  Lucina Santamaria is a 58 year old female with history of bariatric surgery who presented to the emergency department with acute onset of left upper quadrant abdominal pain with radiation into the chest as well as lower abdomen.  Patient was awakened by the pain at 4 AM this morning.  Pain is sharp and stabbing with severe intensity.  She has had previous history of pulmonary embolism and prior surgeries for gallbladder disease and appendicitis.  She has a coagulation disorder, and history of MTHFR mutation.  Patient was feeling fine when she went to bed, and was awakened by severe pain.  She has not had any fever, chills, vomiting, diarrhea or urinary symptoms.  She has been passing gas.  No prior history of bowel obstruction or kidney stones.  Current pain level is severe.  Patient reports that she was recently diagnosed with shingles/poison ivy 2 weeks ago.  She completed a course of Valtrex.    Problem List:  Patient Active Problem List    Diagnosis Date Noted    ERRONEOUS ENCOUNTER--DISREGARD 06/04/2024     Priority: Medium    Umbilical hernia without obstruction and without gangrene 08/13/2023     Priority: Medium    Onychomycosis 08/13/2023     Priority: Medium    Pneumonia 05/05/2022     Priority: Medium     covid      History of MTHFR mutation 04/27/2022     Priority: Medium    Hypothyroidism 09/27/2021     Priority: Medium    Elevated blood pressure reading without diagnosis of hypertension 09/23/2021     Priority: Medium    Edema, unspecified type 09/23/2021     Priority: Medium    CARDIOVASCULAR SCREENING; LDL GOAL LESS THAN 130 05/31/2012     Priority: Medium       Past Medical History:   Diagnosis Date    Acquired hypothyroidism     Arthritis      Chronic constipation     Coagulation disorder (H24)     Complication of anesthesia     COVID-19     Gallbladder problem     Head injury     Heterozygous for MTHFR gene mutation     History of MTHFR mutation     Hypertension     Long COVID     Morbid obesity (H)     Pneumonia     Prediabetes     Pulmonary emboli (H)     Pulmonary embolism (H)     Pulmonary embolism (H)        MEDS: calcium carbonate (TUMS) 500 MG chewable tablet  levothyroxine (SYNTHROID/LEVOTHROID) 88 MCG tablet  LORazepam (ATIVAN) 0.5 MG tablet  Pediatric Multivitamins-Iron (MULTIVITAMINS PLUS IRON CHILD) 18 MG CHEW  predniSONE (DELTASONE) 20 MG tablet  valACYclovir (VALTREX) 1000 mg tablet  vitamin D3 (CHOLECALCIFEROL) 50 mcg (2000 units) tablet        ALLERGIES:    Allergies   Allergen Reactions    Latex      Added based on information entered during case entry, please review and add reactions, type, and severity as needed    Morphine Itching    Nsaids      Caution should be used in the gastric bypass patient.       Past Surgical History:   Procedure Laterality Date    APPENDECTOMY      BREAST BIOPSY, RT/LT      right breast - benign     SECTION      COLONOSCOPY N/A 11/10/2021    Procedure: COLONOSCOPY, WITH POLYPECTOMY;  Surgeon: Lynn Doshi MD;  Location:  GI    CREATION, GASTRIC BYPASS, KRIS-EN-Y, LAPAROSCOPIC N/A 2022    Procedure: LAPAROSCOPIC KRIS-EN-Y, GASTRIC BYPASS;  Surgeon: North Schneider MD;  Location: [facility] OR    EYE SURGERY  epilasik left eye    GI SURGERY  Kris N Y     GYN SURGERY      tubal ligation    LAPAROSCOPIC CHOLECYSTECTOMY N/A 2019    Procedure: CHOLECYSTECTOMY, LAPAROSCOPIC;  Surgeon: Lynn Doshi MD;  Location:  OR    ORTHOPEDIC SURGERY      radius, ulnar - open fracture    TUBAL LIGATION      wisdom teeth         Social History     Tobacco Use    Smoking status: Never    Smokeless tobacco: Never   Vaping Use    Vaping status: Never Used    Substance Use Topics    Alcohol use: Not Currently     Comment: rare, once monthly.    Drug use: No         Review of Systems   Except as noted in HPI, all other systems were reviewed and are negative    Physical Exam   Vitals were reviewed  Patient Vitals for the past 12 hrs:   BP Pulse Resp SpO2   08/15/24 0539 (!) 152/72 84 18 94 %     GENERAL APPEARANCE: Patient is standing in moderate to severe pain, hunched over the bed.  FACE: normal facies  EYES: Pupils are equal  HENT: normal external exam  NECK: no adenopathy or asymmetry  RESP: normal respiratory effort; clear breath sounds bilaterally; increased pain with deep breathing  CV: regular rate and rhythm; no significant murmurs, gallops or rubs  ABD: soft, left upper quadrant and lower abdominal tenderness; no rebound or guarding; bowel sounds are normal  MS: no gross deformities noted; normal muscle tone.  EXT: No calf tenderness or pitting edema  SKIN: no worrisome rash  NEURO: no facial droop; no focal deficits, speech is normal        Available Lab/Imaging Results     Results for orders placed or performed during the hospital encounter of 08/15/24 (from the past 24 hour(s))   CBC with platelets differential    Narrative    The following orders were created for panel order CBC with platelets differential.  Procedure                               Abnormality         Status                     ---------                               -----------         ------                     CBC with platelets and d...[[id number]]  Abnormal            Final result               RBC and Platelet Morphology[[id number]]                                                   Please view results for these tests on the individual orders.   Comprehensive metabolic panel   Result Value Ref Range    Sodium 142 135 - 145 mmol/L    Potassium 3.8 3.4 - 5.3 mmol/L    Carbon Dioxide (CO2) 23 22 - 29 mmol/L    Anion Gap 16 (H) 7 - 15 mmol/L    Urea Nitrogen 20.1 (H) 6.0 - 20.0 mg/dL     Creatinine 0.80 0.51 - 0.95 mg/dL    GFR Estimate 86 >60 mL/min/1.73m2    Calcium 9.3 8.8 - 10.4 mg/dL    Chloride 103 98 - 107 mmol/L    Glucose 161 (H) 70 - 99 mg/dL    Alkaline Phosphatase 73 40 - 150 U/L    AST 13 0 - 45 U/L    ALT 25 0 - 50 U/L    Protein Total 6.9 6.4 - 8.3 g/dL    Albumin 4.1 3.5 - 5.2 g/dL    Bilirubin Total 0.3 <=1.2 mg/dL   Lipase   Result Value Ref Range    Lipase 11 (L) 13 - 60 U/L   D dimer quantitative   Result Value Ref Range    D-Dimer Quantitative 0.63 (H) 0.00 - 0.50 ug/mL FEU    Narrative    This D-dimer assay is intended for use in conjunction with a clinical pretest probability assessment model to exclude pulmonary embolism (PE) and deep venous thrombosis (DVT) in outpatients suspected of PE or DVT. The cut-off value is 0.50 ug/mL FEU.    For patients 50 years of age or older, the application of age-adjusted cut-off values for D-Dimer may increase the specificity without significant effect on sensitivity. The literature suggested calculation age adjusted cut-off in ug/L = age in years x 10 ug/L. The results in this laboratory are reported as ug/mL rather than ug/L. The calculation for age adjusted cut off in ug/mL= age in years x 0.01 ug/mL. For example, the cut off for a 76 year old male is 76 x 0.01 ug/mL = 0.76 ug/mL (760 ug/L).    M David et al. Age adjusted D-dimer cut-off levels to rule out pulmonary embolism: The ADJUST-PE Study. WILL 2014;311:2469-9580.; HJ Safia et al. Diagnostic accuracy of conventional or age adjusted D-dimer cutoff values in older patients with suspected venous thromboembolism. Systemic review and meta-analysis. BMJ 2013:346:f2492.   Troponin T, High Sensitivity   Result Value Ref Range    Troponin T, High Sensitivity <6 <=14 ng/L   CBC with platelets and differential   Result Value Ref Range    WBC Count 9.9 4.0 - 11.0 10e3/uL    RBC Count 4.55 3.80 - 5.20 10e6/uL    Hemoglobin 15.4 11.7 - 15.7 g/dL    Hematocrit 45.4 35.0 - 47.0 %      78 - 100 fL    MCH 33.8 (H) 26.5 - 33.0 pg    MCHC 33.9 31.5 - 36.5 g/dL    RDW 12.7 10.0 - 15.0 %    Platelet Count 237 150 - 450 10e3/uL    % Neutrophils 66 %    % Lymphocytes 27 %    % Monocytes 5 %    % Eosinophils 2 %    % Basophils 0 %    % Immature Granulocytes 0 %    NRBCs per 100 WBC 0 <1 /100    Absolute Neutrophils 6.6 1.6 - 8.3 10e3/uL    Absolute Lymphocytes 2.6 0.8 - 5.3 10e3/uL    Absolute Monocytes 0.5 0.0 - 1.3 10e3/uL    Absolute Eosinophils 0.2 0.0 - 0.7 10e3/uL    Absolute Basophils 0.0 0.0 - 0.2 10e3/uL    Absolute Immature Granulocytes 0.0 <=0.4 10e3/uL    Absolute NRBCs 0.0 10e3/uL   Extra Tube    Narrative    The following orders were created for panel order Extra Tube.  Procedure                               Abnormality         Status                     ---------                               -----------         ------                     Extra Red Top Tube[[id number]]                               In process                   Please view results for these tests on the individual orders.                Impression     Final diagnoses:   Abdominal pain, left upper quadrant   Pleuritic chest pain   History of pulmonary embolism         ED Course & Medical Decision Making   Lucina Santamaria is a 58 year old female who presented to the emergency department with sudden onset of left upper quadrant abdominal pain that started at 4 AM this morning with radiation of pain into the left chest with pleuritic quality but also pain radiating into the lower abdomen.  Patient states that she has had similar pleuritic chest pains when she had pulmonary embolism in the past.  She has a coagulation disorder that has resulted in a couple of episodes of PE.  She does not feel particularly short of breath and thinks that this is primarily due to pain at this time.    Vital signs reveal a blood pressure 152/72, heart rate of 84, respiration 18, 94% oxygen saturation.  On exam, patient is in moderate to severe  distress, hunched over the bed.  Lungs are clear, heart sounds are normal, abdomen reveals some tenderness in the left upper quadrant.  No rebound or guarding.  There is some lower abdominal tenderness as well.    Laboratory workup reveals a white blood count of 9.9, hemoglobin of 15.4, platelet count of 237.  Comprehensive metabolic panel reveals a normal sodium, potassium, BUN of 20.1, creatinine 0.80, glucose of 161, normal liver enzymes.  Lipase is 11.  D-dimer 0.63.  Troponin is less than 6.    Initial difficulty establishing peripheral IV.  Dilaudid IM 1 g was administered.  Eventually IV established, and blood work drawn as above.  With elevated D-dimer, and prior history of PE with similar pleuritic chest pain, will proceed with CT scans of the chest with contrast, but first obtain CT scan of the abdomen without contrast to rule out stone.    The following medications were administered in the ED.    Medications   sodium chloride 0.9% BOLUS 1,000 mL (1,000 mLs Intravenous $New Bag 8/15/24 0724)   HYDROmorphone (PF) (DILAUDID) injection 0.5 mg (0.5 mg Intravenous $Given 8/15/24 0717)   HYDROmorphone (DILAUDID) injection 1 mg (1 mg Intramuscular $Given 8/15/24 0623)   ondansetron (ZOFRAN) injection 4 mg (4 mg Intravenous $Given 8/15/24 0724)   iopamidol (ISOVUE-370) solution 500 mL (has no administration in time range)   sodium chloride 0.9 % bag 100mL for CT scan flush use (has no administration in time range)       7:15 AM: Patient was signed out to Dr. Schulz at the change of shift.         Disclaimer: This note consists of words and symbols derived from keyboarding and dictation using voice recognition software.  As a result, there may be errors that have gone undetected.  Please consider this when interpreting information found in this note.       Rico Gregory MD  08/15/24 0760       Rico Gregory MD  08/15/24 0713

## 2024-08-15 NOTE — OP NOTE
[facility]    Operative Note    Pre-operative diagnosis: Free intraperitoneal air [K66.8]  Post-operative diagnosis Same as pre-operative diagnosis    Procedure: DIAGNOSTIC LAPAROSCOPY, LYSIS OF ADHESIONS, REPAIR OF UMBILLICAL HERNIA, N/A - Abdomen    Surgeon: Surgeons and Role:     * Teofilo Servin, DO - Primary     * Catrina Lee PA-C - Assisting  Anesthesia: General   Estimated Blood Loss: 5 mL     Drains:   19 Fr catherine drain in the pelvis and 19 Fr catherine drain at the site of perforation.     Specimens: * No specimens in log *  Findings:     - 0.5 mm perforation at the GJ anastomosis.  Repair with modified Kevin patch.  - gross contamination of the quadrants of the abdomen  - Repair of the 2 cm umbilical hernia primarily.    Complications: None.  Implants: * No implants in log *      Indication: 58-year-old female presenting with abdominal pain and an outside hospital.  Patient was worked up and found to have intra-abdominal free air.  Patient was then transferred to [facility] for surgical evaluation and surgical invention.  On evaluation, patient found to have peritonitis of the abdomen.  I then offered patient procedure of diagnostic laparoscopy. The risks and benefits of the procedure were explained detail to the patient. The risks include infection, bleeding, damage to the surrounding structures. Patient verbalized understanding provided consent to undergo the procedure above.    Procedure: Patient was brought to the operating placed on operative table in supine position.  Patient then underwent sedation and intubation by the anesthesia team.  Patient's abdomen was prepped and draped in usual sterile fashion.  Prior to initiating the procedure, a timeout was completed.  All present were in agreement.      1% lidocaine with epinephrine was used to inject over Soria's point.  A stab incision was then made at the site.  Veress needle was inserted to the abdomen.  The  insufflation was then initiated.  Once sufficient insufflation was initiated, I then placed a supraumbilical 5 mm trocar under direct visualization for support guidance.  Once inside the abdomen, a general survey was completed.  We could identify that there was significant gross contamination of gastric contents in the upper quadrants of the abdomen especially in the left upper quadrant.  A 10 mm trocar was then placed approximately 1 handbreadth to the right of the midline.  And then another 5 mm trocar was placed approxi-1 handbreadth to the right lateral quadrants.  Both trocars placed under direct visualization.  On further manipulation of the abdominal contents in the left upper quadrant.  I could identify the GJ.  At the GJ site, there was a 5 mm perforation.  A 3 oh V-Loc absorbable suture was then used to repair this perforation in a running fashion.  I then used the 3 oh V-Loc and imbricated the repair.  With the remaining 3 oh V-Loc suture, there was a piece of omentum nearby which I pulled over the gastric site and anchored it to the repair site.    I then continued to irrigate and suction out the upper quadrants of the abdomen.  I attempted to look down into the pelvis however the patient had an umbilical hernia with incarcerated omentum.  This blocks my vision from irrigating the lower quadrants of the abdomen and the pelvis.  As result, the [place] LigaSure was then used to take down these adhesions on the anterior abdominal wall and the incarcerated omentum in the umbilical hernia. I then used an 0 Vicryl stitch to repair the 2 cm umbilical hernia with the suture passer.    The pelvis was then irrigated with copious months of sterile saline and all of the irrigant was then suctioned out.  I then placed the Somali Horace drain through the most lateral 5 mm port site.  This drain was then terminated with the tip in the pelvis.  I then sized the incision in Soria's point and placed a 19 Somali Horace  drain through this site.  The drain terminated at the repair site.    The procedure, a final count was completed.  I was told that all sharps, sponges, instruments were accounted for.  The patient tolerated the procedure well and was extubated brought back to the PACU in stable condition.    Catrina TOWNSEND was present to assist myself in the surgical case.  Her assistance was required for exposure and visualization, leading to decreased operating time, and decreased blood loss.

## 2024-08-15 NOTE — CONSULTS
General Surgery Consultation  Lucina Santamaria MRN# [id number]   Age/Sex: 58 year old female YOB: 1965     Reason for consult: 1. Perforation of intestine (H)            Requesting physician: Emergency Department                   Assessment and Plan:   Assessment:  Perforated viscus  Ms. 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 Afebrile and intermittently hypertensive and tachycardic ('s). Labs without leukocytosis, stable hemoglobin and otherwise unremarkable. CT chest/abdomen/pelvis obtained and notable for intraperitoneal free air of unclear source, small amount of free air in the lesser sac. Patient with peritoneal signs on exam. Discussed recommendation for surgical intervention including diagnostic laparoscopy and possible exploratory laparotomy. Patient amendable to proceed with surgery.    Plan:  - NPO  - IV antibiotics  - IV fluids  - OR for diagnostic laparotomy, possible laparotomy          Chief Complaint:     Chief Complaint   Patient presents with    Abdominal Pain        History is obtained from the patient, electronic health record, and patient's family    HPI:   Lucina Santamaria is a 58 year old female with PMH of PE, HTN, prediabetes, h/o RYGB (7/25/22) who presented to outside hospital emergency department with acute onset of epigastric and left upper quadrant abdominal pain around 4 AM day of admission.  Patient states that she was awoken out of sleep due to abdominal pain.  Nothing seems to alleviate her pain.  She has never had pain like this before.  Her pain was exacerbated with trying to lie flat.  Given severity and persistence, she presented to the emergency department for further evaluation.  Currently, she continues to endorse epigastric and left upper quadrant abdominal pain which radiates up into her  left chest as well as down into her pelvis.  Associated symptoms include nausea when pain worsens.  Denies fever, chills, vomiting, diarrhea, constipation, hematochezia, melena.  She did not try anything for pain at home.  She states IV pain medication in the emergency department temporarily decreases her pain.  Patient denies any NSAID use.  Past abdominal surgical history includes laparoscopic cholecystectomy, open appendectomy,  section with tubal ligation and laparoscopic Kris-en-Y gastric bypass.  Denies any complications or issues since her last surgery in .  Denies any tobacco use history.  Endorses rare alcohol use.  Denies any other substance use.  Patient last ate or drink anything yesterday evening around 9 PM.          Past Medical History:     Past Medical History:   Diagnosis Date    Acquired hypothyroidism     Arthritis     sacro iliac and hip, right ankle.    Chronic constipation     since menopause. fiber/hydration helps.    Coagulation disorder (H24)     Complication of anesthesia     nauseated w/ morphine previously after arm surgery.    COVID-19     .    Gallbladder problem     s/p cholecystectomy.    Head injury     childhood concussions    Heterozygous for MTHFR gene mutation     History of MTHFR mutation     Hypertension     Long COVID     Long term Covid    Morbid obesity (H)     Pneumonia     covid    Prediabetes     Pulmonary emboli (H)     Pulmonary embolism (H)     wisdome teeth out while on OCPs and MTHFR gene mutation history.    Pulmonary embolism (H)     age 23, OCPs/dental extraction provoked. reports some MTHFR gene issues, unsure if homo or heterozygous.              Past Surgical History:     Past Surgical History:   Procedure Laterality Date    APPENDECTOMY      BREAST BIOPSY, RT/LT      right breast - benign     SECTION      COLONOSCOPY N/A 11/10/2021    Procedure: COLONOSCOPY, WITH POLYPECTOMY;  Surgeon: Lynn Doshi MD;   Location:  GI    CREATION, GASTRIC BYPASS, KRIS-EN-Y, LAPAROSCOPIC N/A 07/25/2022    Procedure: LAPAROSCOPIC KRIS-EN-Y, GASTRIC BYPASS;  Surgeon: North Schneider MD;  Location: [facility] OR    EYE SURGERY  epilasik left eye    GI SURGERY  Kris N Y 2022    GYN SURGERY  2005    tubal ligation    LAPAROSCOPIC CHOLECYSTECTOMY N/A 12/31/2019    Procedure: CHOLECYSTECTOMY, LAPAROSCOPIC;  Surgeon: Lynn Doshi MD;  Location:  OR    ORTHOPEDIC SURGERY  1991    radius, ulnar - open fracture    TUBAL LIGATION      wisdom teeth               Social History:    reports that she has never smoked. She has never used smokeless tobacco. She reports that she does not currently use alcohol. She reports that she does not use drugs.           Family History:     Family History   Problem Relation Age of Onset    Cancer Father     Other Cancer Father     Substance Abuse Father         Alcoholic    Depression Brother         Bipolar    Substance Abuse Brother         Alcoholic/Drug Addiction    Diabetes Mother     Hypertension Mother     Hyperlipidemia Mother     Colon Cancer Mother     Thyroid Disease Mother     Obesity Mother     Arthritis Maternal Grandmother         rheumatoid arthritis    Diabetes Paternal Grandmother     Obesity Paternal Grandmother     Cancer Other         breast    Breast Cancer Other         Breast Cancer Mets Brain    Substance Abuse Paternal Grandfather         Alcoholic    Depression Niece         Bipolar    Depression Nephew         Bipolar    Substance Abuse Nephew         Drug Addict              Allergies:     Allergies   Allergen Reactions    Nsaids Other (See Comments)     Caution should be used in the gastric bypass patient.    Latex Dermatitis     Contact dermatitis    Morphine Itching              Medications:     Prior to Admission medications    Medication Sig Start Date End Date Taking? Authorizing Provider   acetaminophen (TYLENOL) 500 MG tablet Take 1,000 mg by mouth 3 times  "daily as needed for fever or pain    Reported, Patient   ascorbic acid 1000 MG TABS tablet Take 1,000 mg by mouth daily    Reported, Patient   calcium carbonate (TUMS) 500 MG chewable tablet Take 2 chew tab by mouth 2 times daily    Reported, Patient   hydrOXYzine HCl (ATARAX) 25 MG tablet Take 25 mg by mouth 3 times daily as needed for itching    Reported, Patient   levothyroxine (SYNTHROID/LEVOTHROID) 88 MCG tablet Take 1 tablet (88 mcg) by mouth daily 5/2/24   Nani Pierson MD   lidocaine HCl 3 % cream Apply topically 3 times daily as needed (shingles pain)    Reported, Patient   LORazepam (ATIVAN) 0.5 MG tablet Take 1-2 tablets (0.5-1 mg) by mouth every 6 hours as needed for anxiety 5/8/24   Mandi Worthington APRN CNP   Pediatric Multivitamins-Iron (MULTIVITAMINS PLUS IRON CHILD) 18 MG CHEW Take 1 chew tab by mouth 2 times daily Ok to substitute with any chewable that contains 18 mg of iron, Vitamin A, Thiamine and Zinc. 4/22/22   Arnulfo Stanley MD   predniSONE (DELTASONE) 20 MG tablet Take 3 tabs by mouth daily x 3 days, then 2 tabs daily x 3 days, then 1 tab daily x 3 days, then 1/2 tab daily x 3 days.  Patient taking differently: Take 20 mg by mouth daily 1 tab daily x 3 days, then 1/2 tab daily x 3 days. 8/6/24   Mandi Worthington APRN CNP   triamcinolone (KENALOG) 0.1 % external cream Apply topically 2 times daily as needed (poison ivy)    Reported, Patient   valACYclovir (VALTREX) 1000 mg tablet Take 1 tablet (1,000 mg) by mouth 3 times daily for 7 days 8/6/24 8/15/24  Mandi Worthington APRN CNP   vitamin D3 (CHOLECALCIFEROL) 50 mcg (2000 units) tablet Take 50 mcg by mouth daily 7/26/22   Erika Jamison APRN CNP              Review of Systems:   The Review of Systems is negative other than noted in the HPI            Physical Exam:   Patient Vitals for the past 24 hrs:   BP Temp Pulse Resp SpO2 Height Weight   08/15/24 1335 (!) 144/77 98.6  F (37  C) 101 16 95 % 1.702 m (5' 7\") 104.3 kg " (230 lb)        No intake or output data in the 24 hours ending 08/15/24 1407   Constitutional:   awake, alert, cooperative, no apparent distress, and appears stated age       Eyes:   PERRL, conjunctiva/corneas clear, EOM's intact; no scleral edema or icterus noted        ENT:   Normocephalic, without obvious abnormality, atraumatic, Lips, mucosa, and tongue normal        Lungs:   Normal respiratory effort, no accessory muscle use       Cardiovascular:   Regular rate and rhythm       Abdomen:   Soft, tender to palpation over epigastric and LUQ regions with associated guarding.        Musculoskeletal:   No obvious swelling, bruising or deformity       Skin:   Skin color and texture normal for patient, no rashes or lesions              Data:         All imaging studies reviewed by me.    Results for orders placed or performed during the hospital encounter of 08/15/24 (from the past 24 hour(s))   CBC with platelets differential    Narrative    The following orders were created for panel order CBC with platelets differential.  Procedure                               Abnormality         Status                     ---------                               -----------         ------                     CBC with platelets and d...[[id number]]  Abnormal            Final result               RBC and Platelet Morphology[[id number]]                                                   Please view results for these tests on the individual orders.   Comprehensive metabolic panel   Result Value Ref Range    Sodium 142 135 - 145 mmol/L    Potassium 3.8 3.4 - 5.3 mmol/L    Carbon Dioxide (CO2) 23 22 - 29 mmol/L    Anion Gap 16 (H) 7 - 15 mmol/L    Urea Nitrogen 20.1 (H) 6.0 - 20.0 mg/dL    Creatinine 0.80 0.51 - 0.95 mg/dL    GFR Estimate 86 >60 mL/min/1.73m2    Calcium 9.3 8.8 - 10.4 mg/dL    Chloride 103 98 - 107 mmol/L    Glucose 161 (H) 70 - 99 mg/dL    Alkaline Phosphatase 73 40 - 150 U/L    AST 13 0 - 45 U/L    ALT 25 0 - 50 U/L     Protein Total 6.9 6.4 - 8.3 g/dL    Albumin 4.1 3.5 - 5.2 g/dL    Bilirubin Total 0.3 <=1.2 mg/dL   Lipase   Result Value Ref Range    Lipase 11 (L) 13 - 60 U/L   D dimer quantitative   Result Value Ref Range    D-Dimer Quantitative 0.63 (H) 0.00 - 0.50 ug/mL FEU    Narrative    This D-dimer assay is intended for use in conjunction with a clinical pretest probability assessment model to exclude pulmonary embolism (PE) and deep venous thrombosis (DVT) in outpatients suspected of PE or DVT. The cut-off value is 0.50 ug/mL FEU.    For patients 50 years of age or older, the application of age-adjusted cut-off values for D-Dimer may increase the specificity without significant effect on sensitivity. The literature suggested calculation age adjusted cut-off in ug/L = age in years x 10 ug/L. The results in this laboratory are reported as ug/mL rather than ug/L. The calculation for age adjusted cut off in ug/mL= age in years x 0.01 ug/mL. For example, the cut off for a 76 year old male is 78 x 0.01 ug/mL = 0.76 ug/mL (760 ug/L).    M David et al. Age adjusted D-dimer cut-off levels to rule out pulmonary embolism: The ADJUST-PE Study. WILL 2014;311:4529-7648.; HJ Safia et al. Diagnostic accuracy of conventional or age adjusted D-dimer cutoff values in older patients with suspected venous thromboembolism. Systemic review and meta-analysis. BMJ 2013:346:f2492.   Troponin T, High Sensitivity   Result Value Ref Range    Troponin T, High Sensitivity <6 <=14 ng/L   CBC with platelets and differential   Result Value Ref Range    WBC Count 9.9 4.0 - 11.0 10e3/uL    RBC Count 4.55 3.80 - 5.20 10e6/uL    Hemoglobin 15.4 11.7 - 15.7 g/dL    Hematocrit 45.4 35.0 - 47.0 %     78 - 100 fL    MCH 33.8 (H) 26.5 - 33.0 pg    MCHC 33.9 31.5 - 36.5 g/dL    RDW 12.7 10.0 - 15.0 %    Platelet Count 237 150 - 450 10e3/uL    % Neutrophils 66 %    % Lymphocytes 27 %    % Monocytes 5 %    % Eosinophils 2 %    % Basophils 0 %    %  Immature Granulocytes 0 %    NRBCs per 100 WBC 0 <1 /100    Absolute Neutrophils 6.6 1.6 - 8.3 10e3/uL    Absolute Lymphocytes 2.6 0.8 - 5.3 10e3/uL    Absolute Monocytes 0.5 0.0 - 1.3 10e3/uL    Absolute Eosinophils 0.2 0.0 - 0.7 10e3/uL    Absolute Basophils 0.0 0.0 - 0.2 10e3/uL    Absolute Immature Granulocytes 0.0 <=0.4 10e3/uL    Absolute NRBCs 0.0 10e3/uL   Extra Tube    Narrative    The following orders were created for panel order Extra Tube.  Procedure                               Abnormality         Status                     ---------                               -----------         ------                     Extra Red Top Tube[[id number]]                               Final result                 Please view results for these tests on the individual orders.   Extra Red Top Tube   Result Value Ref Range    Hold Specimen JIC    Abd/pelvis CT - no contrast - Stone Protocol   Result Value Ref Range    Radiologist flags Unexplained pneumoperitoneum (AA)     Narrative    CT ABDOMEN PELVIS WITHOUT CONTRAST 8/15/2024 8:44 AM    CLINICAL HISTORY: Abdominal pain.  Left upper quadrant abdominal pain.    TECHNIQUE: CT scan of the abdomen and pelvis was performed without IV  contrast. Multiplanar reformats were obtained. Dose reduction  techniques were used.  CONTRAST: None.    COMPARISON: None.    FINDINGS:   LOWER CHEST: No infiltrates or effusions. See CT chest report same  date.    HEPATOBILIARY: No significant mass or bile duct dilatation. No  calcified gallstones.     PANCREAS: No significant mass, duct dilatation, or inflammatory  change.    SPLEEN: Normal size.    ADRENAL GLANDS: No significant nodules.    KIDNEYS/BLADDER: No significant mass, stones, or hydronephrosis.    BOWEL: Large amount of intraperitoneal free air. There is some lesser  sac air. There is some thickening of the bowel around the distal  anastomosis, as well as the Kris limb from the gastric bypass.    VASCULATURE: There are mild  calcified atherosclerotic changes of the  visualized aorta and its branches. There is no evidence of aortic  aneurysm.    PELVIC ORGANS: No pelvic masses.    OTHER: Small amount of ascites. Fat-containing periumbilical hernia.    MUSCULOSKELETAL: No frankly destructive bony lesions.      Impression    IMPRESSION:   1.  Intraperitoneal free air, source is unclear. There is a small  amount of air in the lesser sac, question whether the upper GI tract  and/or gastric bypass may be the source of the perforation. This is  not certain.    [Critical Result: Unexplained pneumoperitoneum]    Finding was identified on 8/15/2024 8:45 AM.     DEEP Brink was contacted by me at 8/15/2024 8:53 AM  and verbalized understanding of the critical finding.     FELA STARK MD         SYSTEM ID:  [id number]   CT Chest Pulmonary Embolism w Contrast    Narrative    CT CHEST PULMONARY EMBOLISM WITH CONTRAST  8/15/2024 10:59 AM    CLINICAL HISTORY: Chest pain. Left-sided pleuritic chest pain. History  of PE. Left upper quadrant abdominal pain.     TECHNIQUE: CT angiogram chest during arterial phase injection IV  contrast. 2D and 3D MIP reconstructions were performed by the CT  technologist. Dose reduction techniques were used.     CONTRAST: ISOVUE-370, 70 mL    COMPARISON: None.    FINDINGS:  ANGIOGRAM CHEST: Pulmonary arteries are normal caliber and negative  for pulmonary emboli. Thoracic aorta is negative for dissection. No CT  evidence of right heart strain.    LUNGS AND PLEURA: No infiltrates or effusions.    MEDIASTINUM/AXILLAE: No adenopathy or aneurysm.    CORONARY ARTERY CALCIFICATIONS: None.    UPPER ABDOMEN: See abdomen report same day.    MUSCULOSKELETAL: No frankly destructive bony lesions.      Impression    IMPRESSION:  No acute process demonstrated in the chest.   Extra Tube ([place] Draw)    Narrative    The following orders were created for panel order Extra Tube ([place] Draw).  Procedure                                Abnormality         Status                     ---------                               -----------         ------                     Extra Urine Collection[[id number]]                           Final result                 Please view results for these tests on the individual orders.   Extra Urine Collection   Result Value Ref Range    Hold Specimen [facility]            Catrina Lee PA-C  [facility]  [address] [address]   [phone]

## 2024-08-15 NOTE — ED PROVIDER NOTES
Emergency Department Encounter     Evaluation Date & Time:   8/15/2024  1:41 PM    CHIEF COMPLAINT:  Abdominal Pain      Triage Note:  Pt reports awakened with sever abd pain LUQ radiates into chest. Was seen in [facility] ED and sent here for surgery.            ED COURSE & MEDICAL DECISION MAKING:     Pt transferred from [facility] ED for ED to ED transfer so pt could go directly to OR with Dr. Servin for free in abdomen secondary to perforation, likely related to eric-en-y gastric bypass.  She was recently treated for shingles with prednisone and valacyclovir 2 weeks ago.  Pt received IV unasyn, arrives hemodynamically stable. Pt go to directly to OR, no additional labs or CT imaging indicated. I spoke with Dr. Servin regarding pt pta and he and OR team will be ready to accept pt to OR.  He is requesting I call/admit to medical team, which I am doing now.      ED Course as of 08/15/24 1615   Thu Aug 15, 2024   1338 I met with the patient for the initial interview and physical examination. Discussed plan for treatment and workup in the ED.           Medical Decision Making    History:  Supplemental history from: N/A  External Record(s) reviewed: Documented in chart and Outpatient Record: 8/15/2024, [facility] Emergency Department    Work Up:  Chart documentation includes differential considered and any EKGs or imaging independently interpreted by provider, where specified.  In additional to work up documented, I considered the following work up: Documented in chart, if applicable.    External consultation:  Discussion of management with another provider: General Surgery    Complicating factors:  Care impacted by chronic illness: N/A  Care affected by social determinants of health: N/A    Disposition considerations: Admit.      At the conclusion of the encounter I discussed the results of all the tests and the disposition. The questions were answered. The patient or family acknowledged understanding  "and was agreeable with the care plan.      MEDICATIONS GIVEN IN THE EMERGENCY DEPARTMENT:  Medications   HYDROmorphone (DILAUDID) injection 0.5 mg ( Intravenous Auto Hold 8/15/24 1414)   lidocaine 1 % 0.1-1 mL (has no administration in time range)   lidocaine (LMX4) cream (has no administration in time range)   sodium chloride (PF) 0.9% PF flush 3 mL (has no administration in time range)   sodium chloride (PF) 0.9% PF flush 3 mL (has no administration in time range)   lactated ringers infusion ( Intravenous $New Bag 8/15/24 1508)   fentaNYL (PF) (SUBLIMAZE) injection  mcg (25 mcg Intravenous $Given 8/15/24 1521)   BUPivacaine liposome (EXPAREL) 1.3 % LA inj susp 20 mL (has no administration in time range)   BUPivacaine liposome (EXPAREL) LA inj was given in the infiltration site to produce post-op analgesia. Duration of action is up to 72 hours. Other \"maria m\" meds should not be given for 96 hours except for lidocaine 4% patch. This is for INFORMATION ONLY. (has no administration in time range)   magnesium sulfate 4 g in 50 mL sterile water intermittent infusion (4 g Intravenous $New Bag 8/15/24 1511)   acetaminophen (TYLENOL) tablet 650 mg ( Oral Auto Hold 8/15/24 1414)     Or   acetaminophen (TYLENOL) Suppository 650 mg ( Rectal Auto Hold 8/15/24 1414)   senna-docusate (SENOKOT-S/PERICOLACE) 8.6-50 MG per tablet 1 tablet ( Oral Auto Hold 8/15/24 1414)     Or   senna-docusate (SENOKOT-S/PERICOLACE) 8.6-50 MG per tablet 2 tablet ( Oral Auto Hold 8/15/24 1414)   polyethylene glycol (MIRALAX) Packet 17 g ( Oral Auto Hold 8/15/24 1414)   ondansetron (ZOFRAN ODT) ODT tab 4 mg ( Oral Auto Hold 8/15/24 1414)     Or   ondansetron (ZOFRAN) injection 4 mg ( Intravenous Auto Hold 8/15/24 1414)   benzocaine-menthol (CHLORASEPTIC) 6-10 MG lozenge 1 lozenge ( Buccal Auto Hold 8/15/24 2951)   sodium chloride 0.9 % infusion (has no administration in time range)   HYDROmorphone (DILAUDID) injection 0.2-0.4 mg ( Intravenous " Auto Hold 8/15/24 1414)   piperacillin-tazobactam (ZOSYN) 3.375 g vial to attach to  mL bag ( Intravenous Automatically Held 8/15/24 1430)   piperacillin-tazobactam (ZOSYN) 3.375 g vial to attach to  mL bag ( Intravenous Automatically Held 8/18/24 2300)   ceFAZolin Sodium (ANCEF) injection 2 g (has no administration in time range)   naloxone (NARCAN) injection 0.2 mg (has no administration in time range)     Or   naloxone (NARCAN) injection 0.4 mg (has no administration in time range)     Or   naloxone (NARCAN) injection 0.2 mg (has no administration in time range)     Or   naloxone (NARCAN) injection 0.4 mg (has no administration in time range)   vitamin C (ASCORBIC ACID) tablet 1,000 mg ( Oral Automatically Held 8/18/24 0800)   calcium carbonate (TUMS) chewable tablet 1,000 mg ( Oral Automatically Held 8/18/24 2000)   hydrOXYzine HCl (ATARAX) tablet 25 mg ( Oral Held by provider 8/15/24 1441)   levothyroxine (SYNTHROID/LEVOTHROID) tablet 88 mcg ( Oral Automatically Held 8/18/24 0800)   lidocaine HCl 3 % cream (has no administration in time range)   LORazepam (ATIVAN) tablet 0.5-1 mg ( Oral Held by provider 8/15/24 1440)   Multivitamins Plus Iron Child CHEW 1 chew tab ( Oral Automatically Held 8/18/24 2000)   Vitamin D3 (CHOLECALCIFEROL) tablet 50 mcg ( Oral Automatically Held 8/18/24 0800)   fentaNYL (PF) (SUBLIMAZE) injection 50 mcg (has no administration in time range)   ondansetron (ZOFRAN) injection 4 mg (4 mg Intravenous $Given 8/15/24 1512)   ceFAZolin Sodium (ANCEF) injection 2 g (2 g Intravenous $Given 8/15/24 1328)       NEW PRESCRIPTIONS STARTED AT TODAY'S ED VISIT:  Current Discharge Medication List          HPI   The history is provided by the patient. No  was used.        Lucina Santamaria is a 58 year old female with a pertinent history of hypothyroidism, pulmonary embolism, and eric en y gastric bypass who presents to this ED by walk-in for evaluation of abdominal  pain.    Per chart review: Patient was seen earlier today (8/15/2024) at [facility] Emergency Department for evaluation of abdominal pain. Dilaudid IM 1 g was administered. Patient's D-dimer elevated at 0.63 and prior history of PE with similar pleuritic chest pain indicated CT scan of the chest with contrast. Plan to first obtain CT abdomen to rule out stone. CT showed free air on CT scan with perforation likely around Kris en Y. Patient was accepted for OR by Dr. Servin, general surgery, at [facility].       Patient reports waking up this morning at 4 AM with LUQ abdominal pain which radiates into her chest. She endorses nausea due to the pain medications she is currently taking; she takes Zofran to mitigate this. She has had shingles for the past 2 weeks now as well for which she has been taking prednisone and Valtrex.     Patient denies vomiting or any other symptoms at this time.      REVIEW OF SYSTEMS:  See HPI      Medical History     Past Medical History:   Diagnosis Date    Acquired hypothyroidism     Arthritis     Chronic constipation     Coagulation disorder (H24)     Complication of anesthesia     COVID-19     Gallbladder problem     Head injury     Heterozygous for MTHFR gene mutation     History of MTHFR mutation     Hypertension     Long COVID     Morbid obesity (H)     Pneumonia     Prediabetes     Pulmonary emboli (H)     Pulmonary embolism (H)     Pulmonary embolism (H)        Past Surgical History:   Procedure Laterality Date    APPENDECTOMY      BREAST BIOPSY, RT/LT      right breast - benign     SECTION      COLONOSCOPY N/A 11/10/2021    Procedure: COLONOSCOPY, WITH POLYPECTOMY;  Surgeon: Lynn Doshi MD;  Location:  GI    CREATION, GASTRIC BYPASS, KRIS-EN-Y, LAPAROSCOPIC N/A 2022    Procedure: LAPAROSCOPIC KRIS-EN-Y, GASTRIC BYPASS;  Surgeon: North Schneider MD;  Location: [facility] OR    EYE SURGERY  [facility]    GI  "SURGERY  Kris N Y 2022    GYN SURGERY  2005    tubal ligation    LAPAROSCOPIC CHOLECYSTECTOMY N/A 12/31/2019    Procedure: CHOLECYSTECTOMY, LAPAROSCOPIC;  Surgeon: Lynn Doshi MD;  Location:  OR    ORTHOPEDIC SURGERY  1991    radius, ulnar - open fracture    TUBAL LIGATION      wisdom teeth         Family History   Problem Relation Age of Onset    Cancer Father     Other Cancer Father     Substance Abuse Father         Alcoholic    Depression Brother         Bipolar    Substance Abuse Brother         Alcoholic/Drug Addiction    Diabetes Mother     Hypertension Mother     Hyperlipidemia Mother     Colon Cancer Mother     Thyroid Disease Mother     Obesity Mother     Arthritis Maternal Grandmother         rheumatoid arthritis    Diabetes Paternal Grandmother     Obesity Paternal Grandmother     Cancer Other         breast    Breast Cancer Other         Breast Cancer Mets Brain    Substance Abuse Paternal Grandfather         Alcoholic    Depression Niece         Bipolar    Depression Nephew         Bipolar    Substance Abuse Nephew         Drug Addict       Social History     Tobacco Use    Smoking status: Never    Smokeless tobacco: Never   Vaping Use    Vaping status: Never Used   Substance Use Topics    Alcohol use: Not Currently     Comment: rare, once monthly.    Drug use: No       No current outpatient medications on file.      Physical Exam     Vitals:  BP (!) 141/60   Pulse 94   Temp 99  F (37.2  C)   Resp 22   Ht 1.702 m (5' 7\")   Wt 104.3 kg (230 lb)   SpO2 96%   BMI 36.02 kg/m      PHYSICAL EXAM:   Physical Exam  Vitals and nursing note reviewed.   Constitutional:       General: She is not in acute distress.     Appearance: Normal appearance.      Comments: Patient appears to be uncomfortable.   HENT:      Head: Normocephalic and atraumatic.      Nose: Nose normal.      Mouth/Throat:      Mouth: Mucous membranes are moist.   Eyes:      Extraocular Movements: Extraocular movements intact. " "  Cardiovascular:      Rate and Rhythm: Normal rate and regular rhythm.      Pulses: Normal pulses.           Radial pulses are 2+ on the right side and 2+ on the left side.        Dorsalis pedis pulses are 2+ on the right side and 2+ on the left side.   Pulmonary:      Effort: Pulmonary effort is normal.   Abdominal:      Tenderness: There is abdominal tenderness (diffuse).   Skin:     Findings: No rash.   Neurological:      General: No focal deficit present.      Mental Status: She is alert. Mental status is at baseline.      Comments: Fluent speech   Psychiatric:         Mood and Affect: Mood normal.         Behavior: Behavior normal.         Results     LAB:  All pertinent labs reviewed and interpreted  Labs Ordered and Resulted from Time of ED Arrival to Time of ED Departure - No data to display    RADIOLOGY:  POC US Guidance Needle Placement   Final Result            Results for orders placed during the hospital encounter of 08/15/24    POC US GUIDANCE NEEDLE PLACEMENT    Narrative  Ultrasound was performed as guidance to an anesthesia procedure.  Click \"PACS images\" hyperlink below to view any stored images.  For specific procedure details, view procedure note authored by anesthesia.           PROCEDURES:  Procedures:  None      FINAL IMPRESSION:    ICD-10-CM    1. Perforation of intestine (H)  K63.1           0 minutes of critical care time      I, Rayshawn Patterson, am serving as a scribe to document services personally performed by Dr. Tristan Polo, based on my observations and the provider's statements to me. I, Tristan Polo, DO attest that Rayshawn Patterson is acting in a scribe capacity, has observed my performance of the services and has documented them in accordance with my direction.      Tristan Polo DO  Emergency Medicine  [facility] EMERGENCY DEPARTMENT  8/15/2024  1:41 PM      Tristan Polo MD  08/15/24 1615    "

## 2024-08-15 NOTE — ED NOTES
Expected Patient Referral to ED  11:44 AM    Referring Clinic/Provider:  [facility] ED    Reason for referral/Clinical facts:  Hx of kris en y gastric bypass, awoke at 0400 with LUQ pain radiating into chest/abdomen.  Hx of PE as well.  CT showing free air on CT scan with perforation likely around Kris en Y.  General surgeon, Dr. Servin, accepted for OR.  SOC on line as well and everything should be arranged.  Vitally stable.  Received IV Unasyn.      I notified our charge ED as well.    Recommendations provided:  Send to ED for further evaluation    Caller was informed that this institution does possess the capabilities and/or resources to provide for patient and should be transferred to our facility.    Discussed that if direct admit is sought and any hurdles are encountered, this ED would be happy to see the patient and evaluate.    Informed caller that recommendations provided are recommendations based only on the facts provided and that they responsible to accept or reject the advice, or to seek a formal in person consultation as needed and that this ED will see/treat patient should they arrive.      Tristan Polo, [facility] EMERGENCY DEPARTMENT  [address] [place] [id number]  [phone]       Tristan Polo MD  08/15/24 4312

## 2024-08-15 NOTE — ED NOTES
Bed: JNED-21  Expected date:   Expected time:   Means of arrival:   Comments:  [facility] transfer

## 2024-08-16 LAB
ANION GAP SERPL CALCULATED.3IONS-SCNC: 11 MMOL/L (ref 7–15)
BUN SERPL-MCNC: 15.9 MG/DL (ref 6–20)
CALCIUM SERPL-MCNC: 8.3 MG/DL (ref 8.8–10.4)
CHLORIDE SERPL-SCNC: 108 MMOL/L (ref 98–107)
CREAT SERPL-MCNC: 0.86 MG/DL (ref 0.51–0.95)
EGFRCR SERPLBLD CKD-EPI 2021: 79 ML/MIN/1.73M2
ERYTHROCYTE [DISTWIDTH] IN BLOOD BY AUTOMATED COUNT: 13.1 % (ref 10–15)
GLUCOSE SERPL-MCNC: 102 MG/DL (ref 70–99)
HCO3 SERPL-SCNC: 25 MMOL/L (ref 22–29)
HCT VFR BLD AUTO: 34.6 % (ref 35–47)
HGB BLD-MCNC: 11.4 G/DL (ref 11.7–15.7)
MAGNESIUM SERPL-MCNC: 2.1 MG/DL (ref 1.7–2.3)
MCH RBC QN AUTO: 33.7 PG (ref 26.5–33)
MCHC RBC AUTO-ENTMCNC: 32.9 G/DL (ref 31.5–36.5)
MCV RBC AUTO: 102 FL (ref 78–100)
PHOSPHATE SERPL-MCNC: 3.3 MG/DL (ref 2.5–4.5)
PLATELET # BLD AUTO: 137 10E3/UL (ref 150–450)
POTASSIUM SERPL-SCNC: 4.1 MMOL/L (ref 3.4–5.3)
RBC # BLD AUTO: 3.38 10E6/UL (ref 3.8–5.2)
SODIUM SERPL-SCNC: 144 MMOL/L (ref 135–145)
WBC # BLD AUTO: 8.2 10E3/UL (ref 4–11)

## 2024-08-16 PROCEDURE — 250N000009 HC RX 250: Performed by: PHYSICIAN ASSISTANT

## 2024-08-16 PROCEDURE — 99024 POSTOP FOLLOW-UP VISIT: CPT | Performed by: PHYSICIAN ASSISTANT

## 2024-08-16 PROCEDURE — 258N000003 HC RX IP 258 OP 636: Performed by: PHYSICIAN ASSISTANT

## 2024-08-16 PROCEDURE — 120N000001 HC R&B MED SURG/OB

## 2024-08-16 PROCEDURE — 99222 1ST HOSP IP/OBS MODERATE 55: CPT | Performed by: INTERNAL MEDICINE

## 2024-08-16 PROCEDURE — 258N000003 HC RX IP 258 OP 636: Performed by: SURGERY

## 2024-08-16 PROCEDURE — 36415 COLL VENOUS BLD VENIPUNCTURE: CPT | Performed by: SURGERY

## 2024-08-16 PROCEDURE — 250N000011 HC RX IP 250 OP 636: Performed by: SURGERY

## 2024-08-16 PROCEDURE — 83735 ASSAY OF MAGNESIUM: CPT | Performed by: SURGERY

## 2024-08-16 PROCEDURE — 250N000013 HC RX MED GY IP 250 OP 250 PS 637: Performed by: SURGERY

## 2024-08-16 PROCEDURE — 80048 BASIC METABOLIC PNL TOTAL CA: CPT | Performed by: SURGERY

## 2024-08-16 PROCEDURE — 99233 SBSQ HOSP IP/OBS HIGH 50: CPT | Performed by: INTERNAL MEDICINE

## 2024-08-16 PROCEDURE — 85027 COMPLETE CBC AUTOMATED: CPT | Performed by: SURGERY

## 2024-08-16 PROCEDURE — 87205 SMEAR GRAM STAIN: CPT | Performed by: INTERNAL MEDICINE

## 2024-08-16 PROCEDURE — 84100 ASSAY OF PHOSPHORUS: CPT | Performed by: SURGERY

## 2024-08-16 RX ADMIN — HYDROMORPHONE HYDROCHLORIDE 0.4 MG: 0.2 INJECTION, SOLUTION INTRAMUSCULAR; INTRAVENOUS; SUBCUTANEOUS at 19:00

## 2024-08-16 RX ADMIN — SENNOSIDES AND DOCUSATE SODIUM 1 TABLET: 8.6; 5 TABLET ORAL at 12:03

## 2024-08-16 RX ADMIN — HYDROMORPHONE HYDROCHLORIDE 0.5 MG: 0.2 INJECTION, SOLUTION INTRAMUSCULAR; INTRAVENOUS; SUBCUTANEOUS at 16:40

## 2024-08-16 RX ADMIN — SENNOSIDES AND DOCUSATE SODIUM 1 TABLET: 8.6; 5 TABLET ORAL at 20:00

## 2024-08-16 RX ADMIN — SODIUM CHLORIDE 8 MG/HR: 9 INJECTION, SOLUTION INTRAVENOUS at 16:41

## 2024-08-16 RX ADMIN — SODIUM CHLORIDE: 9 INJECTION, SOLUTION INTRAVENOUS at 23:48

## 2024-08-16 RX ADMIN — POLYETHYLENE GLYCOL 3350 17 G: 17 POWDER, FOR SOLUTION ORAL at 12:02

## 2024-08-16 RX ADMIN — HEPARIN SODIUM 5000 UNITS: 10000 INJECTION, SOLUTION INTRAVENOUS; SUBCUTANEOUS at 19:57

## 2024-08-16 RX ADMIN — HYDROMORPHONE HYDROCHLORIDE 0.4 MG: 0.2 INJECTION, SOLUTION INTRAMUSCULAR; INTRAVENOUS; SUBCUTANEOUS at 01:27

## 2024-08-16 RX ADMIN — SODIUM CHLORIDE 8 MG/HR: 9 INJECTION, SOLUTION INTRAVENOUS at 02:02

## 2024-08-16 RX ADMIN — SODIUM CHLORIDE: 9 INJECTION, SOLUTION INTRAVENOUS at 10:41

## 2024-08-16 RX ADMIN — OXYCODONE HYDROCHLORIDE 10 MG: 5 TABLET ORAL at 03:31

## 2024-08-16 RX ADMIN — PIPERACILLIN AND TAZOBACTAM 3.38 G: 3; .375 INJECTION, POWDER, FOR SOLUTION INTRAVENOUS at 08:13

## 2024-08-16 RX ADMIN — HYDROMORPHONE HYDROCHLORIDE 0.4 MG: 0.2 INJECTION, SOLUTION INTRAMUSCULAR; INTRAVENOUS; SUBCUTANEOUS at 12:03

## 2024-08-16 RX ADMIN — ACETAMINOPHEN 975 MG: 325 TABLET ORAL at 12:02

## 2024-08-16 RX ADMIN — ACETAMINOPHEN 650 MG: 325 TABLET ORAL at 23:53

## 2024-08-16 RX ADMIN — FLUCONAZOLE 200 MG: 2 INJECTION, SOLUTION INTRAVENOUS at 23:48

## 2024-08-16 RX ADMIN — HEPARIN SODIUM 5000 UNITS: 10000 INJECTION, SOLUTION INTRAVENOUS; SUBCUTANEOUS at 12:04

## 2024-08-16 RX ADMIN — HYDROMORPHONE HYDROCHLORIDE 0.4 MG: 0.2 INJECTION, SOLUTION INTRAMUSCULAR; INTRAVENOUS; SUBCUTANEOUS at 08:14

## 2024-08-16 RX ADMIN — HYDROMORPHONE HYDROCHLORIDE 0.4 MG: 0.2 INJECTION, SOLUTION INTRAMUSCULAR; INTRAVENOUS; SUBCUTANEOUS at 23:53

## 2024-08-16 RX ADMIN — PIPERACILLIN AND TAZOBACTAM 3.38 G: 3; .375 INJECTION, POWDER, FOR SOLUTION INTRAVENOUS at 16:40

## 2024-08-16 RX ADMIN — ACETAMINOPHEN 975 MG: 325 TABLET ORAL at 19:57

## 2024-08-16 RX ADMIN — ACETAMINOPHEN 975 MG: 325 TABLET ORAL at 00:41

## 2024-08-16 RX ADMIN — PIPERACILLIN AND TAZOBACTAM 3.38 G: 3; .375 INJECTION, POWDER, FOR SOLUTION INTRAVENOUS at 23:48

## 2024-08-16 RX ADMIN — FLUCONAZOLE 200 MG: 2 INJECTION, SOLUTION INTRAVENOUS at 01:27

## 2024-08-16 ASSESSMENT — ACTIVITIES OF DAILY LIVING (ADL)
ADLS_ACUITY_SCORE: 22
ADLS_ACUITY_SCORE: 23
ADLS_ACUITY_SCORE: 22
ADLS_ACUITY_SCORE: 23
DEPENDENT_IADLS:: INDEPENDENT
ADLS_ACUITY_SCORE: 23
ADLS_ACUITY_SCORE: 23
ADLS_ACUITY_SCORE: 22
ADLS_ACUITY_SCORE: 23
ADLS_ACUITY_SCORE: 22
ADLS_ACUITY_SCORE: 23
ADLS_ACUITY_SCORE: 23
ADLS_ACUITY_SCORE: 22
ADLS_ACUITY_SCORE: 23

## 2024-08-16 NOTE — PLAN OF CARE
Problem: Adult Inpatient Plan of Care  Goal: Plan of Care Review  Outcome: Progressing     Problem: Pain Acute  Goal: Optimal Pain Control and Function  Outcome: Progressing     Problem: Surgery Nonspecified  Goal: Effective Bowel Elimination  Outcome: Progressing  Goal: Nausea and Vomiting Relief  Outcome: Progressing  Goal: Effective Urinary Elimination  Outcome: Progressing   Patient was managing with pain medication dilaudid and tylenol. Pain elevated up to 7 when up and walking. Tolerated all medication well. No signs of skin breakdown or infection.   2 NOHEMI drain are patent. Dressing changed   NG tube right nares. PO Medication given via NG see in MAR. L PIV line infusing  mIVf per MAR.   Protonix pause new request is in process- next shift RN notified. Diet order NPO expect meds and ice chip. Standby assist    Shelby Mckeon RN on 8/16/2024 at 3:56 PM

## 2024-08-16 NOTE — CONSULTS
Consultation - Infectious Disease  Lucina Santamaria,  1965, MRN [id number]    Admitting Dx: Perforation of intestine (H) [K63.1]    PCP: Mandi Worthington, [phone]   Code status:  Full Code       Extended Emergency Contact Information  Primary Emergency Contact: Kang Santamaria  Address: [address] [place] [address]            [place] [address] [facility]  Home Phone: [phone]  Mobile Phone: [phone]  Relation: Spouse  Secondary Emergency Contact: Ami Mcclendon   [place]  Mobile Phone: [phone]  Relation: Daughter       ASSESSMENT   Peritonitis from perforated viscus, presented with acute abdominal pain. Free air seen on CT. Now s/p exploratory laparoscopy 8/15, noted gross contamination throughout abdomen, underwent repair of perforation at GJ anastomosis from prior gastric bypass, with Kevin patch. Question instigating factor -- possibly prednisone use.   S/p gastric bypass .   Recent varicella zoster, treated with valtrex and prednisone.   Active Problems:    Perforation of intestine (H)       PLAN   Agree with pip/tazo and fluconazole, generally plan anti-infective 5 days beyond source control   If ready for discharge prior to 5 days post-op, plan fluc PO and probably Augmentin (may be guided by cultures from drains)  Cultures sent from drains -- interpret with cautions as these can yield sometimes colonizing organisms.     Pardeep Andrea MD  [facility]  Contact via [employer] or [facility] [phone]  ______________________________________________________________________        Reason For Consult: Perforated viscus     HPI    We have been requested by Dr. Kohli to evaluate Lucina Santamaria. She has a history of gastric bypass in , presented yesterday with sudden onset abdominal pain and chest pain awakening her from sleep. Recent shingles diagnosis, treated with valtrex and prednisone.      Found to have free air on CT. Taken to OR for  exploratory laparotomy.   Findings:                       - 0.5 mm perforation at the GJ anastomosis.  Repair with modified Kevin patch.  - gross contamination of the quadrants of the abdomen  - Repair of the 2 cm umbilical hernia primarily.    Feels better. Pain improved. Tolerating antibiotics so far.     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        Medical History  Past Medical History:   Diagnosis Date    Acquired hypothyroidism     Arthritis     sacro iliac and hip, right ankle.    Chronic constipation     since menopause. fiber/hydration helps.    Coagulation disorder (H24)     Complication of anesthesia     nauseated w/ morphine previously after arm surgery.    COVID-19     .    Gallbladder problem     s/p cholecystectomy.    Head injury     childhood concussions    Heterozygous for MTHFR gene mutation     History of MTHFR mutation     Hypertension     Long COVID     Long term Covid    Morbid obesity (H)     Pneumonia     covid    Prediabetes     Pulmonary emboli (H)     Pulmonary embolism (H)     wisdome teeth out while on OCPs and MTHFR gene mutation history.    Pulmonary embolism (H)     age 23, OCPs/dental extraction provoked. reports some MTHFR gene issues, unsure if homo or heterozygous.    Surgical History  She  has a past surgical history that includes appendectomy (); orthopedic surgery (); GYN surgery ();  section (); breast biopsy, rt/lt (); Laparoscopic cholecystectomy (N/A, 2019); wisdom teeth; tubal ligation; Colonoscopy (N/A, 11/10/2021); Creation, Gastric Bypass, Kris-En-Y, Laparoscopic (N/A, 2022); Eye surgery (epilasik left eye); and GI surgery (Kris N Y ).   Social History  Reviewed, and she  reports that she has never smoked. She has never used smokeless tobacco. She reports that she does not currently use alcohol. She reports that she does not use drugs.   Allergies  Allergies   Allergen Reactions    Nsaids Other (See  Comments)     Caution should be used in the gastric bypass patient.    Latex Dermatitis     Contact dermatitis    Morphine Itching    Family History  family history includes Arthritis in her maternal grandmother; Breast Cancer in an other family member; Cancer in her father and another family member; Colon Cancer in her mother; Depression in her brother, nephew, and niece; Diabetes in her mother and paternal grandmother; Hyperlipidemia in her mother; Hypertension in her mother; Obesity in her mother and paternal grandmother; Other Cancer in her father; Substance Abuse in her brother, father, nephew, and paternal grandfather; Thyroid Disease in her mother.     Psychosocial Needs  Social History     Social History Narrative    Not on file     Additional psychosocial needs reviewed per nursing assessment.       Prior to Admission Medications   Medications Prior to Admission   Medication Sig Dispense Refill Last Dose    hydrOXYzine HCl (ATARAX) 25 MG tablet Take 25 mg by mouth 3 times daily as needed for itching   8/14/2024    levothyroxine (SYNTHROID/LEVOTHROID) 88 MCG tablet Take 1 tablet (88 mcg) by mouth daily 90 tablet 3 8/15/2024 at 0400    acetaminophen (TYLENOL) 500 MG tablet Take 1,000 mg by mouth 3 times daily as needed for fever or pain       ascorbic acid 1000 MG TABS tablet Take 1,000 mg by mouth daily       calcium carbonate (TUMS) 500 MG chewable tablet Take 2 chew tab by mouth 2 times daily       lidocaine HCl 3 % cream Apply topically 3 times daily as needed (shingles pain)       LORazepam (ATIVAN) 0.5 MG tablet Take 1-2 tablets (0.5-1 mg) by mouth every 6 hours as needed for anxiety 20 tablet 0     Pediatric Multivitamins-Iron (MULTIVITAMINS PLUS IRON CHILD) 18 MG CHEW Take 1 chew tab by mouth 2 times daily Ok to substitute with any chewable that contains 18 mg of iron, Vitamin A, Thiamine and Zinc. 180 tablet 3     predniSONE (DELTASONE) 20 MG tablet Take 3 tabs by mouth daily x 3 days, then 2 tabs  "daily x 3 days, then 1 tab daily x 3 days, then 1/2 tab daily x 3 days. (Patient taking differently: Take 20 mg by mouth daily 1 tab daily x 3 days, then 1/2 tab daily x 3 days.) 20 tablet 0     triamcinolone (KENALOG) 0.1 % external cream Apply topically 2 times daily as needed (poison ivy)       valACYclovir (VALTREX) 1000 mg tablet Take 1 tablet (1,000 mg) by mouth 3 times daily for 7 days 21 tablet 0     vitamin D3 (CHOLECALCIFEROL) 50 mcg (2000 units) tablet Take 50 mcg by mouth daily             Anti-infectives: pip/tazo 8/15-  Fluconazole 8/16-  Amp, cefaz x1  Cultures:   No results found for the last 90 days.    Imaging: reviewed images          Review of Systems:  All other systems negative in detail except what is noted above. Physical Exam:  Temp:  [97.2  F (36.2  C)-99.8  F (37.7  C)] 98.7  F (37.1  C)  Pulse:  [] 55  Resp:  [16-23] 18  BP: (105-144)/(52-77) 128/60  SpO2:  [92 %-100 %] 93 %    GENERAL:  In no acute distress, is alert and oriented to person, place, time.  EYES: No conjunctival injection, pupils equally reactive to light, extra-ocular movement intact  HEAD, EARS, NOSE, MOUTH, AND THROAT: NG in place  RESPIRATORY: Clear anterior  CARDIOVASCULAR: Regular rate and rhythm, normal S1 and S2, no murmurs  ABDOMEN: Soft, mildly tender. NOHEMI x2. Port sites normal.   MUSCULOSKELETAL: No synovitis.  SKIN/HAIR/NAILS: No rashes, no signs of peripheral emboli.  NEUROLOGIC: Grossly intact.       Pertinent Labs         Recent Labs   Lab 08/16/24  0700 08/15/24  0643    142   CO2 25 23   BUN 15.9 20.1*       Lab Results   Component Value Date    ALT 25 08/15/2024    AST 13 08/15/2024    ALKPHOS 73 08/15/2024          Pertinent Radiology  Radiology Results: Reviewed  POC US Guidance Needle Placement    Result Date: 8/15/2024  Ultrasound was performed as guidance to an anesthesia procedure.  Click \"PACS images\" hyperlink below to view any stored images.  For specific procedure details, view " procedure note authored by anesthesia.    CT Chest Pulmonary Embolism w Contrast    Result Date: 8/15/2024  CT CHEST PULMONARY EMBOLISM WITH CONTRAST  8/15/2024 10:59 AM CLINICAL HISTORY: Chest pain. Left-sided pleuritic chest pain. History of PE. Left upper quadrant abdominal pain. TECHNIQUE: CT angiogram chest during arterial phase injection IV contrast. 2D and 3D MIP reconstructions were performed by the CT technologist. Dose reduction techniques were used. CONTRAST: ISOVUE-370, 70 mL COMPARISON: None. FINDINGS: ANGIOGRAM CHEST: Pulmonary arteries are normal caliber and negative for pulmonary emboli. Thoracic aorta is negative for dissection. No CT evidence of right heart strain. LUNGS AND PLEURA: No infiltrates or effusions. MEDIASTINUM/AXILLAE: No adenopathy or aneurysm. CORONARY ARTERY CALCIFICATIONS: None. UPPER ABDOMEN: See abdomen report same day. MUSCULOSKELETAL: No frankly destructive bony lesions.     IMPRESSION:  No acute process demonstrated in the chest. FELA STARK MD   SYSTEM ID:  [id number]    Abd/pelvis CT - no contrast - Stone Protocol    Result Date: 8/15/2024  CT ABDOMEN PELVIS WITHOUT CONTRAST 8/15/2024 8:44 AM CLINICAL HISTORY: Abdominal pain.  Left upper quadrant abdominal pain. TECHNIQUE: CT scan of the abdomen and pelvis was performed without IV contrast. Multiplanar reformats were obtained. Dose reduction techniques were used. CONTRAST: None. COMPARISON: None. FINDINGS: LOWER CHEST: No infiltrates or effusions. See CT chest report same date. HEPATOBILIARY: No significant mass or bile duct dilatation. No calcified gallstones. PANCREAS: No significant mass, duct dilatation, or inflammatory change. SPLEEN: Normal size. ADRENAL GLANDS: No significant nodules. KIDNEYS/BLADDER: No significant mass, stones, or hydronephrosis. BOWEL: Large amount of intraperitoneal free air. There is some lesser sac air. There is some thickening of the bowel around the distal anastomosis, as well as the Kris  limb from the gastric bypass. VASCULATURE: There are mild calcified atherosclerotic changes of the visualized aorta and its branches. There is no evidence of aortic aneurysm. PELVIC ORGANS: No pelvic masses. OTHER: Small amount of ascites. Fat-containing periumbilical hernia. MUSCULOSKELETAL: No frankly destructive bony lesions.     IMPRESSION: 1.  Intraperitoneal free air, source is unclear. There is a small amount of air in the lesser sac, question whether the upper GI tract and/or gastric bypass may be the source of the perforation. This is not certain. [Critical Result: Unexplained pneumoperitoneum] Finding was identified on 8/15/2024 8:45 AM. DEEP Brink was contacted by me at 8/15/2024 8:53 AM and verbalized understanding of the critical finding. FELA STARK MD   SYSTEM ID:  [id number]

## 2024-08-16 NOTE — CONSULTS
Care Management Initial Consult    General Information  Assessment completed with: Children, Daughter Ami  Type of CM/SW Visit: Initial Assessment    Primary Care Provider verified and updated as needed: Yes   Readmission within the last 30 days: no previous admission in last 30 days         Advance Care Planning: Advance Care Planning Reviewed: other (see comments) (No ACP documents)          Communication Assessment  Patient's communication style: spoken language (English or Bilingual)    Hearing Difficulty or Deaf: no   Wear Glasses or Blind: yes    Cognitive  Cognitive/Neuro/Behavioral: WDL                      Living Environment:   People in home: spouse, child(shukri), adult  Spouse Kang and sons Huey and Ryan  Current living Arrangements: house      Able to return to prior arrangements: yes       Family/Social Support:  Care provided by: self  Provides care for: no one  Marital Status:   Children,   Kang       Description of Support System: Supportive         Current Resources:   Patient receiving home care services: No     Community Resources: None  Equipment currently used at home: none  Supplies currently used at home:      Employment/Financial:  Employment Status: employed full-time (Per daughter pt may need short term disability depending on medical treatment plan.)        Financial Concerns:             Does the patient's insurance plan have a 3 day qualifying hospital stay waiver?  No    Lifestyle & Psychosocial Needs:  Social Determinants of Health     Food Insecurity: Low Risk  (4/30/2024)    Food Insecurity     Within the past 12 months, did you worry that your food would run out before you got money to buy more?: No     Within the past 12 months, did the food you bought just not last and you didn t have money to get more?: No   Depression: Not at risk (5/2/2024)    PHQ-2     PHQ-2 Score: 0   Housing Stability: Low Risk  (4/30/2024)    Housing Stability     Do you have housing? :  Yes     Are you worried about losing your housing?: No   Tobacco Use: Low Risk  (8/15/2024)    Patient History     Smoking Tobacco Use: Never     Smokeless Tobacco Use: Never     Passive Exposure: Not on file   Financial Resource Strain: Low Risk  (4/30/2024)    Financial Resource Strain     Within the past 12 months, have you or your family members you live with been unable to get utilities (heat, electricity) when it was really needed?: No   Alcohol Use: Not on file   Transportation Needs: Low Risk  (4/30/2024)    Transportation Needs     Within the past 12 months, has lack of transportation kept you from medical appointments, getting your medicines, non-medical meetings or appointments, work, or from getting things that you need?: No   Physical Activity: Sufficiently Active (4/30/2024)    Exercise Vital Sign     Days of Exercise per Week: 6 days     Minutes of Exercise per Session: 30 min   Interpersonal Safety: Low Risk  (5/2/2024)    Interpersonal Safety     Do you feel physically and emotionally safe where you currently live?: Yes     Within the past 12 months, have you been hit, slapped, kicked or otherwise physically hurt by someone?: No     Within the past 12 months, have you been humiliated or emotionally abused in other ways by your partner or ex-partner?: No   Stress: Stress Concern Present (4/30/2024)    [employer] - Occupational Stress Questionnaire     Feeling of Stress : To some extent   Social Connections: Unknown (4/30/2024)    Social Connection and Isolation Panel [NHANES]     Frequency of Communication with Friends and Family: Not on file     Frequency of Social Gatherings with Friends and Family: Twice a week     Attends Taoist Services: Not on file     Active Member of Clubs or Organizations: Not on file     Attends Club or Organization Meetings: Not on file     Marital Status: Not on file   Health Literacy: Not on file       Functional Status:  Prior to  admission patient needed assistance:   Dependent ADLs:: Independent  Dependent IADLs:: Independent       Mental Health Status:  Mental Health Status: Other (see comment) (Per daughter Ami, pt lost her eldest son Rashaad in May to suicide.)       Chemical Dependency Status:  Chemical Dependency Status: No Current Concerns             Values/Beliefs:  Spiritual, Cultural Beliefs, Anglican Practices, Values that affect care:                 Additional Information:  Assessed. RNCM introduced self and CM role to pt's daughter Ami over the phone, pt tied up in the room. Pt lives with spouse Kang and two adult sons Ryan and Huey in a house in [place], [place]. Pt Ind with ADLS and IADLS. Pt works full time, per daughter will have to see outcome of what surgery says to see if pt will need short term disability. Pt/daughter aware of how process works. Pt has [facility] PCP at [email or web address] No home care svcs prior. No mobility aides. Has supportive daughter Ami in [place], and daughter Kendal that lives about 15 mins away from pt. No ACP docs. No therapy consults placed at this time. ID consulted, surgery following. Family to transport.         Cm will continue to follow plan of care,review recommendations, and assist with any discharge needs anticipated.       Karena Montgomery RN       pt alert oriented x 4 c/o diffuse chest pain since yesterday pt history of pacemaker since 2012 at which time pt had complete heart block pt here one week ago had ablation and pacemaker interrogated pt paced on monitor at this time SL placed admission labs sent

## 2024-08-16 NOTE — PLAN OF CARE
Problem: Pain Acute  Goal: Optimal Pain Control and Function  Outcome: Progressing     Problem: Surgery Nonspecified  Goal: Nausea and Vomiting Relief  Outcome: Progressing     Slept intermittently overnight. Having abdominal pain which is well controlled with PO and IV pain medication. IVF running per orders. No signs of bleeding, VSS. More lateral right sided NOHEMI drain leaking significantly from insertion site, dressing saturated and changed x2 overnight.    Tan Allen RN     0 1

## 2024-08-16 NOTE — PROGRESS NOTES
Admission medication history completed at [facility]. Please see Medication Scribe Admission Medication History note from 08/15/2024.

## 2024-08-16 NOTE — PROGRESS NOTES
[facility]    Medicine Progress Note - Hospitalist Service    Date of Admission:  8/15/2024    Assessment & Plan   58 year old female with history of prior PE, prior Kris-en-Y gastric bypass 7/25/22, hypothyroidism, and history of MTHFR mutation admitted on 8/15/2024 with perforated viscus.         Perforated viscus  H/O Kris-en-Y gastric bypass 7/25/22  -- admitted for urgent ex-lap  -- underwent diagnostic laparoscopy, RAINA and repair of umbilical hernia 8/16/24. Found to have 0.5 mm perforation at the GJ anastomosis at time of laparoscopy and this was repaired with modified Kevin patch. Gross contamination of quadrants of the abdomen noted. Drains in place x 2  -- patient remains afebrile with normal WBC   -- continue zosyn and fluconazole   -- drain cultures  -- ID consult to guide appropriate therapy  -- post op cares, drains, diet and NG management per surgery  -- continue current IVF and IV PPI      Chest pain  Trop normal x1, EKG is low voltage but without any overt ischemic changes, CTA chest negative for dissection or PE.   CT A/P shows Intraperitoneal free air   -- chest pain resolved  -- monitor        H/O Kris-en-Y gastric bypass 7/25/22  H/O MTHFR mutation  -- continue home supplements once able to take PO        Recent diagnosis shingles right lower back and around flank  -- hold any further steroids  -- continue lidocaine cream   -- patient should have finished course of valtrex by now        Hypothyroidism: continue home replacement once able to take PO        H/O anxiety: continue home lorazepam once able to take PO        H/O PE: no longer on anticoagulation, CTA chest on day of admission negative for PE  -- is on chemical DVT PPX post-op                Diet: NPO for Medical/Clinical Reasons Except for: No Exceptions, Meds, Ice Chips    DVT Prophylaxis: Heparin SQ  Gee Catheter: Not present  Lines: None     Cardiac Monitoring: None  Code Status: Full Code      Clinically  "Significant Risk Factors          # Hypocalcemia: Lowest Ca = 8.3 mg/dL in last 2 days, will monitor and replace as appropriate                   # Obesity: Estimated body mass index is 36.02 kg/m  as calculated from the following:    Height as of this encounter: 1.702 m (5' 7\").    Weight as of this encounter: 104.3 kg (230 lb)., PRESENT ON ADMISSION                  Disposition Plan   Medically Ready for Discharge: Anticipated in 2-4 Days      Jaciel Kohli DO  Hospitalist Service  [facility]  Securely message with [employer] (more info)  Text page via [facility] Paging/Directory   ______________________________________________________________________    Interval History   NAD. Denies any current complaints besides feeling that a tylenol pill is stuck in her throat around NG tube    Physical Exam   Vital Signs: Temp: 98.4  F (36.9  C) Temp src: Oral BP: 130/61 Pulse: 56   Resp: 18 SpO2: 92 % O2 Device: None (Room air) Oxygen Delivery: 2 LPM  Weight: 230 lbs 0 oz  General: NAD  RESPIRATORY: Breathing nonlabored  CARDIOVASCULAR: No le edema bilat.   ABDOMEN: soft and non-tender, drains in place  NEUROLOGIC: Motor and sensory intact, speech clear        >50 MINUTES SPENT BY ME on the date of service doing chart review, history, exam, documentation & further activities per the note.  Data       "

## 2024-08-16 NOTE — ANESTHESIA POSTPROCEDURE EVALUATION
Patient: Lucina Santamaria    Procedure: Procedure(s):  DIAGNOSTIC LAPAROSCOPY, LYSIS OF ADHESIONS, REPAIR OF UMBILLICAL HERNIA       Anesthesia Type:  General    Note:  Disposition: Inpatient   Postop Pain Control: Uneventful            Sign Out: Well controlled pain   PONV: No   Neuro/Psych: Uneventful            Sign Out: Acceptable/Baseline neuro status   Airway/Respiratory: Uneventful            Sign Out: Acceptable/Baseline resp. status   CV/Hemodynamics: Uneventful            Sign Out: Acceptable CV status; No obvious hypovolemia; No obvious fluid overload   Other NRE: NONE   DID A NON-ROUTINE EVENT OCCUR? No           Last vitals:  Vitals Value Taken Time   /53 08/15/24 1915   Temp 37.1  C (98.78  F) 08/15/24 1924   Pulse 84 08/15/24 1923   Resp 31 08/15/24 1923   SpO2 92 % 08/15/24 1922   Vitals shown include unfiled device data.    Electronically Signed By: Izaiah Torrez MD  August 15, 2024  8:17 PM

## 2024-08-16 NOTE — PHARMACY-ADMISSION MEDICATION HISTORY
Admission medication history completed at [facility]. Please see Medication Scribe Admission Medication History note from 08/15/2024.

## 2024-08-16 NOTE — PROGRESS NOTES
General Surgery Progress Note:    Hospital Day # 1    ASSESSMENT:  1. Perforation of intestine (H)        Lucina Santamaria is a 58 year old female who is s/p diagnostic laparoscopy with repair of gastric perforation on 8/15/24 with Dr. Servin.  Patient doing well postoperatively.  Pain adequately controlled.  N.p.o. with NG tube to LIS.  Plan for upper GI study tentatively tomorrow.    PLAN:  -NPO with NG tube to LIS  -Continue Protonix infusion  -Continue antibiotic and antifungal  -Increase activity and encourage ambulation as able.  Okay to clamp NG tube for ambulating.  -Tentative plan for upper GI study tomorrow, 8/17  -Monitor output from drains  -Surgery will continue to follow    SUBJECTIVE:   She is feeling well this morning.  Main complaint is NG tube discomfort and inquiring when this can be removed.  Also states she feels like pills got stuck with trying to swallow with NG tube in place.  Denies fever, chills, nausea, vomiting.  No return of bowel function yet.  Has not been out of bed yet this morning.  Voiding independently.      Patient Vitals for the past 24 hrs:   BP Temp Temp src Pulse Resp SpO2 Height Weight   08/16/24 1054 130/61 98.4  F (36.9  C) Oral 56 18 92 % -- --   08/16/24 0722 128/60 98.7  F (37.1  C) Oral 55 18 93 % -- --   08/16/24 0331 119/63 98.7  F (37.1  C) Oral 78 18 94 % -- --   08/15/24 2300 138/62 99.5  F (37.5  C) Oral 83 16 92 % -- --   08/15/24 2234 130/60 98.4  F (36.9  C) Oral 78 18 93 % -- --   08/15/24 2149 130/59 99.4  F (37.4  C) Oral 82 18 94 % -- --   08/15/24 2041 118/62 98.4  F (36.9  C) Oral 82 16 96 % -- --   08/15/24 2000 122/58 98.4  F (36.9  C) Oral 85 17 94 % -- --   08/15/24 1938 113/57 98.4  F (36.9  C) Oral 90 18 94 % -- --   08/15/24 1900 105/52 99  F (37.2  C) -- 80 20 94 % -- --   08/15/24 1845 114/57 98.6  F (37  C) -- 78 22 96 % -- --   08/15/24 1830 113/59 98.4  F (36.9  C) -- 72 18 97 % -- --   08/15/24 1815 125/61 98.1  F (36.7  C) -- 72 17 100 % --  "--   08/15/24 1800 129/59 97.7  F (36.5  C) -- 74 16 100 % -- --   08/15/24 1746 136/66 97.2  F (36.2  C) -- 84 -- 100 % -- --   08/15/24 1521 -- 99  F (37.2  C) -- 94 22 96 % -- --   08/15/24 1500 -- 99.1  F (37.3  C) -- 96 23 96 % -- --   08/15/24 1408 (!) 141/60 98.1  F (36.7  C) Core 105 16 93 % -- --   08/15/24 1335 (!) 144/77 98.6  F (37  C) -- 101 16 95 % 1.702 m (5' 7\") 104.3 kg (230 lb)         PHYSICAL EXAM:  General: patient seen resting in bed, no acute distress  HEENT: NG tube to LIS with scant output in canister.  Resp: no respiratory distress, breathing comfortably on room air  Abdomen: Soft, appropriately tender to palpation over incisions.  Laparoscopic incisions clean/dry/intact with overlying Dermabond.  Drains: Surgical Horace drain x 2: both with serosanguineous output.  Output by Drain (mL) 08/14/24 0700 - 08/14/24 1459 08/14/24 1500 - 08/14/24 2259 08/14/24 2300 - 08/15/24 0659 08/15/24 0700 - 08/15/24 1459 08/15/24 1500 - 08/15/24 2259 08/15/24 2300 - 08/16/24 0659 08/16/24 0700 - 08/16/24 1104   Closed/Suction Drain 1 RUQ Bulb 19 Upper sorbian     70 20 30   Closed/Suction Drain 2 LUQ Bulb 19 Upper sorbian     20 60 10   Extremities: warm and well perfused    08/15 0700 - 08/16 0659  In: 1575 [I.V.:1325]  Out: 1040 [Urine:850; Drains:170]    Admission on 08/15/2024   Component Date Value    Sodium 08/16/2024 144     Potassium 08/16/2024 4.1     Chloride 08/16/2024 108 (H)     Carbon Dioxide (CO2) 08/16/2024 25     Anion Gap 08/16/2024 11     Urea Nitrogen 08/16/2024 15.9     Creatinine 08/16/2024 0.86     GFR Estimate 08/16/2024 79     Calcium 08/16/2024 8.3 (L)     Glucose 08/16/2024 102 (H)     WBC Count 08/16/2024 8.2     RBC Count 08/16/2024 3.38 (L)     Hemoglobin 08/16/2024 11.4 (L)     Hematocrit 08/16/2024 34.6 (L)     MCV 08/16/2024 102 (H)     MCH 08/16/2024 33.7 (H)     MCHC 08/16/2024 32.9     RDW 08/16/2024 13.1     Platelet Count 08/16/2024 137 (L)     Magnesium 08/16/2024 2.1     Phosphorus " 08/16/2024 3.3    Admission on 08/15/2024, Discharged on 08/15/2024   Component Date Value    Sodium 08/15/2024 142     Potassium 08/15/2024 3.8     Carbon Dioxide (CO2) 08/15/2024 23     Anion Gap 08/15/2024 16 (H)     Urea Nitrogen 08/15/2024 20.1 (H)     Creatinine 08/15/2024 0.80     GFR Estimate 08/15/2024 86     Calcium 08/15/2024 9.3     Chloride 08/15/2024 103     Glucose 08/15/2024 161 (H)     Alkaline Phosphatase 08/15/2024 73     AST 08/15/2024 13     ALT 08/15/2024 25     Protein Total 08/15/2024 6.9     Albumin 08/15/2024 4.1     Bilirubin Total 08/15/2024 0.3     Lipase 08/15/2024 11 (L)     D-Dimer Quantitative 08/15/2024 0.63 (H)     Troponin T, High Sensiti* 08/15/2024 <6     WBC Count 08/15/2024 9.9     RBC Count 08/15/2024 4.55     Hemoglobin 08/15/2024 15.4     Hematocrit 08/15/2024 45.4     MCV 08/15/2024 100     MCH 08/15/2024 33.8 (H)     MCHC 08/15/2024 33.9     RDW 08/15/2024 12.7     Platelet Count 08/15/2024 237     % Neutrophils 08/15/2024 66     % Lymphocytes 08/15/2024 27     % Monocytes 08/15/2024 5     % Eosinophils 08/15/2024 2     % Basophils 08/15/2024 0     % Immature Granulocytes 08/15/2024 0     NRBCs per 100 WBC 08/15/2024 0     Absolute Neutrophils 08/15/2024 6.6     Absolute Lymphocytes 08/15/2024 2.6     Absolute Monocytes 08/15/2024 0.5     Absolute Eosinophils 08/15/2024 0.2     Absolute Basophils 08/15/2024 0.0     Absolute Immature Granul* 08/15/2024 0.0     Absolute NRBCs 08/15/2024 0.0     Hold Specimen 08/15/2024 [facility]     Radiologist flags 08/15/2024 Unexplained pneumoperitoneum (AA)     Hold Specimen 08/15/2024 [facility]         Catrina Lee PA-C  [facility]  [address] [address]

## 2024-08-17 ENCOUNTER — ANCILLARY PROCEDURE (OUTPATIENT)
Dept: RADIOLOGY | Facility: CLINIC | Age: 59
End: 2024-08-17
Payer: COMMERCIAL

## 2024-08-17 ENCOUNTER — APPOINTMENT (OUTPATIENT)
Dept: RADIOLOGY | Facility: HOSPITAL | Age: 59
DRG: 353 | End: 2024-08-17
Attending: PHYSICIAN ASSISTANT
Payer: COMMERCIAL

## 2024-08-17 LAB
ANION GAP SERPL CALCULATED.3IONS-SCNC: 11 MMOL/L (ref 7–15)
BUN SERPL-MCNC: 13.1 MG/DL (ref 6–20)
CALCIUM SERPL-MCNC: 8.5 MG/DL (ref 8.8–10.4)
CHLORIDE SERPL-SCNC: 106 MMOL/L (ref 98–107)
CREAT SERPL-MCNC: 0.77 MG/DL (ref 0.51–0.95)
EGFRCR SERPLBLD CKD-EPI 2021: 90 ML/MIN/1.73M2
ERYTHROCYTE [DISTWIDTH] IN BLOOD BY AUTOMATED COUNT: 12.8 % (ref 10–15)
GLUCOSE SERPL-MCNC: 61 MG/DL (ref 70–99)
HCO3 SERPL-SCNC: 22 MMOL/L (ref 22–29)
HCT VFR BLD AUTO: 33.6 % (ref 35–47)
HGB BLD-MCNC: 10.9 G/DL (ref 11.7–15.7)
MCH RBC QN AUTO: 33.4 PG (ref 26.5–33)
MCHC RBC AUTO-ENTMCNC: 32.4 G/DL (ref 31.5–36.5)
MCV RBC AUTO: 103 FL (ref 78–100)
PLATELET # BLD AUTO: 129 10E3/UL (ref 150–450)
POTASSIUM SERPL-SCNC: 4.1 MMOL/L (ref 3.4–5.3)
RBC # BLD AUTO: 3.26 10E6/UL (ref 3.8–5.2)
SODIUM SERPL-SCNC: 139 MMOL/L (ref 135–145)
WBC # BLD AUTO: 8.5 10E3/UL (ref 4–11)

## 2024-08-17 PROCEDURE — 258N000003 HC RX IP 258 OP 636: Performed by: INTERNAL MEDICINE

## 2024-08-17 PROCEDURE — 250N000011 HC RX IP 250 OP 636: Performed by: SURGERY

## 2024-08-17 PROCEDURE — 99232 SBSQ HOSP IP/OBS MODERATE 35: CPT | Performed by: INTERNAL MEDICINE

## 2024-08-17 PROCEDURE — 258N000003 HC RX IP 258 OP 636: Performed by: PHYSICIAN ASSISTANT

## 2024-08-17 PROCEDURE — 74240 X-RAY XM UPR GI TRC 1CNTRST: CPT

## 2024-08-17 PROCEDURE — 120N000001 HC R&B MED SURG/OB

## 2024-08-17 PROCEDURE — 250N000009 HC RX 250: Performed by: PHYSICIAN ASSISTANT

## 2024-08-17 PROCEDURE — 80048 BASIC METABOLIC PNL TOTAL CA: CPT | Performed by: INTERNAL MEDICINE

## 2024-08-17 PROCEDURE — 250N000013 HC RX MED GY IP 250 OP 250 PS 637: Performed by: SURGERY

## 2024-08-17 PROCEDURE — 99232 SBSQ HOSP IP/OBS MODERATE 35: CPT | Performed by: STUDENT IN AN ORGANIZED HEALTH CARE EDUCATION/TRAINING PROGRAM

## 2024-08-17 PROCEDURE — 250N000013 HC RX MED GY IP 250 OP 250 PS 637: Performed by: INTERNAL MEDICINE

## 2024-08-17 PROCEDURE — 85027 COMPLETE CBC AUTOMATED: CPT | Performed by: INTERNAL MEDICINE

## 2024-08-17 PROCEDURE — 36415 COLL VENOUS BLD VENIPUNCTURE: CPT | Performed by: INTERNAL MEDICINE

## 2024-08-17 RX ORDER — VITAMIN B COMPLEX
50 TABLET ORAL DAILY
Status: DISCONTINUED | OUTPATIENT
Start: 2024-08-17 | End: 2024-08-19 | Stop reason: HOSPADM

## 2024-08-17 RX ORDER — LEVOTHYROXINE SODIUM 88 UG/1
88 TABLET ORAL DAILY
Status: DISCONTINUED | OUTPATIENT
Start: 2024-08-17 | End: 2024-08-19 | Stop reason: HOSPADM

## 2024-08-17 RX ORDER — DEXTROSE MONOHYDRATE AND SODIUM CHLORIDE 5; .9 G/100ML; G/100ML
INJECTION, SOLUTION INTRAVENOUS CONTINUOUS
Status: DISCONTINUED | OUTPATIENT
Start: 2024-08-17 | End: 2024-08-18

## 2024-08-17 RX ORDER — MULTIVIT WITH MINERALS/LUTEIN
1000 TABLET ORAL DAILY
Status: DISCONTINUED | OUTPATIENT
Start: 2024-08-17 | End: 2024-08-19 | Stop reason: HOSPADM

## 2024-08-17 RX ORDER — HYDROXYZINE HYDROCHLORIDE 25 MG/1
25 TABLET, FILM COATED ORAL 3 TIMES DAILY PRN
Status: DISCONTINUED | OUTPATIENT
Start: 2024-08-17 | End: 2024-08-19 | Stop reason: HOSPADM

## 2024-08-17 RX ORDER — CALCIUM CARBONATE 500 MG/1
1000 TABLET, CHEWABLE ORAL 2 TIMES DAILY
Status: DISCONTINUED | OUTPATIENT
Start: 2024-08-17 | End: 2024-08-19 | Stop reason: HOSPADM

## 2024-08-17 RX ORDER — LORAZEPAM 0.5 MG/1
.5-1 TABLET ORAL EVERY 6 HOURS PRN
Status: DISCONTINUED | OUTPATIENT
Start: 2024-08-17 | End: 2024-08-19 | Stop reason: HOSPADM

## 2024-08-17 RX ORDER — HYDRALAZINE HYDROCHLORIDE 20 MG/ML
10 INJECTION INTRAMUSCULAR; INTRAVENOUS EVERY 4 HOURS PRN
Status: DISCONTINUED | OUTPATIENT
Start: 2024-08-17 | End: 2024-08-19 | Stop reason: HOSPADM

## 2024-08-17 RX ORDER — MULTIVIT WITH IRON,MINERALS
1 TABLET,CHEWABLE ORAL 2 TIMES DAILY
Status: DISCONTINUED | OUTPATIENT
Start: 2024-08-17 | End: 2024-08-19 | Stop reason: HOSPADM

## 2024-08-17 RX ADMIN — SODIUM CHLORIDE 8 MG/HR: 9 INJECTION, SOLUTION INTRAVENOUS at 11:29

## 2024-08-17 RX ADMIN — ACETAMINOPHEN 975 MG: 325 TABLET ORAL at 20:57

## 2024-08-17 RX ADMIN — HYDROMORPHONE HYDROCHLORIDE 0.4 MG: 0.2 INJECTION, SOLUTION INTRAMUSCULAR; INTRAVENOUS; SUBCUTANEOUS at 16:58

## 2024-08-17 RX ADMIN — HEPARIN SODIUM 5000 UNITS: 10000 INJECTION, SOLUTION INTRAVENOUS; SUBCUTANEOUS at 13:45

## 2024-08-17 RX ADMIN — ACETAMINOPHEN 975 MG: 325 TABLET ORAL at 03:38

## 2024-08-17 RX ADMIN — Medication 1000 MG: at 13:44

## 2024-08-17 RX ADMIN — ACETAMINOPHEN 975 MG: 325 TABLET ORAL at 13:44

## 2024-08-17 RX ADMIN — HEPARIN SODIUM 5000 UNITS: 10000 INJECTION, SOLUTION INTRAVENOUS; SUBCUTANEOUS at 21:08

## 2024-08-17 RX ADMIN — DEXTROSE AND SODIUM CHLORIDE: 5; 900 INJECTION, SOLUTION INTRAVENOUS at 10:36

## 2024-08-17 RX ADMIN — HEPARIN SODIUM 5000 UNITS: 10000 INJECTION, SOLUTION INTRAVENOUS; SUBCUTANEOUS at 03:38

## 2024-08-17 RX ADMIN — DEXTROSE AND SODIUM CHLORIDE: 5; 900 INJECTION, SOLUTION INTRAVENOUS at 21:53

## 2024-08-17 RX ADMIN — SODIUM CHLORIDE 8 MG/HR: 9 INJECTION, SOLUTION INTRAVENOUS at 01:20

## 2024-08-17 RX ADMIN — HYDROMORPHONE HYDROCHLORIDE 0.4 MG: 0.2 INJECTION, SOLUTION INTRAMUSCULAR; INTRAVENOUS; SUBCUTANEOUS at 08:33

## 2024-08-17 RX ADMIN — HYDROMORPHONE HYDROCHLORIDE 0.4 MG: 0.2 INJECTION, SOLUTION INTRAMUSCULAR; INTRAVENOUS; SUBCUTANEOUS at 11:29

## 2024-08-17 RX ADMIN — PIPERACILLIN AND TAZOBACTAM 3.38 G: 3; .375 INJECTION, POWDER, FOR SOLUTION INTRAVENOUS at 16:51

## 2024-08-17 RX ADMIN — PIPERACILLIN AND TAZOBACTAM 3.38 G: 3; .375 INJECTION, POWDER, FOR SOLUTION INTRAVENOUS at 10:37

## 2024-08-17 RX ADMIN — HYDROMORPHONE HYDROCHLORIDE 0.4 MG: 0.2 INJECTION, SOLUTION INTRAMUSCULAR; INTRAVENOUS; SUBCUTANEOUS at 20:56

## 2024-08-17 RX ADMIN — SENNOSIDES AND DOCUSATE SODIUM 1 TABLET: 8.6; 5 TABLET ORAL at 13:45

## 2024-08-17 RX ADMIN — CALCIUM CARBONATE (ANTACID) CHEW TAB 500 MG 1000 MG: 500 CHEW TAB at 21:08

## 2024-08-17 RX ADMIN — DIATRIZOATE MEGLUMINE AND DIATRIZOATE SODIUM 80 ML: 660; 100 SOLUTION ORAL; RECTAL at 09:29

## 2024-08-17 RX ADMIN — SODIUM CHLORIDE 8 MG/HR: 9 INJECTION, SOLUTION INTRAVENOUS at 21:56

## 2024-08-17 RX ADMIN — HYDROMORPHONE HYDROCHLORIDE 0.4 MG: 0.2 INJECTION, SOLUTION INTRAMUSCULAR; INTRAVENOUS; SUBCUTANEOUS at 05:45

## 2024-08-17 RX ADMIN — Medication 50 MCG: at 13:44

## 2024-08-17 RX ADMIN — LEVOTHYROXINE SODIUM 88 MCG: 88 TABLET ORAL at 13:44

## 2024-08-17 ASSESSMENT — ACTIVITIES OF DAILY LIVING (ADL)
ADLS_ACUITY_SCORE: 23
ADLS_ACUITY_SCORE: 22
ADLS_ACUITY_SCORE: 23
ADLS_ACUITY_SCORE: 23
ADLS_ACUITY_SCORE: 22
ADLS_ACUITY_SCORE: 23
ADLS_ACUITY_SCORE: 23
ADLS_ACUITY_SCORE: 22
ADLS_ACUITY_SCORE: 23
ADLS_ACUITY_SCORE: 22
ADLS_ACUITY_SCORE: 23
ADLS_ACUITY_SCORE: 23
ADLS_ACUITY_SCORE: 22
ADLS_ACUITY_SCORE: 23

## 2024-08-17 NOTE — PROGRESS NOTES
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         Perforated viscus  H/O Kris-en-Y gastric bypass 7/25/22  -- admitted for urgent ex-lap  -- underwent diagnostic laparoscopy, RAINA and repair of umbilical hernia 8/16/24. Found to have 0.5 mm perforation at the GJ anastomosis at time of laparoscopy and this was repaired with modified Kevin patch. Gross contamination of quadrants of the abdomen noted. Drains in place x 2  -- patient remains afebrile with normal WBC   -- continue zosyn and fluconazole   -- follow up drain cultures  -- ID consult to guide appropriate therapy  -- post op cares, drains, diet and NG management per surgery  -- continue current IVF and IV PPI until diet advances      Chest pain  Trop normal x1, EKG is low voltage but without any overt ischemic changes, CTA chest negative for dissection or PE.   CT A/P shows Intraperitoneal free air   -- chest pain resolved  -- monitor        H/O Kris-en-Y gastric bypass 7/25/22  H/O MTHFR mutation  -- continue home supplements   -- defer to surgery service about appropriateness of ordering annual labs during this hospitalization        Recent diagnosis shingles right lower back and around flank  -- hold any further steroids  -- continue lidocaine cream   -- patient has finished course of valtrex PTA        Hypothyroidism: continue home replacement         H/O anxiety: continue home lorazepam          H/O PE: no longer on anticoagulation, CTA chest on day of admission negative for PE  -- is on chemical DVT PPX post-op                Diet: NPO for Medical/Clinical Reasons Except for: No Exceptions, Meds, Ice Chips    DVT Prophylaxis: Heparin SQ  Gee Catheter: Not present  Lines: None    "  Cardiac Monitoring: None  Code Status: Full Code      Clinically Significant Risk Factors                # Thrombocytopenia: Lowest platelets = 129 in last 2 days, will monitor for bleeding             # Obesity: Estimated body mass index is 36.02 kg/m  as calculated from the following:    Height as of this encounter: 1.702 m (5' 7\").    Weight as of this encounter: 104.3 kg (230 lb)., PRESENT ON ADMISSION                  Disposition Plan   Medically Ready for Discharge: Anticipated in 2-4 Days      Jaciel Kohli DO  Hospitalist Service  [facility]  Securely message with [employer] (more info)  Text page via [employer] Paging/Directory   ______________________________________________________________________    Interval History   NAD. Denies any current complaints besides wanting NG tube out    Physical Exam   Vital Signs: Temp: 98.2  F (36.8  C) Temp src: Oral BP: (!) 142/67 Pulse: 52   Resp: 20 SpO2: 93 % O2 Device: None (Room air)    Weight: 230 lbs 0 oz  General: NAD  RESPIRATORY: Breathing nonlabored  CARDIOVASCULAR: No le edema bilat.   NEUROLOGIC: Motor and sensory intact, speech clear        >45 MINUTES SPENT BY ME on the date of service doing chart review, history, exam, documentation & further activities per the note.  Data       "

## 2024-08-17 NOTE — PLAN OF CARE
L PIV infusing mIVf and IV protonix per MAR. R FA PIV infusing zosyn per MAR. R wrist PIV SL. NOHEMI dressings changed x1 with both drains with serosanguinous output. BM x1. Independent in room. Voiding freely. Ambulating on unit x2. NPO except med and ice chips. PO medications restarted this AM.     NG discontinue per order - off unit in AM for xray GI study. Denies nausea or vomiting. Call light in reach and able to make needs known. PRN pain medications for abdominal pain and discomfort x2 - IV dilaudid 0.4 mg 7-8/10 - reassessment 3-5/10.     Problem: Adult Inpatient Plan of Care  Goal: Plan of Care Review  Outcome: Progressing     Problem: Pain Acute  Goal: Optimal Pain Control and Function  Outcome: Progressing  Intervention: Prevent or Manage Pain  Recent Flowsheet Documentation  Taken 8/17/2024 0945 by Maritza Norman RN  Medication Review/Management:   medications reviewed   high-risk medications identified     Problem: Surgery Nonspecified  Goal: Blood Glucose Level Within Targeted Range  Outcome: Progressing  Intervention: Optimize Glycemic Control  Recent Flowsheet Documentation  Taken 8/17/2024 0945 by Maritza Norman, RN  Glycemic Management: (mIVf changed per MAR;)   oral hydration promoted   other (see comments)  Goal: Anesthesia/Sedation Recovery  Outcome: Progressing  Intervention: Optimize Anesthesia Recovery  Recent Flowsheet Documentation  Taken 8/17/2024 0945 by Maritza Norman, RN  Safety Promotion/Fall Prevention:   clutter free environment maintained   lighting adjusted   nonskid shoes/slippers when out of bed   room organization consistent   treat reversible contributory factors   treat underlying cause  Goal: Effective Urinary Elimination  Outcome: Progressing  Intervention: Monitor and Manage Urinary Retention  Recent Flowsheet Documentation  Taken 8/17/2024 0845 by Maritza Nroman, RN  Urinary Elimination Promotion: frequent voiding encouraged  Goal: Effective Oxygenation and  Ventilation  Outcome: Progressing  Intervention: Optimize Oxygenation and Ventilation  Recent Flowsheet Documentation  Taken 8/17/2024 0850 by Maritza Norman, RN  Head of Bed (HOB) Positioning: HOB at 20-30 degrees

## 2024-08-17 NOTE — PLAN OF CARE
Problem: Adult Inpatient Plan of Care  Goal: Absence of Hospital-Acquired Illness or Injury  Outcome: Progressing  Intervention: Identify and Manage Fall Risk  Recent Flowsheet Documentation  Taken 8/16/2024 2348 by Nadeem Elaine RN  Safety Promotion/Fall Prevention:   clutter free environment maintained   lighting adjusted   nonskid shoes/slippers when out of bed   room organization consistent   treat reversible contributory factors   treat underlying cause  Intervention: Prevent Skin Injury  Recent Flowsheet Documentation  Taken 8/16/2024 2348 by Nadeem Elaine RN  Body Position: position changed independently  Intervention: Prevent and Manage VTE (Venous Thromboembolism) Risk  Recent Flowsheet Documentation  Taken 8/16/2024 2348 by Nadeem Elaine RN  VTE Prevention/Management: SCDs on (sequential compression devices)     Problem: Pain Acute  Goal: Optimal Pain Control and Function  Outcome: Progressing  Intervention: Develop Pain Management Plan  Recent Flowsheet Documentation  Taken 8/16/2024 2348 by Nadeem Elaine RN  Pain Management Interventions: medication (see MAR)  Intervention: Prevent or Manage Pain  Recent Flowsheet Documentation  Taken 8/16/2024 2348 by Nadeem Elaine RN  Medication Review/Management:   medications reviewed   high-risk medications identified   Goal Outcome Evaluation:  VSS on RA, A&Ox4, pain as high as 6/10 given PRN dilaudid and both PRN and scheduled tylenol with relief. Jpx2 patent. Pills crushed though NG. Lap sites CDI. Tolerating NG. NS @75mL/hr. NPO except ice chips. SBA. Will continue with plan of care.

## 2024-08-17 NOTE — PROGRESS NOTES
Lucina Santamaria is doing relatively well, complains of abdominal discomfort and is in the process of going down for upper GI.  No fevers or chills.  Pleasant and conversant.        Vitals:    08/16/24 1617 08/17/24 0012 08/17/24 0451 08/17/24 0737   BP: (!) 147/65 137/66 (!) 144/68 (!) 142/67   BP Location: Right arm Right arm Right arm Left arm   Pulse: 66 62 59 52   Resp: 18 18 20 20   Temp: 98.3  F (36.8  C) 98.1  F (36.7  C) 98.6  F (37  C) 98.2  F (36.8  C)   TempSrc: Oral Oral Oral Oral   SpO2: 92% 92% 92% 93%   Weight:       Height:           PHYSICAL EXAM:  GEN: No acute distress, comfortable  ABD: Soft, tender to palpation epigastric and mid abdominal region with SUREKHA drains with cloudy serosanguineous output  EXT:No cyanosis, edema or obvious abnormalities        Recent Labs   Lab 08/17/24  0652   WBC 8.5   HGB 10.9*   HCT 33.6*   *       Recent Labs   Lab 08/17/24  0652 08/16/24  0700 08/15/24  0643      < > 142   CO2 22   < > 23   BUN 13.1   < > 20.1*   ALBUMIN  --   --  4.1   ALKPHOS  --   --  73   ALT  --   --  25   AST  --   --  13    < > = values in this interval not displayed.         A/P:  Lucina Santamaria is s/p repair of GJ marginal ulcer perforation  -Continue with n.p.o. and IV antibiotics as well as antifungals for now.  -Surekha drains to remain in place  -Upper GI study pending    Nicola Hope DO  FACS  [phone]  [facility] Department of Surgery

## 2024-08-17 NOTE — PROGRESS NOTES
[facility] Inpatient follow up       Patient:  Lucina Santamaria  Date of birth 1965, Medical record number [id number]  Date of Visit:  2024  Attending Physician: Jaciel Kohli DO         Assessment and Recommendations:   Assessment:  Lucina Santamaria is a 58 year old female with   Peritonitis from perforated viscus, presented with acute abdominal pain. Free air seen on CT. Now s/p exploratory laparoscopy 8/15, noted gross contamination throughout abdomen, underwent repair of perforation at GJ anastomosis from prior gastric bypass, with Kevin patch. Question instigating factor -- possibly prednisone use.  Clinically improving.  S/p gastric bypass .   Recent varicella zoster, treated with valtrex and prednisone.     Recommendations:  Continue IV Zosyn and fluconazole  Can do p.o. Augmentin plus fluconazole at discharge depending on cultures.  Follow-up pending cultures.    Discussed with the patient, nursing staff.    ID will follow.    Domi Zafar MD.  [facility].   [facility]  Office Telephone [phone].  Fax [phone]  [facility] paging            Interval History:     HPI:  The interval history was reviewed.   Doing much better today.  No new complaint.  Cultures in process.    Pertinent cultures include:  Culture Micro   Date Value Ref Range Status   2020 >100,000 colonies/mL  Escherichia coli   (A)  Final       Recent Inflammatory Biomarkers:   Recent Labs   Lab Test 24  0652 24  0700 08/15/24  0643 24  1636 23  1033 23  1045 22  1202 22  1948 19  0525 16  1105   CRP  --   --   --   --   --   --   --  21.3*  --  11.1*   WBC 8.5 8.2 9.9 6.4 5.3 6.2   < >  --    < > 5.7    < > = values in this interval not displayed.            Review of Systems:   CONSTITUTIONAL:    Temp Max: Temp (24hrs), Av.3  F (36.8  C), Min:98.1  F (36.7  C),  Max:98.6  F (37  C)   .  Negative except for findings in the HPI.           Current Medications (antimicrobials listed in bold):     Current Facility-Administered Medications   Medication Dose Route Frequency Provider Last Rate Last Admin    acetaminophen (TYLENOL) tablet 975 mg  975 mg Oral Q8H Teofilo Servin DO   975 mg at 08/17/24 1344    calcium carbonate (TUMS) chewable tablet 1,000 mg  1,000 mg Oral or Feeding Tube BID Jaciel Kohli DO        childrens multivitamin w/iron (FLINTSTONES COMPLETE) chewable tablet 1 tablet  1 tablet Oral or Feeding Tube BID Jaciel Kohli DO        fluconazole (DIFLUCAN) intermittent infusion 200 mg  200 mg Intravenous Q24H Teofilo Servin  mL/hr at 08/16/24 2348 200 mg at 08/16/24 2348    heparin ANTICOAGULANT injection 5,000 Units  5,000 Units Subcutaneous Q8H Teofilo Servin DO   5,000 Units at 08/17/24 1345    levothyroxine (SYNTHROID/LEVOTHROID) tablet 88 mcg  88 mcg Oral or Feeding Tube Daily Jaciel Kohli DO   88 mcg at 08/17/24 1344    piperacillin-tazobactam (ZOSYN) 3.375 g vial to attach to  mL bag  3.375 g Intravenous Once Teofilo Servin DO        piperacillin-tazobactam (ZOSYN) 3.375 g vial to attach to  mL bag  3.375 g Intravenous Q8H Teofilo Servin DO   3.375 g at 08/17/24 1037    polyethylene glycol (MIRALAX) Packet 17 g  17 g Oral Daily Teofilo Servin DO   17 g at 08/16/24 1202    senna-docusate (SENOKOT-S/PERICOLACE) 8.6-50 MG per tablet 1 tablet  1 tablet Oral BID Teofilo Servin DO   1 tablet at 08/17/24 1345    sodium chloride (PF) 0.9% PF flush 3 mL  3 mL Intracatheter Q8H Teofilo Servin DO   3 mL at 08/17/24 0550    sodium chloride (PF) 0.9% PF flush 3 mL  3 mL Intracatheter Q8H Teofilo Servin, DO   3 mL at 08/16/24 1641    vitamin C (ASCORBIC ACID) tablet 1,000 mg  1,000 mg Oral or Feeding Tube Daily Jaciel Kohli DO   1,000 mg at 08/17/24 1344    Vitamin D3 (CHOLECALCIFEROL) tablet 50 mcg  50 mcg Oral or Feeding Tube Daily Seun  Jaciel GONZALES DO   50 mcg at 08/17/24 1344              Allergies:     Allergies   Allergen Reactions    Nsaids Other (See Comments)     Caution should be used in the gastric bypass patient.    Latex Dermatitis     Contact dermatitis    Morphine Itching            Physical Exam:   Vitals were reviewed  Patient Vitals for the past 24 hrs:   BP Temp Temp src Pulse Resp SpO2   08/17/24 1546 (!) 163/63 98.5  F (36.9  C) Oral 53 20 94 %   08/17/24 0737 (!) 142/67 98.2  F (36.8  C) Oral 52 20 93 %   08/17/24 0451 (!) 144/68 98.6  F (37  C) Oral 59 20 92 %   08/17/24 0012 137/66 98.1  F (36.7  C) Oral 62 18 92 %   08/16/24 1617 (!) 147/65 98.3  F (36.8  C) Oral 66 18 92 %       Physical Examination:  Gen: Pleasant in no acute distress.  HEENT: NCAT. EOMI. PERRL.  Neck: No bruit, JVD or thyromegaly.  Lungs: Clear to ascultation bilat with no crackles or wheezes.  Card: RRR. NSR. No RMG. Peripheral pulses present and symmetric. No edema.  Abd: Soft NT ND. No mass. Normal bowel sounds.  Skin: No rash.  Extr: No edema.  Neuro: Alert and oriented to place time and person.        Laboratory Data:   ID Labs:  Microbiology labs:  Reviewed.    Recent Labs   Lab Test 01/19/22  1948 06/09/16  1105   CRP 21.3* 11.1*     Recent Labs   Lab Test 08/17/24  0652 08/16/24  0700 08/15/24  0643 04/30/24  1636 07/23/23  1033 01/24/23  1045   WBC 8.5 8.2 9.9 6.4 5.3 6.2     Recent Labs   Lab Test 08/17/24  0652 08/16/24  0700 08/15/24  0643 04/30/24  1636   CR 0.77 0.86 0.80 0.74   GFRESTIMATED 90 79 86 >90       Hematology Studies  Recent Labs   Lab Test 08/17/24  0652 08/16/24  0700 08/15/24  0643 04/30/24  1636 07/23/23  1033 01/24/23  1045 09/23/21  0828 12/31/19  0525 06/09/16  1105   WBC 8.5 8.2 9.9 6.4 5.3 6.2   < > 8.2 5.7   ANEU  --   --   --   --   --   --   --  5.4 3.6   AEOS  --   --   --   --   --   --   --   --  0.2   HGB 10.9* 11.4* 15.4 13.5 13.7 14.2   < > 13.1 13.4   HCT 33.6* 34.6* 45.4 40.8 40.9 42.2   < > 39.6 40.6   *  137* 237 171 164 187   < > 179 168    < > = values in this interval not displayed.       Metabolic  Recent Labs   Lab Test 08/17/24  0652 08/16/24  0700 08/15/24  0643    144 142   BUN 13.1 15.9 20.1*   CO2 22 25 23   CR 0.77 0.86 0.80   GFRESTIMATED 90 79 86       Hepatic Studies  Recent Labs   Lab Test 08/15/24  0643 04/30/24  1636 07/23/23  1033   BILITOTAL 0.3 0.2 0.3   ALKPHOS 73 83 75   ALBUMIN 4.1 4.1 4.2   AST 13 16 16   ALT 25 16 16       Immunologlobulins  Recent Labs   Lab Test 01/19/22  1948 06/09/16  1105   SED 14 15            Imaging Data:   Reviewed

## 2024-08-18 LAB
ANION GAP SERPL CALCULATED.3IONS-SCNC: 6 MMOL/L (ref 7–15)
BUN SERPL-MCNC: 5.5 MG/DL (ref 6–20)
CALCIUM SERPL-MCNC: 8 MG/DL (ref 8.8–10.4)
CHLORIDE SERPL-SCNC: 109 MMOL/L (ref 98–107)
CREAT SERPL-MCNC: 0.75 MG/DL (ref 0.51–0.95)
EGFRCR SERPLBLD CKD-EPI 2021: >90 ML/MIN/1.73M2
ERYTHROCYTE [DISTWIDTH] IN BLOOD BY AUTOMATED COUNT: 12.5 % (ref 10–15)
GLUCOSE SERPL-MCNC: 115 MG/DL (ref 70–99)
HCO3 SERPL-SCNC: 24 MMOL/L (ref 22–29)
HCT VFR BLD AUTO: 32.6 % (ref 35–47)
HGB BLD-MCNC: 10.8 G/DL (ref 11.7–15.7)
MCH RBC QN AUTO: 33.9 PG (ref 26.5–33)
MCHC RBC AUTO-ENTMCNC: 33.1 G/DL (ref 31.5–36.5)
MCV RBC AUTO: 102 FL (ref 78–100)
PLATELET # BLD AUTO: 136 10E3/UL (ref 150–450)
POTASSIUM SERPL-SCNC: 3.6 MMOL/L (ref 3.4–5.3)
RBC # BLD AUTO: 3.19 10E6/UL (ref 3.8–5.2)
SODIUM SERPL-SCNC: 139 MMOL/L (ref 135–145)
WBC # BLD AUTO: 6.6 10E3/UL (ref 4–11)

## 2024-08-18 PROCEDURE — 250N000011 HC RX IP 250 OP 636: Performed by: SURGERY

## 2024-08-18 PROCEDURE — 250N000011 HC RX IP 250 OP 636: Performed by: INTERNAL MEDICINE

## 2024-08-18 PROCEDURE — 80048 BASIC METABOLIC PNL TOTAL CA: CPT | Performed by: INTERNAL MEDICINE

## 2024-08-18 PROCEDURE — 250N000009 HC RX 250: Performed by: PHYSICIAN ASSISTANT

## 2024-08-18 PROCEDURE — 120N000001 HC R&B MED SURG/OB

## 2024-08-18 PROCEDURE — 258N000003 HC RX IP 258 OP 636: Performed by: PHYSICIAN ASSISTANT

## 2024-08-18 PROCEDURE — 99232 SBSQ HOSP IP/OBS MODERATE 35: CPT | Performed by: STUDENT IN AN ORGANIZED HEALTH CARE EDUCATION/TRAINING PROGRAM

## 2024-08-18 PROCEDURE — 250N000013 HC RX MED GY IP 250 OP 250 PS 637: Performed by: SURGERY

## 2024-08-18 PROCEDURE — 36415 COLL VENOUS BLD VENIPUNCTURE: CPT | Performed by: INTERNAL MEDICINE

## 2024-08-18 PROCEDURE — 250N000013 HC RX MED GY IP 250 OP 250 PS 637: Performed by: INTERNAL MEDICINE

## 2024-08-18 PROCEDURE — 85027 COMPLETE CBC AUTOMATED: CPT | Performed by: INTERNAL MEDICINE

## 2024-08-18 PROCEDURE — 99024 POSTOP FOLLOW-UP VISIT: CPT | Performed by: PHYSICIAN ASSISTANT

## 2024-08-18 PROCEDURE — 99232 SBSQ HOSP IP/OBS MODERATE 35: CPT | Performed by: INTERNAL MEDICINE

## 2024-08-18 PROCEDURE — 250N000013 HC RX MED GY IP 250 OP 250 PS 637: Performed by: PHYSICIAN ASSISTANT

## 2024-08-18 RX ADMIN — FLUCONAZOLE 200 MG: 2 INJECTION, SOLUTION INTRAVENOUS at 22:39

## 2024-08-18 RX ADMIN — CALCIUM CARBONATE (ANTACID) CHEW TAB 500 MG 1000 MG: 500 CHEW TAB at 20:57

## 2024-08-18 RX ADMIN — HEPARIN SODIUM 5000 UNITS: 10000 INJECTION, SOLUTION INTRAVENOUS; SUBCUTANEOUS at 20:58

## 2024-08-18 RX ADMIN — HYDROXYZINE HYDROCHLORIDE 25 MG: 25 TABLET ORAL at 22:52

## 2024-08-18 RX ADMIN — HEPARIN SODIUM 5000 UNITS: 10000 INJECTION, SOLUTION INTRAVENOUS; SUBCUTANEOUS at 11:45

## 2024-08-18 RX ADMIN — HEPARIN SODIUM 5000 UNITS: 10000 INJECTION, SOLUTION INTRAVENOUS; SUBCUTANEOUS at 04:16

## 2024-08-18 RX ADMIN — Medication 1 TABLET: at 20:57

## 2024-08-18 RX ADMIN — PIPERACILLIN AND TAZOBACTAM 3.38 G: 3; .375 INJECTION, POWDER, FOR SOLUTION INTRAVENOUS at 08:45

## 2024-08-18 RX ADMIN — OXYCODONE HYDROCHLORIDE 5 MG: 5 TABLET ORAL at 18:24

## 2024-08-18 RX ADMIN — OXYCODONE HYDROCHLORIDE 10 MG: 5 TABLET ORAL at 22:40

## 2024-08-18 RX ADMIN — PIPERACILLIN AND TAZOBACTAM 3.38 G: 3; .375 INJECTION, POWDER, FOR SOLUTION INTRAVENOUS at 18:24

## 2024-08-18 RX ADMIN — Medication 1 TABLET: at 10:18

## 2024-08-18 RX ADMIN — Medication 50 MCG: at 10:18

## 2024-08-18 RX ADMIN — CALCIUM CARBONATE (ANTACID) CHEW TAB 500 MG 1000 MG: 500 CHEW TAB at 10:17

## 2024-08-18 RX ADMIN — OXYCODONE HYDROCHLORIDE 5 MG: 5 TABLET ORAL at 10:17

## 2024-08-18 RX ADMIN — ACETAMINOPHEN 975 MG: 325 TABLET ORAL at 11:45

## 2024-08-18 RX ADMIN — ACETAMINOPHEN 975 MG: 325 TABLET ORAL at 04:16

## 2024-08-18 RX ADMIN — OXYCODONE HYDROCHLORIDE 10 MG: 5 TABLET ORAL at 04:20

## 2024-08-18 RX ADMIN — Medication 40 MG: at 18:24

## 2024-08-18 RX ADMIN — Medication 1000 MG: at 10:17

## 2024-08-18 RX ADMIN — HYDROMORPHONE HYDROCHLORIDE 0.4 MG: 0.2 INJECTION, SOLUTION INTRAMUSCULAR; INTRAVENOUS; SUBCUTANEOUS at 02:28

## 2024-08-18 RX ADMIN — HYDRALAZINE HYDROCHLORIDE 10 MG: 20 INJECTION INTRAMUSCULAR; INTRAVENOUS at 12:44

## 2024-08-18 RX ADMIN — FLUCONAZOLE 200 MG: 2 INJECTION, SOLUTION INTRAVENOUS at 00:10

## 2024-08-18 RX ADMIN — SODIUM CHLORIDE 8 MG/HR: 9 INJECTION, SOLUTION INTRAVENOUS at 08:45

## 2024-08-18 RX ADMIN — PIPERACILLIN AND TAZOBACTAM 3.38 G: 3; .375 INJECTION, POWDER, FOR SOLUTION INTRAVENOUS at 00:05

## 2024-08-18 RX ADMIN — ACETAMINOPHEN 975 MG: 325 TABLET ORAL at 20:57

## 2024-08-18 RX ADMIN — LEVOTHYROXINE SODIUM 88 MCG: 88 TABLET ORAL at 08:45

## 2024-08-18 ASSESSMENT — ACTIVITIES OF DAILY LIVING (ADL)
ADLS_ACUITY_SCORE: 22

## 2024-08-18 NOTE — PLAN OF CARE
Problem: Adult Inpatient Plan of Care  Goal: Absence of Hospital-Acquired Illness or Injury  Intervention: Identify and Manage Fall Risk  Recent Flowsheet Documentation  Taken 8/17/2024 1700 by Morenita Donahue RN  Safety Promotion/Fall Prevention:   clutter free environment maintained   lighting adjusted   nonskid shoes/slippers when out of bed   room organization consistent   treat reversible contributory factors   treat underlying cause     Problem: Adult Inpatient Plan of Care  Goal: Absence of Hospital-Acquired Illness or Injury  Intervention: Prevent and Manage VTE (Venous Thromboembolism) Risk  Recent Flowsheet Documentation  Taken 8/17/2024 1700 by Morenita Donahue RN  VTE Prevention/Management: SCDs on (sequential compression devices)     Problem: Adult Inpatient Plan of Care  Goal: Absence of Hospital-Acquired Illness or Injury  Intervention: Prevent Infection  Recent Flowsheet Documentation  Taken 8/17/2024 1700 by Morenita Donahue RN  Infection Prevention:   cohorting utilized   rest/sleep promoted   single patient room provided   Scheduled iv abx given.  Problem: Pain Acute  Goal: Optimal Pain Control and Function  Outcome: Progressing  Intervention: Prevent or Manage Pain  Recent Flowsheet Documentation  Taken 8/17/2024 1700 by Morenita Donahue RN  Medication Review/Management:   medications reviewed   high-risk medications identified  Pain controlled with PRN dilaudid and scheduled TY.  Intervention: Optimize Psychosocial Wellbeing  Recent Flowsheet Documentation  Taken 8/17/2024 1700 by Morenita Donahue RN  Supportive Measures:   active listening utilized   verbalization of feelings encouraged

## 2024-08-18 NOTE — PLAN OF CARE
R and L PIVs SL between ordered infusions. Ind in room - call light in reach and able to make needs known. Ambulated on unit. Up in chair. IV protonix stopped. Diet updated to mechanical/soft. PRN hydralazine given x1 - patient asymptomatic. BM x1 - loose stool - bowel medications held per order parameters. No N/V. PRN PO oxycodone given x1 5 mg and scheduled tylenol. IV abx give per MAR. NOHEMI drains patent and to bulb suction. Dressings changed x1 d/t leaking at site. Patient declined offered atarax.     Problem: Adult Inpatient Plan of Care  Goal: Plan of Care Review  Outcome: Progressing     Problem: Surgery Nonspecified  Goal: Effective Bowel Elimination  Outcome: Progressing  Goal: Fluid and Electrolyte Balance  Outcome: Progressing  Goal: Absence of Infection Signs and Symptoms  Outcome: Progressing  Goal: Optimal Pain Control and Function  Outcome: Progressing  Goal: Nausea and Vomiting Relief  Outcome: Progressing  Goal: Effective Urinary Elimination  Outcome: Progressing

## 2024-08-18 NOTE — PROGRESS NOTES
General Surgery Progress Note:    Hospital Day # 3    ASSESSMENT:  1. Perforation of intestine (H)        Lucina Santamaria is a 58 year old female who is s/p diagnostic laparoscopy with repair of GJ marginal ulcer on 8/15/24. Upper GI study completed 8/17 and was negative for ongoing perforation or leak. NG tube was subsequently removed. Okay for soft solid diet as tolerated.    PLAN:  - Okay for soft solid diet  - BID PO PPI  - Monitor drain outputs  - Antibiotics per ID  - Continue to encourage ambulation to promote bowel function  - General surgery will continue to follow    SUBJECTIVE:   She is feeling well this morning.  States that she tried some Gatorade, Jell-O and vanilla pudding yesterday evening without issues.  Denies fever, chills, nausea, vomiting.  Abdominal pain controlled.  Had bowel movements yesterday.  Ambulating and voiding independently.      Patient Vitals for the past 24 hrs:   BP Temp Temp src Pulse Resp SpO2   08/18/24 1145 (!) 180/79 -- -- (!) 47 -- --   08/18/24 0721 (!) 177/72 98.5  F (36.9  C) Oral 51 16 95 %   08/18/24 0349 (!) 158/70 98.4  F (36.9  C) Oral 50 16 94 %   08/18/24 0017 (!) 141/63 98.7  F (37.1  C) Oral 50 16 95 %   08/17/24 1546 (!) 163/63 98.5  F (36.9  C) Oral 53 20 94 %         PHYSICAL EXAM:  General: patient seen resting in bed, no acute distress  Resp: no respiratory distress, breathing comfortably on room air  Abdomen: Soft, mildly tender to palpation around surgical drain sites.  Laparoscopic incisions clean/dry/intact with overlying Dermabond.  Drains: Surgical Horace drains x 2: Lower abdominal drain in pelvis with serosanguineous output, upper abdominal drain over anastomosis with serosanguineous output.  Output by Drain (mL) 08/16/24 0700 - 08/16/24 1459 08/16/24 1500 - 08/16/24 2259 08/16/24 2300 - 08/17/24 0659 08/17/24 0700 - 08/17/24 1459 08/17/24 1500 - 08/17/24 2259 08/17/24 2300 - 08/18/24 0659 08/18/24 0700 - 08/18/24 1234   Closed/Suction Drain 1 LUQ  Bulb 19 Croatian updated per assessment - previously RUQ 65  20 15 5 5    Closed/Suction Drain 2 LLQ Bulb 19 Croatian updated per assessment - previously LUQ 50 20 20 30 15 10    Extremities: warm and well perfused    08/17 0700 - 08/18 0659  In: 1690.25 [P.O.:120; I.V.:1570.25]  Out: 2730 [Urine:2650; Drains:80]    Admission on 08/15/2024   Component Date Value    Sodium 08/16/2024 144     Potassium 08/16/2024 4.1     Chloride 08/16/2024 108 (H)     Carbon Dioxide (CO2) 08/16/2024 25     Anion Gap 08/16/2024 11     Urea Nitrogen 08/16/2024 15.9     Creatinine 08/16/2024 0.86     GFR Estimate 08/16/2024 79     Calcium 08/16/2024 8.3 (L)     Glucose 08/16/2024 102 (H)     WBC Count 08/16/2024 8.2     RBC Count 08/16/2024 3.38 (L)     Hemoglobin 08/16/2024 11.4 (L)     Hematocrit 08/16/2024 34.6 (L)     MCV 08/16/2024 102 (H)     MCH 08/16/2024 33.7 (H)     MCHC 08/16/2024 32.9     RDW 08/16/2024 13.1     Platelet Count 08/16/2024 137 (L)     Magnesium 08/16/2024 2.1     Phosphorus 08/16/2024 3.3     Culture 08/16/2024 No growth after 1 day     Gram Stain Result 08/16/2024 No organisms seen     Gram Stain Result 08/16/2024 4+ WBC seen     Culture 08/16/2024 Culture in progress     Gram Stain Result 08/16/2024 1+ Gram positive cocci (A)     Gram Stain Result 08/16/2024 4+ WBC seen (A)     Sodium 08/17/2024 139     Potassium 08/17/2024 4.1     Chloride 08/17/2024 106     Carbon Dioxide (CO2) 08/17/2024 22     Anion Gap 08/17/2024 11     Urea Nitrogen 08/17/2024 13.1     Creatinine 08/17/2024 0.77     GFR Estimate 08/17/2024 90     Calcium 08/17/2024 8.5 (L)     Glucose 08/17/2024 61 (L)     WBC Count 08/17/2024 8.5     RBC Count 08/17/2024 3.26 (L)     Hemoglobin 08/17/2024 10.9 (L)     Hematocrit 08/17/2024 33.6 (L)     MCV 08/17/2024 103 (H)     MCH 08/17/2024 33.4 (H)     MCHC 08/17/2024 32.4     RDW 08/17/2024 12.8     Platelet Count 08/17/2024 129 (L)     Sodium 08/18/2024 139     Potassium 08/18/2024 3.6     Chloride  08/18/2024 109 (H)     Carbon Dioxide (CO2) 08/18/2024 24     Anion Gap 08/18/2024 6 (L)     Urea Nitrogen 08/18/2024 5.5 (L)     Creatinine 08/18/2024 0.75     GFR Estimate 08/18/2024 >90     Calcium 08/18/2024 8.0 (L)     Glucose 08/18/2024 115 (H)     WBC Count 08/18/2024 6.6     RBC Count 08/18/2024 3.19 (L)     Hemoglobin 08/18/2024 10.8 (L)     Hematocrit 08/18/2024 32.6 (L)     MCV 08/18/2024 102 (H)     MCH 08/18/2024 33.9 (H)     MCHC 08/18/2024 33.1     RDW 08/18/2024 12.5     Platelet Count 08/18/2024 136 (L)         Catrina Lee PA-C  [facility]  [address]  [address]  [place] [address]

## 2024-08-18 NOTE — PROGRESS NOTES
[facility] Inpatient follow up       Patient:  Lucina Santamaria  Date of birth 1965, Medical record number [id number]  Date of Visit:  2024  Attending Physician: Jaciel Kohli DO         Assessment and Recommendations:   Assessment:  Lucina Santamaria is a 58 year old female with   Peritonitis from perforated viscus, presented with acute abdominal pain. Free air seen on CT. Now s/p exploratory laparoscopy 8/15, noted gross contamination throughout abdomen, underwent repair of perforation at GJ anastomosis from prior gastric bypass, with Kevin patch. Question instigating factor -- possibly prednisone use.  Clinically improving.  Cultures remain in process.  S/p gastric bypass .   Recent varicella zoster, treated with valtrex and prednisone.     Recommendations:  Continue IV Zosyn and fluconazole  Can do p.o. Augmentin plus fluconazole at discharge depending on cultures.  Follow-up pending cultures.    Discussed with the patient, nursing staff.    ID will follow.    Domi Zafar MD.  [facility].   [facility]  Office Telephone [phone].  Fax [phone]  [facility] paging            Interval History:     HPI:  The interval history was reviewed.   Clinically feeling better.  No new complaint.    Pertinent cultures include:  Culture Micro   Date Value Ref Range Status   2020 >100,000 colonies/mL  Escherichia coli   (A)  Final       Recent Inflammatory Biomarkers:   Recent Labs   Lab Test 24  0613 24  0652 24  0700 08/15/24  0643 24  1636 23  1033 22  1202 22  1948 19  0525 16  1105   CRP  --   --   --   --   --   --   --  21.3*  --  11.1*   WBC 6.6 8.5 8.2 9.9 6.4 5.3   < >  --    < > 5.7    < > = values in this interval not displayed.            Review of Systems:   CONSTITUTIONAL:    Temp Max: Temp (24hrs), Av.3  F (36.8  C), Min:98.1  F (36.7   C), Max:98.6  F (37  C)   .  Negative except for findings in the HPI.           Current Medications (antimicrobials listed in bold):     Current Facility-Administered Medications   Medication Dose Route Frequency Provider Last Rate Last Admin    acetaminophen (TYLENOL) tablet 975 mg  975 mg Oral Q8H Teofilo Servin DO   975 mg at 08/18/24 0416    calcium carbonate (TUMS) chewable tablet 1,000 mg  1,000 mg Oral or Feeding Tube BID Jaciel Kohli DO   1,000 mg at 08/18/24 1017    childrens multivitamin w/iron (FLINTSTONES COMPLETE) chewable tablet 1 tablet  1 tablet Oral or Feeding Tube BID Jaciel Kohli, DO   1 tablet at 08/18/24 1018    fluconazole (DIFLUCAN) intermittent infusion 200 mg  200 mg Intravenous Q24H Teofilo Servin  mL/hr at 08/18/24 0010 200 mg at 08/18/24 0010    heparin ANTICOAGULANT injection 5,000 Units  5,000 Units Subcutaneous Q8H Teofilo Servin DO   5,000 Units at 08/18/24 0416    levothyroxine (SYNTHROID/LEVOTHROID) tablet 88 mcg  88 mcg Oral or Feeding Tube Daily Jaciel Kohli DO   88 mcg at 08/18/24 0845    piperacillin-tazobactam (ZOSYN) 3.375 g vial to attach to  mL bag  3.375 g Intravenous Once Teofilo Servin DO        piperacillin-tazobactam (ZOSYN) 3.375 g vial to attach to  mL bag  3.375 g Intravenous Q8H Teofilo Servin DO   3.375 g at 08/18/24 0845    polyethylene glycol (MIRALAX) Packet 17 g  17 g Oral Daily Teofilo Servin DO   17 g at 08/16/24 1202    senna-docusate (SENOKOT-S/PERICOLACE) 8.6-50 MG per tablet 1 tablet  1 tablet Oral BID Teofilo Servin DO   1 tablet at 08/17/24 1345    sodium chloride (PF) 0.9% PF flush 3 mL  3 mL Intracatheter Q8H Teofilo Servin DO   3 mL at 08/17/24 2155    sodium chloride (PF) 0.9% PF flush 3 mL  3 mL Intracatheter Q8H Teofilo Servin DO   3 mL at 08/18/24 0845    vitamin C (ASCORBIC ACID) tablet 1,000 mg  1,000 mg Oral or Feeding Tube Daily Jaciel Kohli DO   1,000 mg at 08/18/24 1017    Vitamin D3 (CHOLECALCIFEROL) tablet 50  mcg  50 mcg Oral or Feeding Tube Daily Jaciel Kohli DO   50 mcg at 08/18/24 1018              Allergies:     Allergies   Allergen Reactions    Nsaids Other (See Comments)     Caution should be used in the gastric bypass patient.    Latex Dermatitis     Contact dermatitis    Morphine Itching            Physical Exam:   Vitals were reviewed  Patient Vitals for the past 24 hrs:   BP Temp Temp src Pulse Resp SpO2   08/18/24 0721 (!) 177/72 98.5  F (36.9  C) Oral 51 16 95 %   08/18/24 0349 (!) 158/70 98.4  F (36.9  C) Oral 50 16 94 %   08/18/24 0017 (!) 141/63 98.7  F (37.1  C) Oral 50 16 95 %   08/17/24 1546 (!) 163/63 98.5  F (36.9  C) Oral 53 20 94 %       Physical Examination:  Gen: Pleasant in no acute distress.  HEENT: NCAT. EOMI. PERRL.  Neck: No bruit, JVD or thyromegaly.  Lungs: Clear to ascultation bilat with no crackles or wheezes.  Card: RRR. NSR. No RMG. Peripheral pulses present and symmetric. No edema.  Abd: Soft NT ND. No mass. Normal bowel sounds.  Skin: No rash.  Extr: No edema.  Neuro: Alert and oriented to place time and person.        Laboratory Data:   ID Labs:  Microbiology labs:  Reviewed.    Recent Labs   Lab Test 01/19/22 1948 06/09/16  1105   CRP 21.3* 11.1*     Recent Labs   Lab Test 08/18/24  0613 08/17/24  0652 08/16/24  0700 08/15/24  0643 04/30/24  1636 07/23/23  1033   WBC 6.6 8.5 8.2 9.9 6.4 5.3     Recent Labs   Lab Test 08/18/24  0613 08/17/24  0652 08/16/24  0700 08/15/24  0643   CR 0.75 0.77 0.86 0.80   GFRESTIMATED >90 90 79 86       Hematology Studies  Recent Labs   Lab Test 08/18/24  0613 08/17/24  0652 08/16/24  0700 08/15/24  0643 04/30/24  1636 07/23/23  1033 09/23/21  0828 12/31/19  0525 06/09/16  1105   WBC 6.6 8.5 8.2 9.9 6.4 5.3   < > 8.2 5.7   ANEU  --   --   --   --   --   --   --  5.4 3.6   AEOS  --   --   --   --   --   --   --   --  0.2   HGB 10.8* 10.9* 11.4* 15.4 13.5 13.7   < > 13.1 13.4   HCT 32.6* 33.6* 34.6* 45.4 40.8 40.9   < > 39.6 40.6   * 129*  137* 237 171 164   < > 179 168    < > = values in this interval not displayed.       Metabolic  Recent Labs   Lab Test 08/18/24  0613 08/17/24  0652 08/16/24  0700    139 144   BUN 5.5* 13.1 15.9   CO2 24 22 25   CR 0.75 0.77 0.86   GFRESTIMATED >90 90 79       Hepatic Studies  Recent Labs   Lab Test 08/15/24  0643 04/30/24  1636 07/23/23  1033   BILITOTAL 0.3 0.2 0.3   ALKPHOS 73 83 75   ALBUMIN 4.1 4.1 4.2   AST 13 16 16   ALT 25 16 16       Immunologlobulins  Recent Labs   Lab Test 01/19/22  1948 06/09/16  1105   SED 14 15            Imaging Data:   Reviewed

## 2024-08-18 NOTE — PLAN OF CARE
Goal Outcome Evaluation:         Problem: Pain Acute  Goal: Optimal Pain Control and Function  Outcome: Progressing  Intervention: Prevent or Manage Pain  Recent Flowsheet Documentation  Taken 8/18/2024 0000 by Alex Odom RN  Medication Review/Management:   medications reviewed   high-risk medications identified     Problem: Surgery Nonspecified  Goal: Absence of Bleeding  Outcome: Progressing         A/Ox4, vss except elevated BP   Severe right abdominal pain managed by prn dilaudid, oxycodone and scheduled Tylenol   NHOEMI drains intact and patent, leaking at sites, dressings changed.

## 2024-08-18 NOTE — PROGRESS NOTES
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         Perforated viscus  H/O Kris-en-Y gastric bypass 7/25/22  -- admitted for urgent ex-lap  -- underwent diagnostic laparoscopy, RAINA and repair of umbilical hernia 8/16/24. Found to have 0.5 mm perforation at the GJ anastomosis at time of laparoscopy and this was repaired with modified Kevin patch. Gross contamination of quadrants of the abdomen noted. Drains in place x 2  -- patient remains afebrile with normal WBC   -- continue zosyn and fluconazole   -- follow up drain cultures  -- ID following to guide appropriate therapy  -- post op cares, drains, and diet management per surgery  -- continue current po PPI       Chest pain  Trop normal x1, EKG is low voltage but without any overt ischemic changes, CTA chest negative for dissection or PE.   CT A/P shows Intraperitoneal free air   -- chest pain resolved  -- monitor        H/O Kris-en-Y gastric bypass 7/25/22  H/O MTHFR mutation  -- continue home supplements   -- defer to surgery service about appropriateness of ordering annual labs during this hospitalization        Recent diagnosis shingles right lower back and around flank  -- hold any further steroids  -- continue lidocaine cream   -- patient has finished course of valtrex PTA        Hypothyroidism: continue home replacement         H/O anxiety: continue home lorazepam          H/O PE: no longer on anticoagulation, CTA chest on day of admission negative for PE  -- continue chemical DVT PPX post-op                Diet: Snacks/Supplements Adult: Ensure Clear; With Meals  Mechanical/Dental Soft Diet    DVT Prophylaxis: Heparin SQ  Gee Catheter: Not present  Lines: None     Cardiac Monitoring:  "None  Code Status: Full Code      Clinically Significant Risk Factors                # Thrombocytopenia: Lowest platelets = 129 in last 2 days, will monitor for bleeding             # Obesity: Estimated body mass index is 36.02 kg/m  as calculated from the following:    Height as of this encounter: 1.702 m (5' 7\").    Weight as of this encounter: 104.3 kg (230 lb)., PRESENT ON ADMISSION                  Disposition Plan   Medically Ready for Discharge: Anticipated in 2-4 Days      Jaciel Kohli DO  Hospitalist Service  [facility]  Securely message with [employer] (more info)  Text page via [facility] Paging/Directory   ______________________________________________________________________    Interval History   NAD. Denies any current complaints besides wanting to advance diet    Physical Exam   Vital Signs: Temp: 98.5  F (36.9  C) Temp src: Oral BP: (!) 180/79 Pulse: (!) 47   Resp: 16 SpO2: 95 % O2 Device: None (Room air)    Weight: 230 lbs 0 oz  General: NAD  RESPIRATORY: Breathing nonlabored  CARDIOVASCULAR: No le edema bilat.   NEUROLOGIC: Motor and sensory intact, speech clear        >45 MINUTES SPENT BY ME on the date of service doing chart review, history, exam, documentation & further activities per the note.  Data     "

## 2024-08-19 VITALS
SYSTOLIC BLOOD PRESSURE: 185 MMHG | WEIGHT: 230 LBS | BODY MASS INDEX: 36.1 KG/M2 | OXYGEN SATURATION: 95 % | RESPIRATION RATE: 20 BRPM | HEART RATE: 53 BPM | HEIGHT: 67 IN | TEMPERATURE: 99.1 F | DIASTOLIC BLOOD PRESSURE: 77 MMHG

## 2024-08-19 LAB
BACTERIA FLD CULT: ABNORMAL
BACTERIA FLD CULT: ABNORMAL
GRAM STAIN RESULT: ABNORMAL
GRAM STAIN RESULT: ABNORMAL

## 2024-08-19 PROCEDURE — 250N000013 HC RX MED GY IP 250 OP 250 PS 637: Performed by: PHYSICIAN ASSISTANT

## 2024-08-19 PROCEDURE — 250N000013 HC RX MED GY IP 250 OP 250 PS 637: Performed by: INTERNAL MEDICINE

## 2024-08-19 PROCEDURE — 250N000011 HC RX IP 250 OP 636: Performed by: SURGERY

## 2024-08-19 PROCEDURE — 99024 POSTOP FOLLOW-UP VISIT: CPT | Performed by: PHYSICIAN ASSISTANT

## 2024-08-19 PROCEDURE — 99239 HOSP IP/OBS DSCHRG MGMT >30: CPT | Performed by: INTERNAL MEDICINE

## 2024-08-19 PROCEDURE — 99207 PR NO CHARGE LOS: CPT | Performed by: INTERNAL MEDICINE

## 2024-08-19 PROCEDURE — 250N000013 HC RX MED GY IP 250 OP 250 PS 637: Performed by: SURGERY

## 2024-08-19 RX ORDER — OXYCODONE HYDROCHLORIDE 5 MG/1
5 TABLET ORAL EVERY 6 HOURS PRN
Qty: 10 TABLET | Refills: 0 | Status: SHIPPED | OUTPATIENT
Start: 2024-08-19 | End: 2024-08-23 | Stop reason: ALTCHOICE

## 2024-08-19 RX ORDER — PANTOPRAZOLE SODIUM 40 MG/1
40 TABLET, DELAYED RELEASE ORAL
Status: DISCONTINUED | OUTPATIENT
Start: 2024-08-19 | End: 2024-08-19 | Stop reason: HOSPADM

## 2024-08-19 RX ORDER — FLUCONAZOLE 200 MG/1
200 TABLET ORAL DAILY
Qty: 2 TABLET | Refills: 0 | Status: SHIPPED | OUTPATIENT
Start: 2024-08-19 | End: 2024-08-21

## 2024-08-19 RX ORDER — AMOXICILLIN 250 MG
1 CAPSULE ORAL 2 TIMES DAILY PRN
Qty: 30 TABLET | Refills: 0 | Status: SHIPPED | OUTPATIENT
Start: 2024-08-19

## 2024-08-19 RX ORDER — PANTOPRAZOLE SODIUM 40 MG/1
40 TABLET, DELAYED RELEASE ORAL
Qty: 180 TABLET | Refills: 0 | Status: SHIPPED | OUTPATIENT
Start: 2024-08-19 | End: 2024-11-17

## 2024-08-19 RX ORDER — AMLODIPINE BESYLATE 2.5 MG/1
2.5 TABLET ORAL DAILY
Status: DISCONTINUED | OUTPATIENT
Start: 2024-08-19 | End: 2024-08-19 | Stop reason: HOSPADM

## 2024-08-19 RX ORDER — SUCRALFATE ORAL 1 G/10ML
1 SUSPENSION ORAL 4 TIMES DAILY
Qty: 3600 ML | Refills: 0 | Status: SHIPPED | OUTPATIENT
Start: 2024-08-19 | End: 2024-09-03

## 2024-08-19 RX ORDER — FLUCONAZOLE 200 MG/1
200 TABLET ORAL DAILY
Status: DISCONTINUED | OUTPATIENT
Start: 2024-08-19 | End: 2024-08-19 | Stop reason: HOSPADM

## 2024-08-19 RX ADMIN — PIPERACILLIN AND TAZOBACTAM 3.38 G: 3; .375 INJECTION, POWDER, FOR SOLUTION INTRAVENOUS at 01:58

## 2024-08-19 RX ADMIN — Medication 1000 MG: at 08:59

## 2024-08-19 RX ADMIN — Medication 50 MCG: at 09:00

## 2024-08-19 RX ADMIN — OXYCODONE HYDROCHLORIDE 10 MG: 5 TABLET ORAL at 12:01

## 2024-08-19 RX ADMIN — AMLODIPINE BESYLATE 2.5 MG: 2.5 TABLET ORAL at 10:05

## 2024-08-19 RX ADMIN — FLUCONAZOLE 200 MG: 200 TABLET ORAL at 12:01

## 2024-08-19 RX ADMIN — LEVOTHYROXINE SODIUM 88 MCG: 88 TABLET ORAL at 06:43

## 2024-08-19 RX ADMIN — Medication 1 TABLET: at 09:00

## 2024-08-19 RX ADMIN — AMOXICILLIN AND CLAVULANATE POTASSIUM 1 TABLET: 875; 125 TABLET, FILM COATED ORAL at 12:01

## 2024-08-19 RX ADMIN — HEPARIN SODIUM 5000 UNITS: 10000 INJECTION, SOLUTION INTRAVENOUS; SUBCUTANEOUS at 04:45

## 2024-08-19 RX ADMIN — OXYCODONE HYDROCHLORIDE 5 MG: 5 TABLET ORAL at 04:50

## 2024-08-19 RX ADMIN — CALCIUM CARBONATE (ANTACID) CHEW TAB 500 MG 1000 MG: 500 CHEW TAB at 08:59

## 2024-08-19 RX ADMIN — PANTOPRAZOLE SODIUM 40 MG: 40 TABLET, DELAYED RELEASE ORAL at 09:00

## 2024-08-19 RX ADMIN — ACETAMINOPHEN 650 MG: 325 TABLET ORAL at 09:00

## 2024-08-19 RX ADMIN — SENNOSIDES AND DOCUSATE SODIUM 1 TABLET: 8.6; 5 TABLET ORAL at 09:00

## 2024-08-19 ASSESSMENT — ACTIVITIES OF DAILY LIVING (ADL)
ADLS_ACUITY_SCORE: 22

## 2024-08-19 NOTE — PLAN OF CARE
Problem: Adult Inpatient Plan of Care  Goal: Absence of Hospital-Acquired Illness or Injury  Intervention: Identify and Manage Fall Risk  Recent Flowsheet Documentation  Taken 8/18/2024 1830 by Morenita Donahue RN  Safety Promotion/Fall Prevention:   clutter free environment maintained   lighting adjusted   nonskid shoes/slippers when out of bed   room organization consistent   treat reversible contributory factors   treat underlying cause     Problem: Adult Inpatient Plan of Care  Goal: Absence of Hospital-Acquired Illness or Injury  Intervention: Prevent Infection  Recent Flowsheet Documentation  Taken 8/18/2024 1830 by Morenita Donahue RN  Infection Prevention:   cohorting utilized   rest/sleep promoted   single patient room provided   Scheduled iv abx given.  Problem: Pain Acute  Goal: Optimal Pain Control and Function  Outcome: Progressing  Intervention: Prevent or Manage Pain  Recent Flowsheet Documentation  Taken 8/18/2024 1830 by Morenita Donahue RN  Medication Review/Management:   medications reviewed   high-risk medications identified   Pain managed with PRN oxycodone.  Problem: Surgery Nonspecified  Goal: Effective Urinary Elimination  Outcome: Progressing  Voiding freely and without difficulty.

## 2024-08-19 NOTE — DISCHARGE SUMMARY
[facility]  Hospitalist Discharge Summary      Date of Admission:  8/15/2024  Date of Discharge:  8/19/2024  Discharging Provider: Jaciel Kohli DO  Discharge Service: Hospitalist Service        Follow-ups Needed After Discharge   Follow-up Appointments     Follow-up and recommended labs and tests       Follow up with primary care provider, Krista Reynoso DNP,, within 7 days   for hospital follow- up, BP check.   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         Perforated viscus  H/O Kris-en-Y gastric bypass 7/25/22  -- underwent urgent diagnostic laparoscopy, RAINA and repair of umbilical hernia 8/16/24. Found to have 0.5 mm perforation at the GJ anastomosis at time of laparoscopy and this was repaired with modified Kevin patch. Gross contamination of quadrants of the abdomen noted. Had drains placed x 2  -- drains removed on day of discharge  -- patient remained afebrile with normal WBC on IV zosyn and fluconazole post op  -- changed to augmentin and po fluconazole for 2 more days after discharge per ID recommendations  -- drain fluid cultures currently only growing GPC, final drain culture results pending.   -- follow up drain cultures  -- follow up with surgery team as directed  -- continue sucralfate and po PPI after  discharge per surgery recommendations       Post op HTN: mild  -- monitor BP BID after discharge   -- outpatient PCP follow up        H/O Kris-en-Y gastric bypass 7/25/22  H/O MTHFR mutation  -- continue home supplements   -- plan outpatient follow up as previously planned        Recent diagnosis shingles right lower back and around flank  -- hold any further oral steroids  -- continue lidocaine cream   -- patient has finished course of valtrex PTA        Hypothyroidism: continue home replacement         H/O anxiety: continue home lorazepam          H/O PE: no longer on anticoagulation, CTA chest on day of admission negative for PE  -- was on chemical DVT PPX post-op         Chest pain, on admission, likely related to abdominal free air, resolved  Trop normal x1, EKG is low voltage but without any overt ischemic changes, CTA chest negative for dissection or PE.   CT A/P shows Intraperitoneal free air              Consultations This Hospital Stay   INFECTIOUS DISEASES IP CONSULT  CARE MANAGEMENT / SOCIAL WORK IP CONSULT    Code Status   Full Code    Time Spent on this Encounter   I, Jaciel Kohli DO, personally saw the patient today and spent greater than 30 minutes discharging this patient.       Jaciel Kohli DO  [address] [address]  Phone: [phone]  Fax: [phone]  ______________________________________________________________________    Physical Exam   Vital Signs: Temp: 99.1  F (37.3  C) Temp src: Oral BP: (!) 185/77 (RN notified) Pulse: 53   Resp: 20 SpO2: 95 % O2 Device: None (Room air)    Weight: 230 lbs 0 oz    General: NAD  HEENT: Without congestion or inflammation  RESPIRATORY: Respirations nonlabored  CARDIOVASCULAR: No le edema bilat.  NEUROLOGIC: Alert, speech clear    Primary Care Physician   Krista Reynoso DNP,    Discharge Orders      Primary Care - Care Coordination Referral      Reason for your hospital stay     Perforated intestine     Follow-up and recommended labs and tests     Follow up with primary care provider, Krista Reynoso DNP,, within 7 days for hospital follow- up, BP check.   Follow up with general surgery as directed     Activity    Your activity upon discharge: activity as per surgery recommendations     Monitor and record    Blood pressure: Twice daily.     Diet    Follow this diet upon discharge: Advance to regular diet as tolerated.  Avoid carbonation and stick with a low acid diet (avoid caffeine, citrus, tomato, etc).     \    Discharge Medications   Current Discharge Medication List        START taking these medications    Details   amoxicillin-clavulanate (AUGMENTIN) 875-125 MG tablet Take 1 tablet by mouth every 12 hours for 2 days  Qty: 4 tablet, Refills: 0    Associated Diagnoses: Perforation of intestine (H)      fluconazole (DIFLUCAN) 200 MG tablet Take 1 tablet (200 mg) by mouth daily for 2 days  Qty: 2 tablet, Refills: 0    Associated Diagnoses: Perforation of intestine (H)      oxyCODONE (ROXICODONE) 5 MG tablet Take 1 tablet (5 mg) by mouth every 6 hours as needed for pain  Qty: 10 tablet, Refills: 0    Associated Diagnoses: Perforation of intestine (H)      pantoprazole (PROTONIX) 40 MG EC tablet Take 1 tablet (40 mg) by mouth 2 times daily (before meals) for 90 days  Qty: 180 tablet, Refills: 0    Associated Diagnoses: Perforation of intestine (H)      senna-docusate (SENOKOT-S/PERICOLACE) 8.6-50 MG tablet Take 1 tablet by mouth 2 times daily as needed for constipation  Qty: 30 tablet, Refills: 0    Associated Diagnoses: Perforation of intestine (H)      sucralfate (CARAFATE) 1 GM/10ML suspension Take 10 mLs (1 g) by mouth 4 times daily for 90 days  Qty: 3600 mL, Refills: 0    Associated Diagnoses: Perforation of intestine (H)           CONTINUE these medications which have NOT CHANGED    Details   hydrOXYzine HCl (ATARAX) 25 MG tablet Take 25 mg by mouth 3 times daily as needed for itching       levothyroxine (SYNTHROID/LEVOTHROID) 88 MCG tablet Take 1 tablet (88 mcg) by mouth daily  Qty: 90 tablet, Refills: 3    Associated Diagnoses: Acquired hypothyroidism      acetaminophen (TYLENOL) 500 MG tablet Take 1,000 mg by mouth 3 times daily as needed for fever or pain      ascorbic acid 1000 MG TABS tablet Take 1,000 mg by mouth daily      calcium carbonate (TUMS) 500 MG chewable tablet Take 2 chew tab by mouth 2 times daily      lidocaine HCl 3 % cream Apply topically 3 times daily as needed (shingles pain)      LORazepam (ATIVAN) 0.5 MG tablet Take 1-2 tablets (0.5-1 mg) by mouth every 6 hours as needed for anxiety  Qty: 20 tablet, Refills: 0    Associated Diagnoses: Panic attack      Pediatric Multivitamins-Iron (MULTIVITAMINS PLUS IRON CHILD) 18 MG CHEW Take 1 chew tab by mouth 2 times daily Ok to substitute with any chewable that contains 18 mg of iron, Vitamin A, Thiamine and Zinc.  Qty: 180 tablet, Refills: 3    Comments: Ok to substitute with any chewable that contains 18 mg of iron, Vitamin A, Thiamine and Zinc.  Associated Diagnoses: Morbid obesity (H); S/P bariatric surgery; Postoperative intestinal malabsorption      triamcinolone (KENALOG) 0.1 % external cream Apply topically 2 times daily as needed (poison ivy)      vitamin D3 (CHOLECALCIFEROL) 50 mcg (2000 units) tablet Take 50 mcg by mouth daily           STOP taking these medications       predniSONE (DELTASONE) 20 MG tablet Comments:   Reason for Stopping:         valACYclovir (VALTREX) 1000 mg tablet Comments:   Reason for Stopping:             Allergies   Allergies   Allergen Reactions    Nsaids Other (See Comments)     Caution should be used in the gastric bypass patient.    Latex Dermatitis     Contact dermatitis    Morphine Itching

## 2024-08-19 NOTE — PLAN OF CARE
Problem: Pain Acute  Goal: Optimal Pain Control and Function  Outcome: Progressing  Intervention: Prevent or Manage Pain  Recent Flowsheet Documentation  Taken 8/19/2024 0000 by Alex Odom RN  Medication Review/Management:   medications reviewed   high-risk medications identified     Problem: Surgery Nonspecified  Goal: Absence of Bleeding  Outcome: Progressing   Goal Outcome Evaluation:       -Alert and orientedx4. Vss on RA.    -Patient received prn oxycodone  5mg 1x for abdominal pain with relief.    -Jet drains intact and patent, leaking at site, dressings changed.    -Scheduled abx administered, denies any nausea/vomiting in this shift tolerating mechanical soft diet.

## 2024-08-19 NOTE — PROGRESS NOTES
Care Management Discharge Note    Discharge Date: 08/19/2024       Discharge Disposition:  home    Discharge Services:  none    Discharge DME:      Discharge Transportation: family or friend will provide    Private pay costs discussed: Not applicable    Does the patient's insurance plan have a 3 day qualifying hospital stay waiver?  No    PAS Confirmation Code:NA   Patient/family educated on Medicare website which has current facility and service quality ratings:  NA    Education Provided on the Discharge Plan:  per team  Persons Notified of Discharge Plans: patient  Patient/Family in Agreement with the Plan:  yes    Handoff Referral Completed: Yes    Additional Information:  Patient to discharge home with family. No needs identified. Spouse to transport.    RICKY Garcia

## 2024-08-19 NOTE — PLAN OF CARE
Patient discharged to home with  via car at 12:24 PM.     AVS printed and medications updated. AVS given to patient. All discharge medications, restrictions, and instructions reviewed with patient. Patient verbalized understanding of discharge medications, restrictions, and instructions.     Patient to follow-up with [facility] team September 3, 2024. Patient to follow-up with PCP within 7 days for hospitalization follow-up and BP check. Patient instructed to  discharge medications sent to preferred pharmacy. contact PCP or [facility] office or to seek care if experiencing worsening symptoms.      PIVs and NOHEMI drains removed. Education completed. Belongings returned to patient.    ------    Problem: Adult Inpatient Plan of Care  Goal: Plan of Care Review  Outcome: Met

## 2024-08-19 NOTE — PROGRESS NOTES
General Surgery Progress Note:    Hospital Day # 4    ASSESSMENT:  1. Perforation of intestine (H)        Lucina Santamaria is a 58 year old female who is s/p diagnostic laparoscopy with repair of GJ marginal ulcer on 8/15/24. Upper GI study completed 8/17 and was negative for ongoing perforation or leak. Patient tolerating a soft solid diet without issues and having antegrade bowel function.     PLAN:  - Okay to advance diet as tolerated. Discussed with patient to avoid carbonation and stick with a low acid diet (avoid caffeine, citrus, tomato, etc).  - Please remove both surgical drains  - Antibiotics per ID  - Continue BID PO PPI x 90 days followed by PPI daily indefinitely  - Sucralfate x 90 days  - Increase activity and encourage ambulation to continue to promote bowel function  - Okay for discharge from a surgical standpoint. Discharge recommendations and follow-up instructions placed in AVS    SUBJECTIVE:   She is feeling okay this morning. Main complaint is pelvic discomfort which is exacerbated with stripping her surgical drain. Tolerated a soft solid diet without issues. Denies fever, chills, nausea, vomiting. Passing flatus. Ambulating independently.      Patient Vitals for the past 24 hrs:   BP Temp Temp src Pulse Resp SpO2   08/19/24 0725 (!) 185/77 99.1  F (37.3  C) Oral 53 20 95 %   08/19/24 0454 (!) 169/74 99.2  F (37.3  C) Oral 51 20 98 %   08/19/24 0000 128/60 98.6  F (37  C) Oral 56 18 --   08/18/24 1543 (!) 159/74 99.1  F (37.3  C) Oral 54 17 97 %   08/18/24 1458 (!) 176/74 -- -- (!) 44 -- --   08/18/24 1145 (!) 180/79 -- -- (!) 47 -- --         PHYSICAL EXAM:  General: patient seen resting in bed, no acute distress  Resp: no respiratory distress, breathing comfortably on room air  Abdomen: Soft, minimally tender to palpation. Lap sites clean/dry/intact with overlying Dermabond.   Drains: Surgical catherine drains x2 both with serosanguinous output.   Output by Drain (mL) 08/17/24 0700 - 08/17/24  1459 08/17/24 1500 - 08/17/24 2259 08/17/24 2300 - 08/18/24 0659 08/18/24 0700 - 08/18/24 1459 08/18/24 1500 - 08/18/24 2259 08/18/24 2300 - 08/19/24 0659 08/19/24 0700 - 08/19/24 0906   Closed/Suction Drain 1 LUQ Bulb 19 Italian updated per assessment - previously RUQ 15 5 5 15  50    Closed/Suction Drain 2 LLQ Bulb 19 Italian updated per assessment - previously LUQ 30 15 10 30  50    Extremities: warm and well perfused    08/18 0700 - 08/19 0659  In: 506.25 [P.O.:120; I.V.:386.25]  Out: 1345 [Urine:1200; Drains:145]    Admission on 08/15/2024   Component Date Value    Sodium 08/16/2024 144     Potassium 08/16/2024 4.1     Chloride 08/16/2024 108 (H)     Carbon Dioxide (CO2) 08/16/2024 25     Anion Gap 08/16/2024 11     Urea Nitrogen 08/16/2024 15.9     Creatinine 08/16/2024 0.86     GFR Estimate 08/16/2024 79     Calcium 08/16/2024 8.3 (L)     Glucose 08/16/2024 102 (H)     WBC Count 08/16/2024 8.2     RBC Count 08/16/2024 3.38 (L)     Hemoglobin 08/16/2024 11.4 (L)     Hematocrit 08/16/2024 34.6 (L)     MCV 08/16/2024 102 (H)     MCH 08/16/2024 33.7 (H)     MCHC 08/16/2024 32.9     RDW 08/16/2024 13.1     Platelet Count 08/16/2024 137 (L)     Magnesium 08/16/2024 2.1     Phosphorus 08/16/2024 3.3     Culture 08/16/2024 No growth after 2 days     Gram Stain Result 08/16/2024 No organisms seen     Gram Stain Result 08/16/2024 4+ WBC seen     Culture 08/16/2024 Culture in progress     Gram Stain Result 08/16/2024 1+ Gram positive cocci (A)     Gram Stain Result 08/16/2024 4+ WBC seen (A)     Sodium 08/17/2024 139     Potassium 08/17/2024 4.1     Chloride 08/17/2024 106     Carbon Dioxide (CO2) 08/17/2024 22     Anion Gap 08/17/2024 11     Urea Nitrogen 08/17/2024 13.1     Creatinine 08/17/2024 0.77     GFR Estimate 08/17/2024 90     Calcium 08/17/2024 8.5 (L)     Glucose 08/17/2024 61 (L)     WBC Count 08/17/2024 8.5     RBC Count 08/17/2024 3.26 (L)     Hemoglobin 08/17/2024 10.9 (L)     Hematocrit 08/17/2024 33.6  (L)     MCV 08/17/2024 103 (H)     MCH 08/17/2024 33.4 (H)     MCHC 08/17/2024 32.4     RDW 08/17/2024 12.8     Platelet Count 08/17/2024 129 (L)     Sodium 08/18/2024 139     Potassium 08/18/2024 3.6     Chloride 08/18/2024 109 (H)     Carbon Dioxide (CO2) 08/18/2024 24     Anion Gap 08/18/2024 6 (L)     Urea Nitrogen 08/18/2024 5.5 (L)     Creatinine 08/18/2024 0.75     GFR Estimate 08/18/2024 >90     Calcium 08/18/2024 8.0 (L)     Glucose 08/18/2024 115 (H)     WBC Count 08/18/2024 6.6     RBC Count 08/18/2024 3.19 (L)     Hemoglobin 08/18/2024 10.8 (L)     Hematocrit 08/18/2024 32.6 (L)     MCV 08/18/2024 102 (H)     MCH 08/18/2024 33.9 (H)     MCHC 08/18/2024 33.1     RDW 08/18/2024 12.5     Platelet Count 08/18/2024 136 (L)         Catrina Lee PA-C  [facility]  [address] [place] [address] [address]  [place] [address]

## 2024-08-20 ENCOUNTER — PATIENT OUTREACH (OUTPATIENT)
Dept: CARE COORDINATION | Facility: CLINIC | Age: 59
End: 2024-08-20
Payer: COMMERCIAL

## 2024-08-20 ASSESSMENT — ACTIVITIES OF DAILY LIVING (ADL): DEPENDENT_IADLS:: INDEPENDENT

## 2024-08-20 NOTE — PROGRESS NOTES
ID brief visit    Seen on 8/19/24 prior to discharge. Improved. Cultures were pending at time of discharge. Reviewed antibiotic plan and discussed with Dr. Kohli -- 2 days of augmentin and fluconazole. Discussed monitoring for fever, increasing pain, which should prompt seeking care for CT.     Pardeep Andrea MD

## 2024-08-20 NOTE — PROGRESS NOTES
Clinic Care Coordination Contact  Clinic Care Coordination Contact  OUTREACH    Referral Information:  Referral Source: IP Handoff    Primary Diagnosis: GI Disorders    Chief Complaint   Patient presents with    Clinic Care Coordination - Post Hospital     Clinic Care Coordination RN         Universal Utilization:   [facility]  Hospitalist Discharge Summary       Date of Admission:  8/15/2024  Date of Discharge:  8/19/2024  perforated viscus.   H/O Kris-en-Y gastric bypass 7/25/22  -- underwent urgent diagnostic laparoscopy, RAINA and repair of umbilical hernia 8/16/24. Found to have 0.5 mm perforation at the GJ anastomosis at time of laparoscopy and this was repaired with modified Kevin patch. Gross contamination of quadrants of the abdomen noted. Had drains placed x 2  -- drains removed on day of discharge     Utilization      No Show Count (past year)  1             ED Visits  2             Hospital Admissions  1                    Current as of: 8/20/2024 12:19 PM                Clinical Concerns:  Current Medical Concerns:  Brief conversation with patient . Patient is answering quick and abruptly  when writer is asking questions .     Patient reports she is doing OK .   Patient reports the incision has a little drainage where the J tube was    No other redness or concerns .    Patient has a post op visit scheduled 9/3/2024  Patient will call the clinic and make a hospital follow up with PCP     Current Behavioral Concerns: No    Education Provided to patient: CC role introduced       Health Maintenance Reviewed: Not assessed  Clinical Pathway: None    Medication Management:  Medication review status:   Patient is taking the pain medication as directed   Didn't discuss medication further due to patient's quick abrupt answers      Functional Status:  Dependent ADLs:: Independent  Dependent IADLs:: Independent  Bed or wheelchair confined:: No  Mobility Status: Independent    Living  Situation:  Current living arrangement:: I live in a private home with spouse    Lifestyle & Psychosocial Needs:    Social Determinants of Health     Food Insecurity: Low Risk  (4/30/2024)    Food Insecurity     Within the past 12 months, did you worry that your food would run out before you got money to buy more?: No     Within the past 12 months, did the food you bought just not last and you didn t have money to get more?: No   Depression: Not at risk (5/2/2024)    PHQ-2     PHQ-2 Score: 0   Housing Stability: Low Risk  (4/30/2024)    Housing Stability     Do you have housing? : Yes     Are you worried about losing your housing?: No   Tobacco Use: Low Risk  (8/15/2024)    Patient History     Smoking Tobacco Use: Never     Smokeless Tobacco Use: Never     Passive Exposure: Not on file   Financial Resource Strain: Low Risk  (4/30/2024)    Financial Resource Strain     Within the past 12 months, have you or your family members you live with been unable to get utilities (heat, electricity) when it was really needed?: No   Alcohol Use: Not on file   Transportation Needs: Low Risk  (4/30/2024)    Transportation Needs     Within the past 12 months, has lack of transportation kept you from medical appointments, getting your medicines, non-medical meetings or appointments, work, or from getting things that you need?: No   Physical Activity: Sufficiently Active (4/30/2024)    Exercise Vital Sign     Days of Exercise per Week: 6 days     Minutes of Exercise per Session: 30 min   Interpersonal Safety: Low Risk  (5/2/2024)    Interpersonal Safety     Do you feel physically and emotionally safe where you currently live?: Yes     Within the past 12 months, have you been hit, slapped, kicked or otherwise physically hurt by someone?: No     Within the past 12 months, have you been humiliated or emotionally abused in other ways by your partner or ex-partner?: No   Stress: Stress Concern Present (4/30/2024)    [place]ian Reading of  Occupational Health - Occupational Stress Questionnaire     Feeling of Stress : To some extent   Social Connections: Unknown (4/30/2024)    Social Connection and Isolation Panel [NHANES]     Frequency of Communication with Friends and Family: Not on file     Frequency of Social Gatherings with Friends and Family: Twice a week     Attends Mormonism Services: Not on file     Active Member of Clubs or Organizations: Not on file     Attends Club or Organization Meetings: Not on file     Marital Status: Not on file   Health Literacy: Not on file     Inadequate nutrition (GOAL):: No  Tube Feeding: No  Inadequate activity/exercise (GOAL):: No  Significant changes in sleep pattern (GOAL): No        Mental health DX:: Yes  Mental health DX how managed:: Medication  Mental health management concern (GOAL):: No  Chemical Dependency Status: No Current Concerns  Informal Support system:: Spouse           Resources and Interventions:  Current Resources:      Community Resources: None     Equipment Currently Used at Home: none  Employment Status: employed full-time (Per daughter pt may need short term disability depending on medical treatment plan.)         Advance Care Plan/Directive  Advanced Care Plans/Directives on file:: No    Referrals Placed: None      Patient/Caregiver understanding: Patient expresses good understanding of discharge instructions        Future Appointments                In 2 weeks Teofilo Servin DO [facility] Surgery [facility]    In 1 month [facility]    In 11 months Arnulfo Stanley MD [facility] Surgery [facility]ariatrics [facility]            Plan:   Patient will make a future PCP hospital follow up visit   Patient will call the surgeons office with questions or concerns   No unmet needs, no further care coordination is needed at this time     [facility]   Yennifer Woodall RN, Care Coordinator    [facility]   E-mail [email or web address]   [phone]

## 2024-08-21 LAB
BACTERIA FLD CULT: NO GROWTH
GRAM STAIN RESULT: NORMAL
GRAM STAIN RESULT: NORMAL

## 2024-08-23 DIAGNOSIS — K63.1 PERFORATION OF INTESTINE (H): Primary | ICD-10-CM

## 2024-08-23 RX ORDER — HYDROCODONE BITARTRATE AND ACETAMINOPHEN 5; 325 MG/1; MG/1
1-2 TABLET ORAL EVERY 6 HOURS PRN
Qty: 24 TABLET | Refills: 0 | Status: SHIPPED | OUTPATIENT
Start: 2024-08-23 | End: 2024-08-26

## 2024-08-26 ENCOUNTER — TELEPHONE (OUTPATIENT)
Dept: FAMILY MEDICINE | Facility: CLINIC | Age: 59
End: 2024-08-26

## 2024-08-26 ENCOUNTER — OFFICE VISIT (OUTPATIENT)
Dept: FAMILY MEDICINE | Facility: CLINIC | Age: 59
End: 2024-08-26
Payer: COMMERCIAL

## 2024-08-26 VITALS
HEART RATE: 73 BPM | OXYGEN SATURATION: 97 % | TEMPERATURE: 98.6 F | RESPIRATION RATE: 22 BRPM | HEIGHT: 67 IN | BODY MASS INDEX: 34.75 KG/M2 | SYSTOLIC BLOOD PRESSURE: 152 MMHG | DIASTOLIC BLOOD PRESSURE: 80 MMHG | WEIGHT: 221.4 LBS

## 2024-08-26 DIAGNOSIS — Z98.84 S/P GASTRIC BYPASS: ICD-10-CM

## 2024-08-26 DIAGNOSIS — E03.9 HYPOTHYROIDISM, UNSPECIFIED TYPE: ICD-10-CM

## 2024-08-26 DIAGNOSIS — K63.1 PERFORATION OF INTESTINE (H): Primary | ICD-10-CM

## 2024-08-26 DIAGNOSIS — K91.2 POSTOPERATIVE INTESTINAL MALABSORPTION: ICD-10-CM

## 2024-08-26 LAB
ALBUMIN SERPL BCG-MCNC: 3.8 G/DL (ref 3.5–5.2)
ALP SERPL-CCNC: 86 U/L (ref 40–150)
ALT SERPL W P-5'-P-CCNC: 20 U/L (ref 0–50)
ANION GAP SERPL CALCULATED.3IONS-SCNC: 6 MMOL/L (ref 7–15)
AST SERPL W P-5'-P-CCNC: 17 U/L (ref 0–45)
BILIRUB SERPL-MCNC: 0.2 MG/DL
BUN SERPL-MCNC: 10.9 MG/DL (ref 6–20)
CALCIUM SERPL-MCNC: 9.4 MG/DL (ref 8.8–10.4)
CHLORIDE SERPL-SCNC: 106 MMOL/L (ref 98–107)
CHOLEST SERPL-MCNC: 142 MG/DL
CREAT SERPL-MCNC: 0.68 MG/DL (ref 0.51–0.95)
EGFRCR SERPLBLD CKD-EPI 2021: >90 ML/MIN/1.73M2
ERYTHROCYTE [DISTWIDTH] IN BLOOD BY AUTOMATED COUNT: 12.9 % (ref 10–15)
FASTING STATUS PATIENT QL REPORTED: YES
FASTING STATUS PATIENT QL REPORTED: YES
FERRITIN SERPL-MCNC: 251 NG/ML (ref 11–328)
FOLATE SERPL-MCNC: 12.1 NG/ML (ref 4.6–34.8)
GLUCOSE SERPL-MCNC: 95 MG/DL (ref 70–99)
HCO3 SERPL-SCNC: 30 MMOL/L (ref 22–29)
HCT VFR BLD AUTO: 39.3 % (ref 35–47)
HDLC SERPL-MCNC: 49 MG/DL
HGB BLD-MCNC: 12.7 G/DL (ref 11.7–15.7)
LDLC SERPL CALC-MCNC: 69 MG/DL
MCH RBC QN AUTO: 33.6 PG (ref 26.5–33)
MCHC RBC AUTO-ENTMCNC: 32.3 G/DL (ref 31.5–36.5)
MCV RBC AUTO: 104 FL (ref 78–100)
NONHDLC SERPL-MCNC: 93 MG/DL
PLATELET # BLD AUTO: 313 10E3/UL (ref 150–450)
POTASSIUM SERPL-SCNC: 4.6 MMOL/L (ref 3.4–5.3)
PROT SERPL-MCNC: 6.6 G/DL (ref 6.4–8.3)
PTH-INTACT SERPL-MCNC: 40 PG/ML (ref 15–65)
RBC # BLD AUTO: 3.78 10E6/UL (ref 3.8–5.2)
SODIUM SERPL-SCNC: 142 MMOL/L (ref 135–145)
TRIGL SERPL-MCNC: 120 MG/DL
TSH SERPL DL<=0.005 MIU/L-ACNC: 1.81 UIU/ML (ref 0.3–4.2)
VIT B12 SERPL-MCNC: 587 PG/ML (ref 232–1245)
WBC # BLD AUTO: 6.3 10E3/UL (ref 4–11)

## 2024-08-26 PROCEDURE — 82728 ASSAY OF FERRITIN: CPT | Performed by: NURSE PRACTITIONER

## 2024-08-26 PROCEDURE — 99495 TRANSJ CARE MGMT MOD F2F 14D: CPT | Performed by: NURSE PRACTITIONER

## 2024-08-26 PROCEDURE — 84443 ASSAY THYROID STIM HORMONE: CPT | Performed by: NURSE PRACTITIONER

## 2024-08-26 PROCEDURE — 99000 SPECIMEN HANDLING OFFICE-LAB: CPT | Performed by: NURSE PRACTITIONER

## 2024-08-26 PROCEDURE — 82607 VITAMIN B-12: CPT | Performed by: NURSE PRACTITIONER

## 2024-08-26 PROCEDURE — 80061 LIPID PANEL: CPT | Performed by: NURSE PRACTITIONER

## 2024-08-26 PROCEDURE — 84630 ASSAY OF ZINC: CPT | Mod: 90 | Performed by: NURSE PRACTITIONER

## 2024-08-26 PROCEDURE — 85027 COMPLETE CBC AUTOMATED: CPT | Performed by: NURSE PRACTITIONER

## 2024-08-26 PROCEDURE — 84590 ASSAY OF VITAMIN A: CPT | Mod: 90 | Performed by: NURSE PRACTITIONER

## 2024-08-26 PROCEDURE — 83970 ASSAY OF PARATHORMONE: CPT | Performed by: NURSE PRACTITIONER

## 2024-08-26 PROCEDURE — 36415 COLL VENOUS BLD VENIPUNCTURE: CPT | Performed by: NURSE PRACTITIONER

## 2024-08-26 PROCEDURE — 82306 VITAMIN D 25 HYDROXY: CPT | Performed by: NURSE PRACTITIONER

## 2024-08-26 PROCEDURE — 80053 COMPREHEN METABOLIC PANEL: CPT | Performed by: NURSE PRACTITIONER

## 2024-08-26 PROCEDURE — 82746 ASSAY OF FOLIC ACID SERUM: CPT | Performed by: NURSE PRACTITIONER

## 2024-08-26 PROCEDURE — 84425 ASSAY OF VITAMIN B-1: CPT | Mod: 90 | Performed by: NURSE PRACTITIONER

## 2024-08-26 ASSESSMENT — PAIN SCALES - GENERAL: PAINLEVEL: MODERATE PAIN (5)

## 2024-08-26 NOTE — PROGRESS NOTES
Assessment & Plan     Perforation of intestine (H)  Patient hospitalized on 8/15/2024 with acute onset of left upper quadrant abdominal pain, s/p Kris-en-Y gastric bypass on 7/25/2022, found to have a perforated viscus.  Had urgent diagnostic laparoscopy with repair of perforation and repair of umbilical hernia.  Finish antibiotics on Wednesday, pain is slowly getting better, no worse, however not significantly better.  Denies any fevers.  Stab sites healing well.  Has follow-up scheduled with surgical team, starting to eat slowly.  Normal bowel movements, taking Colace.  Continue rest and follow-up with surgery as scheduled.    Hypothyroidism, unspecified type  Chronic, on levothyroxine.  Last TSH within normal limits.  Labs ordered by bariatrics.  - TSH    Postoperative intestinal malabsorption  S/P gastric bypass  S/p Kris-en-Y gastric bypass July 2022.  Labs followed by bariatric team.  - 25 Hydroxyvitamin D2 and D3  - CBC with platelets  - Comprehensive metabolic panel  - Ferritin  - Folate  - Lipid Profile  - Parathyroid Hormone Intact  - Vitamin A  - Vitamin B1 whole blood  - Vitamin B12  - Zinc        MED REC REQUIRED  Post Medication Reconciliation Status: discharge medications reconciled, continue medications without change    See Patient Instructions    Gloria Verdugo is a 58 year old, presenting for the following health issues:  Hospital F/U        8/26/2024     8:47 AM   Additional Questions   Roomed by Maite VARMA CMA     History of Present Illness       Reason for visit:  BP recheck, Hospital follow up , Forms    She eats 4 or more servings of fruits and vegetables daily.She consumes 0 sweetened beverage(s) daily.She exercises with enough effort to increase her heart rate 30 to 60 minutes per day.  She exercises with enough effort to increase her heart rate 5 days per week.   She is taking medications regularly.     Hospital Follow-up Visit:    Hospital/Nursing Home/IP Rehab Facility: [facility]  "[facility]  Date of Admission: 08/15/2024  Date of Discharge: 08/19/2024  Reason(s) for Admission: Perforation of intestine  Was the patient in the ICU or did the patient experience delirium during hospitalization?  No  Do you have any other stressors you would like to discuss with your provider? No    Problems taking medications regularly:  None  Medication changes since discharge: None  Problems adhering to non-medication therapy:  None    Summary of hospitalization:  [facility] discharge summary reviewed  Diagnostic Tests/Treatments reviewed.  Follow up needed: Labs as above   Other Healthcare Providers Involved in Patient s Care:         Specialist appointment - General surgery  Update since discharge: improved slightly.       No fevers ~ finished antibiotics on Wednesday   Pain hasn't gotten worse ~ slowly getting better    Wants to do EGD October/Nov     Blood pressure 134/62 at home after resting and taking pain meds   Having good bowel movements taking Colace     Plan of care communicated with patient               Review of Systems  Constitutional, HEENT, cardiovascular, pulmonary, gi and gu systems are negative, except as otherwise noted.      Objective    BP (!) 152/80 (BP Location: Right arm, Patient Position: Sitting, Cuff Size: Adult Regular)   Pulse 73   Temp 98.6  F (37  C) (Tympanic)   Resp 22   Ht 1.702 m (5' 7\")   Wt 100.4 kg (221 lb 6.4 oz)   SpO2 97%   BMI 34.68 kg/m    Body mass index is 34.68 kg/m .  Physical Exam   GENERAL: alert and no distress  RESP: lungs clear to auscultation - no rales, rhonchi or wheezes  CV: regular rate and rhythm, normal S1 S2, no S3 or S4, no murmur, click or rub, no peripheral edema  ABDOMEN: soft, mildly tender around stab sites, no hepatosplenomegaly, no masses and bowel sounds normal  MS: no gross musculoskeletal defects noted, no edema  SKIN: no suspicious lesions or rashes, scattered bruising   NEURO: Normal strength " and tone, mentation intact and speech normal  PSYCH: mentation appears normal, affect normal/bright    Diagnostic Test Results:  Labs reviewed in Epic  Pending        Signed Electronically by: Krista Reynoso DNP,, YARON CNP      Chart documentation with Dragon Voice recognition Software. Although reviewed after completion, some words and grammatical errors may remain.

## 2024-08-26 NOTE — TELEPHONE ENCOUNTER
FMLA forms completed. Faxed, Copy sent to scanning and copy placed in envelope and placed at patients desk

## 2024-08-28 LAB
ANNOTATION COMMENT IMP: NORMAL
RETINYL PALMITATE SERPL-MCNC: <0.02 MG/L
VIT A SERPL-MCNC: 0.42 MG/L
ZINC SERPL-MCNC: 75.7 UG/DL

## 2024-08-29 LAB — VIT B1 PYROPHOSHATE BLD-SCNC: 123 NMOL/L

## 2024-08-30 LAB
DEPRECATED CALCIDIOL+CALCIFEROL SERPL-MC: <49 UG/L (ref 20–75)
VITAMIN D2 SERPL-MCNC: <5 UG/L
VITAMIN D3 SERPL-MCNC: 44 UG/L

## 2024-09-03 ENCOUNTER — DOCUMENTATION ONLY (OUTPATIENT)
Dept: SURGERY | Facility: CLINIC | Age: 59
End: 2024-09-03

## 2024-09-03 ENCOUNTER — OFFICE VISIT (OUTPATIENT)
Dept: SURGERY | Facility: CLINIC | Age: 59
End: 2024-09-03
Payer: COMMERCIAL

## 2024-09-03 DIAGNOSIS — Z98.890 POSTOPERATIVE STATE: Primary | ICD-10-CM

## 2024-09-03 PROCEDURE — 99024 POSTOP FOLLOW-UP VISIT: CPT | Performed by: SURGERY

## 2024-09-03 NOTE — PROGRESS NOTES
Form filled out and provided a copy to the pt and also faxed to [phone]    Jacob Alonzo CMA  She/Her      [facility]  Surgery Clinic - [facility] [place]  [facility] Clinic - [address] [address]    Office: [phone]  Fax: [phone]

## 2024-09-03 NOTE — LETTER
9/3/2024      Lucina Santamaria  [address] [facility] [address]      Dear Colleague,    Thank you for referring your patient, Lucina Santamaria, to the [facility] SURGERY [facility] AND BARIATRICS CARE [place]. Please see a copy of my visit note below.    General Surgery Clinic - Postop follow up  Lucina Santamaria MRN# [id number]   Age/Sex: 58 year old female YOB: 1965     Reason for visit: Post op visit                           Assessment and Plan:   Assessment:  1.  Gastric perforation  2.  History of Kris-en-Y bypass  3.  Allergic reaction    58-year-old female presenting for postoperative evaluation after having a laparoscopic repair of gastric bypass done on 8/15/2024.  Patient is recovering uneventfully from the surgery.  Patient did have an allergic reaction with possible suspicion to Carafate.  Patient is recovering from allergic reaction after taking prednisone.    Plan:  -Recommend patient continue with the pantoprazole for at least a total of 90 days.  Thereafter, the patient should undergo an EGD with biopsy of the perforation site to rule out H. pylori.    -Continue with diet as tolerated    -Avoid Carafate for possible allergic reaction          Chief Complaint:     Chief Complaint   Patient presents with     Post-Op - General Surgery     PO repair of ulcer 8/15        History is obtained from the patient    HPI:   Lucina Santamaria is a 58 year old female who presents to the general surgery team today for evaluation postoperatively after her gastric perforation on 8/15/2024.  Patient is recovering uneventfully from surgery.  Tolerating diet.  Passing flatus having bowel wounds.  Patient states that she has no significant abdominal pain.  However, the patient recently had an episode of an allergic reaction where she had significant swelling around her face.  Patient did not have any difficulty breathing.  Patient did take some prednisone and her allergic reaction has calm down  significantly.  Otherwise, patient has no other complaints at this time.          Past Medical History:     Past Medical History:   Diagnosis Date     Acquired hypothyroidism      Arthritis     sacro iliac and hip, right ankle.     Chronic constipation     since menopause. fiber/hydration helps.     Coagulation disorder (H24)      Complication of anesthesia     nauseated w/ morphine previously after arm surgery.     COVID-19     .     Gallbladder problem     s/p cholecystectomy.     Head injury     childhood concussions     Heterozygous for MTHFR gene mutation      History of MTHFR mutation      Hypertension      Long COVID     Long term Covid     Morbid obesity (H)      Pneumonia     covid     Prediabetes      Pulmonary emboli (H)      Pulmonary embolism (H)     wisdome teeth out while on OCPs and MTHFR gene mutation history.     Pulmonary embolism (H)     age 23, OCPs/dental extraction provoked. reports some MTHFR gene issues, unsure if homo or heterozygous.              Past Surgical History:     Past Surgical History:   Procedure Laterality Date     APPENDECTOMY       BREAST BIOPSY, RT/LT      right breast - benign      SECTION       COLONOSCOPY N/A 11/10/2021    Procedure: COLONOSCOPY, WITH POLYPECTOMY;  Surgeon: Lynn Doshi MD;  Location:  GI     CREATION, GASTRIC BYPASS, HAFSA-EN-Y, LAPAROSCOPIC N/A 2022    Procedure: LAPAROSCOPIC HAFSA-EN-Y, GASTRIC BYPASS;  Surgeon: North Schneider MD;  Location: [facility] OR     EYE SURGERY  epilasik left eye     GI SURGERY  Hafsa N Y      GYN SURGERY      tubal ligation     LAPAROSCOPIC CHOLECYSTECTOMY N/A 2019    Procedure: CHOLECYSTECTOMY, LAPAROSCOPIC;  Surgeon: Lynn Doshi MD;  Location:  OR     LAPAROSCOPIC HERNIORRHAPHY UMBILICAL N/A 8/15/2024    Procedure: REPAIR OF UMBILICAL HERNIA;  Surgeon: Teofilo Servin DO;  Location: [facility] OR     LAPAROSCOPIC LYSIS ADHESIONS N/A 8/15/2024     Procedure: LYSIS OF ADHESIONS,;  Surgeon: Teofilo Servin DO;  Location: [facility] OR     LAPAROSCOPY DIAGNOSTIC (GYN) N/A 8/15/2024    Procedure: DIAGNOSTIC LAPAROSCOPY,;  Surgeon: Teofilo Servin DO;  Location: [facility] OR     ORTHOPEDIC SURGERY  1991    radius, ulnar - open fracture     TUBAL LIGATION       wisdom teeth               Social History:    reports that she has never smoked. She has never used smokeless tobacco. She reports that she does not currently use alcohol. She reports that she does not use drugs.           Family History:     Family History   Problem Relation Age of Onset     Cancer Father      Other Cancer Father      Substance Abuse Father         Alcoholic     Depression Brother         Bipolar     Substance Abuse Brother         Alcoholic/Drug Addiction     Diabetes Mother      Hypertension Mother      Hyperlipidemia Mother      Colon Cancer Mother      Thyroid Disease Mother      Obesity Mother      Arthritis Maternal Grandmother         rheumatoid arthritis     Diabetes Paternal Grandmother      Obesity Paternal Grandmother      Cancer Other         breast     Breast Cancer Other         Breast Cancer Mets Brain     Substance Abuse Paternal Grandfather         Alcoholic     Depression Niece         Bipolar     Depression Nephew         Bipolar     Substance Abuse Nephew         Drug Addict              Allergies:     Allergies   Allergen Reactions     Nsaids Other (See Comments)     Caution should be used in the gastric bypass patient.     Carafate [Sucralfate] Hives, Swelling and Rash     Latex Dermatitis     Contact dermatitis     Morphine Itching              Medications:     Prior to Admission medications    Medication Sig Start Date End Date Taking? Authorizing Provider   acetaminophen (TYLENOL) 500 MG tablet Take 1,000 mg by mouth 3 times daily as needed for fever or pain   Yes Reported, Patient   ascorbic acid 1000 MG TABS tablet Take 1,000 mg by mouth daily   Yes Reported,  Patient   calcium carbonate (TUMS) 500 MG chewable tablet Take 2 chew tab by mouth 2 times daily   Yes Reported, Patient   hydrOXYzine HCl (ATARAX) 25 MG tablet Take 25 mg by mouth 3 times daily as needed for itching   Yes Reported, Patient   levothyroxine (SYNTHROID/LEVOTHROID) 88 MCG tablet Take 1 tablet (88 mcg) by mouth daily 5/2/24  Yes Nani Pierson MD   LORazepam (ATIVAN) 0.5 MG tablet Take 1-2 tablets (0.5-1 mg) by mouth every 6 hours as needed for anxiety 5/8/24  Yes Mandi Worthington APRN CNP   pantoprazole (PROTONIX) 40 MG EC tablet Take 1 tablet (40 mg) by mouth 2 times daily (before meals) for 90 days 8/19/24 11/17/24 Yes Catrina Lee PA-C   Pediatric Multivitamins-Iron (MULTIVITAMINS PLUS IRON CHILD) 18 MG CHEW Take 1 chew tab by mouth 2 times daily Ok to substitute with any chewable that contains 18 mg of iron, Vitamin A, Thiamine and Zinc. 4/22/22  Yes Arnulfo Stanley MD   senna-docusate (SENOKOT-S/PERICOLACE) 8.6-50 MG tablet Take 1 tablet by mouth 2 times daily as needed for constipation 8/19/24  Yes Catrina Lee PA-C   triamcinolone (KENALOG) 0.1 % external cream Apply topically 2 times daily as needed (poison ivy)   Yes Reported, Patient   vitamin D3 (CHOLECALCIFEROL) 50 mcg (2000 units) tablet Take 50 mcg by mouth daily 7/26/22  Yes Erika Jamison APRN CNP              Review of Systems:   A 12 point Review of Systems is negative other than noted in the HPI            Physical Exam:   No data found.     [unfilled]   Constitutional:   awake, alert, cooperative, no apparent distress, and appears stated age       Eyes:   PERRL, conjunctiva/corneas clear, EOM's intact; no scleral edema or icterus noted        ENT:   Normocephalic, without obvious abnormality, atraumatic, Lips, mucosa, and tongue normal        Hematologic / Lymphatic:   No lymphadenopathy       Abdomen:   Soft, nondistended, nontender to palpation.  Surgical incisions well-healed.        Musculoskeletal:   No obvious swelling, bruising or deformity       Skin:   Skin color and texture normal for patient, no rashes or lesions              Data:              DO Teofilo Khoury DO  General Surgeon  [facility]  Surgery [address] [address]?  Office: [phone]  Employed by - [employer]  Pager: [phone]       Again, thank you for allowing me to participate in the care of your patient.        Sincerely,        Teofilo Servin DO

## 2024-09-03 NOTE — PROGRESS NOTES
General Surgery Clinic - Postop follow up  Lucina Santamaria MRN# [id number]   Age/Sex: 58 year old female YOB: 1965     Reason for visit: Post op visit                           Assessment and Plan:   Assessment:  1.  Gastric perforation  2.  History of Kris-en-Y bypass  3.  Allergic reaction    58-year-old female presenting for postoperative evaluation after having a laparoscopic repair of gastric bypass done on 8/15/2024.  Patient is recovering uneventfully from the surgery.  Patient did have an allergic reaction with possible suspicion to Carafate.  Patient is recovering from allergic reaction after taking prednisone.    Plan:  -Recommend patient continue with the pantoprazole for at least a total of 90 days.  Thereafter, the patient should undergo an EGD with biopsy of the perforation site to rule out H. pylori.    -Continue with diet as tolerated    -Avoid Carafate for possible allergic reaction          Chief Complaint:     Chief Complaint   Patient presents with    Post-Op - General Surgery     PO repair of ulcer 8/15        History is obtained from the patient    HPI:   Lucina Santamaria is a 58 year old female who presents to the general surgery team today for evaluation postoperatively after her gastric perforation on 8/15/2024.  Patient is recovering uneventfully from surgery.  Tolerating diet.  Passing flatus having bowel wounds.  Patient states that she has no significant abdominal pain.  However, the patient recently had an episode of an allergic reaction where she had significant swelling around her face.  Patient did not have any difficulty breathing.  Patient did take some prednisone and her allergic reaction has calm down significantly.  Otherwise, patient has no other complaints at this time.          Past Medical History:     Past Medical History:   Diagnosis Date    Acquired hypothyroidism     Arthritis     sacro iliac and hip, right ankle.    Chronic constipation     since  menopause. fiber/hydration helps.    Coagulation disorder (H24)     Complication of anesthesia     nauseated w/ morphine previously after arm surgery.    COVID-19     .    Gallbladder problem     s/p cholecystectomy.    Head injury     childhood concussions    Heterozygous for MTHFR gene mutation     History of MTHFR mutation     Hypertension     Long COVID     Long term Covid    Morbid obesity (H)     Pneumonia     covid    Prediabetes     Pulmonary emboli (H)     Pulmonary embolism (H)     wisdome teeth out while on OCPs and MTHFR gene mutation history.    Pulmonary embolism (H)     age 23, OCPs/dental extraction provoked. reports some MTHFR gene issues, unsure if homo or heterozygous.              Past Surgical History:     Past Surgical History:   Procedure Laterality Date    APPENDECTOMY      BREAST BIOPSY, RT/LT      right breast - benign     SECTION      COLONOSCOPY N/A 11/10/2021    Procedure: COLONOSCOPY, WITH POLYPECTOMY;  Surgeon: Lynn Doshi MD;  Location:  GI    CREATION, GASTRIC BYPASS, HAFSA-EN-Y, LAPAROSCOPIC N/A 2022    Procedure: LAPAROSCOPIC HAFSA-EN-Y, GASTRIC BYPASS;  Surgeon: North Schneider MD;  Location: [facility] OR    EYE SURGERY  epilasik left eye    GI SURGERY  Hafsa N Y     GYN SURGERY      tubal ligation    LAPAROSCOPIC CHOLECYSTECTOMY N/A 2019    Procedure: CHOLECYSTECTOMY, LAPAROSCOPIC;  Surgeon: Lynn Doshi MD;  Location:  OR    LAPAROSCOPIC HERNIORRHAPHY UMBILICAL N/A 8/15/2024    Procedure: REPAIR OF UMBILICAL HERNIA;  Surgeon: Teofilo Servin DO;  Location: [facility] OR    LAPAROSCOPIC LYSIS ADHESIONS N/A 8/15/2024    Procedure: LYSIS OF ADHESIONS,;  Surgeon: Teofilo Servin DO;  Location: [facility] OR    LAPAROSCOPY DIAGNOSTIC (GYN) N/A 8/15/2024    Procedure: DIAGNOSTIC LAPAROSCOPY,;  Surgeon: Teofilo Servin DO;  Location: [facility] OR    ORTHOPEDIC SURGERY      radius, ulnar - open fracture     TUBAL LIGATION      wisdom teeth               Social History:    reports that she has never smoked. She has never used smokeless tobacco. She reports that she does not currently use alcohol. She reports that she does not use drugs.           Family History:     Family History   Problem Relation Age of Onset    Cancer Father     Other Cancer Father     Substance Abuse Father         Alcoholic    Depression Brother         Bipolar    Substance Abuse Brother         Alcoholic/Drug Addiction    Diabetes Mother     Hypertension Mother     Hyperlipidemia Mother     Colon Cancer Mother     Thyroid Disease Mother     Obesity Mother     Arthritis Maternal Grandmother         rheumatoid arthritis    Diabetes Paternal Grandmother     Obesity Paternal Grandmother     Cancer Other         breast    Breast Cancer Other         Breast Cancer Mets Brain    Substance Abuse Paternal Grandfather         Alcoholic    Depression Niece         Bipolar    Depression Nephew         Bipolar    Substance Abuse Nephew         Drug Addict              Allergies:     Allergies   Allergen Reactions    Nsaids Other (See Comments)     Caution should be used in the gastric bypass patient.    Carafate [Sucralfate] Hives, Swelling and Rash    Latex Dermatitis     Contact dermatitis    Morphine Itching              Medications:     Prior to Admission medications    Medication Sig Start Date End Date Taking? Authorizing Provider   acetaminophen (TYLENOL) 500 MG tablet Take 1,000 mg by mouth 3 times daily as needed for fever or pain   Yes Reported, Patient   ascorbic acid 1000 MG TABS tablet Take 1,000 mg by mouth daily   Yes Reported, Patient   calcium carbonate (TUMS) 500 MG chewable tablet Take 2 chew tab by mouth 2 times daily   Yes Reported, Patient   hydrOXYzine HCl (ATARAX) 25 MG tablet Take 25 mg by mouth 3 times daily as needed for itching   Yes Reported, Patient   levothyroxine (SYNTHROID/LEVOTHROID) 88 MCG tablet Take 1 tablet (88 mcg) by  mouth daily 5/2/24  Yes Nani Pierson MD   LORazepam (ATIVAN) 0.5 MG tablet Take 1-2 tablets (0.5-1 mg) by mouth every 6 hours as needed for anxiety 5/8/24  Yes Mandi Worthintgon APRN CNP   pantoprazole (PROTONIX) 40 MG EC tablet Take 1 tablet (40 mg) by mouth 2 times daily (before meals) for 90 days 8/19/24 11/17/24 Yes Catrina Lee PA-C   Pediatric Multivitamins-Iron (MULTIVITAMINS PLUS IRON CHILD) 18 MG CHEW Take 1 chew tab by mouth 2 times daily Ok to substitute with any chewable that contains 18 mg of iron, Vitamin A, Thiamine and Zinc. 4/22/22  Yes Arnulfo Stanley MD   senna-docusate (SENOKOT-S/PERICOLACE) 8.6-50 MG tablet Take 1 tablet by mouth 2 times daily as needed for constipation 8/19/24  Yes Catrina Lee PA-C   triamcinolone (KENALOG) 0.1 % external cream Apply topically 2 times daily as needed (poison ivy)   Yes Reported, Patient   vitamin D3 (CHOLECALCIFEROL) 50 mcg (2000 units) tablet Take 50 mcg by mouth daily 7/26/22  Yes Erika Jamison APRN CNP              Review of Systems:   A 12 point Review of Systems is negative other than noted in the HPI            Physical Exam:   No data found.     [unfilled]   Constitutional:   awake, alert, cooperative, no apparent distress, and appears stated age       Eyes:   PERRL, conjunctiva/corneas clear, EOM's intact; no scleral edema or icterus noted        ENT:   Normocephalic, without obvious abnormality, atraumatic, Lips, mucosa, and tongue normal        Hematologic / Lymphatic:   No lymphadenopathy       Abdomen:   Soft, nondistended, nontender to palpation.  Surgical incisions well-healed.       Musculoskeletal:   No obvious swelling, bruising or deformity       Skin:   Skin color and texture normal for patient, no rashes or lesions              Data:              DO Teofilo Khoury,   General Surgeon  [address]  [address]  [place]  [address]?  Office: [phone]  Employed by [employer]  Pager: [phone]

## 2024-09-11 DIAGNOSIS — L50.9 HIVES: Primary | ICD-10-CM

## 2024-09-11 RX ORDER — PREDNISONE 20 MG/1
TABLET ORAL
Qty: 11 TABLET | Refills: 0 | Status: SHIPPED | OUTPATIENT
Start: 2024-09-11 | End: 2024-09-20

## 2024-09-25 ENCOUNTER — ANCILLARY PROCEDURE (OUTPATIENT)
Dept: MAMMOGRAPHY | Facility: CLINIC | Age: 59
End: 2024-09-25
Attending: FAMILY MEDICINE
Payer: COMMERCIAL

## 2024-09-25 DIAGNOSIS — Z12.31 VISIT FOR SCREENING MAMMOGRAM: ICD-10-CM

## 2024-09-25 PROCEDURE — 77067 SCR MAMMO BI INCL CAD: CPT | Mod: TC | Performed by: RADIOLOGY

## 2024-09-25 PROCEDURE — 77063 BREAST TOMOSYNTHESIS BI: CPT | Mod: TC | Performed by: RADIOLOGY

## 2024-10-02 ENCOUNTER — OFFICE VISIT (OUTPATIENT)
Dept: FAMILY MEDICINE | Facility: CLINIC | Age: 59
End: 2024-10-02
Payer: COMMERCIAL

## 2024-10-02 VITALS
SYSTOLIC BLOOD PRESSURE: 132 MMHG | HEART RATE: 56 BPM | DIASTOLIC BLOOD PRESSURE: 80 MMHG | BODY MASS INDEX: 34.14 KG/M2 | RESPIRATION RATE: 14 BRPM | WEIGHT: 218 LBS | TEMPERATURE: 97.3 F | OXYGEN SATURATION: 98 %

## 2024-10-02 DIAGNOSIS — L50.9 HIVES: Primary | ICD-10-CM

## 2024-10-02 DIAGNOSIS — K63.1 PERFORATION OF INTESTINE (H): ICD-10-CM

## 2024-10-02 PROCEDURE — 99214 OFFICE O/P EST MOD 30 MIN: CPT | Performed by: NURSE PRACTITIONER

## 2024-10-02 RX ORDER — PREDNISONE 20 MG/1
20 TABLET ORAL DAILY
Qty: 90 TABLET | Refills: 0 | Status: SHIPPED | OUTPATIENT
Start: 2024-10-02

## 2024-10-02 RX ORDER — FAMOTIDINE 20 MG/1
20 TABLET, FILM COATED ORAL 2 TIMES DAILY
COMMUNITY

## 2024-10-02 RX ORDER — LEVOCETIRIZINE DIHYDROCHLORIDE 5 MG/1
5 TABLET, FILM COATED ORAL EVERY EVENING
COMMUNITY

## 2024-10-02 ASSESSMENT — PAIN SCALES - GENERAL: PAINLEVEL: NO PAIN (0)

## 2024-10-02 NOTE — PROGRESS NOTES
Assessment & Plan     Hives  Patient having recurrent hives since shortly after discharge from the hospital in late August.  Had just started Carafate and thought was a side effect, however has been off of Carafate since and hives have continued.  Does improve with prednisone, significantly pruritic.  Has been taking twice daily antihistamine and Benadryl as well.  Returns after completing steroid.  Discussed chronic urticaria or other underlying causes.  Will obtain lab work, would advise to continue twice daily antihistamine and discussed longer course of steroids.  Recommend following up with allergy if not improving or recurs.  - CBC with platelets and differential; Future  - Comprehensive metabolic panel (BMP + Alb, Alk Phos, ALT, AST, Total. Bili, TP); Future  - ESR: Erythrocyte sedimentation rate; Future  - Anti Nuclear Krissy IgG by IFA with Reflex; Future  - Adult Allergy/Asthma  Referral; Future  - CRP inflammation; Future  - Helicobacter pylori Antigen Stool; Future  - predniSONE (DELTASONE) 20 MG tablet; Take 1 tablet (20 mg) by mouth daily.    Perforation of intestine (H)  History of intestinal perforation, hospitalized in August.  Advised to have upper endoscopy repeated with biopsies.  - Adult GI  Referral - Procedure Only; Future            See Patient Instructions    Subjective   Lucina is a 58 year old, presenting for the following health issues:  Hives        10/2/2024    12:25 PM   Additional Questions   Roomed by Juanita     History of Present Illness       Reason for visit:  Hives    She eats 4 or more servings of fruits and vegetables daily.She consumes 0 sweetened beverage(s) daily.She exercises with enough effort to increase her heart rate 30 to 60 minutes per day.  She exercises with enough effort to increase her heart rate 5 days per week.   She is taking medications regularly.     Recurrent hives, first episode shortly after discharge from the hospital after starting  Carafate  Stopped Carafate and did not improve  Was taking antihistamine twice a day and Benadryl at night as well without improvement  Covered with course of prednisone and did improve did return and covered with another course of prednisone  Took full course, started hives again approximately a week later on Friday  Significant pruritus, mostly on upper chest, significant on back of head with oozing which has improved slightly and scattered spots otherwise  Has not started anything new, no new exposures or foods      Has still not restarted Carafate, however has been taking Famotidine along with the Pantoprazole   Has not had any increased abdominal pain, mild left upper quadrant discomfort  Does need repeat EGD per gastro after hospitalization          Review of Systems  Constitutional, HEENT, cardiovascular, pulmonary, gi and gu systems are negative, except as otherwise noted.      Objective    /80   Pulse 56   Temp 97.3  F (36.3  C) (Tympanic)   Resp 14   Wt 98.9 kg (218 lb)   SpO2 98%   BMI 34.14 kg/m    Body mass index is 34.14 kg/m .  Physical Exam   GENERAL: alert and no distress  NECK: no adenopathy, no asymmetry, masses, or scars  RESP: lungs clear to auscultation - no rales, rhonchi or wheezes  CV: regular rate and rhythm, normal S1 S2, no S3 or S4, no murmur, click or rub, no peripheral edema  ABDOMEN: soft, nontender, no hepatosplenomegaly, no masses and bowel sounds normal  MS: no gross musculoskeletal defects noted, no edema  SKIN: Small, scattered erythematous patches on upper thighs, bilateral arms, upper chest and erythematous patch on occipital scalp at nape of neck with flaking  PSYCH: mentation appears normal, affect normal/bright    Diagnostic Test Results:  Labs reviewed in Epic  Pending          Signed Electronically by: Krista Reynoso DNP,, APRN CNP      Chart documentation with Dragon Voice recognition Software. Although reviewed after completion, some words and grammatical  errors may remain.

## 2024-10-05 NOTE — PATIENT INSTRUCTIONS
Hives  Patient having recurrent hives since shortly after discharge from the hospital in late August.  Had just started Carafate and thought was a side effect, however has been off of Carafate since and hives have continued.  Does improve with prednisone, significantly pruritic.  Has been taking twice daily antihistamine and Benadryl as well.  Returns after completing steroid.  Discussed chronic urticaria or other underlying causes.  Will obtain lab work, would advise to continue twice daily antihistamine and discussed longer course of steroids.  Recommend following up with allergy if not improving or recurs.  - CBC with platelets and differential; Future  - Comprehensive metabolic panel (BMP + Alb, Alk Phos, ALT, AST, Total. Bili, TP); Future  - ESR: Erythrocyte sedimentation rate; Future  - Anti Nuclear Krissy IgG by IFA with Reflex; Future  - Adult Allergy/Asthma  Referral; Future  - CRP inflammation; Future  - Helicobacter pylori Antigen Stool; Future  - predniSONE (DELTASONE) 20 MG tablet; Take 1 tablet (20 mg) by mouth daily.    Perforation of intestine (H)  History of intestinal perforation, hospitalized in August.  Advised to have upper endoscopy repeated with biopsies.  - Adult GI  Referral - Procedure Only; Future

## 2024-10-14 ENCOUNTER — TELEPHONE (OUTPATIENT)
Dept: GASTROENTEROLOGY | Facility: CLINIC | Age: 59
End: 2024-10-14
Payer: COMMERCIAL

## 2024-10-14 NOTE — TELEPHONE ENCOUNTER
"Patient only wants to schedule with Dr Doshi at [facility], provided number and transferred caller.    Endoscopy Scheduling Screen    Have you had any respiratory illness or flu-like symptoms in the last 10 days?  No    What is your communication preference for Instructions and/or Bowel Prep?   MyChart    What insurance is in the chart?  Other:  [employer]    Ordering/Referring Provider: Mandi Worthington APRN CNP in [facility]     (If ordering provider performs procedure, schedule with ordering provider unless otherwise instructed. )    BMI: Estimated body mass index is 34.14 kg/m  as calculated from the following:    Height as of 8/26/24: 1.702 m (5' 7\").    Weight as of 10/2/24: 98.9 kg (218 lb).     Sedation Ordered  moderate sedation.   If patient BMI > 50 do not schedule in [facility].    If patient BMI > 45 do not schedule at [facility].    Are you taking methadone or Suboxone?  NO, No RN review required.    Have you been diagnosed and are being treated for severe PTSD or severe anxiety?  NO, No RN review required.    Are you taking any prescription medications for pain 3 or more times per week?   NO, No RN review required.    Do you have a history of malignant hyperthermia?  No    (Females) Are you currently pregnant?   No     Have you been diagnosed or told you have pulmonary hypertension?   No    Do you have an LVAD?  No    Have you been told you have moderate to severe sleep apnea?  No.    Have you been told you have COPD, asthma, or any other lung disease?  No    Do you have any heart conditions?  No     Have you ever had or are you waiting for an organ transplant?  No. Continue scheduling, no site restrictions.    Have you had a stroke or transient ischemic attack (TIA aka \"mini stroke\" in the last 6 months?   No    Have you been diagnosed with or been told you have cirrhosis of the liver?   No.    Are you currently on dialysis?   No    Do you need assistance transferring?   No    BMI: Estimated body mass index is " "34.14 kg/m  as calculated from the following:    Height as of 8/26/24: 1.702 m (5' 7\").    Weight as of 10/2/24: 98.9 kg (218 lb).     Is patients BMI > 40 and scheduling location [facility]?  No    Do you take an injectable or oral medication for weight loss or diabetes (excluding insulin)?  No    Do you take the medication Naltrexone?  No    Do you take blood thinners?  No       Prep   Are you currently on dialysis or do you have chronic kidney disease?  No    Do you have a diagnosis of diabetes?  No    Do you have a diagnosis of cystic fibrosis (CF)?  No    On a regular basis do you go 3 -5 days between bowel movements?  No    BMI > 40?  No    Preferred Pharmacy:      [address] [address]  Phone: [phone] Fax: [phone]      Final Scheduling Details     Procedure scheduled  Upper endoscopy (EGD)    Surgeon:  PATIENT ONLY WANTS DR DUNBAR    Date of procedure:  TBD     Pre-OP / PAC:   TBD    Location   [facility]    TBD      Patient Reminders:   You will receive a call from a Nurse to review instructions and health history.  This assessment must be completed prior to your procedure.  Failure to complete the Nurse assessment may result in the procedure being cancelled.      On the day of your procedure, please designate an adult(s) who can drive you home stay with you for the next 24 hours. The medicines used in the exam will make you sleepy. You will not be able to drive.      You cannot take public transportation, ride share services, or non-medical taxi service without a responsible caregiver.  Medical transport services are allowed with the requirement that a responsible caregiver will receive you at your destination.  We require that drivers and caregivers are confirmed prior to your procedure.    "

## 2024-10-15 ENCOUNTER — LAB (OUTPATIENT)
Dept: LAB | Facility: CLINIC | Age: 59
End: 2024-10-15
Payer: COMMERCIAL

## 2024-10-15 DIAGNOSIS — L50.9 HIVES: ICD-10-CM

## 2024-10-15 LAB
ALBUMIN SERPL BCG-MCNC: 4.1 G/DL (ref 3.5–5.2)
ALP SERPL-CCNC: 84 U/L (ref 40–150)
ALT SERPL W P-5'-P-CCNC: 18 U/L (ref 0–50)
ANION GAP SERPL CALCULATED.3IONS-SCNC: 11 MMOL/L (ref 7–15)
AST SERPL W P-5'-P-CCNC: 19 U/L (ref 0–45)
BASOPHILS # BLD AUTO: 0 10E3/UL (ref 0–0.2)
BASOPHILS NFR BLD AUTO: 1 %
BILIRUB SERPL-MCNC: 0.3 MG/DL
BUN SERPL-MCNC: 10.7 MG/DL (ref 6–20)
CALCIUM SERPL-MCNC: 9.4 MG/DL (ref 8.8–10.4)
CHLORIDE SERPL-SCNC: 108 MMOL/L (ref 98–107)
CREAT SERPL-MCNC: 0.82 MG/DL (ref 0.51–0.95)
CRP SERPL-MCNC: <3 MG/L
EGFRCR SERPLBLD CKD-EPI 2021: 83 ML/MIN/1.73M2
EOSINOPHIL # BLD AUTO: 0.3 10E3/UL (ref 0–0.7)
EOSINOPHIL NFR BLD AUTO: 5 %
ERYTHROCYTE [DISTWIDTH] IN BLOOD BY AUTOMATED COUNT: 12.3 % (ref 10–15)
ERYTHROCYTE [SEDIMENTATION RATE] IN BLOOD BY WESTERGREN METHOD: 10 MM/HR (ref 0–30)
GLUCOSE SERPL-MCNC: 91 MG/DL (ref 70–99)
HCO3 SERPL-SCNC: 27 MMOL/L (ref 22–29)
HCT VFR BLD AUTO: 40 % (ref 35–47)
HGB BLD-MCNC: 12.9 G/DL (ref 11.7–15.7)
IMM GRANULOCYTES # BLD: 0 10E3/UL
IMM GRANULOCYTES NFR BLD: 0 %
LYMPHOCYTES # BLD AUTO: 1.5 10E3/UL (ref 0.8–5.3)
LYMPHOCYTES NFR BLD AUTO: 29 %
MCH RBC QN AUTO: 33.5 PG (ref 26.5–33)
MCHC RBC AUTO-ENTMCNC: 32.3 G/DL (ref 31.5–36.5)
MCV RBC AUTO: 104 FL (ref 78–100)
MONOCYTES # BLD AUTO: 0.3 10E3/UL (ref 0–1.3)
MONOCYTES NFR BLD AUTO: 5 %
NEUTROPHILS # BLD AUTO: 3 10E3/UL (ref 1.6–8.3)
NEUTROPHILS NFR BLD AUTO: 60 %
PLATELET # BLD AUTO: 174 10E3/UL (ref 150–450)
POTASSIUM SERPL-SCNC: 4.5 MMOL/L (ref 3.4–5.3)
PROT SERPL-MCNC: 6.5 G/DL (ref 6.4–8.3)
RBC # BLD AUTO: 3.85 10E6/UL (ref 3.8–5.2)
SODIUM SERPL-SCNC: 146 MMOL/L (ref 135–145)
WBC # BLD AUTO: 5.1 10E3/UL (ref 4–11)

## 2024-10-15 PROCEDURE — 80053 COMPREHEN METABOLIC PANEL: CPT

## 2024-10-15 PROCEDURE — 85025 COMPLETE CBC W/AUTO DIFF WBC: CPT

## 2024-10-15 PROCEDURE — 86140 C-REACTIVE PROTEIN: CPT

## 2024-10-15 PROCEDURE — 36415 COLL VENOUS BLD VENIPUNCTURE: CPT

## 2024-10-15 PROCEDURE — 85652 RBC SED RATE AUTOMATED: CPT

## 2024-10-16 PROCEDURE — 87338 HPYLORI STOOL AG IA: CPT

## 2024-10-17 LAB — H PYLORI AG STL QL IA: NEGATIVE

## 2024-11-12 ENCOUNTER — ANCILLARY PROCEDURE (OUTPATIENT)
Dept: GENERAL RADIOLOGY | Facility: CLINIC | Age: 59
End: 2024-11-12
Attending: PHYSICIAN ASSISTANT
Payer: COMMERCIAL

## 2024-11-12 ENCOUNTER — OFFICE VISIT (OUTPATIENT)
Dept: URGENT CARE | Facility: URGENT CARE | Age: 59
End: 2024-11-12
Payer: COMMERCIAL

## 2024-11-12 VITALS
SYSTOLIC BLOOD PRESSURE: 139 MMHG | HEART RATE: 74 BPM | DIASTOLIC BLOOD PRESSURE: 82 MMHG | OXYGEN SATURATION: 98 % | RESPIRATION RATE: 16 BRPM | TEMPERATURE: 98.5 F

## 2024-11-12 DIAGNOSIS — R07.81 RIB PAIN ON RIGHT SIDE: ICD-10-CM

## 2024-11-12 DIAGNOSIS — S22.060A COMPRESSION FRACTURE OF T7 VERTEBRA, INITIAL ENCOUNTER (H): ICD-10-CM

## 2024-11-12 DIAGNOSIS — S22.060A COMPRESSION FRACTURE OF T8 VERTEBRA, INITIAL ENCOUNTER (H): Primary | ICD-10-CM

## 2024-11-12 DIAGNOSIS — M54.6 PAIN IN THORACIC SPINE: ICD-10-CM

## 2024-11-12 PROCEDURE — 71101 X-RAY EXAM UNILAT RIBS/CHEST: CPT | Mod: TC | Performed by: STUDENT IN AN ORGANIZED HEALTH CARE EDUCATION/TRAINING PROGRAM

## 2024-11-12 PROCEDURE — 72072 X-RAY EXAM THORAC SPINE 3VWS: CPT | Mod: TC | Performed by: RADIOLOGY

## 2024-11-12 PROCEDURE — 99214 OFFICE O/P EST MOD 30 MIN: CPT | Performed by: PHYSICIAN ASSISTANT

## 2024-11-12 RX ORDER — CYCLOBENZAPRINE HCL 10 MG
10 TABLET ORAL 3 TIMES DAILY PRN
Qty: 30 TABLET | Refills: 0 | Status: SHIPPED | OUTPATIENT
Start: 2024-11-12

## 2024-11-12 RX ORDER — HYDROCODONE BITARTRATE AND ACETAMINOPHEN 5; 325 MG/1; MG/1
1 TABLET ORAL EVERY 6 HOURS PRN
Qty: 30 TABLET | Refills: 0 | Status: SHIPPED | OUTPATIENT
Start: 2024-11-12

## 2024-11-13 ENCOUNTER — OFFICE VISIT (OUTPATIENT)
Dept: FAMILY MEDICINE | Facility: CLINIC | Age: 59
End: 2024-11-13
Payer: COMMERCIAL
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
## 2024-11-13 DIAGNOSIS — S22.000A COMPRESSION FRACTURE OF THORACIC VERTEBRA, CLOSED, INITIAL ENCOUNTER (H): ICD-10-CM

## 2024-11-13 DIAGNOSIS — R07.81 RIB PAIN ON RIGHT SIDE: ICD-10-CM

## 2024-11-13 PROCEDURE — 99213 OFFICE O/P EST LOW 20 MIN: CPT | Performed by: NURSE PRACTITIONER

## 2024-11-13 RX ORDER — CALCITONIN SALMON 200 [IU]/.09ML
1 SPRAY, METERED NASAL DAILY
Qty: 3.7 ML | Refills: 0 | Status: SHIPPED | OUTPATIENT
Start: 2024-11-13

## 2024-11-13 NOTE — PROGRESS NOTES
Assessment & Plan     Fall, initial encounter  Rib pain on right side  Patient had a fall on ice on 11/1/2024 falling on her bag landing directly on her coffee mug to the right mid back.  Patient has since had pain and has been worsening.  Seen in urgent care last evening and found to have a compression fracture of the T7-T8 thoracic vertebrae.  Patient having difficulty sleeping and rolling in bed.  Right ribs still significantly tender and painful to move, no fractures noted on x-ray yesterday.  Given significant pain and worsening, discussed further imaging with CT scan.  Orders placed, patient to schedule.  In the interim, continue supportive cares including ice and/or heat, topical patches, topical Voltaren gel, Exer strength Tylenol and Norco as needed.  - CT Chest w/o Contrast; Future    Compression fracture of thoracic vertebra, closed, initial encounter (H)  Compression fracture of T7-T8 as discussed above secondary to fall.  Further evaluating for additional fractures with chest CT and also discuss starting calcitonin.  - CT Chest w/o Contrast; Future  - calcitonin, salmon, (MIACALCIN) 200 UNIT/ACT nasal spray; Spray 1 spray into one nostril alternating nostrils daily. Alternate nostril each day.      See Patient Instructions    Subjective   Lucina is a 59 year old, presenting for the following health issues:  Musculoskeletal Problem (Rib Pain)        11/13/2024    10:30 AM   Additional Questions   Roomed by Juanita CEDILLO       ED/ Followup:    Facility:  [facility]   Date of visit: 11/12/2024   Reason for visit: Compression fracture of T8 vertebra     Xray results   INDICATION:  Pain in thoracic spine  COMPARISON: Thoracic spine CT 08/15/2024                                                                IMPRESSION: The upper thoracic spine is partially obscured by overlying soft tissue on the lateral image. Mild levocurvature of the mid to lower thoracic spine. Normal sagittal alignment. Mild  Pt c/o dizziness, revitaled, pt acutely hypotensive to 78/55 at this time, pt placed in green 25 for immediate physician evaluation. Green team at bedside. chronic compression fractures of T7 and T8. The rest of   vertebral body heights are maintained. Mild intervertebral disc height loss in the mid to lower thoracic spine.                                                                    IMPRESSION: No displaced rib fracture. Clear lungs and pleural spaces. Normal heart size and mediastinal contour. Right upper quadrant surgical clips.    Patient had fall on ice on 11/1/2024 landing on her bag with her mug and it and fell directly on coffee mug.  Seen in urgent care yesterday as pain was significantly worsening and found to have a T7-T8 compression fracture  Still having a significant amount of pain in right ribs  Difficulty sleeping due to pain        Review of Systems  Constitutional, HEENT, cardiovascular, pulmonary, gi and gu systems are negative, except as otherwise noted.      Objective    There were no vitals taken for this visit.  There is no height or weight on file to calculate BMI.  Physical Exam   GENERAL: alert and no distress  MS: no gross musculoskeletal defects noted, no edema. Slower gait  SKIN: no suspicious lesions or rashes  PSYCH: mentation appears normal, affect normal/bright    Diagnostic Test Results:  Labs reviewed in [facility]  CT ~ pending          Signed Electronically by: Krista Reynoso DNP,, YARON CNP      Chart documentation with Dragon Voice recognition Software. Although reviewed after completion, some words and grammatical errors may remain.

## 2024-11-13 NOTE — H&P (VIEW-ONLY)
Assessment & Plan     Fall, initial encounter  Rib pain on right side  Patient had a fall on ice on 11/1/2024 falling on her bag landing directly on her coffee mug to the right mid back.  Patient has since had pain and has been worsening.  Seen in urgent care last evening and found to have a compression fracture of the T7-T8 thoracic vertebrae.  Patient having difficulty sleeping and rolling in bed.  Right ribs still significantly tender and painful to move, no fractures noted on x-ray yesterday.  Given significant pain and worsening, discussed further imaging with CT scan.  Orders placed, patient to schedule.  In the interim, continue supportive cares including ice and/or heat, topical patches, topical Voltaren gel, Exer strength Tylenol and Norco as needed.  - CT Chest w/o Contrast; Future    Compression fracture of thoracic vertebra, closed, initial encounter (H)  Compression fracture of T7-T8 as discussed above secondary to fall.  Further evaluating for additional fractures with chest CT and also discuss starting calcitonin.  - CT Chest w/o Contrast; Future  - calcitonin, salmon, (MIACALCIN) 200 UNIT/ACT nasal spray; Spray 1 spray into one nostril alternating nostrils daily. Alternate nostril each day.      See Patient Instructions    Subjective   Lucina is a 59 year old, presenting for the following health issues:  Musculoskeletal Problem (Rib Pain)        11/13/2024    10:30 AM   Additional Questions   Roomed by Juanita CEDILLO       ED/ Followup:    Facility:  [place]   Date of visit: 11/12/2024   Reason for visit: Compression fracture of T8 vertebra     Xray results   INDICATION:  Pain in thoracic spine  COMPARISON: Thoracic spine CT 08/15/2024                                                                IMPRESSION: The upper thoracic spine is partially obscured by overlying soft tissue on the lateral image. Mild levocurvature of the mid to lower thoracic spine. Normal sagittal alignment. Mild  chronic compression fractures of T7 and T8. The rest of   vertebral body heights are maintained. Mild intervertebral disc height loss in the mid to lower thoracic spine.                                                                    IMPRESSION: No displaced rib fracture. Clear lungs and pleural spaces. Normal heart size and mediastinal contour. Right upper quadrant surgical clips.    Patient had fall on ice on 11/1/2024 landing on her bag with her mug and it and fell directly on coffee mug.  Seen in urgent care yesterday as pain was significantly worsening and found to have a T7-T8 compression fracture  Still having a significant amount of pain in right ribs  Difficulty sleeping due to pain        Review of Systems  Constitutional, HEENT, cardiovascular, pulmonary, gi and gu systems are negative, except as otherwise noted.      Objective    There were no vitals taken for this visit.  There is no height or weight on file to calculate BMI.  Physical Exam   GENERAL: alert and no distress  MS: no gross musculoskeletal defects noted, no edema. Slower gait  SKIN: no suspicious lesions or rashes  PSYCH: mentation appears normal, affect normal/bright    Diagnostic Test Results:  Labs reviewed in [facility]  CT ~ pending          Signed Electronically by: Krista Reynoso DNP,, YARON CNP      Chart documentation with Dragon Voice recognition Software. Although reviewed after completion, some words and grammatical errors may remain.

## 2024-11-15 ENCOUNTER — HOSPITAL ENCOUNTER (OUTPATIENT)
Dept: CT IMAGING | Facility: CLINIC | Age: 59
Discharge: HOME | End: 2024-11-15
Attending: NURSE PRACTITIONER | Admitting: NURSE PRACTITIONER
Payer: COMMERCIAL

## 2024-11-15 DIAGNOSIS — R07.81 RIB PAIN ON RIGHT SIDE: ICD-10-CM

## 2024-11-15 DIAGNOSIS — S22.000A COMPRESSION FRACTURE OF THORACIC VERTEBRA, CLOSED, INITIAL ENCOUNTER (H): ICD-10-CM
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
## 2024-11-15 PROCEDURE — 71260 CT THORAX DX C+: CPT

## 2024-11-15 PROCEDURE — 250N000009 HC RX 250: Performed by: NURSE PRACTITIONER

## 2024-11-15 PROCEDURE — 250N000011 HC RX IP 250 OP 636: Performed by: NURSE PRACTITIONER

## 2024-11-15 RX ORDER — IOPAMIDOL 755 MG/ML
500 INJECTION, SOLUTION INTRAVASCULAR ONCE
Status: COMPLETED | OUTPATIENT
Start: 2024-11-15 | End: 2024-11-15

## 2024-11-15 RX ADMIN — SODIUM CHLORIDE 60 ML: 9 INJECTION, SOLUTION INTRAVENOUS at 09:05

## 2024-11-15 RX ADMIN — IOPAMIDOL 80 ML: 755 INJECTION, SOLUTION INTRAVENOUS at 09:05

## 2024-11-20 ENCOUNTER — PREP FOR PROCEDURE (OUTPATIENT)
Dept: SURGERY | Facility: CLINIC | Age: 59
End: 2024-11-20
Payer: COMMERCIAL

## 2024-11-20 DIAGNOSIS — K28.5: Primary | ICD-10-CM

## 2024-11-29 ENCOUNTER — ANESTHESIA EVENT (OUTPATIENT)
Dept: GASTROENTEROLOGY | Facility: CLINIC | Age: 59
End: 2024-11-29
Payer: COMMERCIAL

## 2024-11-29 NOTE — ANESTHESIA PREPROCEDURE EVALUATION
Anesthesia Pre-Procedure Evaluation    Patient: Lucina Santamaria   MRN: [id number] : 1965        Procedure : Procedure(s):  Esophagoscopy, gastroscopy, duodenoscopy (EGD), combined          Past Medical History:   Diagnosis Date    Acquired hypothyroidism     Arthritis     sacro iliac and hip, right ankle.    Chronic constipation     since menopause. fiber/hydration helps.    Coagulation disorder (H)     Complication of anesthesia     nauseated w/ morphine previously after arm surgery.    COVID-19     .    Gallbladder problem     s/p cholecystectomy.    Head injury     childhood concussions    Heterozygous for MTHFR gene mutation     History of MTHFR mutation     Hypertension     Long COVID     Long term Covid    Morbid obesity (H)     Pneumonia     covid    Prediabetes     Pulmonary emboli (H)     Pulmonary embolism (H)     wisdome teeth out while on OCPs and MTHFR gene mutation history.    Pulmonary embolism (H)     age 23, OCPs/dental extraction provoked. reports some MTHFR gene issues, unsure if homo or heterozygous.      Past Surgical History:   Procedure Laterality Date    APPENDECTOMY      BREAST BIOPSY, RT/LT      right breast - benign     SECTION      COLONOSCOPY N/A 11/10/2021    Procedure: COLONOSCOPY, WITH POLYPECTOMY;  Surgeon: Lynn Doshi MD;  Location:  GI    CREATION, GASTRIC BYPASS, HAFSA-EN-Y, LAPAROSCOPIC N/A 2022    Procedure: LAPAROSCOPIC HAFSA-EN-Y, GASTRIC BYPASS;  Surgeon: North Schneider MD;  Location: [facility] OR    EYE SURGERY  epilasik left eye    GI SURGERY  Hafsa N Y     GYN SURGERY      tubal ligation    LAPAROSCOPIC CHOLECYSTECTOMY N/A 2019    Procedure: CHOLECYSTECTOMY, LAPAROSCOPIC;  Surgeon: Lynn Doshi MD;  Location:  OR    LAPAROSCOPIC HERNIORRHAPHY UMBILICAL N/A 8/15/2024    Procedure: REPAIR OF UMBILICAL HERNIA;  Surgeon: Teofilo Servin DO;  Location: [facility] OR    LAPAROSCOPIC LYSIS  ADHESIONS N/A 8/15/2024    Procedure: LYSIS OF ADHESIONS,;  Surgeon: Teofilo Servin DO;  Location: [facility] OR    LAPAROSCOPY DIAGNOSTIC (GYN) N/A 8/15/2024    Procedure: DIAGNOSTIC LAPAROSCOPY,;  Surgeon: Teofilo Servin DO;  Location: [facility] OR    ORTHOPEDIC SURGERY  1991    radius, ulnar - open fracture    TUBAL LIGATION      wisdom teeth        Allergies   Allergen Reactions    Nsaids Other (See Comments)     Caution should be used in the gastric bypass patient.    Carafate [Sucralfate] Hives, Swelling and Rash    Latex Dermatitis     Contact dermatitis    Morphine Itching      Social History     Tobacco Use    Smoking status: Never    Smokeless tobacco: Never   Substance Use Topics    Alcohol use: Not Currently     Comment: rare, once monthly.      Wt Readings from Last 1 Encounters:   10/02/24 98.9 kg (218 lb)        Anesthesia Evaluation   Pt has had prior anesthetic.         ROS/MED HX  ENT/Pulmonary: Comment: Pulmonary embolism (H)    (+)                              recent URI,          Neurologic:  - neg neurologic ROS     Cardiovascular:     (+)  hypertension- -   -  - -                                 Previous cardiac testing   Echo: Date: Results:    Stress Test:  Date: Results:    ECG Reviewed:  Date: 8/24 Results:  Sinus Rhythm   -Negative precordial T-waves -Probably normal -consider anteroseptal ischemia.  Low voltage -possible pulmonary disease.  Cath:  Date: Results:      METS/Exercise Tolerance:     Hematologic:  - neg hematologic  ROS     Musculoskeletal:  - neg musculoskeletal ROS     GI/Hepatic:  - neg GI/hepatic ROS     Renal/Genitourinary:  - neg Renal ROS     Endo:     (+)          thyroid problem, hypothyroidism,    Obesity,       Psychiatric/Substance Use:  - neg psychiatric ROS     Infectious Disease:       Malignancy:  - neg malignancy ROS     Other:  - neg other ROS          Physical Exam    Airway  airway exam normal      Mallampati: II   TM distance: > 3 FB   Neck ROM: full    Mouth opening: > 3 cm    Respiratory Devices and Support         Dental           Cardiovascular   cardiovascular exam normal       Rhythm and rate: regular and normal     Pulmonary   pulmonary exam normal        breath sounds clear to auscultation           OUTSIDE LABS:  CBC:   Lab Results   Component Value Date    WBC 5.1 10/15/2024    WBC 6.3 08/26/2024    HGB 12.9 10/15/2024    HGB 12.7 08/26/2024    HCT 40.0 10/15/2024    HCT 39.3 08/26/2024     10/15/2024     08/26/2024     BMP:   Lab Results   Component Value Date     (H) 10/15/2024     08/26/2024    POTASSIUM 4.5 10/15/2024    POTASSIUM 4.6 08/26/2024    CHLORIDE 108 (H) 10/15/2024    CHLORIDE 106 08/26/2024    CO2 27 10/15/2024    CO2 30 (H) 08/26/2024    BUN 10.7 10/15/2024    BUN 10.9 08/26/2024    CR 0.82 10/15/2024    CR 0.68 08/26/2024    GLC 91 10/15/2024    GLC 95 08/26/2024     COAGS:   Lab Results   Component Value Date    PTT 26 07/14/2022    INR 0.94 07/14/2022     POC:   Lab Results   Component Value Date    HCG Negative 09/24/2018     HEPATIC:   Lab Results   Component Value Date    ALBUMIN 4.1 10/15/2024    PROTTOTAL 6.5 10/15/2024    ALT 18 10/15/2024    AST 19 10/15/2024    ALKPHOS 84 10/15/2024    BILITOTAL 0.3 10/15/2024     OTHER:   Lab Results   Component Value Date    A1C 5.1 04/30/2024    LAURA 9.4 10/15/2024    PHOS 3.3 08/16/2024    MAG 2.1 08/16/2024    LIPASE 11 (L) 08/15/2024    TSH 1.81 08/26/2024    T4 1.18 09/23/2021    CRP 21.3 (H) 01/19/2022    SED 10 10/15/2024       Anesthesia Plan    ASA Status:  2    NPO Status:  NPO Appropriate    Anesthesia Type: General.   Induction: Intravenous, Propofol.   Maintenance: TIVA.        Consents    Anesthesia Plan(s) and associated risks, benefits, and realistic alternatives discussed. Questions answered and patient/representative(s) expressed understanding.     - Discussed: Risks, Benefits and Alternatives for BOTH SEDATION and the PROCEDURE were discussed      - Discussed with:  Patient       - Patient is DNR/DNI Status: No     Use of blood products discussed: No .     Postoperative Care            Comments:               YARON Clark CRNA    I have reviewed the pertinent notes and labs in the chart from the past 30 days and (re)examined the patient.  Any updates or changes from those notes are reflected in this note.

## 2024-12-02 ENCOUNTER — HOSPITAL ENCOUNTER (OUTPATIENT)
Facility: CLINIC | Age: 59
Discharge: HOME | End: 2024-12-02
Attending: SURGERY | Admitting: SURGERY
Payer: COMMERCIAL

## 2024-12-02 ENCOUNTER — ANESTHESIA (OUTPATIENT)
Dept: GASTROENTEROLOGY | Facility: CLINIC | Age: 59
End: 2024-12-02
Payer: COMMERCIAL

## 2024-12-02 VITALS
HEART RATE: 60 BPM | DIASTOLIC BLOOD PRESSURE: 79 MMHG | WEIGHT: 218 LBS | HEIGHT: 67 IN | OXYGEN SATURATION: 98 % | RESPIRATION RATE: 15 BRPM | BODY MASS INDEX: 34.21 KG/M2 | TEMPERATURE: 98 F | SYSTOLIC BLOOD PRESSURE: 159 MMHG

## 2024-12-02 LAB — UPPER GI ENDOSCOPY: NORMAL

## 2024-12-02 PROCEDURE — 250N000009 HC RX 250: Performed by: NURSE ANESTHETIST, CERTIFIED REGISTERED

## 2024-12-02 PROCEDURE — 43235 EGD DIAGNOSTIC BRUSH WASH: CPT | Performed by: SURGERY

## 2024-12-02 PROCEDURE — 250N000011 HC RX IP 250 OP 636: Performed by: NURSE ANESTHETIST, CERTIFIED REGISTERED

## 2024-12-02 PROCEDURE — 370N000017 HC ANESTHESIA TECHNICAL FEE, PER MIN: Performed by: SURGERY

## 2024-12-02 RX ORDER — PROPOFOL 10 MG/ML
INJECTION, EMULSION INTRAVENOUS PRN
Status: DISCONTINUED | OUTPATIENT
Start: 2024-12-02 | End: 2024-12-02

## 2024-12-02 RX ORDER — LIDOCAINE 40 MG/G
CREAM TOPICAL
Status: DISCONTINUED | OUTPATIENT
Start: 2024-12-02 | End: 2024-12-02 | Stop reason: HOSPADM

## 2024-12-02 RX ORDER — PROPOFOL 10 MG/ML
INJECTION, EMULSION INTRAVENOUS CONTINUOUS PRN
Status: DISCONTINUED | OUTPATIENT
Start: 2024-12-02 | End: 2024-12-02

## 2024-12-02 RX ORDER — LIDOCAINE HYDROCHLORIDE 20 MG/ML
INJECTION, SOLUTION INFILTRATION; PERINEURAL PRN
Status: DISCONTINUED | OUTPATIENT
Start: 2024-12-02 | End: 2024-12-02

## 2024-12-02 RX ORDER — SODIUM CHLORIDE, SODIUM LACTATE, POTASSIUM CHLORIDE, CALCIUM CHLORIDE 600; 310; 30; 20 MG/100ML; MG/100ML; MG/100ML; MG/100ML
INJECTION, SOLUTION INTRAVENOUS CONTINUOUS
Status: DISCONTINUED | OUTPATIENT
Start: 2024-12-02 | End: 2024-12-02 | Stop reason: HOSPADM

## 2024-12-02 RX ADMIN — PROPOFOL 200 MCG/KG/MIN: 10 INJECTION, EMULSION INTRAVENOUS at 13:27

## 2024-12-02 RX ADMIN — LIDOCAINE HYDROCHLORIDE 80 MG: 20 INJECTION, SOLUTION INFILTRATION; PERINEURAL at 13:27

## 2024-12-02 RX ADMIN — PROPOFOL 80 MG: 10 INJECTION, EMULSION INTRAVENOUS at 13:27

## 2024-12-02 ASSESSMENT — ACTIVITIES OF DAILY LIVING (ADL)
ADLS_ACUITY_SCORE: 58

## 2024-12-02 NOTE — ANESTHESIA POSTPROCEDURE EVALUATION
Patient: Lucina Santamaria    Procedure: Procedure(s):  Esophagoscopy, gastroscopy, duodenoscopy (EGD), combined       Anesthesia Type:  General    Note:  Disposition: Outpatient   Postop Pain Control: Uneventful            Sign Out: Well controlled pain   PONV: No   Neuro/Psych: Uneventful            Sign Out: Acceptable/Baseline neuro status   Airway/Respiratory: Uneventful            Sign Out: Acceptable/Baseline resp. status   CV/Hemodynamics: Uneventful            Sign Out: Acceptable CV status; No obvious hypovolemia; No obvious fluid overload   Other NRE: NONE   DID A NON-ROUTINE EVENT OCCUR? No           Last vitals:  Vitals Value Taken Time   /79 12/02/24 1400   Temp     Pulse 60 12/02/24 1400   Resp 15 12/02/24 1400   SpO2 97 % 12/02/24 1347   Vitals shown include unfiled device data.    Electronically Signed By: YARON Perea CRNA  December 2, 2024  2:27 PM

## 2024-12-02 NOTE — INTERVAL H&P NOTE
"I have reviewed the surgical (or preoperative) H&P that is linked to this encounter, and examined the patient. There are no significant changes    EGD 2/2 hx of marginal ulcer 2/2 lap RNYGB (2022)    Clinical Conditions Present on Arrival:  Clinically Significant Risk Factors Present on Admission                       # Obesity: Estimated body mass index is 34.14 kg/m  as calculated from the following:    Height as of this encounter: 1.702 m (5' 7\").    Weight as of this encounter: 98.9 kg (218 lb).       "

## 2024-12-02 NOTE — ANESTHESIA CARE TRANSFER NOTE
Patient: Lucina Santamaria    Procedure: Procedure(s):  Esophagoscopy, gastroscopy, duodenoscopy (EGD), combined       Diagnosis: Perforation of intestine (H) [K63.1]  Diagnosis Additional Information: No value filed.    Anesthesia Type:   General     Note:    Oropharynx: oropharynx clear of all foreign objects  Level of Consciousness: awake  Oxygen Supplementation: room air    Independent Airway: airway patency satisfactory and stable  Dentition: dentition unchanged  Vital Signs Stable: post-procedure vital signs reviewed and stable  Report to RN Given: handoff report given  Patient transferred to: Phase II    Handoff Report: Identifed the Patient, Identified the Reponsible Provider, Reviewed the pertinent medical history, Discussed the surgical course, Reviewed Intra-OP anesthesia mangement and issues during anesthesia, Set expectations for post-procedure period and Allowed opportunity for questions and acknowledgement of understanding      Vitals:  Vitals Value Taken Time   /76 12/02/24 1339   Temp     Pulse 72 12/02/24 1339   Resp     SpO2 98 % 12/02/24 1345   Vitals shown include unfiled device data.    Electronically Signed By: YARON Cano CRNA  December 2, 2024  1:46 PM

## 2025-01-14 ENCOUNTER — TELEPHONE (OUTPATIENT)
Dept: URGENT CARE | Facility: URGENT CARE | Age: 60
End: 2025-01-14
Payer: COMMERCIAL

## 2025-01-14 DIAGNOSIS — J01.90 ACUTE SINUSITIS WITH SYMPTOMS > 10 DAYS: Primary | ICD-10-CM

## 2025-04-22 ENCOUNTER — VIRTUAL VISIT (OUTPATIENT)
Dept: FAMILY MEDICINE | Facility: CLINIC | Age: 60
End: 2025-04-22
Payer: COMMERCIAL

## 2025-04-22 DIAGNOSIS — E03.9 ACQUIRED HYPOTHYROIDISM: ICD-10-CM

## 2025-04-22 DIAGNOSIS — F43.20 GRIEF REACTION: ICD-10-CM

## 2025-04-22 DIAGNOSIS — F41.0 PANIC ATTACK: Primary | ICD-10-CM

## 2025-04-22 DIAGNOSIS — Z12.31 ENCOUNTER FOR SCREENING MAMMOGRAM FOR BREAST CANCER: ICD-10-CM

## 2025-04-22 DIAGNOSIS — K63.1 PERFORATION OF INTESTINE (H): ICD-10-CM

## 2025-04-22 PROCEDURE — 98006 SYNCH AUDIO-VIDEO EST MOD 30: CPT | Performed by: NURSE PRACTITIONER

## 2025-04-22 RX ORDER — LEVOTHYROXINE SODIUM 100 UG/1
100 TABLET ORAL DAILY
Qty: 90 TABLET | Refills: 0 | Status: SHIPPED | OUTPATIENT
Start: 2025-04-22

## 2025-04-22 RX ORDER — PANTOPRAZOLE SODIUM 40 MG/1
40 TABLET, DELAYED RELEASE ORAL DAILY
Qty: 90 TABLET | Refills: 3 | Status: SHIPPED | OUTPATIENT
Start: 2025-04-22

## 2025-04-22 RX ORDER — LORAZEPAM 0.5 MG/1
.5-1 TABLET ORAL EVERY 6 HOURS PRN
Qty: 20 TABLET | Refills: 0 | Status: SHIPPED | OUTPATIENT
Start: 2025-04-22

## 2025-04-22 NOTE — PROGRESS NOTES
Lucina is a 59 year old who is being evaluated via a billable video visit.    How would you like to obtain your AVS? MyChart  If the video visit is dropped, the invitation should be resent by: Text to cell phone: [phone]  Will anyone else be joining your video visit? No      Assessment & Plan     Panic attack  Grief reaction  Patient has had increased anxiety and panic attacks since loss of her son coming up on 1 year ago.  Was seeing therapy, managing fairly well.  Tried an alternate therapist which was not beneficial.  Has a good support system at home and through [employer].  Had a significant panic attack a few weeks ago causing irregular heartbeat, thought to be A-fib.  Patient felt did return back to normal and will continue to monitor.  Some increased difficulty coming up on the firsts of holidays and occasions as well as the 1 year anniversary.  Discussed good self cares and support systems.  Using lorazepam sparingly as needed, refilled.  Continue to monitor and follow-up if worsening.  - LORazepam (ATIVAN) 0.5 MG tablet; Take 1-2 tablets (0.5-1 mg) by mouth every 6 hours as needed for anxiety.    Perforation of intestine (H)  Previous history of perforation of intestine with bleeding.  Per general surgery was recently weaned off of pantoprazole and patient has been off for couple months, however having increased symptoms as of late, likely due to increased stress.  Patient would like to start back on pantoprazole for a while.  I feel this is a good idea, will refill and keep on daily for now.  - pantoprazole (PROTONIX) 40 MG EC tablet; Take 1 tablet (40 mg) by mouth daily.    Acquired hypothyroidism  Chronic, previously stable labs.  Patient having increased constipation, weight gain, significant fatigue and dry skin as of late and feels may also be from a different  of her levothyroxine.  Requesting a slight increase of dosage, amenable to this.  Will increase to 100 mcg and has labs due in  "July with bariatrics.  - levothyroxine (SYNTHROID/LEVOTHROID) 100 MCG tablet; Take 1 tablet (100 mcg) by mouth daily.    Encounter for screening mammogram for breast cancer  Patient to schedule in September.  - MA Screen Bilateral w/Rodolfo; Future          BMI  Estimated body mass index is 34.14 kg/m  as calculated from the following:    Height as of 12/2/24: 1.702 m (5' 7\").    Weight as of 12/2/24: 98.9 kg (218 lb).   Weight management plan: Discussed healthy diet and exercise guidelines        Subjective   Lucina is a 59 year old, presenting for the following health issues:  Thyroid Problem, Mental Health Problem (GRIEF), and Constipation        4/22/2025     9:53 AM   Additional Questions   Roomed by Princess SMITH([facility])     History of Present Illness       Hypothyroidism:     Since last visit, patient describes the following symptoms::  Anxiety, Constipation, Dry skin, Fatigue and Weight gain    Weight gain::  6-10 lbs.    Reason for visit:  GI, Thyroid, Grief    She eats 4 or more servings of fruits and vegetables daily.She consumes 0 sweetened beverage(s) daily.She exercises with enough effort to increase her heart rate 30 to 60 minutes per day.  She exercises with enough effort to increase her heart rate 5 days per week.   She is taking medications regularly.      Some panic attacks with upcoming holidays, and 1 year anniversary  Using Lorazepam as needed and hydroxyzine as needed     Weaned off of the Pantoprazole per Dr. Doshi off for about 2 months~ but a few weeks ago started to have symptoms again so restarted     Levothyroxine, changed  and when recently changed to the round ones and has gained weight, tired and constipation with dry skin.   See's Lizeth in July     Review of Systems  Constitutional, HEENT, cardiovascular, pulmonary, gi and gu systems are negative, except as otherwise noted.      Objective    Vitals - Patient Reported  Weight (Patient Reported): 103.4 kg (228 lb)      Vitals:  No " vitals were obtained today due to virtual visit.    Physical Exam   GENERAL: alert and no distress  EYES: Eyes grossly normal to inspection.  No discharge or erythema, or obvious scleral/conjunctival abnormalities.  RESP: No audible wheeze, cough, or visible cyanosis.    SKIN: Visible skin clear. No significant rash, abnormal pigmentation or lesions.  NEURO: Cranial nerves grossly intact.  Mentation and speech appropriate for age.  PSYCH: Appropriate affect, tone, and pace of words    Diagnostic Test Results:  Labs reviewed in Epic  none        Video-Visit Details    Type of service:  Video Visit   Originating Location (pt. Location): Home    Distant Location (provider location):  Off-site  Platform used for Video Visit: Carmenza  Signed Electronically by: Mandi Velasco DNP      Chart documentation with Dragon Voice recognition Software. Although reviewed after completion, some words and grammatical errors may remain.

## 2025-04-22 NOTE — NURSING NOTE
"Chief Complaint   Patient presents with    Thyroid Problem    Mental Health Problem     GRIEF    Constipation       Initial There were no vitals taken for this visit. Estimated body mass index is 34.14 kg/m  as calculated from the following:    Height as of 12/2/24: 1.702 m (5' 7\").    Weight as of 12/2/24: 98.9 kg (218 lb).    Patient presents to the clinic using No DME    Is there anyone who you would like to be able to receive your results? No  If yes have patient fill out TERESA      "

## 2025-04-22 NOTE — PATIENT INSTRUCTIONS
Panic attack  Grief reaction  Patient has had increased anxiety and panic attacks since loss of her son coming up on 1 year ago.  Was seeing therapy, managing fairly well.  Tried an alternate therapist which was not beneficial.  Has a good support system at home and through [employer].  Had a significant panic attack a few weeks ago causing irregular heartbeat, thought to be A-fib.  Patient felt did return back to normal and will continue to monitor.  Some increased difficulty coming up on the firsts of holidays and occasions as well as the 1 year anniversary.  Discussed good self cares and support systems.  Using lorazepam sparingly as needed, refilled.  Continue to monitor and follow-up if worsening.  - LORazepam (ATIVAN) 0.5 MG tablet; Take 1-2 tablets (0.5-1 mg) by mouth every 6 hours as needed for anxiety.    Perforation of intestine (H)  Previous history of perforation of intestine with bleeding.  Per general surgery was recently weaned off of pantoprazole and patient has been off for couple months, however having increased symptoms as of late, likely due to increased stress.  Patient would like to start back on pantoprazole for a while.  I feel this is a good idea, will refill and keep on daily for now.  - pantoprazole (PROTONIX) 40 MG EC tablet; Take 1 tablet (40 mg) by mouth daily.    Acquired hypothyroidism  Chronic, previously stable labs.  Patient having increased constipation, weight gain, significant fatigue and dry skin as of late and feels may also be from a different  of her levothyroxine.  Requesting a slight increase of dosage, amenable to this.  Will increase to 100 mcg and has labs due in July with bariatrics.  - levothyroxine (SYNTHROID/LEVOTHROID) 100 MCG tablet; Take 1 tablet (100 mcg) by mouth daily.    Encounter for screening mammogram for breast cancer  Patient to schedule in September.  - MA Screen Bilateral w/Rodolfo; Future

## 2025-04-23 ENCOUNTER — PATIENT OUTREACH (OUTPATIENT)
Dept: CARE COORDINATION | Facility: CLINIC | Age: 60
End: 2025-04-23
Payer: COMMERCIAL

## 2025-07-03 ENCOUNTER — MYC MEDICAL ADVICE (OUTPATIENT)
Dept: SURGERY | Facility: CLINIC | Age: 60
End: 2025-07-03
Payer: COMMERCIAL

## 2025-07-03 DIAGNOSIS — K91.2 POSTOPERATIVE INTESTINAL MALABSORPTION: Primary | ICD-10-CM

## 2025-07-03 DIAGNOSIS — Z98.84 S/P GASTRIC BYPASS: ICD-10-CM

## 2025-07-30 ENCOUNTER — LAB (OUTPATIENT)
Dept: LAB | Facility: CLINIC | Age: 60
End: 2025-07-30
Payer: COMMERCIAL

## 2025-07-30 DIAGNOSIS — Z98.84 S/P GASTRIC BYPASS: ICD-10-CM

## 2025-07-30 DIAGNOSIS — E03.9 HYPOTHYROIDISM: Primary | ICD-10-CM

## 2025-07-30 DIAGNOSIS — K91.2 POSTOPERATIVE INTESTINAL MALABSORPTION: ICD-10-CM

## 2025-07-30 LAB
ALBUMIN SERPL BCG-MCNC: 4.2 G/DL (ref 3.5–5.2)
ALP SERPL-CCNC: 80 U/L (ref 40–150)
ALT SERPL W P-5'-P-CCNC: 16 U/L (ref 0–50)
ANION GAP SERPL CALCULATED.3IONS-SCNC: 11 MMOL/L (ref 7–15)
AST SERPL W P-5'-P-CCNC: 17 U/L (ref 0–45)
BILIRUB SERPL-MCNC: 0.3 MG/DL
BUN SERPL-MCNC: 15.7 MG/DL (ref 6–20)
CALCIUM SERPL-MCNC: 9.4 MG/DL (ref 8.8–10.4)
CHLORIDE SERPL-SCNC: 109 MMOL/L (ref 98–107)
CHOLEST SERPL-MCNC: 160 MG/DL
CREAT SERPL-MCNC: 0.75 MG/DL (ref 0.51–0.95)
EGFRCR SERPLBLD CKD-EPI 2021: >90 ML/MIN/1.73M2
ERYTHROCYTE [DISTWIDTH] IN BLOOD BY AUTOMATED COUNT: 11.9 % (ref 10–15)
EST. AVERAGE GLUCOSE BLD GHB EST-MCNC: 108 MG/DL
FASTING STATUS PATIENT QL REPORTED: YES
FASTING STATUS PATIENT QL REPORTED: YES
FERRITIN SERPL-MCNC: 95 NG/ML (ref 11–328)
FOLATE SERPL-MCNC: 12.1 NG/ML (ref 4.6–34.8)
GLUCOSE SERPL-MCNC: 100 MG/DL (ref 70–99)
HBA1C MFR BLD: 5.4 % (ref 0–5.6)
HCO3 SERPL-SCNC: 22 MMOL/L (ref 22–29)
HCT VFR BLD AUTO: 40.4 % (ref 35–47)
HDLC SERPL-MCNC: 61 MG/DL
HGB BLD-MCNC: 13.5 G/DL (ref 11.7–15.7)
LDLC SERPL CALC-MCNC: 81 MG/DL
MCH RBC QN AUTO: 33.6 PG (ref 26.5–33)
MCHC RBC AUTO-ENTMCNC: 33.4 G/DL (ref 31.5–36.5)
MCV RBC AUTO: 101 FL (ref 78–100)
NONHDLC SERPL-MCNC: 99 MG/DL
PLATELET # BLD AUTO: 179 10E3/UL (ref 150–450)
POTASSIUM SERPL-SCNC: 4.4 MMOL/L (ref 3.4–5.3)
PROT SERPL-MCNC: 6.8 G/DL (ref 6.4–8.3)
PTH-INTACT SERPL-MCNC: 70 PG/ML (ref 15–65)
RBC # BLD AUTO: 4.02 10E6/UL (ref 3.8–5.2)
SODIUM SERPL-SCNC: 142 MMOL/L (ref 135–145)
TRIGL SERPL-MCNC: 90 MG/DL
TSH SERPL DL<=0.005 MIU/L-ACNC: 1.04 UIU/ML (ref 0.3–4.2)
VIT B12 SERPL-MCNC: 309 PG/ML (ref 232–1245)
WBC # BLD AUTO: 5.4 10E3/UL (ref 4–11)

## 2025-07-30 PROCEDURE — 84425 ASSAY OF VITAMIN B-1: CPT | Mod: 90

## 2025-07-30 PROCEDURE — 80061 LIPID PANEL: CPT

## 2025-07-30 PROCEDURE — 36415 COLL VENOUS BLD VENIPUNCTURE: CPT

## 2025-07-30 PROCEDURE — 83970 ASSAY OF PARATHORMONE: CPT

## 2025-07-30 PROCEDURE — 80053 COMPREHEN METABOLIC PANEL: CPT

## 2025-07-30 PROCEDURE — 82607 VITAMIN B-12: CPT

## 2025-07-30 PROCEDURE — 83036 HEMOGLOBIN GLYCOSYLATED A1C: CPT

## 2025-07-30 PROCEDURE — 99000 SPECIMEN HANDLING OFFICE-LAB: CPT

## 2025-07-30 PROCEDURE — 82728 ASSAY OF FERRITIN: CPT

## 2025-07-30 PROCEDURE — 84590 ASSAY OF VITAMIN A: CPT | Mod: 90

## 2025-07-30 PROCEDURE — 84630 ASSAY OF ZINC: CPT | Mod: 90

## 2025-07-30 PROCEDURE — 82746 ASSAY OF FOLIC ACID SERUM: CPT

## 2025-07-30 PROCEDURE — 85027 COMPLETE CBC AUTOMATED: CPT

## 2025-07-30 PROCEDURE — 84443 ASSAY THYROID STIM HORMONE: CPT

## 2025-07-31 ENCOUNTER — VIRTUAL VISIT (OUTPATIENT)
Dept: SURGERY | Facility: CLINIC | Age: 60
End: 2025-07-31
Attending: EMERGENCY MEDICINE
Payer: COMMERCIAL

## 2025-07-31 VITALS — WEIGHT: 222 LBS | HEIGHT: 67 IN | BODY MASS INDEX: 34.84 KG/M2

## 2025-07-31 DIAGNOSIS — K91.2 POSTOPERATIVE INTESTINAL MALABSORPTION: ICD-10-CM

## 2025-07-31 DIAGNOSIS — Z98.84 S/P GASTRIC BYPASS: ICD-10-CM

## 2025-07-31 DIAGNOSIS — R63.4 WEIGHT LOSS: Primary | ICD-10-CM

## 2025-07-31 DIAGNOSIS — E21.1 SECONDARY HYPERPARATHYROIDISM, NON-RENAL: ICD-10-CM

## 2025-07-31 DIAGNOSIS — E03.9 ACQUIRED HYPOTHYROIDISM: ICD-10-CM

## 2025-07-31 RX ORDER — LEVOTHYROXINE SODIUM 100 UG/1
100 TABLET ORAL DAILY
Qty: 90 TABLET | Refills: 0 | Status: SHIPPED | OUTPATIENT
Start: 2025-07-31

## 2025-07-31 NOTE — LETTER
21 y.o. female  at 26w5d   Reports + FM, denies VB, LOF, or cramping  Doing well without concerns   TW lbs   Breast pump Rx: given    Reviewed upcoming 28wk labs, (O POS) and orders placed, tdap handout provided and explained    1. 26 weeks gestation of pregnancy  -     OB Glucose Screen; Future; Expected date: 2025  -     CBC auto differential; Future; Expected date: 2025  -     HIV 1/2 Ag/Ab (4th Gen); Future; Expected date: 2025  -     Treponema Pallidium Antibodies IgG, IgM; Future; Expected date: 2025    2. Lactating mother  -     BREAST PUMP FOR HOME USE    3. Encounter for supervision of normal first pregnancy in second trimester  - growth at 32 weeks       Reviewed warning signs, normal FM,  labor precautions and how/when to call. Patient states understanding.  RTC x 2 wks, call or present sooner prn.        7/31/2025      Lucina Santamaria  [address]  [place] [address]      Dear Colleague,    Thank you for referring your patient, Lucina Santamaria, to the [facility] [place]. Please see a copy of my visit note below.    Bariatric Follow Up Visit with a History of Previous Bariatric Surgery     Date of visit: 7/30/2025  Physician: Arnulfo Stanley MD, MD  Primary Care Provider:  Mandi Wortihngton  Lucina Santamaria   59 year old  female    Date of Surgery: 7/25/22  Initial Weight: 333 lbs  Initial BMI: 50.6  Today's Weight:   Wt Readings from Last 1 Encounters:   07/31/25 100.7 kg (222 lb)     Weight history:   Wt Readings from Last 4 Encounters:   07/31/25 100.7 kg (222 lb)   12/02/24 98.9 kg (218 lb)   10/02/24 98.9 kg (218 lb)   08/26/24 100.4 kg (221 lb 6.4 oz)      Body mass index is 34.77 kg/m .      Assessment and Plan     Assessment: Lucian is a 59 year old year old female who is 3 years s/p  Kris en Y Gastric Bypass with Dr. Schneider.  In the interim doing well. Continues to struggle with itchiness from B12. We'll see if low dose vitamin water or 5 hour energy also causes itchiness. She's hesitant to try subcutaneous for fear of prolonged itchiness. Levels are still normal but low end of range. .    Bariatric labs 7/30/25 show some irregular vitamin use with elevated MCV and PTH levels, low end of range B12. More tests are pending.     .  She has not had her DXA scan as we discussed last year so I'll place the order today given higher risk for bone density issues following weight loss and malabsorptive surgery. Continue mindful vitamin D3 and calcium supplementation reviewed with her today. PTH levels should improve w/ 500-1000mcg/day of calcium citrate.    Her weight is down 111 lbs from introductory weight, a 33% total body weight reduction.    Lucina Santamaria feels as if she has achieved the goals she hoped to accomplish through bariatric surgery and weight  loss.    Encounter Diagnoses   Name Primary?     Postoperative intestinal malabsorption      S/P gastric bypass      Weight loss Yes     Secondary hyperparathyroidism, non-renal          Current Outpatient Medications:      acetaminophen (TYLENOL) 500 MG tablet, Take 1,000 mg by mouth 3 times daily as needed for fever or pain, Disp: , Rfl:      calcium carbonate (TUMS) 500 MG chewable tablet, Take 2 chew tab by mouth 2 times daily, Disp: , Rfl:      levothyroxine (SYNTHROID/LEVOTHROID) 100 MCG tablet, Take 1 tablet (100 mcg) by mouth daily., Disp: 90 tablet, Rfl: 0     pantoprazole (PROTONIX) 40 MG EC tablet, Take 1 tablet (40 mg) by mouth daily., Disp: 90 tablet, Rfl: 3     Pediatric Multivitamins-Iron (MULTIVITAMINS PLUS IRON CHILD) 18 MG CHEW, Take 1 chew tab by mouth 2 times daily Ok to substitute with any chewable that contains 18 mg of iron, Vitamin A, Thiamine and Zinc., Disp: 180 tablet, Rfl: 3     vitamin D3 (CHOLECALCIFEROL) 50 mcg (2000 units) tablet, Take 50 mcg by mouth daily, Disp: , Rfl:     Plan:  Great work with keeping off 111 lbs of weight, 33% total body weight reduction.  You can see if itchiness happens after 5 hour energy drink or not. Red meat 3x weekly may help some but digestive capacity is limited by gastric bypass anatomy.   Calcium citrate 500-1000mg/day to help PTH levels normalize. You're due for bone density scan so I've placed that order today as I don't see you followed up last year as we discussed. You can do the bone density scan at your convenience this year.  Weight bearing exercise 4-5x weekly should help bone density risks along with adequate calcium and vitamin D.   EGD looked good last year with GI. Follow up if needed should sx increase.    No follow-ups on file.    Bariatric Surgery Review     Interim History/LifeChanges: stable. 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 Itches whenever tries to supplement B12 in oral/liquid or sublingual form.     Patient Concerns: none  Appetite (1-10):  OK  GERD: had weaned off her pantoprazole and will use situationally, none since June.      Collected Lab    Upper GI Endoscopy 12/02/2024  1:19 PM Rad Rslts   _______________________________________________________________________________  Patient Name: Lucina Santamaria         Procedure Date: 12/2/2024 1:19 PM  MRN: [id number]                       YOB: 1965  Admit Type: Outpatient                Age: 59  Gender: Female                        Attending MD: YOSHI DUNBAR MD,  Total Sedation Time:                    _______________________________________________________________________________     Procedure:             Upper GI endoscopy  Indications:           Follow-up of acute gastrojejunal ulcer  Providers:             YOSHI DUNBAR MD  Referring MD:          PAYTON SAMAYOA  Medicines:             Monitored Anesthesia Care  Complications:         No immediate complications.  _______________________________________________________________________________  Procedure:             After obtaining informed consent, the endoscope was                         passed under direct vision. Throughout the procedure,                         the patient's blood pressure, pulse, and oxygen                         saturations were monitored continuously. The Barcode                         0157 was introduced through the mouth, and advanced to                         the second part of duodenum. The upper GI endoscopy                         was accomplished without difficulty. The patient                         tolerated the procedure well.                                                                                   Findings:       The Z-line was regular and was found 36 cm from the incisors.       Evidence of a Kris-en-Y gastrojejunostomy was found. The gastrojejunal       anastomosis was characterized by healthy appearing mucosa. This was       traversed. The pouch-to-jejunum limb was characterized  "by healthy       appearing mucosa and the presence of no stomal ulceration.       The examined jejunum was normal.                                                                                   Impression:            - Z-line regular, 36 cm from the incisors.                         - Kris-en-Y gastrojejunostomy with gastrojejunal                         anastomosis characterized by healthy appearing mucosa.                         - Normal examined jejunum.                         - No specimens collected.  Recommendation:        - Patient has a contact number available for                         emergencies. The signs and symptoms of potential                         delayed complications were discussed with the patient.                         Return to normal activities tomorrow. Written                         discharge instructions were provided to the patient.                         - Resume previous diet.                         - Continue present medications.                         - Follow an antireflux regimen indefinitely.                                                                                     Electronically Signed by Dr. Doshi  ________________  YOSHI DOSHI MD  12/2/2024 1:41:12 PM  I was physically present for the entire viewing portion of the exam.  [id number] DOSHI MD       Medication changes: stable.     Vitamin Intake:   B-12   no   MVI  yes   Vitamin D  yes   Calcium   yeds     Other                LABS: partially reviewed        Most recent labs:  Lab Results   Component Value Date    WBC 5.4 07/30/2025    HGB 13.5 07/30/2025    HCT 40.4 07/30/2025     (H) 07/30/2025     07/30/2025     Lab Results   Component Value Date    CHOL 160 07/30/2025     Lab Results   Component Value Date    HDL 61 07/30/2025     No components found for: \"LDLCALC\"  Lab Results   Component Value Date    TRIG 90 07/30/2025     No results found for: \"CHOLHDL\"  Lab Results   Component " "Value Date    ALT 16 07/30/2025    AST 17 07/30/2025    ALKPHOS 80 07/30/2025     No results found for: \"HGBA1C\"  Lab Results   Component Value Date    B12 309 07/30/2025     No components found for: \"VITDT1\"  Lab Results   Component Value Date    FERNANDO 95 07/30/2025     Lab Results   Component Value Date    PTHI 70 (H) 07/30/2025     Lab Results   Component Value Date    ZN 75.7 08/26/2024     Lab Results   Component Value Date    VIB1WB 123 08/26/2024     Lab Results   Component Value Date    TSH 1.04 07/30/2025     No results found for: \"TEST\"       Rbs/Moy-Ump Bariatric    Question 7/30/2025 10:28 PM CDT - Filed by Patient   THESE QUESTIONS ARE ABOUT YOUR WEIGHT    How has your weight changed since your last visit? I have stayed about the same   What is your lowest weight since surgery? (In pounds) 220   Are you currently taking any weight loss medications? No   What is your highest lifetime weight? 305   I had the following weight loss procedure: Kris-en-y Gastric Bypass   What year was your surgery? 2023   THESE QUESTIONS ARE ABOUT YOUR DIET AND PHYSICAL ACTIVITY    How many meals do you eat per day? 3   Do you snack between meals? Sometimes   How much food are you eating at each meal? 1/2 cup to 1 cup   Are you able to separate your meals and liquids by at least 30 minutes? Yes   Are you able to avoid liquid calories? Yes   Do you avoid NSAIDs such as (Ibuprofen, Aleve, Naproxen, Advil)? Yes   How often do you exercise? 5 to 6 times per week   What is the duration of your exercise (in minutes)? 45 Minutes   What types of exercise do you do? walking    swimming   What keeps you from being more active? Pain   Are you smoking? No   Are you drinking alcohol? No   Review of symptoms: Please check the following symptoms you have experienced within the last 30 days.    Vomiting: No   Diarrhea: No   Constipation: No   Swallowing trouble: No   Heartburn: No   Abdominal pain: No   Rash in skin folds: Yes   Depression: No "   Anxiety: Yes   Stress urinary incontinence Yes   Female only: Post-menopausal   Please jame all conditions you had prior to having weight loss surgery:    Diabetes II: Never   High Blood Pressure: Never   High cholesterol: Never   Heartburn/Reflux: Never   Sleep apnea: Never   Pre-diabetes: Never   PCOS: Never   Back pain: Stayed the same   Joint pain: Stayed the same   Lower leg swelling: Improved   THESE QUESTIONS ASK ABOUT CURRENT HEALTH CONCERNS    Have you been to the Emergency room since your last visit with us? No   Were you in the hospital since your last visit with us? Yes   Do you have any concerns today? No   Do you currently have any of the following: None of the above   Do you have a band? No     Promis-10   [id number] [employer] Version 1.1    Question 7/30/2025 10:30 PM CDT - Filed by Patient   In general, would you say your health is: Good   In general, would you say your quality of life is: Good   In general, how would you rate your physical health? Good   In general, how would you rate your mental health, including your mood and your ability to think? Good   In general, how would you rate your satisfaction with your social activities and relationships? Excellent   In general, please rate how well you carry out your usual social activities and roles. (This includes activities at home, at work and in your community, and responsibilities as a parent, child, spouse, employee, friend, etc.) Good   To what extent are you able to carry out your everyday physical activities such as walking, climbing stairs, carrying groceries, or moving a chair? Completely   In the past 7 days    How often have you been bothered by emotional problems such as feeling anxious, depressed or irritable? Sometimes   How would you rate your fatigue on average? Mild   How would you rate your pain on average?   0 = No Pain  to  10 = Worst Imaginable Pain 2       DEXA:is due this year,  "order placed..  Discussed annual screening to start at age 45 and continue to age 55 if scoring \"low risk\". DEXA scan recommended at age 55 regardless as long as at least 2 years have transpired from their bariatric surgery.    Social History     Social History     Socioeconomic History     Marital status:      Spouse name: Not on file     Number of children: Not on file     Years of education: Not on file     Highest education level: Not on file   Occupational History     Not on file   Tobacco Use     Smoking status: Never     Smokeless tobacco: Never   Vaping Use     Vaping status: Never Used   Substance and Sexual Activity     Alcohol use: Not Currently     Comment: rare, once monthly.     Drug use: No     Sexual activity: Yes     Partners: Male     Birth control/protection: Female Surgical   Other Topics Concern     Parent/sibling w/ CABG, MI or angioplasty before 65F 55M? No   Social History Narrative     Not on file     Social Drivers of Health     Financial Resource Strain: Low Risk  (4/30/2024)    Financial Resource Strain      Within the past 12 months, have you or your family members you live with been unable to get utilities (heat, electricity) when it was really needed?: No   Food Insecurity: Low Risk  (4/30/2024)    Food Insecurity      Within the past 12 months, did you worry that your food would run out before you got money to buy more?: No      Within the past 12 months, did the food you bought just not last and you didn t have money to get more?: No   Transportation Needs: Low Risk  (4/30/2024)    Transportation Needs      Within the past 12 months, has lack of transportation kept you from medical appointments, getting your medicines, non-medical meetings or appointments, work, or from getting things that you need?: No   Physical Activity: Sufficiently Active (4/30/2024)    Exercise Vital Sign      Days of Exercise per Week: 6 days      Minutes of Exercise per Session: 30 min   Stress: Stress " Concern Present (4/30/2024)    [employer] - Occupational Stress Questionnaire      Feeling of Stress : To some extent   Social Connections: Unknown (4/30/2024)    Social Connection and Isolation Panel [NHANES]      Frequency of Communication with Friends and Family: Not on file      Frequency of Social Gatherings with Friends and Family: Twice a week      Attends Voodoo Services: Not on file      Active Member of Clubs or Organizations: Not on file      Attends Club or Organization Meetings: Not on file      Marital Status: Not on file   Interpersonal Safety: Low Risk  (12/2/2024)    Interpersonal Safety      Do you feel physically and emotionally safe where you currently live?: Yes      Within the past 12 months, have you been hit, slapped, kicked or otherwise physically hurt by someone?: No      Within the past 12 months, have you been humiliated or emotionally abused in other ways by your partner or ex-partner?: No   Housing Stability: Low Risk  (4/30/2024)    Housing Stability      Do you have housing? : Yes      Are you worried about losing your housing?: No       Past Medical History     Past Medical History:   Diagnosis Date     Acquired hypothyroidism      Arthritis     sacro iliac and hip, right ankle.     Chronic constipation     since menopause. fiber/hydration helps.     Coagulation disorder      Complication of anesthesia     nauseated w/ morphine previously after arm surgery.     COVID-19     2020, November.     Gallbladder problem     s/p cholecystectomy.     Head injury     childhood concussions     Heterozygous for MTHFR gene mutation      History of MTHFR mutation      Hypertension      Long COVID     Long term Covid     Morbid obesity (H)      Pneumonia     covid     Prediabetes      Pulmonary emboli (H) 1989     Pulmonary embolism (H)     wisdome teeth out while on OCPs and MTHFR gene mutation history.     Pulmonary embolism (H)     age 23, OCPs/dental extraction  provoked. reports some MTHFR gene issues, unsure if homo or heterozygous.     Past Surgical History:   Procedure Laterality Date     APPENDECTOMY       BREAST BIOPSY, RT/LT      right breast - benign      SECTION       COLONOSCOPY N/A 11/10/2021    Procedure: COLONOSCOPY, WITH POLYPECTOMY;  Surgeon: Lynn Doshi MD;  Location:  GI     CREATION, GASTRIC BYPASS, HAFSA-EN-Y, LAPAROSCOPIC N/A 2022    Procedure: LAPAROSCOPIC HAFSA-EN-Y, GASTRIC BYPASS;  Surgeon: North Schneider MD;  Location: [facility] OR     ESOPHAGOSCOPY, GASTROSCOPY, DUODENOSCOPY (EGD), COMBINED N/A 2024    Procedure: Esophagoscopy, gastroscopy, duodenoscopy (EGD), combined;  Surgeon: Lynn Doshi MD;  Location: [place] [facility]     EYE SURGERY  epilasik left eye     GI SURGERY  Hafsa N Y      GYN SURGERY      tubal ligation     LAPAROSCOPIC CHOLECYSTECTOMY N/A 2019    Procedure: CHOLECYSTECTOMY, LAPAROSCOPIC;  Surgeon: Lynn Doshi MD;  Location:  OR     LAPAROSCOPIC HERNIORRHAPHY UMBILICAL N/A 8/15/2024    Procedure: REPAIR OF UMBILICAL HERNIA;  Surgeon: Teofilo Servin DO;  Location: [facility] OR     LAPAROSCOPIC LYSIS ADHESIONS N/A 8/15/2024    Procedure: LYSIS OF ADHESIONS,;  Surgeon: Teofilo Servin DO;  Location: [facility] OR     LAPAROSCOPY DIAGNOSTIC (GYN) N/A 8/15/2024    Procedure: DIAGNOSTIC LAPAROSCOPY,;  Surgeon: Teofilo Servin DO;  Location: [facility] OR     ORTHOPEDIC SURGERY      radius, ulnar - open fracture     TUBAL LIGATION       wisdom teeth         Problem List     Patient Active Problem List   Diagnosis     CARDIOVASCULAR SCREENING; LDL GOAL LESS THAN 130     Elevated blood pressure reading without diagnosis of hypertension     Edema, unspecified type     History of MTHFR mutation     Hypothyroidism     Pneumonia     Umbilical hernia without obstruction and without gangrene     Onychomycosis     ERRONEOUS ENCOUNTER--DISREGARD     Perforation of intestine (H)  "    Medications     [unfilled]  Surgical History     Past Surgical History  She has a past surgical history that includes appendectomy (); orthopedic surgery (); GYN surgery ();  section (); breast biopsy, rt/lt (); Laparoscopic cholecystectomy (N/A, 2019); wisdom teeth; tubal ligation; Colonoscopy (N/A, 11/10/2021); Creation, Gastric Bypass, Kris-En-Y, Laparoscopic (N/A, 2022); Eye surgery (epilasik left eye); GI surgery (Kris N Y ); Laparoscopy diagnostic (gyn) (N/A, 8/15/2024); Laparoscopic lysis adhesions (N/A, 8/15/2024); Laparoscopic herniorrhaphy umbilical (N/A, 8/15/2024); and Esophagoscopy, gastroscopy, duodenoscopy (EGD), combined (N/A, 2024).    Objective-Exam     Constitutional:  Ht 1.702 m (5' 7\")   Wt 100.7 kg (222 lb)   BMI 34.77 kg/m    [unfilled]   General:  Pleasant and in no acute distress   Eyes:  EOMI  ENT:  Airway patent    Neck:  Respiratory: Normal respiratory effort, no cough, .  CV:    Gastrointestinal:   Musculoskeletal: muscle mass WNL  Skin: color without obvious pallor or jaundice, hair long/thick,   Psychiatric: alert and oriented X3, mood and affect normal    Counseling     We reviewed the important post op bariatric recommendations:  -eating 3 meals daily  -eating protein first, getting >60gm protein daily  -eating slowly, chewing food well  -avoiding/limiting calorie containing beverages  -drinking water 15-30 minutes before or after meals  -limiting restaurant or cafeteria eating to twice a week or less    We discussed the importance of restorative sleep and stress management in maintaining a healthy weight.  We discussed the National Weight Control Registry healthy weight maintenance strategies and ways to optimize metabolism.  We discussed the importance of physical activity including cardiovascular and strength training in maintaining a healthier weight.    We discussed the importance of life-long vitamin supplementation " and life-long  follow-up.    Lucina was reminded that, to avoid marginal ulcers she should avoid tobacco at all, alcohol in excess, caffeine in excess, and NSAIDS (unless indicated for cardioprotection or othewise and opposed by a PPI).    Arnulfo Stanley MD    [facility].  2025  8:36 PM  [phone] (clinic phone)  [phone] (fax)    No images are attached to the encounter.  Medical Decision Makin minutes spent by me on the date of the encounter doing chart review, history and exam, documentation and further activities per the note    Virtual Visit Details    Type of service:  Video Visit     Originating Location (pt. Location): Other work    Distant Location (provider location):  On-site  Platform used for Video Visit: Carmenza          Again, thank you for allowing me to participate in the care of your patient.        Sincerely,        Arnulfo Stanley MD    Electronically signed

## 2025-07-31 NOTE — NURSING NOTE
Current patient location: at work in [place], [place]    Is the patient currently in the state of [place]? YES    Visit mode:VIDEO    If the visit is dropped, the patient can be reconnected by: clare    Will anyone else be joining the visit? NO  (If patient encounters technical issues they should call [phone]    Are changes needed to the allergy or medication list? Pt stated no changes to allergies and Pt stated no med changes    Are refills needed on medications prescribed by this physician? NO    Reason for visit: RECHECK    Maria Isabel MILTON

## 2025-07-31 NOTE — PATIENT INSTRUCTIONS
Plan:  Great work with keeping off 111 lbs of weight, 33% total body weight reduction.  You can see if itchiness happens after 5 hour energy drink or not. Red meat 3x weekly may help some but digestive capacity is limited by gastric bypass anatomy.   Calcium citrate 500-1000mg/day to help PTH levels normalize. You're due for bone density scan so I've placed that order today as I don't see you followed up last year as we discussed. You can do the bone density scan at your convenience this year.  Weight bearing exercise 4-5x weekly should help bone density risks along with adequate calcium and vitamin D.   EGD looked good last year with GI. Follow up if needed should sx increase.  The last of your labs should be back by next week. I'll message if issues arise.    [facility]  Nutritional Guidelines  Gastric Bypass 18 Months Post Op and Beyond    General Guidelines and Helpful Hints:  Eat 3 meals per day + protein supplement(s). No snacks between meals.  Do not skip meals.  This can cause overeating at the next meal and will prevent adequate protein and nutritional intake.  Aim for 60-80 grams of protein per day.  Always eat your protein first. This assists with optimal nutrition and helps you stay full longer.  Depending on your portion size, you may need to drink approved protein supplement between meals to achieve protein goals. Follow recommendations of your Dietitian.   Eat your protein first, and then follow with fiber.   It is not necessary to count your fiber, but 15-20 grams per day is recommended.    Add fiber by including fruits, vegetables, whole grains, and beans.   Portions should remain about 1 cup per meal. Use measuring cups to be accurate.  Continue to use saucer/salad plates, infant/toddler silverware to keep portion sizes small and take small bites.  Eat [id number] to make each meal last 20-30 minutes. Always stop eating when satisfied.  Continue to use caution with foods containing  "skins, peels or membranes. Chew well!  Aim for 64 oz. of calorie-free fluids daily.  Continue to avoid caffeine and carbonation. If you choose to drink alcohol, do so in moderation.   Remember to avoid drinking during meals, 15-30 minutes before and 30 minutes after.  Exercise is kessler for continued weight loss and weight maintenance. 150 minutes weekly of moderate aerobic activity or 75 minutes of vigorous with 2 days or more a week of strength training. Try to get 20% or more of your steps each day at a brisk pace, as though hurrying to a bus stop. Look to get stronger this year.  If having trouble tolerating meat, try using a crock-pot, tinfoil tent, steamer or other moist cooking method to create tender meats. Add broth or low-fat gravy to help meat stay moist.   Avoid high sugar and high fat foods to prevent dumping syndrome.  Check nutrition labels for less than 10 grams of sugar and less than 10 grams of fat per serving.  Continue Taking Vitamins/Minerals:  1000 mcg of Sublingual B-12 at least 3 days weekly to average 350-500mcg/day. If using 2500mcg lozenges, 2 weekly.  If 5000 mcg, once weekly dosing works.  1 Complete Multivitamin with 18mg Iron twice daily (chewable or swallow tabs). Often sold as \"women's one a day\" if tablet but take twice daily.  500-600 mg Calcium Citrate twice daily (chewable or swallow tabs).  5000 IU Vitamin D3 daily.  If menstruating, you may need closer to 60mg of iron daily to prevent iron deficiency. An occasional \"boost\" of extra iron supplement during/after is reasonable if heavy flow.    Sample Grocery List    Protein:  Fat free Greek or light yogurt (less than 10 grams sugar)  Fat free or low-fat cottage cheese  String cheese or reduced fat cheese slices  Tuna, salmon, crab, egg, or chicken salad made with light or fat free mayonnaise  Egg or Egg Substitute  Lean/extra lean turkey, beef, bison, venison (ground, sirloin, round, flank)  Pork loin or tenderloin (grilled, baked, " broiled)  Fish such as salmon, tuna, trout, tilapia, etc. (grilled, baked, broiled)  Tender cuts of lean (skinless) turkey or chicken  Lean deli meats: turkey, lean ham, chicken, lean roast beef  Beans such as kidney, garbanzo, black, stein, or low-fat/fat free refried beans  Peanut butter (natural preferred). Limit to 1 Tbsp. per day.  Low-fat meatloaf (made with lean ground beef or turkey)  Sloppy Joes made with low-sugar ketchup and lean ground beef or turkey  Soy or vegetable protein (i.e. vegan crumbles, soy/veggie burger, tofu)  Hummus    Vegetables:  Fresh: cooked or raw (as tolerated)  Frozen vegetables  Canned vegetables (low sodium or no salt added, rinse before cooking/eating)  (Ok to have skins/peels/membranes/seeds - just chew well)    Fruits:  Fresh fruit  Frozen fruit (no sugar added)  Canned fruit (packed in its own juice, NOT syrup)  (Ok to have skins/peels/membranes/seeds - just chew well)    Starch:  Unsweetened whole-grain hot cereal (or high fiber cold cereal, dry)  Toasted whole wheat bread or [place] Thins  Whole grain crackers  Baked /boiled/mashed potato/sweet potato  Cooked whole grain pasta, brown rice, or other cooked whole grains  Starchy vegetables: corn, peas, winter squash    Protein Supplement:   Ready to drink protein shake with:  15-30 grams protein per serving  Less than 10 grams total carbohydrate per serving   Protein powder mixed with:   Skim or 1% milk  Low fat or fat free Lactaid milk, plain or no sugar added soymilk  Water     Fats: (use in moderation)  1 teaspoon of soft tub margarine  1 teaspoon olive oil, canola oil, or peanut oil  1 tablespoon of low-fat h[place] or salad dressing     Sample Menu for 18+ months after Gastric Bypass    You do NOT need to eat/drink the full portion sizes listed below  Always stop when you are satisfied    Breakfast   cup 1% cottage cheese     cup mixed berries   Lunch 2 oz lean roast beef on   [place] Thin with 1 tsp. light hidalgo    small  tomato, chopped, mixed with 1 tsp. light vinaigrette dressing   Supplement Approved protein supplement (if needed between meals)   Dinner 2 oz grilled salmon    cup salad greens with 1 tsp. light salad dressing and 1 tsp. ground flax seed    cup quinoa or brown rice     Breakfast   cup egg substitute with   cup sautéed chopped vegetables  2 light [place] Krisp crackers   Lunch Tuna Melt:   cup tuna mixed with 1 tsp. light hidalgo over   [place] Thin. Top with 2-3 slices cucumber and 1 oz slice of low fat cheese   Supplement 1 cup skim milk (if needed between meals)   Dinner 3 oz  grilled, broiled, or baked seasoned skinless chicken breast    cup asparagus     Breakfast   cup plain oatmeal made with skim or 1% milk with 1 Tbsp. flavored/unflavored protein powder added  1 mozzarella string cheese   Lunch 2 oz deli turkey breast  1/3 cup salad with 1 tsp. light salad dressing, 1/8 of a whole avocado and 1 Tbsp. sunflower seeds   Dinner 3 oz. pork loin made in a crock pot, seasoned with a spice rub    cup cooked carrots   Supplement Approved protein supplement (if needed between meals)     Breakfast 1 cup breakfast casserole made with egg substitute, turkey sausage,  and steamed, chopped bell peppers   Supplement  1 cup light Greek yogurt (if needed between meals)   Lunch 2 oz. teriyaki turkey    cup mashed sweet potato with 1-2 spritzes of spray butter    cup fresh pineapple   Dinner 3 oz low fat meatloaf    cup roasted garlic zucchini     Breakfast   cup leftover breakfast casserole    cup no sugar added applesauce with 1 Tbsp. unflavored protein powder and a sprinkle of cinnamon    Lunch 3 oz shrimp with 1-2 Tbsp. low-sugar cocktail sauce for dipping    c. whole wheat pasta drizzled with   tsp. olive oil   Supplement 1 cup skim/1% milk with scoop of protein powder (if needed between meals)   Dinner Grilled, seasoned kebob with 2 oz lean beef and   cup vegetables     Breakfast Breakfast pizza:   [place] Thin spread with 1  Tbsp. low sugar spaghetti sauce,   cup shredded low fat cheese, melted and 1 slice of [place] quintanilla     cup fresh fruit mixed with chopped almonds   Lunch   cup black bean soup  4-5 whole grain crackers   Dinner 3 oz  tilapia with lemon pepper seasoning    cup stewed tomatoes   Supplement 1 string cheese (if needed between meals)     Breakfast 2 hard boiled eggs (discard 1 egg yolk)    whole wheat English Muffin with 1 tsp. low sugar jelly   Lunch   cup leftover black bean soup topped with 1-2 Tbsp. low fat cheese  2-3 light [place] Krisp crackers   Supplement Approved protein supplement (if needed between meals)   Dinner 3 oz sirloin steak    cup steamed broccoli          LEAN PROTEIN SOURCES  Getting 20-30 grams of protein, 3 meals daily, is appropriate for most people, some need more but more than about 40 grams per meal is not useful.  General rule is drinking one ounce of water per gram of protein eaten over the course of the day:  70 grams of protein each day, drink 70 oz of water.  Protein Source Portion Calories Grams of Protein                           Nonfat, plain Greek yogurt    (10 grams sugar or less) 3/4 cup (6 oz)  12-17   Light Yogurt (10 grams sugar or less) 3/4 cup (6 oz)  6-8   Protein Shake 1 shake 110-180 15-30   Skim/1% Milk or lactose-free milk 1 cup ( 8 oz)  8   Plain or light, flavored soymilk 1 cup  7-8   Plain or light, hemp milk 1 cup 110 6   Fat Free or 1% Cottage Cheese 1/2 cup 90 15   Part skim ricotta cheese 1/2 cup 100 14   Part skim or reduced fat cheese slices 1 ounce 65-80 8     Mozzarella String Cheese 1 80 8   Canned tuna, chicken, crab or salmon  (canned in water)  1/2 cup 100 15-20   White fish (broiled, grilled, baked) 3 ounces 100 21   [place]/Tuna (broiled, grilled, baked) 3 ounces 150-180 21   Shrimp, Scallops, Lobster, Crab 3 ounces 100 21   Pork loin, Pork Tenderloin 3 ounces 150 21   Boneless, skinless chicken /turkey breast                           (broiled, grilled, baked) 3 ounces 120 21   [place], [place], Coffee, and Venison 3 ounces 120 21   Lean cuts of red meat and pork (sirloin,   round, tenderloin, flank, ground 93%-96%) 3 ounces 170 21   Lean or Extra Lean Ground Turkey 1/2 cup 150 20   90-95% Lean Buckeye Burger 1 stephania 140-180 21   Low-fat casserole with lean meat 3/4 cup 200 17   Luncheon Meats                                                        (turkey, lean ham, roast beef, chicken) 3 ounces 100 21   Egg (boiled, poached, scrambled) 1 Egg 60 7   Egg Substitute 1/2 cup 70 10   Nuts (limit to 1 serving per day)  3 Tbsp. 150 7   Nut [place]art (peanut, almond)  Limit to 1 serving or less daily 1 Tbsp. 90 4   Soy Burger (varies) 1  15   Garbanzo, Black, [place] Beans 1/2 cup 110 7   Refried Beans 1/2 cup 100 7   Kidney and Lima beans 1/2 cup 110 7   Tempeh 3 oz 175 18   Vegan crumbles 1/2 cup 100 14   Tofu 1/2 cup 110 14   Chili (beans and extra lean beef or turkey) 1 cup 200 23   Lentil Stew/Soup 1 cup 150 12   Black Bean Soup 1 cup 175 12

## 2025-07-31 NOTE — PROGRESS NOTES
Virtual Visit Details    Type of service:  Video Visit     Originating Location (pt. Location): Other work    Distant Location (provider location):  On-site  Platform used for Video Visit: Carmenza

## 2025-07-31 NOTE — PROGRESS NOTES
Bariatric Follow Up Visit with a History of Previous Bariatric Surgery     Date of visit: 7/30/2025  Physician: Arnulfo Stanley MD, MD  Primary Care Provider:  aMndi Worthington  Lucina Santamaria   59 year old  female    Date of Surgery: 7/25/22  Initial Weight: 333 lbs  Initial BMI: 50.6  Today's Weight:   Wt Readings from Last 1 Encounters:   07/31/25 100.7 kg (222 lb)     Weight history:   Wt Readings from Last 4 Encounters:   07/31/25 100.7 kg (222 lb)   12/02/24 98.9 kg (218 lb)   10/02/24 98.9 kg (218 lb)   08/26/24 100.4 kg (221 lb 6.4 oz)      Body mass index is 34.77 kg/m .      Assessment and Plan     Assessment: Lucina is a 59 year old year old female who is 3 years s/p  Kris en Y Gastric Bypass with Dr. Schneider.  In the interim doing well. Continues to struggle with itchiness from B12. We'll see if low dose vitamin water or 5 hour energy also causes itchiness. She's hesitant to try subcutaneous for fear of prolonged itchiness. Levels are still normal but low end of range. .    Bariatric labs 7/30/25 show some irregular vitamin use with elevated MCV and PTH levels, low end of range B12. More tests are pending.     .  She has not had her DXA scan as we discussed last year so I'll place the order today given higher risk for bone density issues following weight loss and malabsorptive surgery. Continue mindful vitamin D3 and calcium supplementation reviewed with her today. PTH levels should improve w/ 500-1000mcg/day of calcium citrate.    Her weight is down 111 lbs from introductory weight, a 33% total body weight reduction.    Lucina Santamaria feels as if she has achieved the goals she hoped to accomplish through bariatric surgery and weight loss.    Encounter Diagnoses   Name Primary?    Postoperative intestinal malabsorption     S/P gastric bypass     Weight loss Yes    Secondary hyperparathyroidism, non-renal          Current Outpatient Medications:     acetaminophen (TYLENOL) 500 MG tablet, Take  1,000 mg by mouth 3 times daily as needed for fever or pain, Disp: , Rfl:     calcium carbonate (TUMS) 500 MG chewable tablet, Take 2 chew tab by mouth 2 times daily, Disp: , Rfl:     levothyroxine (SYNTHROID/LEVOTHROID) 100 MCG tablet, Take 1 tablet (100 mcg) by mouth daily., Disp: 90 tablet, Rfl: 0    pantoprazole (PROTONIX) 40 MG EC tablet, Take 1 tablet (40 mg) by mouth daily., Disp: 90 tablet, Rfl: 3    Pediatric Multivitamins-Iron (MULTIVITAMINS PLUS IRON CHILD) 18 MG CHEW, Take 1 chew tab by mouth 2 times daily Ok to substitute with any chewable that contains 18 mg of iron, Vitamin A, Thiamine and Zinc., Disp: 180 tablet, Rfl: 3    vitamin D3 (CHOLECALCIFEROL) 50 mcg (2000 units) tablet, Take 50 mcg by mouth daily, Disp: , Rfl:     Plan:  Great work with keeping off 111 lbs of weight, 33% total body weight reduction.  You can see if itchiness happens after 5 hour energy drink or not. Red meat 3x weekly may help some but digestive capacity is limited by gastric bypass anatomy.   Calcium citrate 500-1000mg/day to help PTH levels normalize. You're due for bone density scan so I've placed that order today as I don't see you followed up last year as we discussed. You can do the bone density scan at your convenience this year.  Weight bearing exercise 4-5x weekly should help bone density risks along with adequate calcium and vitamin D.   EGD looked good last year with GI. Follow up if needed should sx increase.    No follow-ups on file.    Bariatric Surgery Review     Interim History/LifeChanges: stable. 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 Itches whenever tries to supplement B12 in oral/liquid or sublingual form.     Patient Concerns: none  Appetite (1-10): OK  GERD: had weaned off her pantoprazole and will use situationally, none since June.      Collected Lab    Upper GI Endoscopy 12/02/2024  1:19 PM Rad Rslts   _______________________________________________________________________________  Patient Name: Lucina Santamaria          Procedure Date: 12/2/2024 1:19 PM  MRN: [id number]                       YOB: 1965  Admit Type: Outpatient                Age: 59  Gender: Female                        Attending MD: YOSHI DUNBAR MD,  Total Sedation Time:                    _______________________________________________________________________________     Procedure:             Upper GI endoscopy  Indications:           Follow-up of acute gastrojejunal ulcer  Providers:             YOSHI DUNBAR MD  Referring MD:          PAYTON SAMAYOA  Medicines:             Monitored Anesthesia Care  Complications:         No immediate complications.  _______________________________________________________________________________  Procedure:             After obtaining informed consent, the endoscope was                         passed under direct vision. Throughout the procedure,                         the patient's blood pressure, pulse, and oxygen                         saturations were monitored continuously. The Barcode                         0157 was introduced through the mouth, and advanced to                         the second part of duodenum. The upper GI endoscopy                         was accomplished without difficulty. The patient                         tolerated the procedure well.                                                                                   Findings:       The Z-line was regular and was found 36 cm from the incisors.       Evidence of a Kris-en-Y gastrojejunostomy was found. The gastrojejunal       anastomosis was characterized by healthy appearing mucosa. This was       traversed. The pouch-to-jejunum limb was characterized by healthy       appearing mucosa and the presence of no stomal ulceration.       The examined jejunum was normal.                                                                                   Impression:            - Z-line regular, 36 cm from the incisors.                   "       - Kris-en-Y gastrojejunostomy with gastrojejunal                         anastomosis characterized by healthy appearing mucosa.                         - Normal examined jejunum.                         - No specimens collected.  Recommendation:        - Patient has a contact number available for                         emergencies. The signs and symptoms of potential                         delayed complications were discussed with the patient.                         Return to normal activities tomorrow. Written                         discharge instructions were provided to the patient.                         - Resume previous diet.                         - Continue present medications.                         - Follow an antireflux regimen indefinitely.                                                                                     Electronically Signed by Dr. Doshi  ________________  SAGARTICO DOSHI MD  12/2/2024 1:41:12 PM  I was physically present for the entire viewing portion of the exam.  [id number]-EOIAMADOR DOSHI MD       Medication changes: stable.     Vitamin Intake:   B-12   no   MVI  yes   Vitamin D  yes   Calcium   yeds     Other                LABS: partially reviewed        Most recent labs:  Lab Results   Component Value Date    WBC 5.4 07/30/2025    HGB 13.5 07/30/2025    HCT 40.4 07/30/2025     (H) 07/30/2025     07/30/2025     Lab Results   Component Value Date    CHOL 160 07/30/2025     Lab Results   Component Value Date    HDL 61 07/30/2025     No components found for: \"LDLCALC\"  Lab Results   Component Value Date    TRIG 90 07/30/2025     No results found for: \"CHOLHDL\"  Lab Results   Component Value Date    ALT 16 07/30/2025    AST 17 07/30/2025    ALKPHOS 80 07/30/2025     No results found for: \"HGBA1C\"  Lab Results   Component Value Date    B12 309 07/30/2025     No components found for: \"VITDT1\"  Lab Results   Component Value Date    FERNANDO 95 07/30/2025     Lab Results " "  Component Value Date    PTHI 70 (H) 07/30/2025     Lab Results   Component Value Date    ZN 75.7 08/26/2024     Lab Results   Component Value Date    VIB1WB 123 08/26/2024     Lab Results   Component Value Date    TSH 1.04 07/30/2025     No results found for: \"TEST\"       Rbs/Moy-Ump Bariatric    Question 7/30/2025 10:28 PM CDT - Filed by Patient   THESE QUESTIONS ARE ABOUT YOUR WEIGHT    How has your weight changed since your last visit? I have stayed about the same   What is your lowest weight since surgery? (In pounds) 220   Are you currently taking any weight loss medications? No   What is your highest lifetime weight? 305   I had the following weight loss procedure: Kris-en-y Gastric Bypass   What year was your surgery? 2023   THESE QUESTIONS ARE ABOUT YOUR DIET AND PHYSICAL ACTIVITY    How many meals do you eat per day? 3   Do you snack between meals? Sometimes   How much food are you eating at each meal? 1/2 cup to 1 cup   Are you able to separate your meals and liquids by at least 30 minutes? Yes   Are you able to avoid liquid calories? Yes   Do you avoid NSAIDs such as (Ibuprofen, Aleve, Naproxen, Advil)? Yes   How often do you exercise? 5 to 6 times per week   What is the duration of your exercise (in minutes)? 45 Minutes   What types of exercise do you do? walking    swimming   What keeps you from being more active? Pain   Are you smoking? No   Are you drinking alcohol? No   Review of symptoms: Please check the following symptoms you have experienced within the last 30 days.    Vomiting: No   Diarrhea: No   Constipation: No   Swallowing trouble: No   Heartburn: No   Abdominal pain: No   Rash in skin folds: Yes   Depression: No   Anxiety: Yes   Stress urinary incontinence Yes   Female only: Post-menopausal   Please jame all conditions you had prior to having weight loss surgery:    Diabetes II: Never   High Blood Pressure: Never   High cholesterol: Never   Heartburn/Reflux: Never   Sleep apnea: Never " "  Pre-diabetes: Never   PCOS: Never   Back pain: Stayed the same   Joint pain: Stayed the same   Lower leg swelling: Improved   THESE QUESTIONS ASK ABOUT CURRENT HEALTH CONCERNS    Have you been to the Emergency room since your last visit with us? No   Were you in the hospital since your last visit with us? Yes   Do you have any concerns today? No   Do you currently have any of the following: None of the above   Do you have a band? No     [employer]   [id number] [employer] Version 1.1    Question 7/30/2025 10:30 PM CDT - Filed by Patient   In general, would you say your health is: Good   In general, would you say your quality of life is: Good   In general, how would you rate your physical health? Good   In general, how would you rate your mental health, including your mood and your ability to think? Good   In general, how would you rate your satisfaction with your social activities and relationships? Excellent   In general, please rate how well you carry out your usual social activities and roles. (This includes activities at home, at work and in your community, and responsibilities as a parent, child, spouse, employee, friend, etc.) Good   To what extent are you able to carry out your everyday physical activities such as walking, climbing stairs, carrying groceries, or moving a chair? Completely   In the past 7 days    How often have you been bothered by emotional problems such as feeling anxious, depressed or irritable? Sometimes   How would you rate your fatigue on average? Mild   How would you rate your pain on average?   0 = No Pain  to  10 = Worst Imaginable Pain 2       DEXA:is due this year, order placed..  Discussed annual screening to start at age 45 and continue to age 55 if scoring \"low risk\". DEXA scan recommended at age 55 regardless as long as at least 2 years have transpired from their bariatric surgery.    Social History     Social History     Socioeconomic " History    Marital status:      Spouse name: Not on file    Number of children: Not on file    Years of education: Not on file    Highest education level: Not on file   Occupational History    Not on file   Tobacco Use    Smoking status: Never    Smokeless tobacco: Never   Vaping Use    Vaping status: Never Used   Substance and Sexual Activity    Alcohol use: Not Currently     Comment: rare, once monthly.    Drug use: No    Sexual activity: Yes     Partners: Male     Birth control/protection: Female Surgical   Other Topics Concern    Parent/sibling w/ CABG, MI or angioplasty before 65F 55M? No   Social History Narrative    Not on file     Social Drivers of Health     Financial Resource Strain: Low Risk  (4/30/2024)    Financial Resource Strain     Within the past 12 months, have you or your family members you live with been unable to get utilities (heat, electricity) when it was really needed?: No   Food Insecurity: Low Risk  (4/30/2024)    Food Insecurity     Within the past 12 months, did you worry that your food would run out before you got money to buy more?: No     Within the past 12 months, did the food you bought just not last and you didn t have money to get more?: No   Transportation Needs: Low Risk  (4/30/2024)    Transportation Needs     Within the past 12 months, has lack of transportation kept you from medical appointments, getting your medicines, non-medical meetings or appointments, work, or from getting things that you need?: No   Physical Activity: Sufficiently Active (4/30/2024)    Exercise Vital Sign     Days of Exercise per Week: 6 days     Minutes of Exercise per Session: 30 min   Stress: Stress Concern Present (4/30/2024)    [employer] - Occupational Stress Questionnaire     Feeling of Stress : To some extent   Social Connections: Unknown (4/30/2024)    Social Connection and Isolation Panel [NHANES]     Frequency of Communication with Friends and Family: Not  on file     Frequency of Social Gatherings with Friends and Family: Twice a week     Attends Alevism Services: Not on file     Active Member of Clubs or Organizations: Not on file     Attends Club or Organization Meetings: Not on file     Marital Status: Not on file   Interpersonal Safety: Low Risk  (2024)    Interpersonal Safety     Do you feel physically and emotionally safe where you currently live?: Yes     Within the past 12 months, have you been hit, slapped, kicked or otherwise physically hurt by someone?: No     Within the past 12 months, have you been humiliated or emotionally abused in other ways by your partner or ex-partner?: No   Housing Stability: Low Risk  (2024)    Housing Stability     Do you have housing? : Yes     Are you worried about losing your housing?: No       Past Medical History     Past Medical History:   Diagnosis Date    Acquired hypothyroidism     Arthritis     sacro iliac and hip, right ankle.    Chronic constipation     since menopause. fiber/hydration helps.    Coagulation disorder     Complication of anesthesia     nauseated w/ morphine previously after arm surgery.    COVID-19     .    Gallbladder problem     s/p cholecystectomy.    Head injury     childhood concussions    Heterozygous for MTHFR gene mutation     History of MTHFR mutation     Hypertension     Long COVID     Long term Covid    Morbid obesity (H)     Pneumonia     covid    Prediabetes     Pulmonary emboli (H)     Pulmonary embolism (H)     wisdome teeth out while on OCPs and MTHFR gene mutation history.    Pulmonary embolism (H)     age 23, OCPs/dental extraction provoked. reports some MTHFR gene issues, unsure if homo or heterozygous.     Past Surgical History:   Procedure Laterality Date    APPENDECTOMY      BREAST BIOPSY, RT/LT      right breast - benign     SECTION      COLONOSCOPY N/A 11/10/2021    Procedure: COLONOSCOPY, WITH POLYPECTOMY;  Surgeon: Glenda,  MD Lynn;  Location:  GI    CREATION, GASTRIC BYPASS, HAFSA-EN-Y, LAPAROSCOPIC N/A 2022    Procedure: LAPAROSCOPIC HAFSA-EN-Y, GASTRIC BYPASS;  Surgeon: North Schneider MD;  Location: [facility] [place] OR    ESOPHAGOSCOPY, GASTROSCOPY, DUODENOSCOPY (EGD), COMBINED N/A 2024    Procedure: Esophagoscopy, gastroscopy, duodenoscopy (EGD), combined;  Surgeon: Lynn Doshi MD;  Location: [facility]    EYE SURGERY  epilasik left eye    GI SURGERY  Hafsa N Y     GYN SURGERY      tubal ligation    LAPAROSCOPIC CHOLECYSTECTOMY N/A 2019    Procedure: CHOLECYSTECTOMY, LAPAROSCOPIC;  Surgeon: Lynn Doshi MD;  Location:  OR    LAPAROSCOPIC HERNIORRHAPHY UMBILICAL N/A 8/15/2024    Procedure: REPAIR OF UMBILICAL HERNIA;  Surgeon: Teofilo Servin DO;  Location: [facility] [place] OR    LAPAROSCOPIC LYSIS ADHESIONS N/A 8/15/2024    Procedure: LYSIS OF ADHESIONS,;  Surgeon: Teofilo Servin DO;  Location: [facility] [place] OR    LAPAROSCOPY DIAGNOSTIC (GYN) N/A 8/15/2024    Procedure: DIAGNOSTIC LAPAROSCOPY,;  Surgeon: Teofilo Servin DO;  Location: [facility] [place] OR    ORTHOPEDIC SURGERY      radius, ulnar - open fracture    TUBAL LIGATION      wisdom teeth         Problem List     Patient Active Problem List   Diagnosis    CARDIOVASCULAR SCREENING; LDL GOAL LESS THAN 130    Elevated blood pressure reading without diagnosis of hypertension    Edema, unspecified type    History of MTHFR mutation    Hypothyroidism    Pneumonia    Umbilical hernia without obstruction and without gangrene    Onychomycosis    ERRONEOUS ENCOUNTER--DISREGARD    Perforation of intestine (H)     Medications     [unfilled]  Surgical History     Past Surgical History  She has a past surgical history that includes appendectomy (); orthopedic surgery (); GYN surgery ();  section (); breast biopsy, rt/lt (); Laparoscopic cholecystectomy (N/A, 2019); wisdom teeth; tubal ligation; Colonoscopy (N/A,  "11/10/2021); Creation, Gastric Bypass, Kris-En-Y, Laparoscopic (N/A, 07/25/2022); Eye surgery (epilasik left eye); GI surgery (Kris N Y 2022); Laparoscopy diagnostic (gyn) (N/A, 8/15/2024); Laparoscopic lysis adhesions (N/A, 8/15/2024); Laparoscopic herniorrhaphy umbilical (N/A, 8/15/2024); and Esophagoscopy, gastroscopy, duodenoscopy (EGD), combined (N/A, 12/2/2024).    Objective-Exam     Constitutional:  Ht 1.702 m (5' 7\")   Wt 100.7 kg (222 lb)   BMI 34.77 kg/m    [unfilled]   General:  Pleasant and in no acute distress   Eyes:  EOMI  ENT:  Airway patent    Neck:  Respiratory: Normal respiratory effort, no cough, .  CV:    Gastrointestinal:   Musculoskeletal: muscle mass WNL  Skin: color without obvious pallor or jaundice, hair long/thick,   Psychiatric: alert and oriented X3, mood and affect normal    Counseling     We reviewed the important post op bariatric recommendations:  -eating 3 meals daily  -eating protein first, getting >60gm protein daily  -eating slowly, chewing food well  -avoiding/limiting calorie containing beverages  -drinking water 15-30 minutes before or after meals  -limiting restaurant or cafeteria eating to twice a week or less    We discussed the importance of restorative sleep and stress management in maintaining a healthy weight.  We discussed the National Weight Control Registry healthy weight maintenance strategies and ways to optimize metabolism.  We discussed the importance of physical activity including cardiovascular and strength training in maintaining a healthier weight.    We discussed the importance of life-long vitamin supplementation and life-long  follow-up.    Lucina was reminded that, to avoid marginal ulcers she should avoid tobacco at all, alcohol in excess, caffeine in excess, and NSAIDS (unless indicated for cardioprotection or othewise and opposed by a PPI).    Arnulfo Stanley MD    [facility].  7/30/2025  8:36 PM  [phone] (clinic " phone)  [phone] (fax)    No images are attached to the encounter.  Medical Decision Makin minutes spent by me on the date of the encounter doing chart review, history and exam, documentation and further activities per the note

## 2025-08-02 LAB
ANNOTATION COMMENT IMP: NORMAL
RETINYL PALMITATE SERPL-MCNC: <0.02 MG/L
VIT A SERPL-MCNC: 0.36 MG/L
VIT B1 PYROPHOSHATE BLD-SCNC: 138 NMOL/L
ZINC SERPL-MCNC: 69.6 UG/DL

## 2025-08-04 LAB
DEPRECATED CALCIDIOL+CALCIFEROL SERPL-MC: <47 UG/L (ref 20–75)
VITAMIN D2 SERPL-MCNC: <5 UG/L
VITAMIN D3 SERPL-MCNC: 42 UG/L

## (undated) DEVICE — POSITIONER ARM BOARD FOAM 7.5X20X2"

## (undated) DEVICE — GLOVE ESTEEM POWDER FREE 5.5  2D72PL55

## (undated) DEVICE — ADHESIVE SEAMGUARD BIO STAPLE LINE EC60A

## (undated) DEVICE — GLOVE BIOGEL PI ULTRATOUCH G SZ 6.5 42165

## (undated) DEVICE — SU MONOCRYL+ 4-0 18IN PS2 UND MCP496G

## (undated) DEVICE — SUCTION MANIFOLD NEPTUNE 2 SYS 1 PORT 702-025-000

## (undated) DEVICE — GLOVE UNDER INDICATOR PI SZ 7.0 LF 41670

## (undated) DEVICE — KIT ENDO TURNOVER/PROCEDURE CARRY-ON 101822

## (undated) DEVICE — DRSG TELFA 3X8" 1238

## (undated) DEVICE — DRSG TELFA 3X4" 1050

## (undated) DEVICE — ESU LIGASURE MARYLAND LAPAROSCOPIC SLR/DVDR 5MMX37CM LF1937

## (undated) DEVICE — SU ENDO STITCH POLYSORB 2-0 ES-9 48"  170053

## (undated) DEVICE — SU WND CLOSURE VLOC 90 ABS 3-0 VIOLET 6" CV-23 VLOCM0804

## (undated) DEVICE — ENDO TROCAR FIRST ENTRY KII FIOS Z-THRD 05X100MM CTF03

## (undated) DEVICE — BLADE KNIFE SURG 11 371111

## (undated) DEVICE — DRSG TEGADERM 4X4 3/4" 1626W

## (undated) DEVICE — DRAPE SURGICAL BARIATRIC 92INW X 110INW X 132INL 94590

## (undated) DEVICE — SOL NACL 0.9% IRRIG 1000ML BOTTLE 2F7124

## (undated) DEVICE — TUBING SMOKE EVAC PNEUMOCLEAR HIGH FLOW 0620050250

## (undated) DEVICE — ENDO TROCAR FIRST ENTRY KII FIOS Z-THRD 11X100MM CTF33

## (undated) DEVICE — NDL INSUFFLATION 13GA 120MM C2201

## (undated) DEVICE — GOWN SURGICAL SMARTGOWN 2XL 89075

## (undated) DEVICE — CLIP APPLIER ENDO 5MM M/L LIGAMAX EL5ML

## (undated) DEVICE — ENDO POUCH UNIV RETRIEVAL SYSTEM INZII 10MM CD001

## (undated) DEVICE — DEVICE ENDO STITCH APPLIER 10MM 173016

## (undated) DEVICE — SUCTION STRYKERFLOW II 250-070-500

## (undated) DEVICE — GOWN IMPERVIOUS BREATHABLE SMART XLG 89045

## (undated) DEVICE — ENDO TROCAR FIRST ENTRY KII FIOS Z-THRD 12X100MM CTF73

## (undated) DEVICE — VIAL DECANTER

## (undated) DEVICE — SYSTEM LAPAROVUE VISIBILITY LAPVUE10

## (undated) DEVICE — SOL NACL 0.9% INJ 1000ML BAG 07983-09

## (undated) DEVICE — GLOVE ESTEEM BLUE W/NEU-THERA 6.0  2D73PB60

## (undated) DEVICE — CUSTOM PACK LAP CHOLE SBA5BLCHEA

## (undated) DEVICE — CATH TRAY FOLEY SURESTEP 16FR LF A947316

## (undated) DEVICE — DEVICE SUTURE GRASPER TROCAR CLOSURE 14GA PMITCSG

## (undated) DEVICE — KIT DEFOGGING-FOG INHIBITOR FOG1001

## (undated) DEVICE — SU DERMABOND ADVANCED .7ML DNX12

## (undated) DEVICE — PACK GENERAL LAPAOSCOPY

## (undated) DEVICE — PREP CHLORAPREP 26ML TINTED HI-LITE ORANGE 930815

## (undated) DEVICE — DRAPE LAP/CHOLE W/O POUCH 89225

## (undated) DEVICE — DRAPE SHEET REV FOLD 3/4 9349

## (undated) DEVICE — SOL RINGERS LACTATED 1000ML BAG 2B2324X

## (undated) DEVICE — NEEDLE SPINAL DISP 22GA X 3.5" QUINCKE 333320

## (undated) DEVICE — GLOVE EXAM NITRILE LG

## (undated) DEVICE — ENDO SHEARS RENEW LAP ENDOCUT SCISSOR TIP 16.5MM 3142

## (undated) DEVICE — SYR 30ML LL W/O NDL 302832

## (undated) DEVICE — ENDO TROCAR SLEEVE KII Z-THREADED 05X100MM CTS02

## (undated) DEVICE — ESU HARMONIC ACE LAP SHEARS STRYKER ACE+ 7 5MMX45CM HARH45

## (undated) DEVICE — SYR 50ML CATH TIP W/O NDL 309620

## (undated) DEVICE — GLOVE BIOGEL PI ULTRATOUCH G SZ 8.0 42180

## (undated) DEVICE — SUTURE VICRYL+ 0 27IN CT-1 UND VCP260H

## (undated) DEVICE — TUBING SUCTION 12"X1/4" N612

## (undated) DEVICE — EVAC SYSTEM CLEAR FLOW SC082500

## (undated) DEVICE — SOL WATER IRRIG 1000ML BOTTLE 2F7114

## (undated) DEVICE — TAPE ADH POROUS 3IN CURITY STD 7046C

## (undated) DEVICE — DRSG TEGADERM 2 3/8X2 3/4" 1624W

## (undated) DEVICE — DRAPE POUCH INSTRUMENT 3 POCKET 1018L

## (undated) DEVICE — DRAIN RESERVOIR 100ML JP 0070740

## (undated) DEVICE — TUBE COLON 32FR 30IN LNG 080074200

## (undated) DEVICE — ENDO TROCAR OPTICAL ACCESS KII Z-THRD 05X100MM CTR03

## (undated) DEVICE — ESU GROUND PAD ADULT REM W/15' CORD E7507DB

## (undated) DEVICE — ADH SKIN CLOSURE PREMIERPRO EXOFIN 1.0ML 3470

## (undated) DEVICE — TUBING LAP/SUCT IRRIG DAVOL 0026880

## (undated) DEVICE — LUBRICATING JELLY 4.25OZ

## (undated) DEVICE — SU VICRYL+ 3-0 27IN SH UND VCP416H

## (undated) DEVICE — DRAIN BLAKE 19FR SIL 2231

## (undated) DEVICE — ESU ENDO SCISSORS 5MM CVD 5DCS

## (undated) DEVICE — DRSG GAUZE 4X4" 3033

## (undated) DEVICE — SU SILK 2-0 FS-1 18" 685G

## (undated) DEVICE — DECANTER VIAL 2006S

## (undated) DEVICE — ESU HOLDER LAP INST DISP PURPLE LONG 330MM H-PRO-330

## (undated) DEVICE — SU MONOCRYL 4-0 PS-2 18" UND Y496G

## (undated) DEVICE — GLOVE BIOGEL PI SZ 7.5 40875

## (undated) DEVICE — NEEDLE HYPO MAGELLAN SAFETY 22GA 1 1/2IN 8881850215

## (undated) DEVICE — SOL WATER IRRIG 1000ML BOTTLE 07139-09

## (undated) DEVICE — GOWN IMPERVIOUS BREATHABLE SMART LG 89015

## (undated) DEVICE — SYR 20ML LL W/O NDL 302830

## (undated) RX ORDER — KETOROLAC TROMETHAMINE 30 MG/ML
INJECTION, SOLUTION INTRAMUSCULAR; INTRAVENOUS
Status: DISPENSED
Start: 2019-12-31

## (undated) RX ORDER — FENTANYL CITRATE-0.9 % NACL/PF 10 MCG/ML
PLASTIC BAG, INJECTION (ML) INTRAVENOUS
Status: DISPENSED
Start: 2024-08-15

## (undated) RX ORDER — EPHEDRINE SULFATE 50 MG/ML
INJECTION, SOLUTION INTRAMUSCULAR; INTRAVENOUS; SUBCUTANEOUS
Status: DISPENSED
Start: 2022-07-25

## (undated) RX ORDER — FENTANYL CITRATE 50 UG/ML
INJECTION, SOLUTION INTRAMUSCULAR; INTRAVENOUS
Status: DISPENSED
Start: 2024-08-15

## (undated) RX ORDER — PROPOFOL 10 MG/ML
INJECTION, EMULSION INTRAVENOUS
Status: DISPENSED
Start: 2019-12-31

## (undated) RX ORDER — DEXAMETHASONE SODIUM PHOSPHATE 10 MG/ML
INJECTION, SOLUTION INTRAMUSCULAR; INTRAVENOUS
Status: DISPENSED
Start: 2019-12-31

## (undated) RX ORDER — BUPIVACAINE HYDROCHLORIDE AND EPINEPHRINE 2.5; 5 MG/ML; UG/ML
INJECTION, SOLUTION EPIDURAL; INFILTRATION; INTRACAUDAL; PERINEURAL
Status: DISPENSED
Start: 2019-12-31

## (undated) RX ORDER — LIDOCAINE HYDROCHLORIDE AND EPINEPHRINE 10; 10 MG/ML; UG/ML
INJECTION, SOLUTION INFILTRATION; PERINEURAL
Status: DISPENSED
Start: 2024-08-15

## (undated) RX ORDER — PROPOFOL 10 MG/ML
INJECTION, EMULSION INTRAVENOUS
Status: DISPENSED
Start: 2022-07-25

## (undated) RX ORDER — FENTANYL CITRATE 50 UG/ML
INJECTION, SOLUTION INTRAMUSCULAR; INTRAVENOUS
Status: DISPENSED
Start: 2022-07-25

## (undated) RX ORDER — LIDOCAINE HYDROCHLORIDE 10 MG/ML
INJECTION, SOLUTION EPIDURAL; INFILTRATION; INTRACAUDAL; PERINEURAL
Status: DISPENSED
Start: 2022-07-25

## (undated) RX ORDER — FENTANYL CITRATE 50 UG/ML
INJECTION, SOLUTION INTRAMUSCULAR; INTRAVENOUS
Status: DISPENSED
Start: 2019-12-31

## (undated) RX ORDER — HYDROMORPHONE HYDROCHLORIDE 1 MG/ML
INJECTION, SOLUTION INTRAMUSCULAR; INTRAVENOUS; SUBCUTANEOUS
Status: DISPENSED
Start: 2019-12-31

## (undated) RX ORDER — LIDOCAINE HYDROCHLORIDE 20 MG/ML
INJECTION, SOLUTION EPIDURAL; INFILTRATION; INTRACAUDAL; PERINEURAL
Status: DISPENSED
Start: 2019-12-31

## (undated) RX ORDER — ONDANSETRON 2 MG/ML
INJECTION INTRAMUSCULAR; INTRAVENOUS
Status: DISPENSED
Start: 2019-12-31

## (undated) RX ORDER — KETAMINE HYDROCHLORIDE 10 MG/ML
INJECTION INTRAMUSCULAR; INTRAVENOUS
Status: DISPENSED
Start: 2022-07-25

## (undated) RX ORDER — DEXAMETHASONE SODIUM PHOSPHATE 10 MG/ML
INJECTION INTRAMUSCULAR; INTRAVENOUS
Status: DISPENSED
Start: 2022-07-25

## (undated) RX ORDER — BUPIVACAINE HYDROCHLORIDE AND EPINEPHRINE 2.5; 5 MG/ML; UG/ML
INJECTION, SOLUTION EPIDURAL; INFILTRATION; INTRACAUDAL; PERINEURAL
Status: DISPENSED
Start: 2022-07-25

## (undated) RX ORDER — ONDANSETRON 2 MG/ML
INJECTION INTRAMUSCULAR; INTRAVENOUS
Status: DISPENSED
Start: 2022-07-25